# Patient Record
Sex: MALE | Race: WHITE | HISPANIC OR LATINO
[De-identification: names, ages, dates, MRNs, and addresses within clinical notes are randomized per-mention and may not be internally consistent; named-entity substitution may affect disease eponyms.]

---

## 2018-12-06 ENCOUNTER — APPOINTMENT (OUTPATIENT)
Dept: SURGERY | Facility: CLINIC | Age: 58
End: 2018-12-06
Payer: SELF-PAY

## 2018-12-06 ENCOUNTER — APPOINTMENT (OUTPATIENT)
Dept: GASTROENTEROLOGY | Facility: CLINIC | Age: 58
End: 2018-12-06

## 2018-12-06 VITALS
DIASTOLIC BLOOD PRESSURE: 52 MMHG | BODY MASS INDEX: 24.13 KG/M2 | TEMPERATURE: 98 F | OXYGEN SATURATION: 99 % | SYSTOLIC BLOOD PRESSURE: 107 MMHG | HEART RATE: 57 BPM | WEIGHT: 141.31 LBS | HEIGHT: 64 IN

## 2018-12-06 PROCEDURE — 99024 POSTOP FOLLOW-UP VISIT: CPT

## 2023-06-12 ENCOUNTER — INPATIENT (INPATIENT)
Facility: HOSPITAL | Age: 63
LOS: 6 days | Discharge: HOME | DRG: 280 | End: 2023-06-19
Attending: HOSPITALIST | Admitting: THORACIC SURGERY (CARDIOTHORACIC VASCULAR SURGERY)
Payer: MEDICAID

## 2023-06-12 VITALS
SYSTOLIC BLOOD PRESSURE: 172 MMHG | HEART RATE: 76 BPM | TEMPERATURE: 99 F | OXYGEN SATURATION: 97 % | DIASTOLIC BLOOD PRESSURE: 70 MMHG | RESPIRATION RATE: 17 BRPM

## 2023-06-12 DIAGNOSIS — I25.10 ATHEROSCLEROTIC HEART DISEASE OF NATIVE CORONARY ARTERY WITHOUT ANGINA PECTORIS: ICD-10-CM

## 2023-06-12 LAB
A1C WITH ESTIMATED AVERAGE GLUCOSE RESULT: 9.4 % — HIGH (ref 4–5.6)
ALBUMIN SERPL ELPH-MCNC: 3.3 G/DL — SIGNIFICANT CHANGE UP (ref 3.3–5.2)
ALP SERPL-CCNC: 78 U/L — SIGNIFICANT CHANGE UP (ref 40–120)
ALT FLD-CCNC: 19 U/L — SIGNIFICANT CHANGE UP
ANION GAP SERPL CALC-SCNC: 15 MMOL/L — SIGNIFICANT CHANGE UP (ref 5–17)
APTT BLD: 29.7 SEC — SIGNIFICANT CHANGE UP (ref 27.5–35.5)
AST SERPL-CCNC: 13 U/L — SIGNIFICANT CHANGE UP
BASOPHILS # BLD AUTO: 0.03 K/UL — SIGNIFICANT CHANGE UP (ref 0–0.2)
BASOPHILS NFR BLD AUTO: 0.6 % — SIGNIFICANT CHANGE UP (ref 0–2)
BILIRUB SERPL-MCNC: 0.3 MG/DL — LOW (ref 0.4–2)
BLD GP AB SCN SERPL QL: SIGNIFICANT CHANGE UP
BUN SERPL-MCNC: 43.5 MG/DL — HIGH (ref 8–20)
CALCIUM SERPL-MCNC: 7.9 MG/DL — LOW (ref 8.4–10.5)
CHLORIDE SERPL-SCNC: 90 MMOL/L — LOW (ref 96–108)
CO2 SERPL-SCNC: 25 MMOL/L — SIGNIFICANT CHANGE UP (ref 22–29)
CREAT SERPL-MCNC: 7.78 MG/DL — HIGH (ref 0.5–1.3)
EGFR: 7 ML/MIN/1.73M2 — LOW
EOSINOPHIL # BLD AUTO: 0.45 K/UL — SIGNIFICANT CHANGE UP (ref 0–0.5)
EOSINOPHIL NFR BLD AUTO: 9.1 % — HIGH (ref 0–6)
ESTIMATED AVERAGE GLUCOSE: 223 MG/DL — HIGH (ref 68–114)
GLUCOSE BLDC GLUCOMTR-MCNC: 308 MG/DL — HIGH (ref 70–99)
GLUCOSE SERPL-MCNC: 323 MG/DL — HIGH (ref 70–99)
GLUCOSE SERPL-MCNC: 323 MG/DL — HIGH (ref 70–99)
HCG SERPL-ACNC: <4 MIU/ML — HIGH
HCT VFR BLD CALC: 25.6 % — LOW (ref 39–50)
HGB BLD-MCNC: 8.7 G/DL — LOW (ref 13–17)
IMM GRANULOCYTES NFR BLD AUTO: 0.2 % — SIGNIFICANT CHANGE UP (ref 0–0.9)
INR BLD: 0.97 RATIO — SIGNIFICANT CHANGE UP (ref 0.88–1.16)
LYMPHOCYTES # BLD AUTO: 0.73 K/UL — LOW (ref 1–3.3)
LYMPHOCYTES # BLD AUTO: 14.8 % — SIGNIFICANT CHANGE UP (ref 13–44)
MCHC RBC-ENTMCNC: 29.5 PG — SIGNIFICANT CHANGE UP (ref 27–34)
MCHC RBC-ENTMCNC: 34 GM/DL — SIGNIFICANT CHANGE UP (ref 32–36)
MCV RBC AUTO: 86.8 FL — SIGNIFICANT CHANGE UP (ref 80–100)
MONOCYTES # BLD AUTO: 0.5 K/UL — SIGNIFICANT CHANGE UP (ref 0–0.9)
MONOCYTES NFR BLD AUTO: 10.1 % — SIGNIFICANT CHANGE UP (ref 2–14)
NEUTROPHILS # BLD AUTO: 3.22 K/UL — SIGNIFICANT CHANGE UP (ref 1.8–7.4)
NEUTROPHILS NFR BLD AUTO: 65.2 % — SIGNIFICANT CHANGE UP (ref 43–77)
NT-PROBNP SERPL-SCNC: HIGH PG/ML (ref 0–300)
PA ADP PRP-ACNC: 262 PRU — SIGNIFICANT CHANGE UP (ref 180–376)
PLATELET # BLD AUTO: 167 K/UL — SIGNIFICANT CHANGE UP (ref 150–400)
POTASSIUM SERPL-MCNC: 4.4 MMOL/L — SIGNIFICANT CHANGE UP (ref 3.5–5.3)
POTASSIUM SERPL-SCNC: 4.4 MMOL/L — SIGNIFICANT CHANGE UP (ref 3.5–5.3)
PREALB SERPL-MCNC: 15 MG/DL — LOW (ref 18–38)
PROT SERPL-MCNC: 6 G/DL — LOW (ref 6.6–8.7)
PROTHROM AB SERPL-ACNC: 11.2 SEC — SIGNIFICANT CHANGE UP (ref 10.5–13.4)
RBC # BLD: 2.95 M/UL — LOW (ref 4.2–5.8)
RBC # FLD: 14.6 % — HIGH (ref 10.3–14.5)
SODIUM SERPL-SCNC: 130 MMOL/L — LOW (ref 135–145)
TSH SERPL-MCNC: 4.14 UIU/ML — SIGNIFICANT CHANGE UP (ref 0.27–4.2)
WBC # BLD: 4.94 K/UL — SIGNIFICANT CHANGE UP (ref 3.8–10.5)
WBC # FLD AUTO: 4.94 K/UL — SIGNIFICANT CHANGE UP (ref 3.8–10.5)

## 2023-06-12 PROCEDURE — 93010 ELECTROCARDIOGRAM REPORT: CPT

## 2023-06-12 RX ORDER — SODIUM CHLORIDE 9 MG/ML
3 INJECTION INTRAMUSCULAR; INTRAVENOUS; SUBCUTANEOUS EVERY 8 HOURS
Refills: 0 | Status: DISCONTINUED | OUTPATIENT
Start: 2023-06-12 | End: 2023-06-19

## 2023-06-12 RX ORDER — INSULIN GLARGINE 100 [IU]/ML
7 INJECTION, SOLUTION SUBCUTANEOUS AT BEDTIME
Refills: 0 | Status: DISCONTINUED | OUTPATIENT
Start: 2023-06-12 | End: 2023-06-15

## 2023-06-12 RX ORDER — INSULIN LISPRO 100/ML
VIAL (ML) SUBCUTANEOUS
Refills: 0 | Status: DISCONTINUED | OUTPATIENT
Start: 2023-06-12 | End: 2023-06-13

## 2023-06-12 RX ORDER — DEXTROSE 50 % IN WATER 50 %
25 SYRINGE (ML) INTRAVENOUS ONCE
Refills: 0 | Status: DISCONTINUED | OUTPATIENT
Start: 2023-06-12 | End: 2023-06-19

## 2023-06-12 RX ORDER — DEXTROSE 50 % IN WATER 50 %
12.5 SYRINGE (ML) INTRAVENOUS ONCE
Refills: 0 | Status: DISCONTINUED | OUTPATIENT
Start: 2023-06-12 | End: 2023-06-19

## 2023-06-12 RX ORDER — PANTOPRAZOLE SODIUM 20 MG/1
40 TABLET, DELAYED RELEASE ORAL
Refills: 0 | Status: DISCONTINUED | OUTPATIENT
Start: 2023-06-12 | End: 2023-06-15

## 2023-06-12 RX ORDER — AMLODIPINE BESYLATE 2.5 MG/1
10 TABLET ORAL ONCE
Refills: 0 | Status: DISCONTINUED | OUTPATIENT
Start: 2023-06-13 | End: 2023-06-16

## 2023-06-12 RX ORDER — GABAPENTIN 400 MG/1
300 CAPSULE ORAL AT BEDTIME
Refills: 0 | Status: DISCONTINUED | OUTPATIENT
Start: 2023-06-12 | End: 2023-06-14

## 2023-06-12 RX ORDER — HYOSCYAMINE SULFATE 0.13 MG
0.12 TABLET ORAL
Refills: 0 | Status: DISCONTINUED | OUTPATIENT
Start: 2023-06-12 | End: 2023-06-19

## 2023-06-12 RX ORDER — CALCIUM ACETATE 667 MG
667 TABLET ORAL
Refills: 0 | Status: DISCONTINUED | OUTPATIENT
Start: 2023-06-12 | End: 2023-06-19

## 2023-06-12 RX ORDER — ERYTHROPOIETIN 10000 [IU]/ML
10000 INJECTION, SOLUTION INTRAVENOUS; SUBCUTANEOUS
Refills: 0 | Status: DISCONTINUED | OUTPATIENT
Start: 2023-06-14 | End: 2023-06-14

## 2023-06-12 RX ORDER — SODIUM CHLORIDE 9 MG/ML
1000 INJECTION, SOLUTION INTRAVENOUS
Refills: 0 | Status: DISCONTINUED | OUTPATIENT
Start: 2023-06-12 | End: 2023-06-19

## 2023-06-12 RX ORDER — ACETAMINOPHEN 500 MG
650 TABLET ORAL EVERY 8 HOURS
Refills: 0 | Status: DISCONTINUED | OUTPATIENT
Start: 2023-06-12 | End: 2023-06-19

## 2023-06-12 RX ORDER — METHOCARBAMOL 500 MG/1
500 TABLET, FILM COATED ORAL THREE TIMES A DAY
Refills: 0 | Status: DISCONTINUED | OUTPATIENT
Start: 2023-06-12 | End: 2023-06-19

## 2023-06-12 RX ORDER — ALBUTEROL 90 UG/1
2 AEROSOL, METERED ORAL EVERY 6 HOURS
Refills: 0 | Status: DISCONTINUED | OUTPATIENT
Start: 2023-06-12 | End: 2023-06-19

## 2023-06-12 RX ORDER — CARVEDILOL PHOSPHATE 80 MG/1
25 CAPSULE, EXTENDED RELEASE ORAL EVERY 12 HOURS
Refills: 0 | Status: DISCONTINUED | OUTPATIENT
Start: 2023-06-13 | End: 2023-06-13

## 2023-06-12 RX ORDER — LIDOCAINE 4 G/100G
1 CREAM TOPICAL DAILY
Refills: 0 | Status: DISCONTINUED | OUTPATIENT
Start: 2023-06-12 | End: 2023-06-19

## 2023-06-12 RX ORDER — ATORVASTATIN CALCIUM 80 MG/1
40 TABLET, FILM COATED ORAL AT BEDTIME
Refills: 0 | Status: DISCONTINUED | OUTPATIENT
Start: 2023-06-12 | End: 2023-06-19

## 2023-06-12 RX ORDER — DEXTROSE 50 % IN WATER 50 %
15 SYRINGE (ML) INTRAVENOUS ONCE
Refills: 0 | Status: DISCONTINUED | OUTPATIENT
Start: 2023-06-12 | End: 2023-06-19

## 2023-06-12 RX ORDER — NITROGLYCERIN 6.5 MG
0.4 CAPSULE, EXTENDED RELEASE ORAL
Refills: 0 | Status: COMPLETED | OUTPATIENT
Start: 2023-06-12 | End: 2023-06-13

## 2023-06-12 RX ORDER — ASPIRIN/CALCIUM CARB/MAGNESIUM 324 MG
81 TABLET ORAL DAILY
Refills: 0 | Status: DISCONTINUED | OUTPATIENT
Start: 2023-06-13 | End: 2023-06-13

## 2023-06-12 RX ORDER — GLUCAGON INJECTION, SOLUTION 0.5 MG/.1ML
1 INJECTION, SOLUTION SUBCUTANEOUS ONCE
Refills: 0 | Status: DISCONTINUED | OUTPATIENT
Start: 2023-06-12 | End: 2023-06-19

## 2023-06-13 DIAGNOSIS — I50.9 HEART FAILURE, UNSPECIFIED: ICD-10-CM

## 2023-06-13 DIAGNOSIS — N40.0 BENIGN PROSTATIC HYPERPLASIA WITHOUT LOWER URINARY TRACT SYMPTOMS: ICD-10-CM

## 2023-06-13 DIAGNOSIS — J44.9 CHRONIC OBSTRUCTIVE PULMONARY DISEASE, UNSPECIFIED: ICD-10-CM

## 2023-06-13 DIAGNOSIS — I10 ESSENTIAL (PRIMARY) HYPERTENSION: ICD-10-CM

## 2023-06-13 DIAGNOSIS — E11.9 TYPE 2 DIABETES MELLITUS WITHOUT COMPLICATIONS: ICD-10-CM

## 2023-06-13 DIAGNOSIS — E78.5 HYPERLIPIDEMIA, UNSPECIFIED: ICD-10-CM

## 2023-06-13 DIAGNOSIS — I25.10 ATHEROSCLEROTIC HEART DISEASE OF NATIVE CORONARY ARTERY WITHOUT ANGINA PECTORIS: ICD-10-CM

## 2023-06-13 DIAGNOSIS — N18.6 END STAGE RENAL DISEASE: ICD-10-CM

## 2023-06-13 LAB
ABO RH CONFIRMATION: SIGNIFICANT CHANGE UP
ALBUMIN SERPL ELPH-MCNC: 3 G/DL — LOW (ref 3.3–5.2)
ALP SERPL-CCNC: 75 U/L — SIGNIFICANT CHANGE UP (ref 40–120)
ALT FLD-CCNC: 15 U/L — SIGNIFICANT CHANGE UP
ANION GAP SERPL CALC-SCNC: 15 MMOL/L — SIGNIFICANT CHANGE UP (ref 5–17)
AST SERPL-CCNC: 11 U/L — SIGNIFICANT CHANGE UP
BASOPHILS # BLD AUTO: 0.02 K/UL — SIGNIFICANT CHANGE UP (ref 0–0.2)
BASOPHILS NFR BLD AUTO: 0.4 % — SIGNIFICANT CHANGE UP (ref 0–2)
BILIRUB SERPL-MCNC: 0.3 MG/DL — LOW (ref 0.4–2)
BUN SERPL-MCNC: 49.7 MG/DL — HIGH (ref 8–20)
CALCIUM SERPL-MCNC: 7.6 MG/DL — LOW (ref 8.4–10.5)
CHLORIDE SERPL-SCNC: 93 MMOL/L — LOW (ref 96–108)
CO2 SERPL-SCNC: 25 MMOL/L — SIGNIFICANT CHANGE UP (ref 22–29)
CREAT SERPL-MCNC: 8.56 MG/DL — HIGH (ref 0.5–1.3)
EGFR: 6 ML/MIN/1.73M2 — LOW
EOSINOPHIL # BLD AUTO: 0.48 K/UL — SIGNIFICANT CHANGE UP (ref 0–0.5)
EOSINOPHIL NFR BLD AUTO: 10.6 % — HIGH (ref 0–6)
FERRITIN SERPL-MCNC: 1092 NG/ML — HIGH (ref 30–400)
GLUCOSE BLDC GLUCOMTR-MCNC: 126 MG/DL — HIGH (ref 70–99)
GLUCOSE BLDC GLUCOMTR-MCNC: 127 MG/DL — HIGH (ref 70–99)
GLUCOSE BLDC GLUCOMTR-MCNC: 193 MG/DL — HIGH (ref 70–99)
GLUCOSE BLDC GLUCOMTR-MCNC: 226 MG/DL — HIGH (ref 70–99)
GLUCOSE BLDC GLUCOMTR-MCNC: 374 MG/DL — HIGH (ref 70–99)
GLUCOSE SERPL-MCNC: 169 MG/DL — HIGH (ref 70–99)
HCT VFR BLD CALC: 23.7 % — LOW (ref 39–50)
HGB BLD-MCNC: 7.9 G/DL — LOW (ref 13–17)
IMM GRANULOCYTES NFR BLD AUTO: 0.2 % — SIGNIFICANT CHANGE UP (ref 0–0.9)
IRON SATN MFR SERPL: 21 % — SIGNIFICANT CHANGE UP (ref 16–55)
IRON SATN MFR SERPL: 45 UG/DL — LOW (ref 59–158)
LYMPHOCYTES # BLD AUTO: 0.74 K/UL — LOW (ref 1–3.3)
LYMPHOCYTES # BLD AUTO: 16.3 % — SIGNIFICANT CHANGE UP (ref 13–44)
MAGNESIUM SERPL-MCNC: 2.1 MG/DL — SIGNIFICANT CHANGE UP (ref 1.6–2.6)
MCHC RBC-ENTMCNC: 28.9 PG — SIGNIFICANT CHANGE UP (ref 27–34)
MCHC RBC-ENTMCNC: 33.3 GM/DL — SIGNIFICANT CHANGE UP (ref 32–36)
MCV RBC AUTO: 86.8 FL — SIGNIFICANT CHANGE UP (ref 80–100)
MONOCYTES # BLD AUTO: 0.55 K/UL — SIGNIFICANT CHANGE UP (ref 0–0.9)
MONOCYTES NFR BLD AUTO: 12.1 % — SIGNIFICANT CHANGE UP (ref 2–14)
MRSA PCR RESULT.: SIGNIFICANT CHANGE UP
NEUTROPHILS # BLD AUTO: 2.74 K/UL — SIGNIFICANT CHANGE UP (ref 1.8–7.4)
NEUTROPHILS NFR BLD AUTO: 60.4 % — SIGNIFICANT CHANGE UP (ref 43–77)
PHOSPHATE SERPL-MCNC: 5.5 MG/DL — HIGH (ref 2.4–4.7)
PLATELET # BLD AUTO: 159 K/UL — SIGNIFICANT CHANGE UP (ref 150–400)
POTASSIUM SERPL-MCNC: 4.2 MMOL/L — SIGNIFICANT CHANGE UP (ref 3.5–5.3)
POTASSIUM SERPL-SCNC: 4.2 MMOL/L — SIGNIFICANT CHANGE UP (ref 3.5–5.3)
PROT SERPL-MCNC: 5.6 G/DL — LOW (ref 6.6–8.7)
RBC # BLD: 2.73 M/UL — LOW (ref 4.2–5.8)
RBC # FLD: 14.7 % — HIGH (ref 10.3–14.5)
S AUREUS DNA NOSE QL NAA+PROBE: SIGNIFICANT CHANGE UP
SODIUM SERPL-SCNC: 133 MMOL/L — LOW (ref 135–145)
TIBC SERPL-MCNC: 216 UG/DL — LOW (ref 220–430)
TRANSFERRIN SERPL-MCNC: 151 MG/DL — LOW (ref 180–329)
WBC # BLD: 4.54 K/UL — SIGNIFICANT CHANGE UP (ref 3.8–10.5)
WBC # FLD AUTO: 4.54 K/UL — SIGNIFICANT CHANGE UP (ref 3.8–10.5)

## 2023-06-13 PROCEDURE — 99222 1ST HOSP IP/OBS MODERATE 55: CPT

## 2023-06-13 PROCEDURE — 99255 IP/OBS CONSLTJ NEW/EST HI 80: CPT

## 2023-06-13 PROCEDURE — 93880 EXTRACRANIAL BILAT STUDY: CPT | Mod: 26

## 2023-06-13 PROCEDURE — 99222 1ST HOSP IP/OBS MODERATE 55: CPT | Mod: 25

## 2023-06-13 PROCEDURE — 93306 TTE W/DOPPLER COMPLETE: CPT | Mod: 26

## 2023-06-13 PROCEDURE — 99223 1ST HOSP IP/OBS HIGH 75: CPT

## 2023-06-13 PROCEDURE — 71045 X-RAY EXAM CHEST 1 VIEW: CPT | Mod: 26

## 2023-06-13 PROCEDURE — 93010 ELECTROCARDIOGRAM REPORT: CPT

## 2023-06-13 RX ORDER — SODIUM BICARBONATE 1 MEQ/ML
2 SYRINGE (ML) INTRAVENOUS
Refills: 0 | DISCHARGE

## 2023-06-13 RX ORDER — INSULIN LISPRO 100/ML
5 VIAL (ML) SUBCUTANEOUS
Refills: 0 | Status: DISCONTINUED | OUTPATIENT
Start: 2023-06-13 | End: 2023-06-14

## 2023-06-13 RX ORDER — LANOLIN ALCOHOL/MO/W.PET/CERES
5 CREAM (GRAM) TOPICAL AT BEDTIME
Refills: 0 | Status: DISCONTINUED | OUTPATIENT
Start: 2023-06-13 | End: 2023-06-19

## 2023-06-13 RX ORDER — INSULIN LISPRO 100/ML
VIAL (ML) SUBCUTANEOUS
Refills: 0 | Status: DISCONTINUED | OUTPATIENT
Start: 2023-06-13 | End: 2023-06-13

## 2023-06-13 RX ORDER — LORATADINE 10 MG/1
10 TABLET ORAL DAILY
Refills: 0 | Status: DISCONTINUED | OUTPATIENT
Start: 2023-06-13 | End: 2023-06-19

## 2023-06-13 RX ORDER — TAMSULOSIN HYDROCHLORIDE 0.4 MG/1
0.4 CAPSULE ORAL AT BEDTIME
Refills: 0 | Status: DISCONTINUED | OUTPATIENT
Start: 2023-06-13 | End: 2023-06-19

## 2023-06-13 RX ORDER — SODIUM BICARBONATE 1 MEQ/ML
1300 SYRINGE (ML) INTRAVENOUS THREE TIMES A DAY
Refills: 0 | Status: DISCONTINUED | OUTPATIENT
Start: 2023-06-13 | End: 2023-06-14

## 2023-06-13 RX ORDER — LORATADINE 10 MG/1
1 TABLET ORAL
Refills: 0 | DISCHARGE

## 2023-06-13 RX ORDER — HEPARIN SODIUM 5000 [USP'U]/ML
5000 INJECTION INTRAVENOUS; SUBCUTANEOUS EVERY 8 HOURS
Refills: 0 | Status: DISCONTINUED | OUTPATIENT
Start: 2023-06-13 | End: 2023-06-19

## 2023-06-13 RX ORDER — METOPROLOL TARTRATE 50 MG
50 TABLET ORAL
Refills: 0 | Status: DISCONTINUED | OUTPATIENT
Start: 2023-06-13 | End: 2023-06-16

## 2023-06-13 RX ORDER — SACCHAROMYCES BOULARDII 250 MG
250 POWDER IN PACKET (EA) ORAL
Refills: 0 | Status: DISCONTINUED | OUTPATIENT
Start: 2023-06-13 | End: 2023-06-19

## 2023-06-13 RX ORDER — ACETAMINOPHEN 500 MG
2 TABLET ORAL
Refills: 0 | DISCHARGE

## 2023-06-13 RX ORDER — LANOLIN ALCOHOL/MO/W.PET/CERES
1 CREAM (GRAM) TOPICAL
Refills: 0 | DISCHARGE

## 2023-06-13 RX ORDER — INSULIN LISPRO 100/ML
5 VIAL (ML) SUBCUTANEOUS ONCE
Refills: 0 | Status: COMPLETED | OUTPATIENT
Start: 2023-06-13 | End: 2023-06-13

## 2023-06-13 RX ORDER — MONTELUKAST 4 MG/1
10 TABLET, CHEWABLE ORAL AT BEDTIME
Refills: 0 | Status: DISCONTINUED | OUTPATIENT
Start: 2023-06-13 | End: 2023-06-19

## 2023-06-13 RX ORDER — ERYTHROPOIETIN 10000 [IU]/ML
10000 INJECTION, SOLUTION INTRAVENOUS; SUBCUTANEOUS
Refills: 0 | DISCHARGE

## 2023-06-13 RX ORDER — METHOCARBAMOL 500 MG/1
1 TABLET, FILM COATED ORAL
Refills: 0 | DISCHARGE

## 2023-06-13 RX ORDER — OMEPRAZOLE 10 MG/1
1 CAPSULE, DELAYED RELEASE ORAL
Refills: 0 | DISCHARGE

## 2023-06-13 RX ORDER — OXYCODONE HYDROCHLORIDE 5 MG/1
5 TABLET ORAL ONCE
Refills: 0 | Status: DISCONTINUED | OUTPATIENT
Start: 2023-06-13 | End: 2023-06-13

## 2023-06-13 RX ORDER — INSULIN LISPRO 100/ML
VIAL (ML) SUBCUTANEOUS
Refills: 0 | Status: DISCONTINUED | OUTPATIENT
Start: 2023-06-13 | End: 2023-06-14

## 2023-06-13 RX ORDER — BNT162B2 ORIGINAL AND OMICRON BA.4/BA.5 .1125; .1125 MG/2.25ML; MG/2.25ML
0.3 INJECTION, SUSPENSION INTRAMUSCULAR ONCE
Refills: 0 | Status: COMPLETED | OUTPATIENT
Start: 2023-06-13 | End: 2023-06-13

## 2023-06-13 RX ORDER — HYOSCYAMINE SULFATE 0.13 MG
1 TABLET ORAL
Refills: 0 | DISCHARGE

## 2023-06-13 RX ORDER — CHLORHEXIDINE GLUCONATE 213 G/1000ML
1 SOLUTION TOPICAL
Refills: 0 | Status: DISCONTINUED | OUTPATIENT
Start: 2023-06-13 | End: 2023-06-19

## 2023-06-13 RX ORDER — LIDOCAINE 4 G/100G
1 CREAM TOPICAL
Refills: 0 | DISCHARGE

## 2023-06-13 RX ORDER — ASPIRIN/CALCIUM CARB/MAGNESIUM 324 MG
81 TABLET ORAL DAILY
Refills: 0 | Status: DISCONTINUED | OUTPATIENT
Start: 2023-06-13 | End: 2023-06-19

## 2023-06-13 RX ADMIN — CARVEDILOL PHOSPHATE 25 MILLIGRAM(S): 80 CAPSULE, EXTENDED RELEASE ORAL at 05:56

## 2023-06-13 RX ADMIN — Medication 5 UNIT(S): at 01:10

## 2023-06-13 RX ADMIN — Medication 250 MILLIGRAM(S): at 22:02

## 2023-06-13 RX ADMIN — Medication 0.4 MILLIGRAM(S): at 01:10

## 2023-06-13 RX ADMIN — Medication 1: at 08:27

## 2023-06-13 RX ADMIN — SODIUM CHLORIDE 3 MILLILITER(S): 9 INJECTION INTRAMUSCULAR; INTRAVENOUS; SUBCUTANEOUS at 00:23

## 2023-06-13 RX ADMIN — HEPARIN SODIUM 5000 UNIT(S): 5000 INJECTION INTRAVENOUS; SUBCUTANEOUS at 22:03

## 2023-06-13 RX ADMIN — OXYCODONE HYDROCHLORIDE 5 MILLIGRAM(S): 5 TABLET ORAL at 00:00

## 2023-06-13 RX ADMIN — LIDOCAINE 1 PATCH: 4 CREAM TOPICAL at 19:00

## 2023-06-13 RX ADMIN — Medication 50 MILLIGRAM(S): at 19:40

## 2023-06-13 RX ADMIN — SODIUM CHLORIDE 3 MILLILITER(S): 9 INJECTION INTRAMUSCULAR; INTRAVENOUS; SUBCUTANEOUS at 22:53

## 2023-06-13 RX ADMIN — Medication 81 MILLIGRAM(S): at 08:25

## 2023-06-13 RX ADMIN — Medication 250 MILLIGRAM(S): at 05:55

## 2023-06-13 RX ADMIN — LIDOCAINE 1 PATCH: 4 CREAM TOPICAL at 12:25

## 2023-06-13 RX ADMIN — GABAPENTIN 300 MILLIGRAM(S): 400 CAPSULE ORAL at 22:03

## 2023-06-13 RX ADMIN — LORATADINE 10 MILLIGRAM(S): 10 TABLET ORAL at 08:26

## 2023-06-13 RX ADMIN — ATORVASTATIN CALCIUM 40 MILLIGRAM(S): 80 TABLET, FILM COATED ORAL at 22:03

## 2023-06-13 RX ADMIN — MONTELUKAST 10 MILLIGRAM(S): 4 TABLET, CHEWABLE ORAL at 22:03

## 2023-06-13 RX ADMIN — TAMSULOSIN HYDROCHLORIDE 0.4 MILLIGRAM(S): 0.4 CAPSULE ORAL at 22:02

## 2023-06-13 RX ADMIN — SODIUM CHLORIDE 3 MILLILITER(S): 9 INJECTION INTRAMUSCULAR; INTRAVENOUS; SUBCUTANEOUS at 14:59

## 2023-06-13 RX ADMIN — Medication 1300 MILLIGRAM(S): at 22:02

## 2023-06-13 RX ADMIN — Medication 0.4 MILLIGRAM(S): at 01:30

## 2023-06-13 RX ADMIN — Medication 667 MILLIGRAM(S): at 08:26

## 2023-06-13 RX ADMIN — Medication 1300 MILLIGRAM(S): at 05:56

## 2023-06-13 RX ADMIN — INSULIN GLARGINE 7 UNIT(S): 100 INJECTION, SOLUTION SUBCUTANEOUS at 22:03

## 2023-06-13 RX ADMIN — ATORVASTATIN CALCIUM 40 MILLIGRAM(S): 80 TABLET, FILM COATED ORAL at 00:49

## 2023-06-13 RX ADMIN — Medication 667 MILLIGRAM(S): at 12:23

## 2023-06-13 RX ADMIN — INSULIN GLARGINE 7 UNIT(S): 100 INJECTION, SOLUTION SUBCUTANEOUS at 00:50

## 2023-06-13 RX ADMIN — PANTOPRAZOLE SODIUM 40 MILLIGRAM(S): 20 TABLET, DELAYED RELEASE ORAL at 05:56

## 2023-06-13 RX ADMIN — OXYCODONE HYDROCHLORIDE 5 MILLIGRAM(S): 5 TABLET ORAL at 06:25

## 2023-06-13 RX ADMIN — Medication 0.4 MILLIGRAM(S): at 00:49

## 2023-06-13 RX ADMIN — Medication 1300 MILLIGRAM(S): at 12:24

## 2023-06-13 RX ADMIN — SODIUM CHLORIDE 3 MILLILITER(S): 9 INJECTION INTRAMUSCULAR; INTRAVENOUS; SUBCUTANEOUS at 06:47

## 2023-06-13 NOTE — CONSULT NOTE ADULT - SUBJECTIVE AND OBJECTIVE BOX
Patient is a 63y old  Male who presents with a chief complaint of multivessel CAD     HPI:  63M with h/o ESRD on HD (T,Th, S), IDDM, COPD, gastroparesis, peripheral blindness and retinopathy, and CAD, who initially presented to [facility] on 5/31 with n/v/d, missing his HD as a result, and found to be hyperkalemic with acute respiratory failure secondary to fluid overload vs pneumonia and NSTEMI in the context of C. diff colitis (positive on 6/1). Completed 10 day course of oral vancomycin on 6/12 per chart.  Patient became fluid overloaded during hospitalization at [place] requiring HFNC, improved with additional HD.Then  transfered to [facility].  Patient found to have NSTEMI s/p heparin drip 6/10, with cardiac cath 6/12 demonstrating MVD requiring transfer to [facility] for CT Surgery evaluation for possible CABG.    On admission a1c 9.4%, at home on insulin, patient does not know his doses, says he has been diabetic for a few years., Takes 2 to 3 insulin shots at home, wife administers it, he does not know doses, wife not bedside today.    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  S/P amputation  left toes  Stented coronary artery        Social History:  Marital status:   Lives with: wife  ADLs: independent  Assistive Devices: none    Tobacco use: denies  Alcohol use: denies  Illicit drug use: denies    Code status: Full  HCP: TBD (13 Jun 2023 03:52)      FAMILY HISTORY:  No pertinent family history in first degree relatives          Allergies    shellfish (Rash)  No Known Drug Allergies    Intolerances        REVIEW OF SYSTEMS:    CONSTITUTIONAL: No fever, weight loss, or fatigue  EYES: No eye pain, legally blind  ENMT:  No difficulty hearing, tinnitus, vertigo; No sinus or throat pain  NECK: No pain or stiffness  RESPIRATORY: No cough, wheezing, chills or hemoptysis; mild shortness of breath  CARDIOVASCULAR: mild chest pain, palpitations, dizziness, or leg swelling  GASTROINTESTINAL: No abdominal or epigastric pain. No nausea, vomiting, or hematemesis;   No diarrhea or constipation. No melena or hematochezia.  NEUROLOGICAL: No headaches, memory loss, loss of strength, numbness, or tremors  SKIN: No itching, burning, rashes, or lesions   MUSCULOSKELETAL: No joint pain or swelling; No muscle, back, or extremity pain  PSYCHIATRIC: No depression, anxiety, mood swings, or difficulty sleeping        MEDICATIONS  (STANDING):  amLODIPine   Tablet 10 milliGRAM(s) Oral once  aspirin  chewable 81 milliGRAM(s) Oral daily  atorvastatin 40 milliGRAM(s) Oral at bedtime  calcium acetate 667 milliGRAM(s) Oral three times a day with meals  chlorhexidine 2% Cloths 1 Application(s) Topical <User Schedule>  dextrose 5%. 1000 milliLiter(s) (100 mL/Hr) IV Continuous <Continuous>  dextrose 5%. 1000 milliLiter(s) (50 mL/Hr) IV Continuous <Continuous>  dextrose 50% Injectable 12.5 Gram(s) IV Push once  dextrose 50% Injectable 25 Gram(s) IV Push once  dextrose 50% Injectable 25 Gram(s) IV Push once  gabapentin 300 milliGRAM(s) Oral at bedtime  glucagon  Injectable 1 milliGRAM(s) IntraMuscular once  insulin glargine Injectable (LANTUS) 7 Unit(s) SubCutaneous at bedtime  insulin lispro (ADMELOG) corrective regimen sliding scale   SubCutaneous three times a day before meals  lidocaine   4% Patch 1 Patch Transdermal daily  loratadine 10 milliGRAM(s) Oral daily  melatonin 5 milliGRAM(s) Oral at bedtime  metoprolol tartrate 50 milliGRAM(s) Oral two times a day  montelukast 10 milliGRAM(s) Oral at bedtime  pantoprazole    Tablet 40 milliGRAM(s) Oral before breakfast  saccharomyces boulardii 250 milliGRAM(s) Oral two times a day  sodium bicarbonate 1300 milliGRAM(s) Oral three times a day  sodium chloride 0.9% lock flush 3 milliLiter(s) IV Push every 8 hours  tamsulosin 0.4 milliGRAM(s) Oral at bedtime    MEDICATIONS  (PRN):  acetaminophen     Tablet .. 650 milliGRAM(s) Oral every 8 hours PRN Mild Pain (1 - 3)  albuterol    90 MICROgram(s) HFA Inhaler 2 Puff(s) Inhalation every 6 hours PRN Bronchospasm  dextrose Oral Gel 15 Gram(s) Oral once PRN Blood Glucose LESS THAN 70 milliGRAM(s)/deciliter  guaiFENesin Oral Liquid (Sugar-Free) 100 milliGRAM(s) Oral every 8 hours PRN Cough  hyoscyamine SL 0.125 milliGRAM(s) SubLingual four times a day PRN abdominal spasm  methocarbamol 500 milliGRAM(s) Oral three times a day PRN Muscle Spasm      Vital Signs Last 24 Hrs  T(C): 36.7 (13 Jun 2023 07:07), Max: 37.1 (12 Jun 2023 21:47)  T(F): 98 (13 Jun 2023 07:07), Max: 98.8 (12 Jun 2023 21:47)  HR: 67 (13 Jun 2023 07:07) (66 - 76)  BP: 134/56 (13 Jun 2023 07:07) (134/56 - 172/70)  BP(mean): 90 (13 Jun 2023 05:48) (90 - 97)  RR: 20 (13 Jun 2023 07:07) (16 - 20)  SpO2: 98% (13 Jun 2023 07:07) (97% - 100%)    Parameters below as of 13 Jun 2023 05:48  Patient On (Oxygen Delivery Method): nasal cannula  O2 Flow (L/min): 2        PHYSICAL EXAM:    Constitutional: NAD, well-groomed, well-developed  Neck: No LAD,  no thyroid enlargement  Respiratory: CTAB  Cardiovascular: S1 and S2, RRR, no M/G/R  Gastrointestinal: BS+, soft, no organomegaly or mass  Extremities: no peripheral edema, no pedal lesions  Vascular: 2+ peripheral pulses  Neurological: A/O x 3, no focal deficits  Psychiatric: Normal mood, normal affect  Skin: No rashes, no acanthosis        LABS  06-13    133<L>  |  93<L>  |  49.7<H>  ----------------------------<  169<H>  4.2   |  25.0  |  8.56<H>    Ca    7.6<L>      13 Jun 2023 05:32  Mg     2.1     06-13    TPro  5.6<L>  /  Alb  3.0<L>  /  TBili  0.3<L>  /  DBili  x   /  AST  11  /  ALT  15  /  AlkPhos  75  06-13                          7.9    4.54  )-----------( 159      ( 13 Jun 2023 05:32 )             23.7       A1C with Estimated Average Glucose Result: 9.4 % (06-12-23 @ 21:50)          Alkaline Phosphatase, Serum: 75 U/L (06-13-23 @ 05:32)  Albumin, Serum: 3.0 g/dL (06-13-23 @ 05:32)  Aspartate Aminotransferase (AST/SGOT): 11 U/L (06-13-23 @ 05:32)  Alanine Aminotransferase (ALT/SGPT): 15 U/L (06-13-23 @ 05:32)  Alkaline Phosphatase, Serum: 78 U/L (06-12-23 @ 21:50)  Albumin, Serum: 3.3 g/dL (06-12-23 @ 21:50)  Aspartate Aminotransferase (AST/SGOT): 13 U/L (06-12-23 @ 21:50)  Alanine Aminotransferase (ALT/SGPT): 19 U/L (06-12-23 @ 21:50)    Thyroid Stimulating Hormone, Serum: 4.14 uIU/mL (06-12-23 @ 21:50)    CAPILLARY BLOOD GLUCOSE      POCT Blood Glucose.: 127 mg/dL (13 Jun 2023 12:15)  POCT Blood Glucose.: 193 mg/dL (13 Jun 2023 08:10)  POCT Blood Glucose.: 374 mg/dL (13 Jun 2023 00:20)  POCT Blood Glucose.: 308 mg/dL (12 Jun 2023 21:44)

## 2023-06-13 NOTE — CONSULT NOTE ADULT - SUBJECTIVE AND OBJECTIVE BOX
The patient is a 63 year old legally blind male with PMH of DM complicated by neuropathy, HTN, HLD, CAD, PVD, gastroparesis, ESRD on HD TTS and BPH initially presented to [facility] on 5/31 with N/V/D after missing hemodialysis. Admitted for hyperkalemic and acute respiratory failure secondary to fluid overload vs pneumonia. Hospital course notable for NSTEMI with cardiac cath on 06/12 showing multivessel disease. He was transferred to [facility] on 06/13/2023 for CABG evaluation. Nephrology is consulted for resumption of hemodialysis.     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  S/P amputation  left toes  Stented coronary artery    Allergies  shellfish (Rash)  No Known Drug Allergies  Intolerances    Home Medications:  acetaminophen 325 mg oral tablet: 2 tab(s) orally every 8 hours as needed for  mild pain (13 Jun 2023 04:19)  Albuterol (Eqv-ProAir HFA) 90 mcg/inh inhalation aerosol: 2 puff(s) inhaled 4 times a day as needed for  bronchospasm (13 Jun 2023 04:33)  aspirin 81 mg oral tablet: 1 tab(s) orally once a day (13 Jun 2023 04:33)  calcium acetate 667 mg oral tablet: 1 tab(s) orally 3 times a day (13 Jun 2023 04:24)  Coreg 25 mg oral tablet: 1 tab(s) orally 2 times a day (13 Jun 2023 04:33)  epoetin lois 10,000 units/mL injectable solution: 10,000 unit(s) subcutaneous 3 times a week MWF (13 Jun 2023 04:19)  Flomax 0.4 mg oral capsule: 1 cap(s) orally once a day (13 Jun 2023 04:35)  gabapentin 300 mg oral capsule: 1 cap(s) orally once a day (at bedtime) (13 Jun 2023 04:33)  guaiFENesin 100 mg/5 mL oral liquid: 5 milliliter(s) orally 3 times a day as needed for  cough (13 Jun 2023 04:33)  hyoscyamine 0.125 mg oral tablet: 1 tab(s) orally 4 times a day as needed for  abdominal spasm (13 Jun 2023 04:33)  Lantus 100 units/mL subcutaneous solution: 5 unit(s) subcutaneous once a day (at bedtime) (13 Jun 2023 04:33)  lidocaine 4% topical film: Apply topically to affected area once a day (13 Jun 2023 04:19)  loratadine 10 mg oral tablet: 1 tab(s) orally once a day (13 Jun 2023 04:19)  melatonin 5 mg oral tablet: 1 tab(s) orally once a day (at bedtime) (13 Jun 2023 04:19)  methocarbamol 500 mg oral tablet: 1 tab(s) orally 2 times a day (13 Jun 2023 04:33)  nitroglycerin 0.4 mg sublingual tablet: 1 tab(s) sublingually once a day as needed for  chest pain (13 Jun 2023 04:19)  Norvasc 10 mg oral tablet: 1 tab(s) orally once a day (13 Jun 2023 04:35)  omeprazole 20 mg oral delayed release capsule: 1 cap(s) orally once a day (13 Jun 2023 04:33)  Plavix 75 mg oral tablet: 1 tab(s) orally once a day (13 Jun 2023 04:33)  pravastatin 40 mg oral tablet: 1 tab(s) orally once a day (at bedtime) (13 Jun 2023 04:33)  saccharomyces boulardii lyo 250 mg oral capsule: 1 cap(s) orally 2 times a day (13 Jun 2023 04:19)  sodium bicarbonate 650 mg oral tablet: 2 tab(s) orally 3 times a day (13 Jun 2023 04:33)    FAMILY HISTORY:  No pertinent family history in first degree relatives    Social History:  Marital status:   Lives with: wife  ADLs: independent  Assistive Devices: none    Tobacco use: denies  Alcohol use: denies  Illicit drug use: denies    Code status: Full  HCP: TBD (13 Jun 2023 03:52)    ROS: "Pain all over"    Vital Signs Last 24 Hrs  T(C): 36.7 (13 Jun 2023 07:07), Max: 37.1 (12 Jun 2023 21:47)  T(F): 98 (13 Jun 2023 07:07), Max: 98.8 (12 Jun 2023 21:47)  HR: 67 (13 Jun 2023 07:07) (66 - 76)  BP: 134/56 (13 Jun 2023 07:07) (134/56 - 172/70)  BP(mean): 90 (13 Jun 2023 05:48) (90 - 97)  RR: 20 (13 Jun 2023 07:07) (16 - 20)  SpO2: 98% (13 Jun 2023 07:07) (97% - 100%)    Parameters below as of 13 Jun 2023 05:48  Patient On (Oxygen Delivery Method): nasal cannula  O2 Flow (L/min): 2    I&O's Summary    12 Jun 2023 07:01  -  13 Jun 2023 07:00  --------------------------------------------------------  IN: 480 mL / OUT: 0 mL / NET: 480 mL    13 Jun 2023 07:01  -  13 Jun 2023 12:48  --------------------------------------------------------  IN: 360 mL / OUT: 0 mL / NET: 360 mL    Physical Exam  General: WDWN male in NAD  HEENT: Normocephalic  Cardiac: S1S2 RRR  Respiratory: CTAB  Abdomen: Soft, NT  Extremities: No appreciable edema  Neuro: AAOx3  Access: R AV fistula with thrill and bruit     06-13    133<L>  |  93<L>  |  49.7<H>  ----------------------------<  169<H>  4.2   |  25.0  |  8.56<H>    Ca    7.6<L>      13 Jun 2023 05:32  Mg     2.1     06-13    TPro  5.6<L>  /  Alb  3.0<L>  /  TBili  0.3<L>  /  DBili  x   /  AST  11  /  ALT  15  /  AlkPhos  75  06-13                        7.9    4.54  )-----------( 159      ( 13 Jun 2023 05:32 )             23.7     MEDICATIONS  (STANDING):  amLODIPine   Tablet 10 milliGRAM(s) Oral once  aspirin  chewable 81 milliGRAM(s) Oral daily  atorvastatin 40 milliGRAM(s) Oral at bedtime  calcium acetate 667 milliGRAM(s) Oral three times a day with meals  chlorhexidine 2% Cloths 1 Application(s) Topical <User Schedule>  dextrose 5%. 1000 milliLiter(s) (50 mL/Hr) IV Continuous <Continuous>  dextrose 5%. 1000 milliLiter(s) (100 mL/Hr) IV Continuous <Continuous>  dextrose 50% Injectable 12.5 Gram(s) IV Push once  dextrose 50% Injectable 25 Gram(s) IV Push once  dextrose 50% Injectable 25 Gram(s) IV Push once  gabapentin 300 milliGRAM(s) Oral at bedtime  glucagon  Injectable 1 milliGRAM(s) IntraMuscular once  insulin glargine Injectable (LANTUS) 7 Unit(s) SubCutaneous at bedtime  insulin lispro (ADMELOG) corrective regimen sliding scale   SubCutaneous three times a day before meals  lidocaine   4% Patch 1 Patch Transdermal daily  loratadine 10 milliGRAM(s) Oral daily  melatonin 5 milliGRAM(s) Oral at bedtime  metoprolol tartrate 50 milliGRAM(s) Oral two times a day  montelukast 10 milliGRAM(s) Oral at bedtime  pantoprazole    Tablet 40 milliGRAM(s) Oral before breakfast  saccharomyces boulardii 250 milliGRAM(s) Oral two times a day  sodium bicarbonate 1300 milliGRAM(s) Oral three times a day  sodium chloride 0.9% lock flush 3 milliLiter(s) IV Push every 8 hours  tamsulosin 0.4 milliGRAM(s) Oral at bedtime    MEDICATIONS  (PRN):  acetaminophen     Tablet .. 650 milliGRAM(s) Oral every 8 hours PRN Mild Pain (1 - 3)  albuterol    90 MICROgram(s) HFA Inhaler 2 Puff(s) Inhalation every 6 hours PRN Bronchospasm  dextrose Oral Gel 15 Gram(s) Oral once PRN Blood Glucose LESS THAN 70 milliGRAM(s)/deciliter  guaiFENesin Oral Liquid (Sugar-Free) 100 milliGRAM(s) Oral every 8 hours PRN Cough  hyoscyamine SL 0.125 milliGRAM(s) SubLingual four times a day PRN abdominal spasm  methocarbamol 500 milliGRAM(s) Oral three times a day PRN Muscle Spasm

## 2023-06-13 NOTE — PATIENT PROFILE ADULT - FALL HARM RISK - RISK INTERVENTIONS

## 2023-06-13 NOTE — H&P ADULT - NSICDXPASTSURGICALHX_GEN_ALL_CORE_FT
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

## 2023-06-13 NOTE — H&P ADULT - PROBLEM SELECTOR PLAN 1
Preop workup in progress  Continue ASA. plavix held in preop setting  Pt with C. Diff at previous hospital, completed oral vancomycin 6/12.  Will continue to monitor for persistent diarrhea.  Continue to monitor on telemetry,     Plan to be discussed with CT Surgery attendings during AM rounds.

## 2023-06-13 NOTE — H&P ADULT - ASSESSMENT
63M with h/o ESRD on HD (T,Th, S), IDDM, COPD, gastroparesis, peripheral blindness and retinopathy, and CAD, who initially presented to [facility] on 5/31 with n/v/d, missing his HD as a result, and found to be hyperkalemic with acute respiratory failure secondary to fluid overload vs pneumonia and NSTEMI in the context of C. diff colitis (positive on 6/1). Completed 10 day course of oral vancomycin on 6/12 per chart.  Patient became fluid overloaded during hospitalization at [place] requiring HFNC, improved with additional HD. Blood culture negative to date, patient received 1 dose of IV cefepime. Patient tolerating 2L NC prior to transfer to [facility].  Patient found to have NSTEMI s/p heparin drip 6/10, with cardiac cath 6/12 demonstrating MVD requiring transfer to [facility] for CT Surgery evaluation for possible CABG.

## 2023-06-13 NOTE — H&P ADULT - NSICDXPASTMEDICALHX_GEN_ALL_CORE_FT
PAST MEDICAL HISTORY:  BPH (Benign Prostatic Hypertrophy) (ICD9 600.00)     CAD (coronary artery disease)     CKD (chronic kidney disease)     Diabetes Mellitus with Neuropathy (ICD9 250.60) x 8 yrs without nephropathy or retinopathy    Gastroparesis     Glaucoma     HTN (Hypertension) (ICD9 401.9)     Hypercholesterolemia (ICD9 272.0)     Legally blind     Osteomyelitis     PVD (peripheral vascular disease)

## 2023-06-13 NOTE — PHYSICAL THERAPY INITIAL EVALUATION ADULT - ADDITIONAL COMMENTS
Pt lives with spouse in an apartment. No stairs. Independent at baseline with RW, short distances. Visual impairment

## 2023-06-13 NOTE — PATIENT PROFILE ADULT - FUNCTIONAL ASSESSMENT - BASIC MOBILITY 6.
3-calculated by average/Not able to assess (calculate score using [facility] averaging method)

## 2023-06-13 NOTE — H&P ADULT - HISTORY OF PRESENT ILLNESS
63M with h/o ESRD on HD (T,Th, S), IDDM, COPD, gastroparesis, peripheral blindness and retinopathy, and CAD, who initially presented to [facility] on 5/31 with n/v/d, missing his HD as a result, and found to be hyperkalemic with acute respiratory failure secondary to fluid overload vs pneumonia and NSTEMI in the context of C. diff colitis (positive on 6/1). Completed 10 day course of oral vancomycin on 6/12 per chart.  Patient became fluid overloaded during hospitalization at [place] requiring HFNC, improved with additional HD. Blood culture negative to date, patient received 1 dose of IV cefepime. Patient tolerating 2L NC prior to transfer to [facility].  Patient found to have NSTEMI s/p heparin drip 6/10, with cardiac cath 6/12 demonstrating MVD requiring transfer to [facility] for CT Surgery evaluation for possible CABG.  Pt complaining of chest pain at time of evaluation.  States chest pain similar to pain that he has been having recently.  Denies f/c, n/v, sob, abdominal pain, d/c, urinary symptoms.

## 2023-06-13 NOTE — CONSULT NOTE ADULT - SUBJECTIVE AND OBJECTIVE BOX
[facility]                                              CARDIOLOGY AT [address], [place] [address]                                             Telephone: [phone]. Fax:[phone]                                                       CARDIOLOGY CONSULTATION NOTE                                                                                             History obtained by: Patient and medical record  Community Cardiologist: Denies current Cardiologist but has seen  in the past   obtained: Yes [  ] No [  ]  Reason for Consultation: MVD, CHF  Available out pt records reviewed: Yes [x  ] No [  ]    HPI:  Patient is a 63y old  Male ESRD on HD (T,Th, S), IDDM, COPD, gastroparesis, peripheral blindness and retinopathy, and CAD (most recent DEISY 2011 dLAD). Presented to [facility] for chest discomfort, respiratory distress, C.Diff, NSTEMI. S/p [facility] with severe MVD. Endorses consistent CP for 1 month now. Cardiology consulted for medical optimization of GDMT prior to CABG. Denies palpitations, fevers, syncope, orthopnea       CARDIAC TESTING   ECHO:  EF 60% on formerly [facility]  STRESS:    CATH:   LAD 70%, distal DEISY ISR 70%  LCX 80% focal, 70% mid, 80% distal  OM1 90%  RCA 80% prox, 70% mid  RPDA 90%    ELECTROPHYSIOLOGY:     PAST MEDICAL HISTORY  HTN (Hypertension) (ICD9 401.9)    Hypercholesterolemia (ICD9 272.0)    Diabetes Mellitus (ICD9 250.00)    Diabetes Mellitus with Neuropathy (ICD9 250.60)    BPH (Benign Prostatic Hypertrophy) (ICD9 600.00)    Glaucoma    CAD (coronary artery disease)    CKD (chronic kidney disease)    Osteomyelitis    Gastroparesis    PVD (peripheral vascular disease)    Legally blind        PAST SURGICAL HISTORY  Diabetes Mellitus with Neuropathy (ICD9 250.60)    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)    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)    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)    S/P amputation    Stented coronary artery        SOCIAL HISTORY:  Denies smoking/alcohol/drugs  CIGARETTES:     ALCOHOL:  DRUGS:    FAMILY HISTORY:  No pertinent family history in first degree relatives      Family History of Cardiovascular Disease:  Yes [ x ] No [  ]  Coronary Artery Disease in first degree relative: Yes [  ] No [ x ]  Sudden Cardiac Death in First degree relative: Yes [  ] No [x  ]    HOME MEDICATIONS:  acetaminophen 325 mg oral tablet: 2 tab(s) orally every 8 hours as needed for  mild pain (13 Jun 2023 04:19)  Albuterol (Eqv-ProAir HFA) 90 mcg/inh inhalation aerosol: 2 puff(s) inhaled 4 times a day as needed for  bronchospasm (13 Jun 2023 04:33)  aspirin 81 mg oral tablet: 1 tab(s) orally once a day (13 Jun 2023 04:33)  calcium acetate 667 mg oral tablet: 1 tab(s) orally 3 times a day (13 Jun 2023 04:24)  Coreg 25 mg oral tablet: 1 tab(s) orally 2 times a day (13 Jun 2023 04:33)  epoetin lois 10,000 units/mL injectable solution: 10,000 unit(s) subcutaneous 3 times a week MWF (13 Jun 2023 04:19)  Flomax 0.4 mg oral capsule: 1 cap(s) orally once a day (13 Jun 2023 04:35)  gabapentin 300 mg oral capsule: 1 cap(s) orally once a day (at bedtime) (13 Jun 2023 04:33)  guaiFENesin 100 mg/5 mL oral liquid: 5 milliliter(s) orally 3 times a day as needed for  cough (13 Jun 2023 04:33)  hyoscyamine 0.125 mg oral tablet: 1 tab(s) orally 4 times a day as needed for  abdominal spasm (13 Jun 2023 04:33)  Lantus 100 units/mL subcutaneous solution: 5 unit(s) subcutaneous once a day (at bedtime) (13 Jun 2023 04:33)  lidocaine 4% topical film: Apply topically to affected area once a day (13 Jun 2023 04:19)  loratadine 10 mg oral tablet: 1 tab(s) orally once a day (13 Jun 2023 04:19)  melatonin 5 mg oral tablet: 1 tab(s) orally once a day (at bedtime) (13 Jun 2023 04:19)  methocarbamol 500 mg oral tablet: 1 tab(s) orally 2 times a day (13 Jun 2023 04:33)  nitroglycerin 0.4 mg sublingual tablet: 1 tab(s) sublingually once a day as needed for  chest pain (13 Jun 2023 04:19)  Norvasc 10 mg oral tablet: 1 tab(s) orally once a day (13 Jun 2023 04:35)  omeprazole 20 mg oral delayed release capsule: 1 cap(s) orally once a day (13 Jun 2023 04:33)  Plavix 75 mg oral tablet: 1 tab(s) orally once a day (13 Jun 2023 04:33)  pravastatin 40 mg oral tablet: 1 tab(s) orally once a day (at bedtime) (13 Jun 2023 04:33)  saccharomyces boulardii lyo 250 mg oral capsule: 1 cap(s) orally 2 times a day (13 Jun 2023 04:19)  sodium bicarbonate 650 mg oral tablet: 2 tab(s) orally 3 times a day (13 Jun 2023 04:33)      CURRENT CARDIAC MEDICATIONS:  amLODIPine   Tablet 10 milliGRAM(s) Oral once, Stop order after: 1 Doses  metoprolol tartrate 50 milliGRAM(s) Oral two times a day      CURRENT OTHER MEDICATIONS:  albuterol    90 MICROgram(s) HFA Inhaler 2 Puff(s) Inhalation every 6 hours PRN Bronchospasm  guaiFENesin Oral Liquid (Sugar-Free) 100 milliGRAM(s) Oral every 8 hours PRN Cough  loratadine 10 milliGRAM(s) Oral daily  montelukast 10 milliGRAM(s) Oral at bedtime  acetaminophen     Tablet .. 650 milliGRAM(s) Oral every 8 hours PRN Mild Pain (1 - 3)  gabapentin 300 milliGRAM(s) Oral at bedtime  melatonin 5 milliGRAM(s) Oral at bedtime  methocarbamol 500 milliGRAM(s) Oral three times a day PRN Muscle Spasm  hyoscyamine SL 0.125 milliGRAM(s) SubLingual four times a day PRN abdominal spasm  pantoprazole    Tablet 40 milliGRAM(s) Oral before breakfast  aspirin  chewable 81 milliGRAM(s) Oral daily  atorvastatin 40 milliGRAM(s) Oral at bedtime  calcium acetate 667 milliGRAM(s) Oral three times a day with meals  chlorhexidine 2% Cloths 1 Application(s) Topical <User Schedule>  dextrose 5%. 1000 milliLiter(s) (100 mL/Hr) IV Continuous <Continuous>  dextrose 5%. 1000 milliLiter(s) (50 mL/Hr) IV Continuous <Continuous>  dextrose 50% Injectable 12.5 Gram(s) IV Push once, Stop order after: 1 Doses  dextrose 50% Injectable 25 Gram(s) IV Push once, Stop order after: 1 Doses  dextrose 50% Injectable 25 Gram(s) IV Push once, Stop order after: 1 Doses  dextrose Oral Gel 15 Gram(s) Oral once, Stop order after: 1 Doses PRN Blood Glucose LESS THAN 70 milliGRAM(s)/deciliter  glucagon  Injectable 1 milliGRAM(s) IntraMuscular once, Stop order after: 1 Doses  insulin glargine Injectable (LANTUS) 7 Unit(s) SubCutaneous at bedtime  insulin lispro (ADMELOG) corrective regimen sliding scale   SubCutaneous three times a day before meals  lidocaine   4% Patch 1 Patch Transdermal daily  saccharomyces boulardii 250 milliGRAM(s) Oral two times a day  sodium bicarbonate 1300 milliGRAM(s) Oral three times a day  sodium chloride 0.9% lock flush 3 milliLiter(s) IV Push every 8 hours  tamsulosin 0.4 milliGRAM(s) Oral at bedtime      ALLERGIES:   shellfish (Rash)  No Known Drug Allergies      REVIEW OF SYMPTOMS:   CONSTITUTIONAL: No fever, no chills, no weight loss, no weight gain, no fatigue   ENMT:  No vertigo; No sinus or throat pain  NECK: No pain or stiffness  CARDIOVASCULAR: +chest pain, no dyspnea, no syncope/presyncope, no palpitations, no dizziness, no Orthopnea, no Paroxsymal nocturnal dyspnea  RESPIRATORY: no Shortness of breath, no cough, no wheezing  : No dysuria, no hematuria   GI: No dark color stool, no nausea, no diarrhea, no constipation, no abdominal pain   NEURO: No headache, no slurred speech   MUSCULOSKELETAL: No joint pain or swelling; No muscle, back, or extremity pain  PSYCH: No agitation, no anxiety.    ALL OTHER REVIEW OF SYSTEMS ARE NEGATIVE.    VITAL SIGNS:  T(C): 36.7 (06-13-23 @ 07:07), Max: 37.1 (06-12-23 @ 21:47)  T(F): 98 (06-13-23 @ 07:07), Max: 98.8 (06-12-23 @ 21:47)  HR: 67 (06-13-23 @ 07:07) (66 - 76)  BP: 134/56 (06-13-23 @ 07:07) (134/56 - 172/70)  RR: 20 (06-13-23 @ 07:07) (16 - 20)  SpO2: 98% (06-13-23 @ 07:07) (97% - 100%)    INTAKE AND OUTPUT:     06-12 @ 07:01  -  06-13 @ 07:00  --------------------------------------------------------  IN: 480 mL / OUT: 0 mL / NET: 480 mL    06-13 @ 07:01  -  06-13 @ 12:58  --------------------------------------------------------  IN: 360 mL / OUT: 0 mL / NET: 360 mL        PHYSICAL EXAM:  Constitutional: Comfortable . No acute distress.   HEENT: Atraumatic and normocephalic , neck is supple . no JVD. No carotid bruit.  CNS: A&Ox3. No focal deficits.   Respiratory: CTAB, unlabored   Cardiovascular: RRR normal s1 s2. No murmur. No rubs or gallop.  Gastrointestinal: Soft, non-tender. +Bowel sounds.   Extremities: 2+ Peripheral Pulses, No clubbing, cyanosis, or edema  Psychiatric: Calm . no agitation.   Skin: Warm and dry, no ulcers on extremities     LABS:                            7.9    4.54  )-----------( 159      ( 13 Jun 2023 05:32 )             23.7     06-13    133<L>  |  93<L>  |  49.7<H>  ----------------------------<  169<H>  4.2   |  25.0  |  8.56<H>    Ca    7.6<L>      13 Jun 2023 05:32  Mg     2.1     06-13    TPro  5.6<L>  /  Alb  3.0<L>  /  TBili  0.3<L>  /  DBili  x   /  AST  11  /  ALT  15  /  AlkPhos  75  06-13    PT/INR - ( 12 Jun 2023 21:50 )   PT: 11.2 sec;   INR: 0.97 ratio         PTT - ( 12 Jun 2023 21:50 )  PTT:29.7 sec        Thyroid Stimulating Hormone, Serum: 4.14 uIU/mL (06-12-23 @ 21:50)      INTERPRETATION OF TELEMETRY: SR    ECG: SR no ischemia  Prior ECG: Yes [  x] No [  ]

## 2023-06-13 NOTE — H&P ADULT - NSHPSOCIALHISTORY_GEN_ALL_CORE
Marital status:   Lives with: wife  ADLs: independent  Assistive Devices: none    Tobacco use: denies  Alcohol use: denies  Illicit drug use: denies    Code status: Full  HCP: TBD

## 2023-06-13 NOTE — PATIENT PROFILE ADULT - FUNCTIONAL ASSESSMENT - DAILY ACTIVITY SECTION LABEL
Chart reviewed and case discussed with treatment team. Staff report    Chart reviewed and case discussed with treatment team. Staff report patient has been guarded, keeping to herself. Patient continues to be paranoid about her landlord. She is unwilling to discuss prior psychiatric treatment and states "there's nothing wrong with me." Denies hallucinations.    .

## 2023-06-13 NOTE — H&P ADULT - NSHPPHYSICALEXAM_GEN_ALL_CORE
Neuro: A+O x 3, non-focal, speech clear and intact  HEENT: PERRL, EOMI, oral mucosa pink and moist  Neck: supple, no JVD  CV: regular rate, regular rhythm, +S1S2, no murmurs or rub  Pulm/chest: lung sounds CTA and equal bilaterally, no accessory muscle use noted  Abd: soft, NT, ND, +BS  Ext: BARRETO x 4, no C/C/E  Skin: warm, well perfused, no rashes

## 2023-06-13 NOTE — CONSULT NOTE ADULT - PROBLEM SELECTOR RECOMMENDATION 2
-HFpEF  -Euvolemic at this time  -BNP 41K  -ESRD for fluid removal  -EF preserved on LHC at [place], was 60%  -TTE here obtained, f/u -New HFrEF, ICM  -Euvolemic at this time  -BNP 41K  -ESRD for fluid removal  -EF preserved on LHC at [place], was 60%  -TTE here obtained, -> 35-40%, DDII

## 2023-06-14 DIAGNOSIS — D63.8 ANEMIA IN OTHER CHRONIC DISEASES CLASSIFIED ELSEWHERE: ICD-10-CM

## 2023-06-14 LAB
ANION GAP SERPL CALC-SCNC: 12 MMOL/L — SIGNIFICANT CHANGE UP (ref 5–17)
BUN SERPL-MCNC: 27.2 MG/DL — HIGH (ref 8–20)
CALCIUM SERPL-MCNC: 7.8 MG/DL — LOW (ref 8.4–10.5)
CALCIUM SERPL-MCNC: 7.9 MG/DL — LOW (ref 8.4–10.5)
CHLORIDE SERPL-SCNC: 93 MMOL/L — LOW (ref 96–108)
CO2 SERPL-SCNC: 27 MMOL/L — SIGNIFICANT CHANGE UP (ref 22–29)
CREAT SERPL-MCNC: 6.02 MG/DL — HIGH (ref 0.5–1.3)
EGFR: 10 ML/MIN/1.73M2 — LOW
GLUCOSE BLDC GLUCOMTR-MCNC: 126 MG/DL — HIGH (ref 70–99)
GLUCOSE BLDC GLUCOMTR-MCNC: 142 MG/DL — HIGH (ref 70–99)
GLUCOSE BLDC GLUCOMTR-MCNC: 155 MG/DL — HIGH (ref 70–99)
GLUCOSE BLDC GLUCOMTR-MCNC: 155 MG/DL — HIGH (ref 70–99)
GLUCOSE BLDC GLUCOMTR-MCNC: 246 MG/DL — HIGH (ref 70–99)
GLUCOSE BLDC GLUCOMTR-MCNC: 65 MG/DL — LOW (ref 70–99)
GLUCOSE BLDC GLUCOMTR-MCNC: 67 MG/DL — LOW (ref 70–99)
GLUCOSE BLDC GLUCOMTR-MCNC: 68 MG/DL — LOW (ref 70–99)
GLUCOSE SERPL-MCNC: 147 MG/DL — HIGH (ref 70–99)
HBV CORE AB SER-ACNC: SIGNIFICANT CHANGE UP
HBV SURFACE AB SER-ACNC: 12.7 MIU/ML — SIGNIFICANT CHANGE UP
HBV SURFACE AB SER-ACNC: REACTIVE
HBV SURFACE AG SER-ACNC: SIGNIFICANT CHANGE UP
HCT VFR BLD CALC: 28.7 % — LOW (ref 39–50)
HCV AB S/CO SERPL IA: 0.09 S/CO — SIGNIFICANT CHANGE UP (ref 0–0.99)
HCV AB SERPL-IMP: SIGNIFICANT CHANGE UP
HGB BLD-MCNC: 9.7 G/DL — LOW (ref 13–17)
MAGNESIUM SERPL-MCNC: 1.9 MG/DL — SIGNIFICANT CHANGE UP (ref 1.6–2.6)
MCHC RBC-ENTMCNC: 29.1 PG — SIGNIFICANT CHANGE UP (ref 27–34)
MCHC RBC-ENTMCNC: 33.8 GM/DL — SIGNIFICANT CHANGE UP (ref 32–36)
MCV RBC AUTO: 86.2 FL — SIGNIFICANT CHANGE UP (ref 80–100)
PLATELET # BLD AUTO: 175 K/UL — SIGNIFICANT CHANGE UP (ref 150–400)
POTASSIUM SERPL-MCNC: 4.4 MMOL/L — SIGNIFICANT CHANGE UP (ref 3.5–5.3)
POTASSIUM SERPL-SCNC: 4.4 MMOL/L — SIGNIFICANT CHANGE UP (ref 3.5–5.3)
PTH-INTACT FLD-MCNC: 175 PG/ML — HIGH (ref 15–65)
RBC # BLD: 3.33 M/UL — LOW (ref 4.2–5.8)
RBC # FLD: 15.2 % — HIGH (ref 10.3–14.5)
SODIUM SERPL-SCNC: 132 MMOL/L — LOW (ref 135–145)
WBC # BLD: 4.83 K/UL — SIGNIFICANT CHANGE UP (ref 3.8–10.5)
WBC # FLD AUTO: 4.83 K/UL — SIGNIFICANT CHANGE UP (ref 3.8–10.5)

## 2023-06-14 PROCEDURE — 99232 SBSQ HOSP IP/OBS MODERATE 35: CPT

## 2023-06-14 PROCEDURE — 90937 HEMODIALYSIS REPEATED EVAL: CPT

## 2023-06-14 PROCEDURE — 71250 CT THORAX DX C-: CPT | Mod: 26

## 2023-06-14 PROCEDURE — 99233 SBSQ HOSP IP/OBS HIGH 50: CPT | Mod: 25

## 2023-06-14 PROCEDURE — 99231 SBSQ HOSP IP/OBS SF/LOW 25: CPT

## 2023-06-14 RX ORDER — GABAPENTIN 400 MG/1
300 CAPSULE ORAL
Refills: 0 | Status: DISCONTINUED | OUTPATIENT
Start: 2023-06-14 | End: 2023-06-19

## 2023-06-14 RX ORDER — DEXTROSE 50 % IN WATER 50 %
15 SYRINGE (ML) INTRAVENOUS ONCE
Refills: 0 | Status: COMPLETED | OUTPATIENT
Start: 2023-06-14 | End: 2023-06-14

## 2023-06-14 RX ORDER — OXYCODONE HYDROCHLORIDE 5 MG/1
2.5 TABLET ORAL ONCE
Refills: 0 | Status: DISCONTINUED | OUTPATIENT
Start: 2023-06-14 | End: 2023-06-14

## 2023-06-14 RX ORDER — MAGNESIUM SULFATE 500 MG/ML
2 VIAL (ML) INJECTION ONCE
Refills: 0 | Status: COMPLETED | OUTPATIENT
Start: 2023-06-14 | End: 2023-06-14

## 2023-06-14 RX ORDER — ERYTHROPOIETIN 10000 [IU]/ML
10000 INJECTION, SOLUTION INTRAVENOUS; SUBCUTANEOUS
Refills: 0 | Status: DISCONTINUED | OUTPATIENT
Start: 2023-06-14 | End: 2023-06-17

## 2023-06-14 RX ORDER — INSULIN LISPRO 100/ML
4 VIAL (ML) SUBCUTANEOUS ONCE
Refills: 0 | Status: COMPLETED | OUTPATIENT
Start: 2023-06-14 | End: 2023-06-14

## 2023-06-14 RX ORDER — DEXTROSE 50 % IN WATER 50 %
12.5 SYRINGE (ML) INTRAVENOUS ONCE
Refills: 0 | Status: COMPLETED | OUTPATIENT
Start: 2023-06-14 | End: 2023-06-14

## 2023-06-14 RX ORDER — INSULIN LISPRO 100/ML
VIAL (ML) SUBCUTANEOUS
Refills: 0 | Status: DISCONTINUED | OUTPATIENT
Start: 2023-06-14 | End: 2023-06-19

## 2023-06-14 RX ORDER — INSULIN LISPRO 100/ML
3 VIAL (ML) SUBCUTANEOUS
Refills: 0 | Status: DISCONTINUED | OUTPATIENT
Start: 2023-06-14 | End: 2023-06-15

## 2023-06-14 RX ADMIN — ATORVASTATIN CALCIUM 40 MILLIGRAM(S): 80 TABLET, FILM COATED ORAL at 22:15

## 2023-06-14 RX ADMIN — Medication 5 UNIT(S): at 08:35

## 2023-06-14 RX ADMIN — Medication 15 GRAM(S): at 12:06

## 2023-06-14 RX ADMIN — Medication 81 MILLIGRAM(S): at 11:54

## 2023-06-14 RX ADMIN — LIDOCAINE 1 PATCH: 4 CREAM TOPICAL at 19:00

## 2023-06-14 RX ADMIN — LIDOCAINE 1 PATCH: 4 CREAM TOPICAL at 11:54

## 2023-06-14 RX ADMIN — Medication 1300 MILLIGRAM(S): at 05:01

## 2023-06-14 RX ADMIN — Medication 5 MILLIGRAM(S): at 22:16

## 2023-06-14 RX ADMIN — SODIUM CHLORIDE 3 MILLILITER(S): 9 INJECTION INTRAMUSCULAR; INTRAVENOUS; SUBCUTANEOUS at 14:00

## 2023-06-14 RX ADMIN — Medication 4 UNIT(S): at 00:46

## 2023-06-14 RX ADMIN — INSULIN GLARGINE 7 UNIT(S): 100 INJECTION, SOLUTION SUBCUTANEOUS at 22:03

## 2023-06-14 RX ADMIN — Medication 250 MILLIGRAM(S): at 05:01

## 2023-06-14 RX ADMIN — CHLORHEXIDINE GLUCONATE 1 APPLICATION(S): 213 SOLUTION TOPICAL at 05:01

## 2023-06-14 RX ADMIN — Medication 1: at 22:03

## 2023-06-14 RX ADMIN — SODIUM CHLORIDE 3 MILLILITER(S): 9 INJECTION INTRAMUSCULAR; INTRAVENOUS; SUBCUTANEOUS at 07:48

## 2023-06-14 RX ADMIN — LIDOCAINE 1 PATCH: 4 CREAM TOPICAL at 22:18

## 2023-06-14 RX ADMIN — OXYCODONE HYDROCHLORIDE 2.5 MILLIGRAM(S): 5 TABLET ORAL at 10:49

## 2023-06-14 RX ADMIN — Medication 25 GRAM(S): at 10:49

## 2023-06-14 RX ADMIN — TAMSULOSIN HYDROCHLORIDE 0.4 MILLIGRAM(S): 0.4 CAPSULE ORAL at 22:15

## 2023-06-14 RX ADMIN — Medication 12.5 GRAM(S): at 12:32

## 2023-06-14 RX ADMIN — HEPARIN SODIUM 5000 UNIT(S): 5000 INJECTION INTRAVENOUS; SUBCUTANEOUS at 05:00

## 2023-06-14 RX ADMIN — SODIUM CHLORIDE 3 MILLILITER(S): 9 INJECTION INTRAMUSCULAR; INTRAVENOUS; SUBCUTANEOUS at 22:16

## 2023-06-14 RX ADMIN — OXYCODONE HYDROCHLORIDE 2.5 MILLIGRAM(S): 5 TABLET ORAL at 12:56

## 2023-06-14 RX ADMIN — LORATADINE 10 MILLIGRAM(S): 10 TABLET ORAL at 11:54

## 2023-06-14 RX ADMIN — PANTOPRAZOLE SODIUM 40 MILLIGRAM(S): 20 TABLET, DELAYED RELEASE ORAL at 05:01

## 2023-06-14 RX ADMIN — Medication 3 UNIT(S): at 18:46

## 2023-06-14 RX ADMIN — Medication 50 MILLIGRAM(S): at 18:43

## 2023-06-14 RX ADMIN — LIDOCAINE 1 PATCH: 4 CREAM TOPICAL at 00:41

## 2023-06-14 RX ADMIN — Medication 667 MILLIGRAM(S): at 07:57

## 2023-06-14 RX ADMIN — MONTELUKAST 10 MILLIGRAM(S): 4 TABLET, CHEWABLE ORAL at 22:15

## 2023-06-14 RX ADMIN — ERYTHROPOIETIN 10000 UNIT(S): 10000 INJECTION, SOLUTION INTRAVENOUS; SUBCUTANEOUS at 17:18

## 2023-06-14 RX ADMIN — Medication 50 MILLIGRAM(S): at 05:01

## 2023-06-14 RX ADMIN — HEPARIN SODIUM 5000 UNIT(S): 5000 INJECTION INTRAVENOUS; SUBCUTANEOUS at 14:01

## 2023-06-14 RX ADMIN — Medication 667 MILLIGRAM(S): at 18:49

## 2023-06-14 RX ADMIN — Medication 667 MILLIGRAM(S): at 11:54

## 2023-06-14 RX ADMIN — HEPARIN SODIUM 5000 UNIT(S): 5000 INJECTION INTRAVENOUS; SUBCUTANEOUS at 22:16

## 2023-06-14 NOTE — PROVIDER CONTACT NOTE (HYPOGLYCEMIA EVENT) - NS PROVIDER CONTACT RECOMMEND-HYPO
Pt with FS <70. Glucose gel given with apple juice with no effect. 12.5g dextrose 50% IVP given with rise in blood glucose. Standing premeal of insulin decreased.

## 2023-06-14 NOTE — PROGRESS NOTE ADULT - ASSESSMENT
63M with h/o ESRD on HD (T,Th,S), IDDM, COPD, gastroparesis, peripheral blindness and retinopathy, and CAD, who initially presented to [facility] on 5/31 with n/v/d, missing his HD as a result, and found to be hyperkalemic with acute respiratory failure secondary to fluid overload vs pneumonia and NSTEMI in the context of C. diff colitis (positive on 6/1). Completed 10 day course of oral vancomycin on 6/12 per chart.  Patient became fluid overloaded during hospitalization at [place] requiring HFNC, improved with additional HD. Blood culture negative to date, patient received 1 dose of IV cefepime. Patient found to have NSTEMI s/p heparin drip 6/10, with cardiac cath 6/12 demonstrating MVD requiring transfer to [facility] for CT Surgery evaluation for possible CABG.    Problem/Plan - 1:  ·  Problem: CAD (coronary artery disease).   ·  Plan: CABG evaluation ongoing   On  ASA and statin  On  coreg as tolerated by HR/BP  Pt with C. Diff at previous hospital, completed oral vancomycin 6/12.      Problem/Plan - 2:  ·  Problem: Anemia of chronic disease.   ·  Plan: s/p PRBC 6/13 for H/H 7.9/23 %    Problem/Plan - 3:  ·  Problem: HTN (hypertension).   ·  Plan: On  norvasc, carvedilol as tolerated by BP and HR.    Problem/Plan - 4:  ·  Problem: HLD (hyperlipidemia).   ·  Plan: On  lipitor.    Problem/Plan - 5:  ·  Problem: Diabetes mellitus.   ·  Plan: On  Lantus, ISS, NCC Diet,     Problem/Plan - 6:  ·  Problem: BPH (benign prostatic hyperplasia).   ·  Plan: On  flomax. UO ?    Problem/Plan - 7:  ·  Problem: CKD (chronic kidney disease) requiring chronic dialysis.     D,C  NaHCO3-  Will try to optimize fluid removal in pre operative setting.    Problem/Plan - 8:  ·  Problem: COPD without exacerbation.   ·  Plan: On  albuterol and singulair.    HD Today & In AM ,     D/W CT-NP

## 2023-06-14 NOTE — PROGRESS NOTE ADULT - SUBJECTIVE AND OBJECTIVE BOX
Subjective: Patient seen and assessed for CABG evaluation.  Patient states "It hurts when you press everywhere."  At time of exam, patient reports constant generalized pain. Otherwise, .d    Pertinent events of the past 24 hours: s/p PRBC for likely anemia of chonic condition secondary to CKD    VITAL SIGNS  Vital Signs Last 24 Hrs  T(C): 36.9 (23 @ 21:50), Max: 37.1 (23 @ 18:40)  T(F): 98.4 (23 @ 21:50), Max: 98.7 (23 @ 18:40)  HR: 83 (23 @ 21:50) (58 - 83)  BP: 165/71 (23 @ 21:50) (134/56 - 170/71)  RR: 16 (23 @ 21:50) (16 - 20)  SpO2: 97% (23 @ 21:50) (94% - 100%)  on (O2)              MEDICATIONS  acetaminophen     Tablet .. 650 milliGRAM(s) Oral every 8 hours PRN  albuterol    90 MICROgram(s) HFA Inhaler 2 Puff(s) Inhalation every 6 hours PRN  amLODIPine   Tablet 10 milliGRAM(s) Oral once  aspirin  chewable 81 milliGRAM(s) Oral daily  atorvastatin 40 milliGRAM(s) Oral at bedtime  calcium acetate 667 milliGRAM(s) Oral three times a day with meals  chlorhexidine 2% Cloths 1 Application(s) Topical <User Schedule>  epoetin lois-epbx (RETACRIT) Injectable 93242 Unit(s) SubCutaneous <User Schedule>  gabapentin 300 milliGRAM(s) Oral at bedtime  guaiFENesin Oral Liquid (Sugar-Free) 100 milliGRAM(s) Oral every 8 hours PRN  heparin   Injectable 5000 Unit(s) SubCutaneous every 8 hours  hyoscyamine SL 0.125 milliGRAM(s) SubLingual four times a day PRN  insulin glargine Injectable (LANTUS) 7 Unit(s) SubCutaneous at bedtime  insulin lispro (ADMELOG) corrective regimen sliding scale   SubCutaneous three times a day before meals  insulin lispro Injectable (ADMELOG) 5 Unit(s) SubCutaneous three times a day before meals  lidocaine   4% Patch 1 Patch Transdermal daily  loratadine 10 milliGRAM(s) Oral daily  melatonin 5 milliGRAM(s) Oral at bedtime  methocarbamol 500 milliGRAM(s) Oral three times a day PRN  metoprolol tartrate 50 milliGRAM(s) Oral two times a day  montelukast 10 milliGRAM(s) Oral at bedtime  pantoprazole    Tablet 40 milliGRAM(s) Oral before breakfast  saccharomyces boulardii 250 milliGRAM(s) Oral two times a day  sodium bicarbonate 1300 milliGRAM(s) Oral three times a day  sodium chloride 0.9% lock flush 3 milliLiter(s) IV Push every 8 hours  tamsulosin 0.4 milliGRAM(s) Oral at bedtime    PHYSICAL EXAM  Constitutional: NAD  Neuro: A+O x 3- poor historian, non-focal, speech clear and intact  CV: regular rate, regular rhythm, +S1S2, no murmurs or rub  Pulm/chest: breath sounds with bibasilar crackles, otherwise clear to ascultation, no accessory muscle use noted  Abd: +BS, soft, NT, ND  Ext: BARRETO x 4, no C/C/E  Skin: warm, well perfused  Psych: calm, appropriate affect  Lines: Right upper arm AVF +bruit/+thrill    Intake and Output   @ 07:  -   @ 07:00  --------------------------------------------------------  IN: 480 mL / OUT: 0 mL / NET: 480 mL     @ 07:  -  14 @ 02:18  --------------------------------------------------------  IN: 720 mL / OUT: 1500 mL / NET: -780 mL    Weights:  Daily     Daily Weight in k.4 (2023 18:40)  Admit Wt: Drug Dosing Weight  Height (cm): 162.5 (2023 22:28)  Weight (kg): 67.2 (2023 22:28)  BMI (kg/m2): 25.4 (2023 22:28)  BSA (m2): 1.72 (2023 22:28)    All laboratory results, radiology and medications reviewed.    LABS      133<L>  |  93<L>  |  49.7<H>  ----------------------------<  169<H>  4.2   |  25.0  |  8.56<H>    Ca    7.6<L>      2023 05:32  Phos  5.5       Mg     2.1         TPro  5.6<L>  /  Alb  3.0<L>  /  TBili  0.3<L>  /  DBili  x   /  AST  11  /  ALT  15  /  AlkPhos  75                                   7.9    4.54  )-----------( 159      ( 2023 05:32 )             23.7          PT/INR - ( 2023 21:50 )   PT: 11.2 sec;   INR: 0.97 ratio         PTT - ( 2023 21:50 )  PTT:29.7 sec  Bilirubin Total, Serum: 0.3 mg/dL ( @ 05:32)    CAPILLARY BLOOD GLUCOSE  POCT Blood Glucose.: 246 mg/dL (2023 00:20)  POCT Blood Glucose.: 226 mg/dL (2023 21:13)  POCT Blood Glucose.: 126 mg/dL (2023 17:59)  POCT Blood Glucose.: 127 mg/dL (2023 12:15)  POCT Blood Glucose.: 193 mg/dL (2023 08:10)         < from: Xray Chest 1 View- PORTABLE-Routine (Xray Chest 1 View- PORTABLE-Routine in AM.) (23 @ 05:00) >    FINDINGS:    Single frontal view of the chest demonstrates moderate CHF and moderate   bilateral pleural effusions. The cardiomediastinal silhouette is   enlarged. No acute osseous abnormalities. Overlying EKG leads and wires   are noted    IMPRESSION: No interval change.    < end of copied text >      < from: US Duplex Carotid Arteries Complete, Bilateral (23 @ 12:31) >    FINDINGS:    No elevated velocities or abnormal waveforms are encountered. Bilateral   calcified plaque noted.    Peak systolic velocities are as follows:    RIGHT:  PROX CCA = 64 cm/s  DIST CCA = 71 cm/s  PROX ICA = 77 cm/s  DIST ICA = 67 cm/s  ECA = 74 cm/s    LEFT:  PROX CCA = 83 cm/s  DIST CCA = 97 cm/s  PROX ICA = 89 cm/s  DIST ICA = 75 cm/s  ECA = 126 cm/s    Antegrade flow is noted within both vertebral arteries.    IMPRESSION: No significant hemodynamic stenosis of either carotid artery.    < end of copied text >      < from: 12 Lead ECG (23 @ 13:00) >    Ventricular Rate 66 BPM    Atrial Rate 66 BPM    P-R Interval 204 ms    QRS Duration 88 ms    Q-T Interval 438 ms    QTC Calculation(Bazett) 459 ms    P Axis 57 degrees    R Axis 19 degrees    T Axis 18 degrees    Diagnosis Line Normal sinus rhythm  Normal ECG    < end of copied text >      < from: TTE Echo Complete w/ Contrast w/ Doppler (23 @ 08:36) >    PHYSICIAN INTERPRETATION:  Left Ventricle: Endocardial visualization was enhanced with intravenous   echo contrast. The left ventricular internal cavity size is normal. Left   ventricular wall thickness is normal.  Global LV systolic function was moderately to severely decreased. Left   ventricular ejection fraction, by visual estimation, is 35 to 40%.   Spectral Doppler shows pseudonormal pattern of left ventricular   myocardial filling (Grade II diastolic dysfunction).  Right Ventricle: Normal right ventricular size and function.  Left Atrium: Moderate to severe left atrial enlargement.  Right Atrium: The right atrium is normal in size.  Pericardium: There is no evidence of pericardial effusion. There is a   small pleural effusion in both left and right lateral regions.  Mitral Valve: Thickening of the anterior and posterior mitral valve   leaflets. There is mild mitral annular calcification. No evidence of   mitral stenosis. Mild mitral valve regurgitation is seen.  Tricuspid Valve: Structurally normal tricuspid valve, with normal leaflet  excursion. Trivial tricuspid regurgitation is visualized.  Aortic Valve: The aortic valve is trileaflet. Sclerotic aortic valve with   normal opening. No evidence of aortic valve regurgitation is seen.  Pulmonic Valve: Structurally normal pulmonic valve, with normal leaflet   excursion. Trace pulmonic valve regurgitation.  Aorta: The aortic root is normal in size and structure.  Pulmonary Artery: The main pulmonary artery is normal in size.  Venous: A normal flow pattern is recorded from the right lower pulmonary   vein. The inferior vena cava is normal. The inferior vena cava was normal   sized, with respiratory size variation greater than 50%. The inferior   vena cava and the hepatic vein show a normal flow pattern.  In comparison to the previous echocardiogram(s): There are no prior   studies on this patient for comparison purposes.      Summary:   1. Endocardial visualization was enhanced with intravenous echo contrast.   2. Left ventricular ejection fraction, by visual estimation, is 35 to   40%.   3. Moderately to severely decreased global left ventricular systolic   function.   4. Spectral Doppler shows pseudonormal pattern of left ventricular   myocardial filling (Grade II diastolic dysfunction).   5. Normal right ventricular size and function.   6. Moderate to severe left atrial enlargement.   7. Mild mitral valve regurgitation.   8. Sclerotic aortic valve with normal opening.    < end of copied text >

## 2023-06-14 NOTE — PROVIDER CONTACT NOTE (HYPOGLYCEMIA EVENT) - NS PROVIDER CONTACT BACKGROUND-HYPO
Age: 63y    Gender: Male    POCT Blood Glucose:  126 mg/dL (06-14-23 @ 12:48)  67 mg/dL (06-14-23 @ 12:23)  65 mg/dL (06-14-23 @ 12:00)  68 mg/dL (06-14-23 @ 11:55)  142 mg/dL (06-14-23 @ 07:55)  246 mg/dL (06-14-23 @ 00:20)  226 mg/dL (06-13-23 @ 21:13)  126 mg/dL (06-13-23 @ 17:59)      eMAR:atorvastatin   40 milliGRAM(s) Oral (06-13-23 @ 22:03)    dextrose 50% Injectable   12.5 Gram(s) IV Push (06-14-23 @ 12:32)    dextrose Oral Gel   15 Gram(s) Oral (06-14-23 @ 12:06)    insulin glargine Injectable (LANTUS)   7 Unit(s) SubCutaneous (06-13-23 @ 22:03)    insulin lispro Injectable (ADMELOG)   5 Unit(s) SubCutaneous (06-14-23 @ 08:35)    insulin lispro Injectable (ADMELOG).   4 Unit(s) SubCutaneous (06-14-23 @ 00:46)

## 2023-06-14 NOTE — PROGRESS NOTE ADULT - PROBLEM SELECTOR PLAN 1
CABG evaluation ongoing   Continue ASA and statin  Continue coreg as tolerated by HR/BP  Plavix held in preop setting  Pt with C. Diff at previous hospital, completed oral vancomycin 6/12.  Will continue to monitor for persistent diarrhea.  Plan to be discussed with CT Surgery attendings during AM rounds.

## 2023-06-14 NOTE — PROGRESS NOTE ADULT - PROBLEM SELECTOR PLAN 2
controlled on norvasc 10mg qd & metoprolol 50mg bid  Low na diet    Cardiac meds  amLODIPine   Tablet 10 milliGRAM(s) Oral once  aspirin  chewable 81 milliGRAM(s) Oral daily  atorvastatin 40 milliGRAM(s) Oral at bedtime  magnesium sulfate  IVPB 2 Gram(s) IV Intermittent once  metoprolol tartrate 50 milliGRAM(s) Oral two times a day

## 2023-06-14 NOTE — PROVIDER CONTACT NOTE (HYPOGLYCEMIA EVENT) - NS PROVIDER CONTACT NOTE-TREATMENT-HYPO
CTS DOMINGUEZ Dale made aware of hypoglycemic episode and actions taken./Glucose gel 1 tube/Dextrose 50% 12.5 Grams IV Push

## 2023-06-14 NOTE — PROGRESS NOTE ADULT - SUBJECTIVE AND OBJECTIVE BOX
Subjective: Patient seen and assessed for CABG evaluation.  Patient states "It hurts when you press everywhere."  At time of exam, patient reports constant generalized pain. Otherwise, .d    Pertinent events of the past 24 hours: s/p PRBC for likely anemia of chonic condition secondary to CKD    VITAL SIGNS    T(C): 36.9 (23 @ 21:50), Max: 37.1 (23 @ 18:40)  T(F): 98.4 (23 @ 21:50), Max: 98.7 (23 @ 18:40)  HR: 83 (23 @ 21:50) (58 - 83)  BP: 165/71 (23 @ 21:50) (134/56 - 170/71)  RR: 16 (23 @ 21:50) (16 - 20)  SpO2: 97% (23 @ 21:50) (94% - 100%)  on (O2)              MEDICATIONS    acetaminophen     Tablet .. 650 milliGRAM(s) Oral every 8 hours PRN  albuterol    90 MICROgram(s) HFA Inhaler 2 Puff(s) Inhalation every 6 hours PRN  amLODIPine   Tablet 10 milliGRAM(s) Oral once  aspirin  chewable 81 milliGRAM(s) Oral daily  atorvastatin 40 milliGRAM(s) Oral at bedtime  calcium acetate 667 milliGRAM(s) Oral three times a day with meals  chlorhexidine 2% Cloths 1 Application(s) Topical <User Schedule>  epoetin lois-epbx (RETACRIT) Injectable 56014 Unit(s) SubCutaneous <User Schedule>  gabapentin 300 milliGRAM(s) Oral at bedtime  guaiFENesin Oral Liquid (Sugar-Free) 100 milliGRAM(s) Oral every 8 hours PRN  heparin   Injectable 5000 Unit(s) SubCutaneous every 8 hours  hyoscyamine SL 0.125 milliGRAM(s) SubLingual four times a day PRN  insulin glargine Injectable (LANTUS) 7 Unit(s) SubCutaneous at bedtime  insulin lispro (ADMELOG) corrective regimen sliding scale   SubCutaneous three times a day before meals  insulin lispro Injectable (ADMELOG) 5 Unit(s) SubCutaneous three times a day before meals  lidocaine   4% Patch 1 Patch Transdermal daily  loratadine 10 milliGRAM(s) Oral daily  melatonin 5 milliGRAM(s) Oral at bedtime  methocarbamol 500 milliGRAM(s) Oral three times a day PRN  metoprolol tartrate 50 milliGRAM(s) Oral two times a day  montelukast 10 milliGRAM(s) Oral at bedtime  pantoprazole    Tablet 40 milliGRAM(s) Oral before breakfast  saccharomyces boulardii 250 milliGRAM(s) Oral two times a day  sodium bicarbonate 1300 milliGRAM(s) Oral three times a day  sodium chloride 0.9% lock flush 3 milliLiter(s) IV Push every 8 hours  tamsulosin 0.4 milliGRAM(s) Oral at bedtime    PHYSICAL EXAM  Constitutional: NAD  Neuro: A+O x 3- poor historian, non-focal, speech clear and intact  CV: regular rate, regular rhythm, +S1S2, no murmurs or rub  Pulm/chest: breath sounds with bibasilar crackles, otherwise clear to ascultation, no accessory muscle use noted  Abd: +BS, soft, NT, ND  Ext: BARRETO x 4, no C/C/E  Skin: warm, well perfused  Psych: calm, appropriate affect  Lines: Right upper arm AVF +bruit/+thrill    Intake and Output   @ :  -   @ 07:00  --------------------------------------------------------  IN: 480 mL / OUT: 0 mL / NET: 480 mL     @ 07:  -  14 @ 02:18  --------------------------------------------------------  IN: 720 mL / OUT: 1500 mL / NET: -780 mL    Daily Weight in k.4 (2023 18:40)  Admit Wt: Drug Dosing Weight  Height (cm): 162.5 (2023 22:28)  Weight (kg): 67.2 (2023 22:28)  BMI (kg/m2): 25.4 (2023 22:28)  BSA (m2): 1.72 (2023 22:28)    All laboratory results, radiology and medications reviewed.    LABS      133<L>  |  93<L>  |  49.7<H>  ----------------------------<  169<H>  4.2   |  25.0  |  8.56<H>    Ca    7.6<L>      2023 05:32  Phos  5.5       Mg     2.1         TPro  5.6<L>  /  Alb  3.0<L>  /  TBili  0.3<L>  /  DBili  x   /  AST  11  /  ALT  15  /  AlkPhos  75                                7.9    4.54  )-----------( 159      ( 2023 05:32 )             23.7          PT/INR - ( 2023 21:50 )   PT: 11.2 sec;   INR: 0.97 ratio         PTT - ( 2023 21:50 )  PTT:29.7 sec  Bilirubin Total, Serum: 0.3 mg/dL ( @ 05:32)    CAPILLARY BLOOD GLUCOSE  POCT Blood Glucose.: 246 mg/dL (2023 00:20)  POCT Blood Glucose.: 226 mg/dL (2023 21:13)  POCT Blood Glucose.: 126 mg/dL (2023 17:59)  POCT Blood Glucose.: 127 mg/dL (2023 12:15)  POCT Blood Glucose.: 193 mg/dL (2023 08:10)         Xray Chest 1 View- PORTABLE-Routine (Xray Chest 1 View- PORTABLE-Routine in AM.) (23 @ 05:00)    FINDINGS:    Single frontal view of the chest demonstrates moderate CHF and moderate   bilateral pleural effusions. The cardiomediastinal silhouette is   enlarged. No acute osseous abnormalities. Overlying EKG leads and wires   are noted    IMPRESSION: No interval change.    US Duplex Carotid Arteries Complete, Bilateral (23 @ 12:31)    FINDINGS:    No elevated velocities or abnormal waveforms are encountered. Bilateral   calcified plaque noted.    Peak systolic velocities are as follows:    RIGHT:  PROX CCA = 64 cm/s  DIST CCA = 71 cm/s  PROX ICA = 77 cm/s  DIST ICA = 67 cm/s  ECA = 74 cm/s    LEFT:  PROX CCA = 83 cm/s  DIST CCA = 97 cm/s  PROX ICA = 89 cm/s  DIST ICA = 75 cm/s  ECA = 126 cm/s    Antegrade flow is noted within both vertebral arteries.    IMPRESSION: No significant hemodynamic stenosis of either carotid artery.    12 Lead ECG (23 @ 13:00)     Ventricular Rate 66 BPM    Atrial Rate 66 BPM    P-R Interval 204 ms    QRS Duration 88 ms    Q-T Interval 438 ms    QTC Calculation(Bazett) 459 ms    P Axis 57 degrees    R Axis 19 degrees    T Axis 18 degrees    Diagnosis Line Normal sinus rhythm  Normal ECG    TTE Echo Complete w/ Contrast w/ Doppler (23 @ 08:36)     PHYSICIAN INTERPRETATION:  Left Ventricle: Endocardial visualization was enhanced with intravenous   echo contrast. The left ventricular internal cavity size is normal. Left   ventricular wall thickness is normal.  Global LV systolic function was moderately to severely decreased. Left   ventricular ejection fraction, by visual estimation, is 35 to 40%.   Spectral Doppler shows pseudonormal pattern of left ventricular   myocardial filling (Grade II diastolic dysfunction).  Right Ventricle: Normal right ventricular size and function.  Left Atrium: Moderate to severe left atrial enlargement.  Right Atrium: The right atrium is normal in size.  Pericardium: There is no evidence of pericardial effusion. There is a   small pleural effusion in both left and right lateral regions.  Mitral Valve: Thickening of the anterior and posterior mitral valve   leaflets. There is mild mitral annular calcification. No evidence of   mitral stenosis. Mild mitral valve regurgitation is seen.  Tricuspid Valve: Structurally normal tricuspid valve, with normal leaflet  excursion. Trivial tricuspid regurgitation is visualized.  Aortic Valve: The aortic valve is trileaflet. Sclerotic aortic valve with   normal opening. No evidence of aortic valve regurgitation is seen.  Pulmonic Valve: Structurally normal pulmonic valve, with normal leaflet   excursion. Trace pulmonic valve regurgitation.  Aorta: The aortic root is normal in size and structure.  Pulmonary Artery: The main pulmonary artery is normal in size.  Venous: A normal flow pattern is recorded from the right lower pulmonary   vein. The inferior vena cava is normal. The inferior vena cava was normal   sized, with respiratory size variation greater than 50%. The inferior   vena cava and the hepatic vein show a normal flow pattern.  In comparison to the previous echocardiogram(s): There are no prior   studies on this patient for comparison purposes.      Summary:   1. Endocardial visualization was enhanced with intravenous echo contrast.   2. Left ventricular ejection fraction, by visual estimation, is 35 to   40%.   3. Moderately to severely decreased global left ventricular systolic   function.   4. Spectral Doppler shows pseudonormal pattern of left ventricular   myocardial filling (Grade II diastolic dysfunction).   5. Normal right ventricular size and function.   6. Moderate to severe left atrial enlargement.   7. Mild mitral valve regurgitation.   8. Sclerotic aortic valve with normal opening.

## 2023-06-14 NOTE — PROGRESS NOTE ADULT - SUBJECTIVE AND OBJECTIVE BOX
INTERVAL EVENTS:  Follow up diabetes management    ROS: Complains of generalized pain    MEDICATIONS  (STANDING):  amLODIPine   Tablet 10 milliGRAM(s) Oral once  aspirin  chewable 81 milliGRAM(s) Oral daily  atorvastatin 40 milliGRAM(s) Oral at bedtime  calcium acetate 667 milliGRAM(s) Oral three times a day with meals  chlorhexidine 2% Cloths 1 Application(s) Topical <User Schedule>  dextrose 5%. 1000 milliLiter(s) (50 mL/Hr) IV Continuous <Continuous>  dextrose 5%. 1000 milliLiter(s) (100 mL/Hr) IV Continuous <Continuous>  dextrose 50% Injectable 12.5 Gram(s) IV Push once  dextrose 50% Injectable 25 Gram(s) IV Push once  dextrose 50% Injectable 25 Gram(s) IV Push once  epoetin lois-epbx (RETACRIT) Injectable 42946 Unit(s) SubCutaneous <User Schedule>  gabapentin 300 milliGRAM(s) Oral at bedtime  glucagon  Injectable 1 milliGRAM(s) IntraMuscular once  heparin   Injectable 5000 Unit(s) SubCutaneous every 8 hours  insulin glargine Injectable (LANTUS) 7 Unit(s) SubCutaneous at bedtime  insulin lispro (ADMELOG) corrective regimen sliding scale   SubCutaneous Before meals and at bedtime  insulin lispro Injectable (ADMELOG) 5 Unit(s) SubCutaneous three times a day before meals  lidocaine   4% Patch 1 Patch Transdermal daily  loratadine 10 milliGRAM(s) Oral daily  melatonin 5 milliGRAM(s) Oral at bedtime  metoprolol tartrate 50 milliGRAM(s) Oral two times a day  montelukast 10 milliGRAM(s) Oral at bedtime  pantoprazole    Tablet 40 milliGRAM(s) Oral before breakfast  saccharomyces boulardii 250 milliGRAM(s) Oral two times a day  sodium bicarbonate 1300 milliGRAM(s) Oral three times a day  sodium chloride 0.9% lock flush 3 milliLiter(s) IV Push every 8 hours  tamsulosin 0.4 milliGRAM(s) Oral at bedtime    MEDICATIONS  (PRN):  acetaminophen     Tablet .. 650 milliGRAM(s) Oral every 8 hours PRN Mild Pain (1 - 3)  albuterol    90 MICROgram(s) HFA Inhaler 2 Puff(s) Inhalation every 6 hours PRN Bronchospasm  dextrose Oral Gel 15 Gram(s) Oral once PRN Blood Glucose LESS THAN 70 milliGRAM(s)/deciliter  guaiFENesin Oral Liquid (Sugar-Free) 100 milliGRAM(s) Oral every 8 hours PRN Cough  hyoscyamine SL 0.125 milliGRAM(s) SubLingual four times a day PRN abdominal spasm  methocarbamol 500 milliGRAM(s) Oral three times a day PRN Muscle Spasm    Allergies  shellfish (Rash)  No Known Drug Allergies    Vital Signs Last 24 Hrs  T(C): 36.6 (14 Jun 2023 08:08), Max: 37.1 (13 Jun 2023 18:40)  T(F): 97.9 (14 Jun 2023 08:08), Max: 98.7 (13 Jun 2023 18:40)  HR: 66 (14 Jun 2023 08:08) (58 - 83)  BP: 155/72 (14 Jun 2023 08:08) (139/94 - 170/71)  BP(mean): --  RR: 18 (14 Jun 2023 08:08) (16 - 18)  SpO2: 98% (14 Jun 2023 08:08) (93% - 98%)    Parameters below as of 14 Jun 2023 08:08  Patient On (Oxygen Delivery Method): nasal cannula  O2 Flow (L/min): 3      PHYSICAL EXAM:  General: No apparent distress  Neck: Supple, trachea midline, no thyromegaly  Respiratory: Lungs clear bilaterally, normal rate, effort  Cardiac: +S1, S2, no m/r/g  GI: +BS, soft, non tender, non distended  Extremities: No peripheral edema, no pedal lesions  Neuro: A+O X3, no tremor      LABS:                        9.7    4.83  )-----------( 175      ( 14 Jun 2023 05:17 )             28.7     06-14    132<L>  |  93<L>  |  27.2<H>  ----------------------------<  147<H>  4.4   |  27.0  |  6.02<H>    Ca    7.9<L>      14 Jun 2023 05:17  Phos  5.5     06-13  Mg     1.9     06-14    TPro  5.6<L>  /  Alb  3.0<L>  /  TBili  0.3<L>  /  DBili  x   /  AST  11  /  ALT  15  /  AlkPhos  75  06-13      POCT Blood Glucose.: 142 mg/dL (06-14-23 @ 07:55)  POCT Blood Glucose.: 246 mg/dL (06-14-23 @ 00:20)  POCT Blood Glucose.: 226 mg/dL (06-13-23 @ 21:13)  POCT Blood Glucose.: 126 mg/dL (06-13-23 @ 17:59)  POCT Blood Glucose.: 127 mg/dL (06-13-23 @ 12:15)    Thyroid Stimulating Hormone, Serum: 4.14 uIU/mL (06-12-23 @ 21:50)

## 2023-06-14 NOTE — PROGRESS NOTE ADULT - NS ATTEND AMEND GEN_ALL_CORE FT
Patient seen and examined by me.    T(C): 36.6 (06-14-23 @ 08:08), Max: 37.1 (06-13-23 @ 18:40)  HR: 66 (06-14-23 @ 08:08) (58 - 83)  BP: 155/72 (06-14-23 @ 08:08) (139/94 - 170/71)  RR: 18 (06-14-23 @ 08:08) (16 - 18)  SpO2: 98% (06-14-23 @ 08:08) (93% - 98%)  Patient alert and awake.  Chest- Bilateral Clear BS  Cardiac- S1 and S2  Abdomen- Soft    Assessment:  1. CAD  2. Chest pain-?angina or musculoskeletal pain    Recommendations:  Continue current medical therapy  Awaiting CABG    acetaminophen     Tablet .. 650 milliGRAM(s) Oral every 8 hours PRN  albuterol    90 MICROgram(s) HFA Inhaler 2 Puff(s) Inhalation every 6 hours PRN  amLODIPine   Tablet 10 milliGRAM(s) Oral once  aspirin  chewable 81 milliGRAM(s) Oral daily  atorvastatin 40 milliGRAM(s) Oral at bedtime  calcium acetate 667 milliGRAM(s) Oral three times a day with meals  chlorhexidine 2% Cloths 1 Application(s) Topical <User Schedule>  dextrose 5%. 1000 milliLiter(s) IV Continuous <Continuous>  dextrose 5%. 1000 milliLiter(s) IV Continuous <Continuous>  dextrose 50% Injectable 25 Gram(s) IV Push once  dextrose 50% Injectable 25 Gram(s) IV Push once  dextrose 50% Injectable 12.5 Gram(s) IV Push once  dextrose Oral Gel 15 Gram(s) Oral once PRN  epoetin lois-epbx (RETACRIT) Injectable 13550 Unit(s) SubCutaneous <User Schedule>  gabapentin 300 milliGRAM(s) Oral at bedtime  glucagon  Injectable 1 milliGRAM(s) IntraMuscular once  guaiFENesin Oral Liquid (Sugar-Free) 100 milliGRAM(s) Oral every 8 hours PRN  heparin   Injectable 5000 Unit(s) SubCutaneous every 8 hours  hyoscyamine SL 0.125 milliGRAM(s) SubLingual four times a day PRN  insulin glargine Injectable (LANTUS) 7 Unit(s) SubCutaneous at bedtime  insulin lispro (ADMELOG) corrective regimen sliding scale   SubCutaneous Before meals and at bedtime  insulin lispro Injectable (ADMELOG) 5 Unit(s) SubCutaneous three times a day before meals  lidocaine   4% Patch 1 Patch Transdermal daily  loratadine 10 milliGRAM(s) Oral daily  melatonin 5 milliGRAM(s) Oral at bedtime  methocarbamol 500 milliGRAM(s) Oral three times a day PRN  metoprolol tartrate 50 milliGRAM(s) Oral two times a day  montelukast 10 milliGRAM(s) Oral at bedtime  pantoprazole    Tablet 40 milliGRAM(s) Oral before breakfast  saccharomyces boulardii 250 milliGRAM(s) Oral two times a day  sodium bicarbonate 1300 milliGRAM(s) Oral three times a day  sodium chloride 0.9% lock flush 3 milliLiter(s) IV Push every 8 hours  tamsulosin 0.4 milliGRAM(s) Oral at bedtime      I have discussed my recommendation with the PA which are outlined above.  Will follow.

## 2023-06-14 NOTE — PROGRESS NOTE ADULT - PROBLEM SELECTOR PLAN 2
s/p PRBC 6/13 for H/H 7.9/23  Per Dr. Newsome, patient to received a unit of PRBC with each HD session prior to surger

## 2023-06-14 NOTE — PROGRESS NOTE ADULT - SUBJECTIVE AND OBJECTIVE BOX
[facility]                                                         CARDIOLOGY AT [facility]                                                                  [address] [facility] [place] [place] [address]                                                         Telephone: [phone]. Fax:[phone]                                                                             PROGRESS NOTE    Reason for follow up: CHF and multivessel cad  Update: Has reproducible pain across chest  still with some chest discomfort but no sob or diaphoresis    Review of symptoms:   Respiratory: no cough. No dyspnea  Gastrointestinal: No diarrhea. No abdominal pain. No bleeding.   Neuro: No focal neuro complaints.      Vitals:  T(C): 36.6 (06-14-23 @ 08:08), Max: 37.1 (06-13-23 @ 18:40)  HR: 66 (06-14-23 @ 08:08) (58 - 83)  BP: 155/72 (06-14-23 @ 08:08) (139/94 - 170/71)  RR: 18 (06-14-23 @ 08:08) (16 - 18)  SpO2: 98% (06-14-23 @ 08:08) (93% - 98%)  Wt(kg): --  I&O's Summary    13 Jun 2023 07:01  -  14 Jun 2023 07:00  --------------------------------------------------------  IN: 720 mL / OUT: 1500 mL / NET: -780 mL      Weight (kg): 67.2 (06-12 @ 22:28)      PHYSICAL EXAM:  Appearance: Comfortable. No acute distress  HEENT:  Atraumatic. Normocephalic.  Normal oral mucosa  Neurologic: A & O x 3, no gross focal deficits.  Cardiovascular: RRR S1 S2, No murmur, no rubs/gallops. No JVD  Respiratory: Lungs clear to auscultation, unlabored   Gastrointestinal:  Soft, Non-tender, + BS  Lower Extremities: No edema  Psychiatry: Patient is calm. No agitation.   Skin: warm and dry.      CURRENT MEDICATIONS:  MEDICATIONS  (STANDING):  amLODIPine   Tablet 10 milliGRAM(s) Oral once  aspirin  chewable 81 milliGRAM(s) Oral daily  atorvastatin 40 milliGRAM(s) Oral at bedtime  calcium acetate 667 milliGRAM(s) Oral three times a day with meals  epoetin lois-epbx (RETACRIT) Injectable 92233 Unit(s) SubCutaneous <User Schedule>  gabapentin 300 milliGRAM(s) Oral at bedtime  glucagon  Injectable 1 milliGRAM(s) IntraMuscular once  heparin   Injectable 5000 Unit(s) SubCutaneous every 8 hours  insulin glargine Injectable (LANTUS) 7 Unit(s) SubCutaneous at bedtime  insulin lispro (ADMELOG) corrective regimen sliding scale   SubCutaneous Before meals and at bedtime  insulin lispro Injectable (ADMELOG) 5 Unit(s) SubCutaneous three times a day before meals  lidocaine   4% Patch 1 Patch Transdermal daily  loratadine 10 milliGRAM(s) Oral daily  magnesium sulfate  IVPB 2 Gram(s) IV Intermittent once  melatonin 5 milliGRAM(s) Oral at bedtime  metoprolol tartrate 50 milliGRAM(s) Oral two times a day  montelukast 10 milliGRAM(s) Oral at bedtime  oxyCODONE    IR 2.5 milliGRAM(s) Oral once  pantoprazole    Tablet 40 milliGRAM(s) Oral before breakfast  saccharomyces boulardii 250 milliGRAM(s) Oral two times a day  sodium bicarbonate 1300 milliGRAM(s) Oral three times a day  sodium chloride 0.9% lock flush 3 milliLiter(s) IV Push every 8 hours  tamsulosin 0.4 milliGRAM(s) Oral at bedtime          LABS:	 	                            9.7    4.83  )-----------( 175      ( 14 Jun 2023 05:17 )             28.7     06-14    132<L>  |  93<L>  |  27.2<H>  ----------------------------<  147<H>  4.4   |  27.0  |  6.02<H>    Ca    7.9<L>      14 Jun 2023 05:17  Phos  5.5     06-13  Mg     1.9     06-14    TPro  5.6<L>  /  Alb  3.0<L>  /  TBili  0.3<L>  /  DBili  x   /  AST  11  /  ALT  15  /  AlkPhos  75  06-13    proBNP:   Lipid Profile:   HgA1c:   TSH: Thyroid Stimulating Hormone, Serum: 4.14 uIU/mL        TELEMETRY:   ECG:  	      DIAGNOSTIC TESTING:  [ ] Echocardiogram:   [ ]  Catheterization:  [ ] Stress Test:    OTHER:

## 2023-06-14 NOTE — PROGRESS NOTE ADULT - ASSESSMENT
64 y/o M with PMHx ESRD (T/TH/S), IDDM, COPD, gastroparesis, peripheral blindness, retinopathy, CAD, initially presented to [facility] with nausea, vomiting and diarrhea and missed HD, found to be hyperkalemic with acute respiratory failure secondary to fluid overload, C. diff colitis. Then found to have NSTEMI, s/p cath with MVD. Transferred for CABG evaluation. Endocrine consulted for DM management, a1c 9.4%.    1. Uncontrolled DM, a1c 9.4% - glucoses improving  - Continue lantus 7 units daily  - Continue premeal admelog 5 units tid  - Sliding scale    2. CAD/MVD  - CABG evaluation as per CT surgery    3. ESRD  - HD as per nephrology    4. HLD  - Continue atorvastatin       62 y/o M with PMHx ESRD (T/TH/S), IDDM, COPD, gastroparesis, peripheral blindness, retinopathy, CAD, initially presented to [facility] with nausea, vomiting and diarrhea and missed HD, found to be hyperkalemic with acute respiratory failure secondary to fluid overload, C. diff colitis. Then found to have NSTEMI, s/p cath with MVD. Transferred for CABG evaluation. Endocrine consulted for DM management, a1c 9.4%.    1. Uncontrolled DM, a1c 9.4%  - FS 68 at lunch  - Decrease premeal admelog to 3 units tid  - Continue lantus 7 units daily  - Sliding scale    2. CAD/MVD  - CABG evaluation as per CT surgery    3. ESRD  - HD as per nephrology    4. HLD  - Continue atorvastatin

## 2023-06-15 DIAGNOSIS — R19.7 DIARRHEA, UNSPECIFIED: ICD-10-CM

## 2023-06-15 DIAGNOSIS — I25.10 ATHEROSCLEROTIC HEART DISEASE OF NATIVE CORONARY ARTERY WITHOUT ANGINA PECTORIS: ICD-10-CM

## 2023-06-15 DIAGNOSIS — D64.9 ANEMIA, UNSPECIFIED: ICD-10-CM

## 2023-06-15 DIAGNOSIS — N18.6 END STAGE RENAL DISEASE: ICD-10-CM

## 2023-06-15 DIAGNOSIS — I10 ESSENTIAL (PRIMARY) HYPERTENSION: ICD-10-CM

## 2023-06-15 DIAGNOSIS — E11.9 TYPE 2 DIABETES MELLITUS WITHOUT COMPLICATIONS: ICD-10-CM

## 2023-06-15 DIAGNOSIS — I50.21 ACUTE SYSTOLIC (CONGESTIVE) HEART FAILURE: ICD-10-CM

## 2023-06-15 LAB
ANION GAP SERPL CALC-SCNC: 13 MMOL/L — SIGNIFICANT CHANGE UP (ref 5–17)
BLD GP AB SCN SERPL QL: SIGNIFICANT CHANGE UP
BUN SERPL-MCNC: 29.7 MG/DL — HIGH (ref 8–20)
CALCIUM SERPL-MCNC: 8 MG/DL — LOW (ref 8.4–10.5)
CHLORIDE SERPL-SCNC: 92 MMOL/L — LOW (ref 96–108)
CO2 SERPL-SCNC: 27 MMOL/L — SIGNIFICANT CHANGE UP (ref 22–29)
CREAT SERPL-MCNC: 5.46 MG/DL — HIGH (ref 0.5–1.3)
EGFR: 11 ML/MIN/1.73M2 — LOW
GLUCOSE BLDC GLUCOMTR-MCNC: 189 MG/DL — HIGH (ref 70–99)
GLUCOSE BLDC GLUCOMTR-MCNC: 204 MG/DL — HIGH (ref 70–99)
GLUCOSE BLDC GLUCOMTR-MCNC: 234 MG/DL — HIGH (ref 70–99)
GLUCOSE BLDC GLUCOMTR-MCNC: 71 MG/DL — SIGNIFICANT CHANGE UP (ref 70–99)
GLUCOSE SERPL-MCNC: 223 MG/DL — HIGH (ref 70–99)
HCT VFR BLD CALC: 30.2 % — LOW (ref 39–50)
HGB BLD-MCNC: 10 G/DL — LOW (ref 13–17)
MAGNESIUM SERPL-MCNC: 2.2 MG/DL — SIGNIFICANT CHANGE UP (ref 1.6–2.6)
MCHC RBC-ENTMCNC: 29.4 PG — SIGNIFICANT CHANGE UP (ref 27–34)
MCHC RBC-ENTMCNC: 33.1 GM/DL — SIGNIFICANT CHANGE UP (ref 32–36)
MCV RBC AUTO: 88.8 FL — SIGNIFICANT CHANGE UP (ref 80–100)
PLATELET # BLD AUTO: 197 K/UL — SIGNIFICANT CHANGE UP (ref 150–400)
POTASSIUM SERPL-MCNC: 4.7 MMOL/L — SIGNIFICANT CHANGE UP (ref 3.5–5.3)
POTASSIUM SERPL-SCNC: 4.7 MMOL/L — SIGNIFICANT CHANGE UP (ref 3.5–5.3)
RBC # BLD: 3.4 M/UL — LOW (ref 4.2–5.8)
RBC # FLD: 15.3 % — HIGH (ref 10.3–14.5)
SODIUM SERPL-SCNC: 132 MMOL/L — LOW (ref 135–145)
WBC # BLD: 4.55 K/UL — SIGNIFICANT CHANGE UP (ref 3.8–10.5)
WBC # FLD AUTO: 4.55 K/UL — SIGNIFICANT CHANGE UP (ref 3.8–10.5)

## 2023-06-15 PROCEDURE — 99231 SBSQ HOSP IP/OBS SF/LOW 25: CPT

## 2023-06-15 PROCEDURE — 99232 SBSQ HOSP IP/OBS MODERATE 35: CPT

## 2023-06-15 PROCEDURE — 74176 CT ABD & PELVIS W/O CONTRAST: CPT | Mod: 26

## 2023-06-15 PROCEDURE — 90937 HEMODIALYSIS REPEATED EVAL: CPT

## 2023-06-15 PROCEDURE — 71045 X-RAY EXAM CHEST 1 VIEW: CPT | Mod: 26

## 2023-06-15 RX ORDER — INSULIN LISPRO 100/ML
2 VIAL (ML) SUBCUTANEOUS
Refills: 0 | Status: DISCONTINUED | OUTPATIENT
Start: 2023-06-15 | End: 2023-06-17

## 2023-06-15 RX ORDER — INSULIN GLARGINE 100 [IU]/ML
6 INJECTION, SOLUTION SUBCUTANEOUS AT BEDTIME
Refills: 0 | Status: DISCONTINUED | OUTPATIENT
Start: 2023-06-15 | End: 2023-06-19

## 2023-06-15 RX ORDER — VANCOMYCIN HCL 1 G
125 VIAL (EA) INTRAVENOUS EVERY 6 HOURS
Refills: 0 | Status: DISCONTINUED | OUTPATIENT
Start: 2023-06-15 | End: 2023-06-16

## 2023-06-15 RX ADMIN — Medication 1: at 17:32

## 2023-06-15 RX ADMIN — INSULIN GLARGINE 6 UNIT(S): 100 INJECTION, SOLUTION SUBCUTANEOUS at 21:51

## 2023-06-15 RX ADMIN — LIDOCAINE 1 PATCH: 4 CREAM TOPICAL at 18:21

## 2023-06-15 RX ADMIN — LORATADINE 10 MILLIGRAM(S): 10 TABLET ORAL at 09:12

## 2023-06-15 RX ADMIN — MONTELUKAST 10 MILLIGRAM(S): 4 TABLET, CHEWABLE ORAL at 21:52

## 2023-06-15 RX ADMIN — Medication 2: at 09:02

## 2023-06-15 RX ADMIN — CHLORHEXIDINE GLUCONATE 1 APPLICATION(S): 213 SOLUTION TOPICAL at 05:25

## 2023-06-15 RX ADMIN — Medication 50 MILLIGRAM(S): at 05:21

## 2023-06-15 RX ADMIN — Medication 5 MILLIGRAM(S): at 21:52

## 2023-06-15 RX ADMIN — Medication 2: at 21:51

## 2023-06-15 RX ADMIN — Medication 2 UNIT(S): at 17:33

## 2023-06-15 RX ADMIN — LIDOCAINE 1 PATCH: 4 CREAM TOPICAL at 20:41

## 2023-06-15 RX ADMIN — GABAPENTIN 300 MILLIGRAM(S): 400 CAPSULE ORAL at 20:33

## 2023-06-15 RX ADMIN — Medication 667 MILLIGRAM(S): at 12:15

## 2023-06-15 RX ADMIN — TAMSULOSIN HYDROCHLORIDE 0.4 MILLIGRAM(S): 0.4 CAPSULE ORAL at 21:52

## 2023-06-15 RX ADMIN — SODIUM CHLORIDE 3 MILLILITER(S): 9 INJECTION INTRAMUSCULAR; INTRAVENOUS; SUBCUTANEOUS at 21:53

## 2023-06-15 RX ADMIN — PANTOPRAZOLE SODIUM 40 MILLIGRAM(S): 20 TABLET, DELAYED RELEASE ORAL at 05:21

## 2023-06-15 RX ADMIN — ATORVASTATIN CALCIUM 40 MILLIGRAM(S): 80 TABLET, FILM COATED ORAL at 21:52

## 2023-06-15 RX ADMIN — Medication 667 MILLIGRAM(S): at 09:10

## 2023-06-15 RX ADMIN — Medication 250 MILLIGRAM(S): at 05:21

## 2023-06-15 RX ADMIN — SODIUM CHLORIDE 3 MILLILITER(S): 9 INJECTION INTRAMUSCULAR; INTRAVENOUS; SUBCUTANEOUS at 05:26

## 2023-06-15 RX ADMIN — Medication 250 MILLIGRAM(S): at 17:32

## 2023-06-15 RX ADMIN — Medication 50 MILLIGRAM(S): at 17:32

## 2023-06-15 RX ADMIN — Medication 667 MILLIGRAM(S): at 17:32

## 2023-06-15 RX ADMIN — Medication 81 MILLIGRAM(S): at 09:10

## 2023-06-15 RX ADMIN — METHOCARBAMOL 500 MILLIGRAM(S): 500 TABLET, FILM COATED ORAL at 05:37

## 2023-06-15 RX ADMIN — SODIUM CHLORIDE 3 MILLILITER(S): 9 INJECTION INTRAMUSCULAR; INTRAVENOUS; SUBCUTANEOUS at 13:43

## 2023-06-15 RX ADMIN — LIDOCAINE 1 PATCH: 4 CREAM TOPICAL at 09:10

## 2023-06-15 RX ADMIN — HEPARIN SODIUM 5000 UNIT(S): 5000 INJECTION INTRAVENOUS; SUBCUTANEOUS at 14:28

## 2023-06-15 RX ADMIN — HEPARIN SODIUM 5000 UNIT(S): 5000 INJECTION INTRAVENOUS; SUBCUTANEOUS at 05:21

## 2023-06-15 RX ADMIN — HEPARIN SODIUM 5000 UNIT(S): 5000 INJECTION INTRAVENOUS; SUBCUTANEOUS at 21:53

## 2023-06-15 RX ADMIN — Medication 3 UNIT(S): at 09:02

## 2023-06-15 NOTE — PROGRESS NOTE ADULT - ASSESSMENT
63y old  Male ESRD on HD (T,Th, S), IDDM, COPD, gastroparesis, peripheral blindness and retinopathy, and CAD (most recent DEISY 2011 dLAD). Presented to [facility] for chest discomfort, respiratory distress, C.Diff, NSTEMI. S/p C with severe MVD. Endorses consistent CP for 1 month now.

## 2023-06-15 NOTE — PROGRESS NOTE ADULT - PROBLEM SELECTOR PROBLEM 7
CKD (chronic kidney disease) requiring chronic dialysis
CKD (chronic kidney disease) requiring chronic dialysis

## 2023-06-15 NOTE — PROGRESS NOTE ADULT - SUBJECTIVE AND OBJECTIVE BOX
INTERVAL EVENTS:  Follow up diabetes management    ROS: Complains of generalized chest discomfort (unchanged since admission), no sob/abd pain    MEDICATIONS  (STANDING):  amLODIPine   Tablet 10 milliGRAM(s) Oral once  aspirin  chewable 81 milliGRAM(s) Oral daily  atorvastatin 40 milliGRAM(s) Oral at bedtime  calcium acetate 667 milliGRAM(s) Oral three times a day with meals  chlorhexidine 2% Cloths 1 Application(s) Topical <User Schedule>  dextrose 5%. 1000 milliLiter(s) (50 mL/Hr) IV Continuous <Continuous>  dextrose 5%. 1000 milliLiter(s) (100 mL/Hr) IV Continuous <Continuous>  dextrose 50% Injectable 12.5 Gram(s) IV Push once  dextrose 50% Injectable 25 Gram(s) IV Push once  dextrose 50% Injectable 25 Gram(s) IV Push once  epoetin lois-epbx (RETACRIT) Injectable 86668 Unit(s) IV Push <User Schedule>  gabapentin 300 milliGRAM(s) Oral <User Schedule>  glucagon  Injectable 1 milliGRAM(s) IntraMuscular once  heparin   Injectable 5000 Unit(s) SubCutaneous every 8 hours  insulin glargine Injectable (LANTUS) 6 Unit(s) SubCutaneous at bedtime  insulin lispro (ADMELOG) corrective regimen sliding scale   SubCutaneous Before meals and at bedtime  insulin lispro Injectable (ADMELOG) 2 Unit(s) SubCutaneous three times a day with meals  lidocaine   4% Patch 1 Patch Transdermal daily  loratadine 10 milliGRAM(s) Oral daily  melatonin 5 milliGRAM(s) Oral at bedtime  metoprolol tartrate 50 milliGRAM(s) Oral two times a day  montelukast 10 milliGRAM(s) Oral at bedtime  pantoprazole    Tablet 40 milliGRAM(s) Oral before breakfast  saccharomyces boulardii 250 milliGRAM(s) Oral two times a day  sodium chloride 0.9% lock flush 3 milliLiter(s) IV Push every 8 hours  tamsulosin 0.4 milliGRAM(s) Oral at bedtime    MEDICATIONS  (PRN):  acetaminophen     Tablet .. 650 milliGRAM(s) Oral every 8 hours PRN Mild Pain (1 - 3)  albuterol    90 MICROgram(s) HFA Inhaler 2 Puff(s) Inhalation every 6 hours PRN Bronchospasm  dextrose Oral Gel 15 Gram(s) Oral once PRN Blood Glucose LESS THAN 70 milliGRAM(s)/deciliter  guaiFENesin Oral Liquid (Sugar-Free) 100 milliGRAM(s) Oral every 8 hours PRN Cough  hyoscyamine SL 0.125 milliGRAM(s) SubLingual four times a day PRN abdominal spasm  methocarbamol 500 milliGRAM(s) Oral three times a day PRN Muscle Spasm    Allergies  shellfish (Rash)  No Known Drug Allergies    Vital Signs Last 24 Hrs  T(C): 36.7 (15 Poncho 2023 10:30), Max: 37.2 (15 Poncho 2023 05:15)  T(F): 98.1 (15 Poncho 2023 10:30), Max: 99 (15 Poncho 2023 05:15)  HR: 60 (15 Poncho 2023 10:30) (60 - 81)  BP: 135/60 (15 Poncho 2023 10:30) (135/60 - 178/80)  BP(mean): 101 (14 Jun 2023 18:33) (101 - 101)  RR: 18 (15 Poncho 2023 10:30) (17 - 18)  SpO2: 100% (15 Poncho 2023 08:24) (93% - 100%)    Parameters below as of 15 Poncho 2023 10:30  Patient On (Oxygen Delivery Method): nasal cannula  O2 Flow (L/min): 2      PHYSICAL EXAM:  General: No apparent distress  Neck: Supple, trachea midline, no thyromegaly  Respiratory: Lungs clear bilaterally, normal rate, effort  Cardiac: +S1, S2, no m/r/g  GI: +BS, soft, non tender, non distended  Extremities: No peripheral edema, no pedal lesions  Neuro: A+O X3, no tremor    LABS:                        10.0   4.55  )-----------( 197      ( 15 Poncho 2023 05:07 )             30.2     06-15    132<L>  |  92<L>  |  29.7<H>  ----------------------------<  223<H>  4.7   |  27.0  |  5.46<H>    Ca    8.0<L>      15 Poncho 2023 05:07  Phos  5.5     06-13  Mg     2.2     06-15    POCT Blood Glucose.: 71 mg/dL (06-15-23 @ 12:06)  POCT Blood Glucose.: 234 mg/dL (06-15-23 @ 07:58)  POCT Blood Glucose.: 155 mg/dL (06-14-23 @ 21:10)  POCT Blood Glucose.: 155 mg/dL (06-14-23 @ 18:26)  POCT Blood Glucose.: 126 mg/dL (06-14-23 @ 12:48)    Thyroid Stimulating Hormone, Serum: 4.14 uIU/mL (06-12-23 @ 21:50)

## 2023-06-15 NOTE — PROGRESS NOTE ADULT - PROBLEM SELECTOR PLAN 5
Continue fingersticks AC/HS.   Continue Lantus, premeal insulin, and sliding scale insulin for BG coverage.   Endocrine following.  Consistent carb diet.

## 2023-06-15 NOTE — PROGRESS NOTE ADULT - ASSESSMENT
3Hrs HD with 1.5 liters fluid loss.1unit PRBC transfused.rt avf hemostasis achived.report given primary RN.    Pt is seen and examined on dialysis. No symptoms. Hemodynamics stable. Tolerating dialysis and ultrafiltration.  Pre Laboratory values personally reviewed by me.  Dialysis adjusted appropriately based on current values.  Will continue the current medical management.  Next hemodialysis as scheduled.  Discussed with nursing, primary care team.     Nephrologially stable for the Planned surgery,

## 2023-06-15 NOTE — PROGRESS NOTE ADULT - PROBLEM SELECTOR PLAN 1
Patient with consistent chest pain since admission  today pain in controlled  CTS following  Consider trialing Imdur (Ranexa contraindicated for eGFR < 30)  Keep HGB > 8.  As discussed Dr. Becerra is to review the films for possible high risk pci today   c/w asa, atorvastatin, metoprolol. Patient with consistent chest pain since admission  today pain in controlled  CTS following  Consider trialing Imdur (Ranexa contraindicated for eGFR < 30)  Keep HGB > 8.  Possible CABG 6/16/2023 CTS on the CASE.     c/w asa, atorvastatin, metoprolol. CATH:   LAD 70%, distal DEISY ISR 70%  LCX 80% focal, 70% mid, 80% distal  OM1 90%  RCA 80% prox, 70% mid  RPDA 90%  today pain is controlled  Possible CABG 6/16/2023 CTS on the case     c/w asa, atorvastatin, metoprolol.

## 2023-06-15 NOTE — PROGRESS NOTE ADULT - ASSESSMENT
64 y/o M with PMHx ESRD (T/TH/S), IDDM, COPD, gastroparesis, peripheral blindness, retinopathy, CAD, initially presented to [facility] with nausea, vomiting and diarrhea and missed HD, found to be hyperkalemic with acute respiratory failure secondary to fluid overload, C. diff colitis. Then found to have NSTEMI, s/p cath with MVD. Transferred for CABG evaluation. Endocrine consulted for DM management, a1c 9.4%.    1. Uncontrolled DM, a1c 9.4%  - Post prandial hypoglycemia yesterday, premeal admelog was reduced  - FS 71 today at lunch time  - Decrease premeal admelog to 2 units tid  - Decrease lantus to 6 units qhs  - Sliding scale    2. CAD/MVD  - CABG evaluation as per CT surgery, possibly 6/16    3. ESRD  - HD as per nephrology    4. HLD  - Continue atorvastatin

## 2023-06-15 NOTE — PROGRESS NOTE ADULT - PROBLEM SELECTOR PLAN 5
Continue fingersticks AC/HS.   Continue Lantus, premeal insulin, and sliding scale insulin for BG coverage.

## 2023-06-15 NOTE — PROGRESS NOTE ADULT - PROBLEM SELECTOR PLAN 5
EF 35-40%, Grade II diastolic dysfunction on echo noted.  patient appearing euvolemic today  Hemofiltration as tolerated by HD  IN: 315 mL / OUT: 2100 mL / NET: -1785 mL  GDMT: no diuresis as hemofiltration is with dialysis  Ct bb Echo 6/13/2023 EF 35-40%, Grade II diastolic dysfunction on echo noted.  patient appearing euvolemic today  Hemofiltration as tolerated by HD  IN: 315 mL / OUT: 2100 mL / NET: -1785 mL  GDMT: no diuresis as hemofiltration is with dialysis  Ct bb

## 2023-06-15 NOTE — PROGRESS NOTE ADULT - PROBLEM SELECTOR PLAN 3
EG 35-40%, Grade II diastolic dysfunction on echo noted.  patient appearing euvolemic today  Hemofiltration as tolerated by HD  IN: 315 mL / OUT: 2100 mL / NET: -1785 mL  GDMT: no diuresis as hemofiltration is with dialysis  Ct bb Patient transferred 1 unit of 6/13/2023  Last hemoglobin 10

## 2023-06-15 NOTE — PROGRESS NOTE ADULT - PROBLEM SELECTOR PLAN 6
anemia of chronic disease in the setting of ESRD  S/p 1 unit PRBC 6/13 for H/H 7.9/23.   Hb 10 today.
Continue Flomax.
Continue flomax.

## 2023-06-15 NOTE — PROGRESS NOTE ADULT - PROBLEM SELECTOR PLAN 2
164/70  amLODIPine   Tablet 10 milliGRAM(s) Oral once  metoprolol tartrate 50 milliGRAM(s) Oral two times a day - titrate as tolerated. 164/70  amlodipine   Tablet 10 mg GRAM(s) Oral once  metoprolol tartrate 50 mg Oral two times a day - titrate as tolerated.

## 2023-06-15 NOTE — PROGRESS NOTE ADULT - SUBJECTIVE AND OBJECTIVE BOX
pt. seen and examined , has been under CT surgery service for evaluation of possible CABG for multi vessel disease found on cardiac cath on 6/12/23  at [facility] later transferred to [facility]. ct surgery team not planning to do CABG at this point, medical optimization and then reassess as an out-pt. As per ct surgery PA pt. is now having loose BM and reported abd. pain, recently pt. completed his course ( 6/12 /23 ) of po vanco for c diff. I was told that ct surgery team consulted GI and pt. will be seen in am. pt's ct abd. pelvis and c-diff has been ordered by ct surgery team ,pending. pt. is transferred to medical team now. no fever. no n/v. no cp, no sob.   ROS : negative except for the ones mentioned above  Brief hospital course :   63 year old male with a medical history of ESRD on HD (T,Th,S), type 2 DM (Ha1c 9.4 on insulin), COPD, gastroparesis, peripheral blindness and retinopathy, and CAD, who initially presented to [facility] on 5/31/23 with n/v/d, missing his HD as a result, found to be hyperkalemic with acute respiratory failure secondary to fluid overload vs pneumonia, and C. diff colitis (positive on 6/1). Completed 10 day course of oral vancomycin on 6/12 per chart.  Patient became fluid overloaded during hospitalization at [place] requiring HFNC, improved with additional HD. Patient found to have NSTEMI s/p heparin drip 6/10, with cardiac cath 6/12 demonstrating Multivessel CAD, requiring transfer to [facility] for CT Surgery evaluation for possible CABG.  pt. seen and examined , has been under CT surgery service for evaluation of possible CABG for multi vessel disease found on cardiac cath on 6/12/23  at [facility] later transferred to [facility]. ct surgery team not planning to do CABG at this point, medical optimization and then reassess as an out-pt. As per ct surgery PA pt. is now having loose watery BM since this morning , multiple episodes and reported abd. pain, pt. recently completed his course ( 6/12 /23 ) of po vanco for c diff. I was told that ct surgery team consulted GI and pt. will be seen in am. pt's ct abd. pelvis and c-diff has been ordered by ct surgery team ,pending. pt. is transferred to medical team now. no fever. no n/v. mid abdomen mild to moderate pain reported. no cp, no sob.   ROS : negative except for the ones mentioned above  Brief hospital course :   63 year old male with a medical history of ESRD on HD (T,Th,S), type 2 DM (Ha1c 9.4 on insulin), COPD, gastroparesis, peripheral blindness and retinopathy, and CAD, who initially presented to [facility] on 5/31/23 with n/v/d, missing his HD as a result, found to be hyperkalemic with acute respiratory failure secondary to fluid overload vs pneumonia, and C. diff colitis (positive on 6/1). Completed 10 day course of oral vancomycin on 6/12 per chart.  Patient became fluid overloaded during hospitalization at [place] requiring HFNC, improved with additional HD. Patient found to have NSTEMI s/p heparin drip 6/10, with cardiac cath 6/12 demonstrating Multivessel CAD, requiring transfer to [facility] for CT Surgery evaluation for possible CABG.     P/E  Vital Signs Last 24 Hrs  T(C): 37.3 (15 Poncho 2023 16:21), Max: 37.3 (15 Poncho 2023 16:21)  T(F): 99.2 (15 Poncho 2023 16:21), Max: 99.2 (15 Poncho 2023 16:21)  HR: 66 (15 Poncho 2023 16:21) (60 - 68)  BP: 165/73 (15 Poncho 2023 16:21) (135/60 - 166/74)  BP(mean): 103 (15 Poncho 2023 16:21) (103 - 103)  RR: 18 (15 Poncho 2023 16:21) (17 - 18)  SpO2: 99% (15 Poncho 2023 16:21) (98% - 100%)    Parameters below as of 15 Poncho 2023 16:21  Patient On (Oxygen Delivery Method): nasal cannula  O2 Flow (L/min): 2    General: Pt. in bed not in distress  HEENT: AT, NC. PERRL. intact EOM. no throat erythema or exudate.   Neck: supple. no JVD.   Chest: CTA bilaterally  Heart: S1,S2. RRR. no heart murmur.  Abdomen: soft. mild pain to palpation mid abd. area, no RT, no gaurding, non-distended. + BS.  Ext: no calf tenderness. RUE av graft with thrill.   Neuro: AAO x3. no focal weakness. no speech disorder.  Skin: warm and dry  psych : mood ok, no si/hi    MEDICATIONS  (STANDING):  amLODIPine   Tablet 10 milliGRAM(s) Oral once  aspirin  chewable 81 milliGRAM(s) Oral daily  atorvastatin 40 milliGRAM(s) Oral at bedtime  calcium acetate 667 milliGRAM(s) Oral three times a day with meals  chlorhexidine 2% Cloths 1 Application(s) Topical <User Schedule>  dextrose 5%. 1000 milliLiter(s) (50 mL/Hr) IV Continuous <Continuous>  dextrose 5%. 1000 milliLiter(s) (100 mL/Hr) IV Continuous <Continuous>  dextrose 50% Injectable 12.5 Gram(s) IV Push once  dextrose 50% Injectable 25 Gram(s) IV Push once  dextrose 50% Injectable 25 Gram(s) IV Push once  epoetin lois-epbx (RETACRIT) Injectable 95056 Unit(s) IV Push <User Schedule>  gabapentin 300 milliGRAM(s) Oral <User Schedule>  glucagon  Injectable 1 milliGRAM(s) IntraMuscular once  heparin   Injectable 5000 Unit(s) SubCutaneous every 8 hours  insulin glargine Injectable (LANTUS) 6 Unit(s) SubCutaneous at bedtime  insulin lispro (ADMELOG) corrective regimen sliding scale   SubCutaneous Before meals and at bedtime  insulin lispro Injectable (ADMELOG) 2 Unit(s) SubCutaneous three times a day with meals  lidocaine   4% Patch 1 Patch Transdermal daily  loratadine 10 milliGRAM(s) Oral daily  melatonin 5 milliGRAM(s) Oral at bedtime  metoprolol tartrate 50 milliGRAM(s) Oral two times a day  montelukast 10 milliGRAM(s) Oral at bedtime  saccharomyces boulardii 250 milliGRAM(s) Oral two times a day  sodium chloride 0.9% lock flush 3 milliLiter(s) IV Push every 8 hours  tamsulosin 0.4 milliGRAM(s) Oral at bedtime  vancomycin    Solution 125 milliGRAM(s) Oral every 6 hours    MEDICATIONS  (PRN):  acetaminophen     Tablet .. 650 milliGRAM(s) Oral every 8 hours PRN Mild Pain (1 - 3)  albuterol    90 MICROgram(s) HFA Inhaler 2 Puff(s) Inhalation every 6 hours PRN Bronchospasm  dextrose Oral Gel 15 Gram(s) Oral once PRN Blood Glucose LESS THAN 70 milliGRAM(s)/deciliter  guaiFENesin Oral Liquid (Sugar-Free) 100 milliGRAM(s) Oral every 8 hours PRN Cough  hyoscyamine SL 0.125 milliGRAM(s) SubLingual four times a day PRN abdominal spasm  methocarbamol 500 milliGRAM(s) Oral three times a day PRN Muscle Spasm      LABS                           10.0   4.55  )-----------( 197      ( 15 Poncho 2023 05:07 )             30.2     06-15    132<L>  |  92<L>  |  29.7<H>  ----------------------------<  223<H>  4.7   |  27.0  |  5.46<H>    Ca    8.0<L>      15 Poncho 2023 05:07  Mg     2.2     06-15

## 2023-06-15 NOTE — PROGRESS NOTE ADULT - PROBLEM SELECTOR PLAN 7
Nephrology following   Last HD 6/13  Continue bicarb, calcium acetate, epoetin.  Will try to optimize fluid removal in pre operative setting
Nephrology following.  Last HD 6/14 with -2L removed.   Continue calcium acetate, epoetin.  Will try to optimize fluid removal in pre operative setting.  Plan for HD again today per [facility].

## 2023-06-15 NOTE — PROGRESS NOTE ADULT - PROBLEM SELECTOR PLAN 1
multi vessel disease   continue aspirin, b.blk, statin   as per ct surgery medical management at this point, pt. not going for CABG at this point.   cardiology team following.

## 2023-06-15 NOTE — PROGRESS NOTE ADULT - NSPROGADDITIONALINFOA_GEN_ALL_CORE
CAD s/p LHC 70%, distal DEISY ISR 70%, LCX 80% focal, 70% mid, 80% distal, OM1 90%, RCA 80% prox, 70% mid, RPDA 90%  Acute Systolic Heart Failure: LVEF 35-40.  Possible CABG 6/16/2023 CTS on the case .  c/w asa, atorvastatin, metoprolol.  s/p LHC showed severe mvd  CTS on the case  Possible CABG 6/16

## 2023-06-15 NOTE — PROGRESS NOTE ADULT - PROBLEM SELECTOR PLAN 2
S/p 1 unit PRBC 6/13 for H/H 7.9/23.  Per Dr. Newsome, patient to received a unit of PRBC with each HD session prior to surgery.

## 2023-06-15 NOTE — PROGRESS NOTE ADULT - ASSESSMENT
63 year old male with a medical history of ESRD on HD (T,Th,S), type 2 DM (Ha1c 9.4 on insulin), COPD, gastroparesis, peripheral blindness and retinopathy, and CAD, who initially presented to [facility] on 5/31/23 with n/v/d, missing his HD as a result, found to be hyperkalemic with acute respiratory failure secondary to fluid overload vs pneumonia, and C. diff colitis (positive on 6/1). Completed 10 day course of oral vancomycin on 6/12 per chart.  Patient became fluid overloaded during hospitalization at [place] requiring HFNC, improved with additional HD. Patient found to have NSTEMI s/p heparin drip 6/10, with cardiac cath 6/12 demonstrating Multivessel CAD, requiring transfer to [facility] for CT Surgery evaluation for possible CABG.

## 2023-06-15 NOTE — PROGRESS NOTE ADULT - SUBJECTIVE AND OBJECTIVE BOX
Preoperative Evaluation for CABG given Multivessel CAD    PAST MEDICAL & SURGICAL HISTORY:  HTN (Hypertension)  Hypercholesterolemia   Diabetes Mellitus with Neuropathy (x 8 yrs without nephropathy or retinopathy  BPH (Benign Prostatic Hypertrophy)   Glaucoma  CAD (coronary artery disease)  CKD (chronic kidney disease)  Osteomyelitis  Gastroparesis  PVD (peripheral vascular disease)  Legally blind  Congenital Anomaly of Foot, surgically corrected as infant  S/P amputation left toes  Stented coronary artery    FAMILY HISTORY:  No pertinent family history in first degree relatives    Brief Hospital Course: 63 year old male with a medical history of ESRD on HD (T,Th,S), type 2 DM (Ha1c 9.4 on insulin), COPD, gastroparesis, peripheral blindness and retinopathy, and CAD, who initially presented to [facility] on 5/31/23 with n/v/d, missing his HD as a result, found to be hyperkalemic with acute respiratory failure secondary to fluid overload vs pneumonia, and C. diff colitis (positive on 6/1). Completed 10 day course of oral vancomycin on 6/12 per chart.  Patient became fluid overloaded during hospitalization at [facility] requiring HFNC, improved with additional HD. Patient found to have NSTEMI s/p heparin drip 6/10, with cardiac cath 6/12 demonstrating Multivessel CAD, requiring transfer to [facility] for CT Surgery evaluation for possible CABG.     Significant recent/past 24 hr events: No overnight events reported.    Subjective: Patient lying in bed in NAD. +Tolerating diet. +Passing gas. +Pain currently controlled. Denies fevers, chills, lightheadedness, dizziness, HA, CP, palpitations, SOB, cough, abdominal pain, N/V, diarrhea, numbness/tingling in extremities, or any other acute complaints. ROS negative x 10 systems except as noted above.    MEDICATIONS  (STANDING):  amLODIPine   Tablet 10 milliGRAM(s) Oral once  aspirin  chewable 81 milliGRAM(s) Oral daily  atorvastatin 40 milliGRAM(s) Oral at bedtime  calcium acetate 667 milliGRAM(s) Oral three times a day with meals  chlorhexidine 2% Cloths 1 Application(s) Topical <User Schedule>  epoetin lois-epbx (RETACRIT) Injectable 82850 Unit(s) IV Push <User Schedule>  gabapentin 300 milliGRAM(s) Oral <User Schedule>  heparin   Injectable 5000 Unit(s) SubCutaneous every 8 hours  insulin glargine Injectable (LANTUS) 7 Unit(s) SubCutaneous at bedtime  insulin lispro (ADMELOG) corrective regimen sliding scale   SubCutaneous Before meals and at bedtime  insulin lispro Injectable (ADMELOG) 3 Unit(s) SubCutaneous three times a day before meals  lidocaine   4% Patch 1 Patch Transdermal daily  loratadine 10 milliGRAM(s) Oral daily  melatonin 5 milliGRAM(s) Oral at bedtime  metoprolol tartrate 50 milliGRAM(s) Oral two times a day  montelukast 10 milliGRAM(s) Oral at bedtime  pantoprazole    Tablet 40 milliGRAM(s) Oral before breakfast  saccharomyces boulardii 250 milliGRAM(s) Oral two times a day  sodium chloride 0.9% lock flush 3 milliLiter(s) IV Push every 8 hours  tamsulosin 0.4 milliGRAM(s) Oral at bedtime    MEDICATIONS  (PRN):  acetaminophen     Tablet .. 650 milliGRAM(s) Oral every 8 hours PRN Mild Pain (1 - 3)  albuterol    90 MICROgram(s) HFA Inhaler 2 Puff(s) Inhalation every 6 hours PRN Bronchospasm  dextrose Oral Gel 15 Gram(s) Oral once PRN Blood Glucose LESS THAN 70 milliGRAM(s)/deciliter  guaiFENesin Oral Liquid (Sugar-Free) 100 milliGRAM(s) Oral every 8 hours PRN Cough  hyoscyamine SL 0.125 milliGRAM(s) SubLingual four times a day PRN abdominal spasm  methocarbamol 500 milliGRAM(s) Oral three times a day PRN Muscle Spasm    Allergies:  shellfish (Rash)  No Known Drug Allergies    Vitals   T(C): 36.8 (15 Poncho 2023 00:28), Max: 36.9 (14 Jun 2023 04:55)  T(F): 98.2 (15 Poncho 2023 00:28), Max: 98.5 (14 Jun 2023 04:55)  HR: 63 (15 Poncho 2023 00:28) (63 - 81)  BP: 162/71 (15 Poncho 2023 00:28) (147/74 - 178/80)  BP(mean): 101 (14 Jun 2023 18:33) (101 - 101)  RR: 18 (15 Poncho 2023 00:28) (16 - 18)  SpO2: 98% (15 Poncho 2023 00:28) (93% - 100%)  O2 Parameters below as of 15 Poncho 2023 00:28  Patient On (Oxygen Delivery Method): nasal cannula  O2 Flow (L/min): 3    I&O's Detail    13 Jun 2023 07:01  -  14 Jun 2023 07:00  --------------------------------------------------------  IN:    Oral Fluid: 720 mL  Total IN: 720 mL    OUT:    Other (mL): 1500 mL  Total OUT: 1500 mL    Total NET: -780 mL      14 Jun 2023 07:01  -  15 Poncho 2023 02:25  --------------------------------------------------------  IN:  Total IN: 0 mL    OUT:    Other (mL): 2000 mL  Total OUT: 2000 mL    Total NET: -2000 mL    PHYSICAL EXAM  Constitutional: NAD  Neuro: A+O x 3, poor historian, non-focal, speech clear and intact  HEENT:  NCAT, No conjuctival edema or icterus, no thrush.   Neck:  Supple, trachea midline  Pulm: breath sounds with bibasilar crackles, no accessory muscle use noted  CV: regular rate, regular rhythm, +S1S2, no murmur or rub noted  Abd: soft, NT, ND, + BS  Ext: BARRETO x 4, no LE edema, no cyanosis, distal motor/neuro/circ intact  Skin: warm, dry, perfused  Psych: calm, appropriate affect  Lines: Right upper arm AVF +bruit/+thrill    LABS                        9.7    4.83  )-----------( 175      ( 14 Jun 2023 05:17 )             28.7     06-14    132<L>  |  93<L>  |  27.2<H>  ----------------------------<  147<H>  4.4   |  27.0  |  6.02<H>    Ca    7.9<L>      14 Jun 2023 05:17  Phos  5.5     06-13  Mg     1.9     06-14    TPro  5.6<L>  /  Alb  3.0<L>  /  TBili  0.3<L>  /  DBili  x   /  AST  11  /  ALT  15  /  AlkPhos  75  06-13    Prothrombin Time and INR, Plasma (06.12.23 @ 21:50)    Prothrombin Time, Plasma: 11.2 sec   INR: 0.97    P2Y12 Plt Response Test (06.12.23 @ 21:50)    P2Y12 Plt Reactivity: 262    POCT Blood Glucose.: 155 mg/dL (06-14-23 @ 21:10)  POCT Blood Glucose.: 155 mg/dL (06-14-23 @ 18:26)  POCT Blood Glucose.: 126 mg/dL (06-14-23 @ 12:48)  POCT Blood Glucose.: 67 mg/dL (06-14-23 @ 12:23)  POCT Blood Glucose.: 65 mg/dL (06-14-23 @ 12:00)  POCT Blood Glucose.: 68 mg/dL (06-14-23 @ 11:55)  POCT Blood Glucose.: 142 mg/dL (06-14-23 @ 07:55)    A1C with Estimated Average Glucose (06.12.23 @ 21:50)    A1C with Estimated Average Glucose Result: 9.4 %   Estimated Average Glucose: 223 mg/dL    Thyroid Stimulating Hormone, Serum (06.12.23 @ 21:50)    Thyroid Stimulating Hormone, Serum: 4.14 uIU/mL    Pro-Brain Natriuretic Peptide (06.12.23 @ 21:50)    Pro-Brain Natriuretic Peptide: 38922    Prealbumin, Serum (06.12.23 @ 21:50)    Prealbumin, Serum: 15 mg/dL    Last CXR:  < from: Xray Chest 1 View- PORTABLE-Routine (Xray Chest 1 View- PORTABLE-Routine in AM.) (06.13.23 @ 05:00) >  FINDINGS:  Single frontal view of the chest demonstrates moderate CHF and moderate bilateral pleural effusions. The cardiomediastinal silhouette is enlarged. No acute osseous abnormalities. Overlying EKG leads and wires are noted  IMPRESSION: No interval change.  < end of copied text >    CT Chest:  < from: CT Chest No Cont (06.14.23 @ 11:31) >  FINDINGS:  AIRWAYS, LUNGS, PLEURA: Central airways clear. Interlobular septal thickening. Small/moderate bilateral pleural effusions and associated right basilar atelectasis, new.  MEDIASTINUM: Cardiomegaly. Coronary atherosclerosis. Normal caliber thoracic aorta. No large mediastinal nodes.  IMAGED ABDOMEN: Unremarkable.  SOFT TISSUES: Unremarkable.  BONES: Unremarkable.  IMPRESSION:.  Pulmonary edema with small/moderate bilateral pleural effusions.  < end of copied text >    TTE:  < from: TTE Echo Complete w/ Contrast w/ Doppler (06.13.23 @ 08:36) >  Summary:   1. Endocardial visualization was enhanced with intravenous echo contrast.   2. Left ventricular ejection fraction, by visual estimation, is 35 to 40%.   3. Moderately to severely decreased global left ventricular systolic function.   4. Spectral Doppler shows pseudonormal pattern of left ventricular myocardial filling (Grade II diastolic dysfunction).   5. Normal right ventricular size and function.   6. Moderate to severe left atrial enlargement.   7. Mild mitral valve regurgitation.   8. Sclerotic aortic valve with normal opening.  MD Jami Electronically signed on 6/13/2023 at 1:16:37 PM  < end of copied text >    Carotid US:  < from: US Duplex Carotid Arteries Complete, Bilateral (06.13.23 @ 12:31) >  Antegrade flow is noted within both vertebral arteries.  IMPRESSION: No significant hemodynamic stenosis of either carotid artery.  < end of copied text >

## 2023-06-15 NOTE — PROGRESS NOTE ADULT - SUBJECTIVE AND OBJECTIVE BOX
[facility] DIVISION OF KIDNEY DISEASES AND HYPERTENSION -- HEMODIALYSIS NOTE  --------------------------------------------------------------------------------  Chief Complaint: ESRD/Ongoing hemodialysis requirement    24 hour events/subjective: On HD, KAI the Family,     Cat, MARINA ,     PAST HISTORY  --------------------------------------------------------------------------------  No significant changes to PMH, PSH, FHx, SHx, unless otherwise noted    ALLERGIES & MEDICATIONS  --------------------------------------------------------------------------------  Allergies    shellfish (Rash)  No Known Drug Allergies    Intolerances    Standing Inpatient Medications  amLODIPine   Tablet 10 milliGRAM(s) Oral daily  aspirin  chewable 81 milliGRAM(s) Oral daily  atorvastatin 40 milliGRAM(s) Oral at bedtime  calcium acetate 667 milliGRAM(s) Oral three times a day with meals  chlorhexidine 2% Cloths 1 Application(s) Topical <User Schedule>  dextrose 5%. 1000 milliLiter(s) IV Continuous <Continuous>  dextrose 5%. 1000 milliLiter(s) IV Continuous <Continuous>  dextrose 50% Injectable 25 Gram(s) IV Push once  dextrose 50% Injectable 12.5 Gram(s) IV Push once  dextrose 50% Injectable 25 Gram(s) IV Push once  epoetin lois-epbx (RETACRIT) Injectable 26262 Unit(s) IV Push <User Schedule>  gabapentin 300 milliGRAM(s) Oral <User Schedule>  glucagon  Injectable 1 milliGRAM(s) IntraMuscular once  heparin   Injectable 5000 Unit(s) SubCutaneous every 8 hours  insulin glargine Injectable (LANTUS) 6 Unit(s) SubCutaneous at bedtime  insulin lispro (ADMELOG) corrective regimen sliding scale   SubCutaneous Before meals and at bedtime  insulin lispro Injectable (ADMELOG) 2 Unit(s) SubCutaneous three times a day with meals  lidocaine   4% Patch 1 Patch Transdermal daily  loratadine 10 milliGRAM(s) Oral daily  melatonin 5 milliGRAM(s) Oral at bedtime  metoprolol tartrate 50 milliGRAM(s) Oral two times a day  montelukast 10 milliGRAM(s) Oral at bedtime  saccharomyces boulardii 250 milliGRAM(s) Oral two times a day  sodium chloride 0.9% lock flush 3 milliLiter(s) IV Push every 8 hours  tamsulosin 0.4 milliGRAM(s) Oral at bedtime  vancomycin    Solution 125 milliGRAM(s) Oral every 6 hours    PRN Inpatient Medications  acetaminophen     Tablet .. 650 milliGRAM(s) Oral every 8 hours PRN  albuterol    90 MICROgram(s) HFA Inhaler 2 Puff(s) Inhalation every 6 hours PRN  dextrose Oral Gel 15 Gram(s) Oral once PRN  guaiFENesin Oral Liquid (Sugar-Free) 100 milliGRAM(s) Oral every 8 hours PRN  hyoscyamine SL 0.125 milliGRAM(s) SubLingual four times a day PRN  methocarbamol 500 milliGRAM(s) Oral three times a day PRN      REVIEW OF SYSTEMS  --------------------------------------------------------------------------------  Gen: No weight changes, fatigue, fevers/chills, weakness  Skin: No rashes  Head/Eyes/Ears/Mouth: No headache; Normal hearing; Normal vision w/o blurriness; No sinus pain/discomfort, sore throat  Respiratory: No dyspnea, cough, wheezing, hemoptysis  CV: No chest pain, PND, orthopnea  GI: No abdominal pain, diarrhea, constipation, nausea, vomiting, melena, hematochezia  : No increased frequency, dysuria, hematuria, nocturia  MSK: No joint pain/swelling; no back pain; no edema  Neuro: No dizziness/lightheadedness, weakness, seizures, numbness, tingling  Heme: No easy bruising or bleeding  Endo: No heat/cold intolerance  Psych: No significant nervousness, anxiety, stress, depression    All other systems were reviewed and are negative, except as noted.    VITALS/PHYSICAL EXAM  --------------------------------------------------------------------------------  T(C): 36.7 (06-16-23 @ 08:02), Max: 37.3 (06-15-23 @ 16:21)  HR: 66 (06-16-23 @ 08:02) (61 - 68)  BP: 136/69 (06-16-23 @ 08:02) (136/69 - 171/73)  RR: 18 (06-16-23 @ 05:20) (18 - 18)  SpO2: 99% (06-16-23 @ 05:20) (98% - 99%)  Wt(kg): --        06-15-23 @ 07:01  -  06-16-23 @ 07:00  --------------------------------------------------------  IN: 780 mL / OUT: 1600 mL / NET: -820 mL      Physical Exam:  	Gen: NAD, well-appearing  	HEENT: PERRL, supple neck, clear oropharynx  	Pulm: CTA B/L  	CV: RRR, S1S2; no rub  	Back: No spinal or CVA tenderness; no sacral edema  	Abd: +BS, soft, nontender/nondistended  	: No suprapubic tenderness  	UE: Warm, FROM, no clubbing, intact strength; no edema; no asterixis  	LE: Warm, FROM, no clubbing, intact strength; no edema  	Neuro: No focal deficits, intact gait  	Psych: Normal affect and mood  	Skin: Warm, without rashes  	Vascular access:    LABS/STUDIES  --------------------------------------------------------------------------------              11.0   5.39  >-----------<  217      [06-16-23 @ 05:05]              33.3     131  |  94  |  34.3  ----------------------------<  178      [06-16-23 @ 05:05]  4.8   |  22.0  |  5.17        Ca     8.0     [06-16-23 @ 05:05]      Mg     2.4     [06-16-23 @ 05:05]    Iron 45, TIBC 216, %sat 21      [06-13-23 @ 15:10]  Ferritin 1092      [06-13-23 @ 15:10]  PTH -- (Ca 7.8)      [06-13-23 @ 15:10]   175  TSH 4.14      [06-12-23 @ 21:50]    HBsAb 12.7      [06-13-23 @ 15:10]  HBsAb Reactive      [06-13-23 @ 15:10]  HBsAg Nonreact      [06-13-23 @ 15:10]  HBcAb Nonreact      [06-13-23 @ 15:10]  HCV 0.09, Nonreact      [06-13-23 @ 15:10]    Patient was seen and evaluated on dialysis.   Patient is tolerating the procedure well.     Continue dialysis: TIW  Dialyzer: Revaclear 300         QB: 400 ml.,        QD: 500ml.,  Goal UF _1.5 L _ over _3_ Hours

## 2023-06-15 NOTE — PROGRESS NOTE ADULT - ASSESSMENT
63 year old male with a medical history of ESRD on HD (T,Th,S), type 2 DM (Ha1c 9.4 on insulin), COPD, gastroparesis, peripheral blindness and retinopathy, CAD with multi vessel disease who was under ct surgery for possible CABG eval but now ct surgery not proceeding with CABG . pt. recently positive for c -diff completed po vancomycin now reporteing that diarrhea is back again.  63 year old male with a medical history of ESRD on HD (T,Th,S), type 2 DM (Ha1c 9.4 on insulin), COPD, gastroparesis, peripheral blindness and retinopathy, CAD with multi vessel disease who was under ct surgery for possible CABG eval but now ct surgery not proceeding with CABG . pt. recently positive for c -diff completed po vancomycin now reporting that diarrhea is back again. pt. now to medical service.

## 2023-06-15 NOTE — PROGRESS NOTE ADULT - PROBLEM SELECTOR PLAN 1
Cardiac cath 6/12/23 demonstrating Multivessel CAD.   Preoperative evaluation for CABG ongoing.   Continue ASA and Statin.  Continue Lopressor as tolerated by HR and BP.   Plavix held in preop setting.  Pt with C. Diff at previous hospital, completed oral vancomycin 6/12.  Will continue to monitor for persistent diarrhea.  Optimizing fluid status prior to OR.   Last HD 6/14 with -2L removed. Plan for HD again today per Renal.   Possible CABG 6/16 pending medical optimization.   Plan to be discussed with CT Surgery attendings during AM rounds.

## 2023-06-15 NOTE — PROGRESS NOTE ADULT - SUBJECTIVE AND OBJECTIVE BOX
[facility] DIVISION OF KIDNEY DISEASES AND HYPERTENSION -- HEMODIALYSIS NOTE  --------------------------------------------------------------------------------  Chief Complaint: ESRD/Ongoing hemodialysis requirement    24 hour events/subjective: HD Yesterday 3.5 Hours,     PAST HISTORY  --------------------------------------------------------------------------------  No significant changes to PMH, PSH, FHx, SHx, unless otherwise noted    ALLERGIES & MEDICATIONS  --------------------------------------------------------------------------------  Allergies    shellfish (Rash)  No Known Drug Allergies    Intolerances    Standing Inpatient Medications  amLODIPine   Tablet 10 milliGRAM(s) Oral once  aspirin  chewable 81 milliGRAM(s) Oral daily  atorvastatin 40 milliGRAM(s) Oral at bedtime  calcium acetate 667 milliGRAM(s) Oral three times a day with meals  chlorhexidine 2% Cloths 1 Application(s) Topical <User Schedule>  dextrose 5%. 1000 milliLiter(s) IV Continuous <Continuous>  dextrose 5%. 1000 milliLiter(s) IV Continuous <Continuous>  dextrose 50% Injectable 12.5 Gram(s) IV Push once  dextrose 50% Injectable 25 Gram(s) IV Push once  dextrose 50% Injectable 25 Gram(s) IV Push once  epoetin lois-epbx (RETACRIT) Injectable 00870 Unit(s) IV Push <User Schedule>  gabapentin 300 milliGRAM(s) Oral <User Schedule>  glucagon  Injectable 1 milliGRAM(s) IntraMuscular once  heparin   Injectable 5000 Unit(s) SubCutaneous every 8 hours  insulin glargine Injectable (LANTUS) 6 Unit(s) SubCutaneous at bedtime  insulin lispro (ADMELOG) corrective regimen sliding scale   SubCutaneous Before meals and at bedtime  insulin lispro Injectable (ADMELOG) 2 Unit(s) SubCutaneous three times a day with meals  lidocaine   4% Patch 1 Patch Transdermal daily  loratadine 10 milliGRAM(s) Oral daily  melatonin 5 milliGRAM(s) Oral at bedtime  metoprolol tartrate 50 milliGRAM(s) Oral two times a day  montelukast 10 milliGRAM(s) Oral at bedtime  pantoprazole    Tablet 40 milliGRAM(s) Oral before breakfast  saccharomyces boulardii 250 milliGRAM(s) Oral two times a day  sodium chloride 0.9% lock flush 3 milliLiter(s) IV Push every 8 hours  tamsulosin 0.4 milliGRAM(s) Oral at bedtime    PRN Inpatient Medications  acetaminophen     Tablet .. 650 milliGRAM(s) Oral every 8 hours PRN  albuterol    90 MICROgram(s) HFA Inhaler 2 Puff(s) Inhalation every 6 hours PRN  dextrose Oral Gel 15 Gram(s) Oral once PRN  guaiFENesin Oral Liquid (Sugar-Free) 100 milliGRAM(s) Oral every 8 hours PRN  hyoscyamine SL 0.125 milliGRAM(s) SubLingual four times a day PRN  methocarbamol 500 milliGRAM(s) Oral three times a day PRN      REVIEW OF SYSTEMS  --------------------------------------------------------------------------------  Gen: No weight changes, fatigue, fevers/chills, weakness  Skin: No rashes  Head/Eyes/Ears/Mouth: No headache; Normal hearing; Normal vision w/o blurriness; No sinus pain/discomfort, sore throat  Respiratory: No dyspnea, cough, wheezing, hemoptysis  CV: No chest pain, PND, orthopnea  GI: No abdominal pain, diarrhea, constipation, nausea, vomiting, melena, hematochezia  : No increased frequency, dysuria, hematuria, nocturia  MSK: No joint pain/swelling; no back pain; no edema  Neuro: No dizziness/lightheadedness, weakness, seizures, numbness, tingling  Heme: No easy bruising or bleeding  Endo: No heat/cold intolerance  Psych: No significant nervousness, anxiety, stress, depression    All other systems were reviewed and are negative, except as noted.    VITALS/PHYSICAL EXAM  --------------------------------------------------------------------------------  T(C): 37.3 (06-15-23 @ 16:21), Max: 37.3 (06-15-23 @ 16:21)  HR: 66 (06-15-23 @ 16:21) (60 - 78)  BP: 165/73 (06-15-23 @ 16:21) (135/60 - 168/68)  RR: 18 (06-15-23 @ 16:21) (17 - 18)  SpO2: 99% (06-15-23 @ 16:21) (98% - 100%)  Wt(kg): --NA    06-14-23 @ 07:01  -  06-15-23 @ 07:00  --------------------------------------------------------  IN: 315 mL / OUT: 2100 mL / NET: -1785 mL    06-15-23 @ 07:01  -  06-15-23 @ 18:23  --------------------------------------------------------  IN: 660 mL / OUT: 1500 mL / NET: -840 mL    Physical Exam:  	Gen: NAD, well-appearing  	HEENT: PERRL, supple neck, clear oropharynx  	Pulm: CTA B/L  	CV: RRR, S1S2; no rub  	Back: No spinal or CVA tenderness; no sacral edema  	Abd: +BS, soft, nontender/nondistended  	: No suprapubic tenderness  	UE: Warm, FROM, no clubbing, intact strength; no edema; no asterixis  	LE: Warm, FROM, no clubbing, intact strength; no edema  	Neuro: No focal deficits, intact gait  	Psych: Normal affect and mood  	Skin: Warm, without rashes  	Vascular access: AVF    LABS/STUDIES  --------------------------------------------------------------------------------              10.0   4.55  >-----------<  197      [06-15-23 @ 05:07]              30.2     132  |  92  |  29.7  ----------------------------<  223      [06-15-23 @ 05:07]  4.7   |  27.0  |  5.46        Ca     8.0     [06-15-23 @ 05:07]      Mg     2.2     [06-15-23 @ 05:07]    Iron 45, TIBC 216, %sat 21      [06-13-23 @ 15:10]  Ferritin 1092      [06-13-23 @ 15:10]  PTH -- (Ca 7.8)      [06-13-23 @ 15:10]   175  TSH 4.14      [06-12-23 @ 21:50]    HBsAb 12.7      [06-13-23 @ 15:10]  HBsAb Reactive      [06-13-23 @ 15:10]  HBsAg Nonreact      [06-13-23 @ 15:10]  HBcAb Nonreact      [06-13-23 @ 15:10]  HCV 0.09, Nonreact      [06-13-23 @ 15:10]    OP HD ( [facility] [place] )

## 2023-06-15 NOTE — PROGRESS NOTE ADULT - SUBJECTIVE AND OBJECTIVE BOX
[facility] PHYSICIAN [facility]NERS                                                         CARDIOLOGY AT [facility]                                                                  [address] [address]                                                         Telephone: [phone]. Fax:[phone]                                                                             PROGRESS NOTE    Reason for follow up:   CHF and multivessel cad    Update:   As discussed with CTS PA - Dr. Becerra to review the films for possible high risk PCI.    Review of symptoms:   Cardiac:  No chest pain. No dyspnea. No palpitations.  Respiratory: no cough. No dyspnea  Gastrointestinal: No diarrhea. No abdominal pain. No bleeding.   Neuro: No focal neuro complaints.    Vitals:  T(C): 36.8 (06-15-23 @ 08:24), Max: 37.2 (06-15-23 @ 05:15)  HR: 66 (06-15-23 @ 08:24) (63 - 81)  BP: 164/70 (06-15-23 @ 08:24) (162/71 - 178/80)  RR: 18 (06-15-23 @ 08:24) (17 - 18)  SpO2: 100% (06-15-23 @ 08:24) (93% - 100%)    I&O's Summary  14 Jun 2023 07:01  -  15 Poncho 2023 07:00  --------------------------------------------------------  IN: 315 mL / OUT: 2100 mL / NET: -1785 mL    Weight (kg): 67.2 (06-12 @ 22:28)    PHYSICAL EXAM:  Appearance: Comfortable.  thin frail  HEENT:  Atraumatic. Normocephalic.  Normal oral mucosa  Neurologic: A & O x 3, no gross focal deficits.  Cardiovascular: RRR S1 S2, No murmur, no rubs/gallops. No JVD  Respiratory:   Diminished bilaterally bs - chest pain noted on palpitations  Gastrointestinal:  Soft, Non-tender, + BS  Lower Extremities: + Peripheral Pulses, No clubbing, cyanosis, or edema  Psychiatry: Patient is calm. No agitation.   Skin: warm and dry.    CURRENT CARDIAC MEDICATIONS:  amLODIPine   Tablet 10 milliGRAM(s) Oral once  metoprolol tartrate 50 milliGRAM(s) Oral two times a day    CURRENT OTHER MEDICATIONS:  albuterol    90 MICROgram(s) HFA Inhaler 2 Puff(s) Inhalation every 6 hours PRN Bronchospasm  guaiFENesin Oral Liquid (Sugar-Free) 100 milliGRAM(s) Oral every 8 hours PRN Cough  loratadine 10 milliGRAM(s) Oral daily  montelukast 10 milliGRAM(s) Oral at bedtime  acetaminophen     Tablet .. 650 milliGRAM(s) Oral every 8 hours PRN Mild Pain (1 - 3)  gabapentin 300 milliGRAM(s) Oral <User Schedule>  melatonin 5 milliGRAM(s) Oral at bedtime  methocarbamol 500 milliGRAM(s) Oral three times a day PRN Muscle Spasm  hyoscyamine SL 0.125 milliGRAM(s) SubLingual four times a day PRN abdominal spasm  pantoprazole    Tablet 40 milliGRAM(s) Oral before breakfast  atorvastatin 40 milliGRAM(s) Oral at bedtime  insulin glargine Injectable (LANTUS) 7 Unit(s) SubCutaneous at bedtime  insulin lispro (ADMELOG) corrective regimen sliding scale   SubCutaneous Before meals and at bedtime  insulin lispro Injectable (ADMELOG) 3 Unit(s) SubCutaneous three times a day before meals  aspirin  chewable 81 milliGRAM(s) Oral daily  calcium acetate 667 milliGRAM(s) Oral three times a day with meals  chlorhexidine 2% Cloths 1 Application(s) Topical <User Schedule>  epoetin lois-epbx (RETACRIT) Injectable 49781 Unit(s) IV Push <User Schedule>  heparin   Injectable 5000 Unit(s) SubCutaneous every 8 hours  lidocaine   4% Patch 1 Patch Transdermal daily  sodium chloride 0.9% lock flush 3 milliLiter(s) IV Push every 8 hours  tamsulosin 0.4 milliGRAM(s) Oral at bedtime    LABS:	 	                      10.0   4.55  )-----------( 197      ( 15 Poncho 2023 05:07 )             30.2     06-15    132<L>  |  92<L>  |  29.7<H>  ----------------------------<  223<H>  4.7   |  27.0  |  5.46<H>    Ca    8.0<L>      15 Pocnho 2023 05:07  Phos  5.5     06-13  Mg     2.2     06-15      PT/INR/PTT ( 12 Jun 2023 21:50 )                       :                       :      11.2         :       29.7                  .        .                   .              .           .       0.97        .                                         TSH: Thyroid Stimulating Hormone, Serum: 4.14 uIU/mL    TELEMETRY:   Tele sr 62, no acute alarms overnigh                                                                [facility]YS[facility]                                                         CARDIOLOGY AT [facility]                                                                  [address] [address]                                                         Telephone: [phone]. Fax:[phone]                                                                             PROGRESS NOTE    Reason for follow up:   CHF and multivessel cad    Review of symptoms:   Cardiac:  No chest pain. No dyspnea. No palpitations.  Respiratory: no cough. No dyspnea  Gastrointestinal: No diarrhea. No abdominal pain. No bleeding.   Neuro: No focal neuro complaints.    Vitals:  T(C): 36.8 (06-15-23 @ 08:24), Max: 37.2 (06-15-23 @ 05:15)  HR: 66 (06-15-23 @ 08:24) (63 - 81)  BP: 164/70 (06-15-23 @ 08:24) (162/71 - 178/80)  RR: 18 (06-15-23 @ 08:24) (17 - 18)  SpO2: 100% (06-15-23 @ 08:24) (93% - 100%)    I&O's Summary  14 Jun 2023 07:01  -  15 Poncho 2023 07:00  --------------------------------------------------------  IN: 315 mL / OUT: 2100 mL / NET: -1785 mL    Weight (kg): 67.2 (06-12 @ 22:28)    PHYSICAL EXAM:  Appearance: Comfortable.  thin frail  HEENT:  Atraumatic. Normocephalic.  Normal oral mucosa  Neurologic: A & O x 3, no gross focal deficits.  Cardiovascular: RRR S1 S2, No murmur, no rubs/gallops. No JVD  Respiratory:   Diminished bilaterally bs - chest pain noted on palpitations  Gastrointestinal:  Soft, Non-tender, + BS  Lower Extremities: + Peripheral Pulses, No clubbing, cyanosis, or edema  Psychiatry: Patient is calm. No agitation.   Skin: warm and dry.    CURRENT CARDIAC MEDICATIONS:  amLODIPine   Tablet 10 milliGRAM(s) Oral once  metoprolol tartrate 50 milliGRAM(s) Oral two times a day    CURRENT OTHER MEDICATIONS:  albuterol    90 MICROgram(s) HFA Inhaler 2 Puff(s) Inhalation every 6 hours PRN Bronchospasm  guaiFENesin Oral Liquid (Sugar-Free) 100 milliGRAM(s) Oral every 8 hours PRN Cough  loratadine 10 milliGRAM(s) Oral daily  montelukast 10 milliGRAM(s) Oral at bedtime  acetaminophen     Tablet .. 650 milliGRAM(s) Oral every 8 hours PRN Mild Pain (1 - 3)  gabapentin 300 milliGRAM(s) Oral <User Schedule>  melatonin 5 milliGRAM(s) Oral at bedtime  methocarbamol 500 milliGRAM(s) Oral three times a day PRN Muscle Spasm  hyoscyamine SL 0.125 milliGRAM(s) SubLingual four times a day PRN abdominal spasm  pantoprazole    Tablet 40 milliGRAM(s) Oral before breakfast  atorvastatin 40 milliGRAM(s) Oral at bedtime  insulin glargine Injectable (LANTUS) 7 Unit(s) SubCutaneous at bedtime  insulin lispro (ADMELOG) corrective regimen sliding scale   SubCutaneous Before meals and at bedtime  insulin lispro Injectable (ADMELOG) 3 Unit(s) SubCutaneous three times a day before meals  aspirin  chewable 81 milliGRAM(s) Oral daily  calcium acetate 667 milliGRAM(s) Oral three times a day with meals  chlorhexidine 2% Cloths 1 Application(s) Topical <User Schedule>  epoetin lois-epbx (RETACRIT) Injectable 71076 Unit(s) IV Push <User Schedule>  heparin   Injectable 5000 Unit(s) SubCutaneous every 8 hours  lidocaine   4% Patch 1 Patch Transdermal daily  sodium chloride 0.9% lock flush 3 milliLiter(s) IV Push every 8 hours  tamsulosin 0.4 milliGRAM(s) Oral at bedtime    LABS:	 	                      10.0   4.55  )-----------( 197      ( 15 Poncho 2023 05:07 )             30.2     06-15    132<L>  |  92<L>  |  29.7<H>  ----------------------------<  223<H>  4.7   |  27.0  |  5.46<H>    Ca    8.0<L>      15 Poncho 2023 05:07  Phos  5.5     06-13  Mg     2.2     06-15      PT/INR/PTT ( 12 Jun 2023 21:50 )                       :                       :      11.2         :       29.7                  .        .                   .              .           .       0.97        .                                         TSH: Thyroid Stimulating Hormone, Serum: 4.14 uIU/mL    TELEMETRY:   Tele sr 62, no acute alarms overnigh

## 2023-06-16 ENCOUNTER — APPOINTMENT (OUTPATIENT)
Dept: CARDIOTHORACIC SURGERY | Facility: HOSPITAL | Age: 63
End: 2023-06-16

## 2023-06-16 DIAGNOSIS — K31.84 GASTROPARESIS: ICD-10-CM

## 2023-06-16 DIAGNOSIS — R19.7 DIARRHEA, UNSPECIFIED: ICD-10-CM

## 2023-06-16 DIAGNOSIS — I50.20 UNSPECIFIED SYSTOLIC (CONGESTIVE) HEART FAILURE: ICD-10-CM

## 2023-06-16 LAB
ADV 40+41 DNA STL QL NAA+NON-PROBE: DETECTED
ANION GAP SERPL CALC-SCNC: 15 MMOL/L — SIGNIFICANT CHANGE UP (ref 5–17)
BUN SERPL-MCNC: 34.3 MG/DL — HIGH (ref 8–20)
CALCIUM SERPL-MCNC: 8 MG/DL — LOW (ref 8.4–10.5)
CHLORIDE SERPL-SCNC: 94 MMOL/L — LOW (ref 96–108)
CO2 SERPL-SCNC: 22 MMOL/L — SIGNIFICANT CHANGE UP (ref 22–29)
CREAT SERPL-MCNC: 5.17 MG/DL — HIGH (ref 0.5–1.3)
EGFR: 12 ML/MIN/1.73M2 — LOW
GI PCR PANEL: DETECTED
GLUCOSE BLDC GLUCOMTR-MCNC: 131 MG/DL — HIGH (ref 70–99)
GLUCOSE BLDC GLUCOMTR-MCNC: 141 MG/DL — HIGH (ref 70–99)
GLUCOSE BLDC GLUCOMTR-MCNC: 181 MG/DL — HIGH (ref 70–99)
GLUCOSE BLDC GLUCOMTR-MCNC: 227 MG/DL — HIGH (ref 70–99)
GLUCOSE BLDC GLUCOMTR-MCNC: 268 MG/DL — HIGH (ref 70–99)
GLUCOSE SERPL-MCNC: 178 MG/DL — HIGH (ref 70–99)
HCT VFR BLD CALC: 33.3 % — LOW (ref 39–50)
HGB BLD-MCNC: 11 G/DL — LOW (ref 13–17)
MAGNESIUM SERPL-MCNC: 2.4 MG/DL — SIGNIFICANT CHANGE UP (ref 1.6–2.6)
MCHC RBC-ENTMCNC: 30 PG — SIGNIFICANT CHANGE UP (ref 27–34)
MCHC RBC-ENTMCNC: 33 GM/DL — SIGNIFICANT CHANGE UP (ref 32–36)
MCV RBC AUTO: 90.7 FL — SIGNIFICANT CHANGE UP (ref 80–100)
PLATELET # BLD AUTO: 217 K/UL — SIGNIFICANT CHANGE UP (ref 150–400)
POTASSIUM SERPL-MCNC: 4.8 MMOL/L — SIGNIFICANT CHANGE UP (ref 3.5–5.3)
POTASSIUM SERPL-SCNC: 4.8 MMOL/L — SIGNIFICANT CHANGE UP (ref 3.5–5.3)
RBC # BLD: 3.67 M/UL — LOW (ref 4.2–5.8)
RBC # FLD: 15.9 % — HIGH (ref 10.3–14.5)
SARS-COV-2 RNA SPEC QL NAA+PROBE: SIGNIFICANT CHANGE UP
SODIUM SERPL-SCNC: 131 MMOL/L — LOW (ref 135–145)
WBC # BLD: 5.39 K/UL — SIGNIFICANT CHANGE UP (ref 3.8–10.5)
WBC # FLD AUTO: 5.39 K/UL — SIGNIFICANT CHANGE UP (ref 3.8–10.5)

## 2023-06-16 PROCEDURE — 71045 X-RAY EXAM CHEST 1 VIEW: CPT | Mod: 26

## 2023-06-16 PROCEDURE — 99232 SBSQ HOSP IP/OBS MODERATE 35: CPT

## 2023-06-16 PROCEDURE — 99233 SBSQ HOSP IP/OBS HIGH 50: CPT

## 2023-06-16 PROCEDURE — 99223 1ST HOSP IP/OBS HIGH 75: CPT

## 2023-06-16 RX ORDER — LOSARTAN POTASSIUM 100 MG/1
25 TABLET, FILM COATED ORAL DAILY
Refills: 0 | Status: DISCONTINUED | OUTPATIENT
Start: 2023-06-16 | End: 2023-06-19

## 2023-06-16 RX ORDER — VANCOMYCIN HCL 1 G
500 VIAL (EA) INTRAVENOUS EVERY 6 HOURS
Refills: 0 | Status: DISCONTINUED | OUTPATIENT
Start: 2023-06-16 | End: 2023-06-17

## 2023-06-16 RX ORDER — METOPROLOL TARTRATE 50 MG
100 TABLET ORAL DAILY
Refills: 0 | Status: DISCONTINUED | OUTPATIENT
Start: 2023-06-17 | End: 2023-06-19

## 2023-06-16 RX ORDER — METOPROLOL TARTRATE 50 MG
50 TABLET ORAL ONCE
Refills: 0 | Status: COMPLETED | OUTPATIENT
Start: 2023-06-16 | End: 2023-06-16

## 2023-06-16 RX ORDER — CLOPIDOGREL BISULFATE 75 MG/1
75 TABLET, FILM COATED ORAL DAILY
Refills: 0 | Status: DISCONTINUED | OUTPATIENT
Start: 2023-06-16 | End: 2023-06-19

## 2023-06-16 RX ORDER — AMLODIPINE BESYLATE 2.5 MG/1
10 TABLET ORAL DAILY
Refills: 0 | Status: DISCONTINUED | OUTPATIENT
Start: 2023-06-16 | End: 2023-06-16

## 2023-06-16 RX ADMIN — Medication 500 MILLIGRAM(S): at 17:48

## 2023-06-16 RX ADMIN — SODIUM CHLORIDE 3 MILLILITER(S): 9 INJECTION INTRAMUSCULAR; INTRAVENOUS; SUBCUTANEOUS at 13:01

## 2023-06-16 RX ADMIN — Medication 1: at 17:49

## 2023-06-16 RX ADMIN — SODIUM CHLORIDE 3 MILLILITER(S): 9 INJECTION INTRAMUSCULAR; INTRAVENOUS; SUBCUTANEOUS at 22:55

## 2023-06-16 RX ADMIN — Medication 250 MILLIGRAM(S): at 17:47

## 2023-06-16 RX ADMIN — SODIUM CHLORIDE 3 MILLILITER(S): 9 INJECTION INTRAMUSCULAR; INTRAVENOUS; SUBCUTANEOUS at 06:14

## 2023-06-16 RX ADMIN — Medication 2 UNIT(S): at 12:04

## 2023-06-16 RX ADMIN — LIDOCAINE 1 PATCH: 4 CREAM TOPICAL at 23:02

## 2023-06-16 RX ADMIN — LIDOCAINE 1 PATCH: 4 CREAM TOPICAL at 08:23

## 2023-06-16 RX ADMIN — Medication 667 MILLIGRAM(S): at 12:03

## 2023-06-16 RX ADMIN — HEPARIN SODIUM 5000 UNIT(S): 5000 INJECTION INTRAVENOUS; SUBCUTANEOUS at 06:13

## 2023-06-16 RX ADMIN — LIDOCAINE 1 PATCH: 4 CREAM TOPICAL at 19:00

## 2023-06-16 RX ADMIN — Medication 2 UNIT(S): at 17:50

## 2023-06-16 RX ADMIN — TAMSULOSIN HYDROCHLORIDE 0.4 MILLIGRAM(S): 0.4 CAPSULE ORAL at 22:54

## 2023-06-16 RX ADMIN — MONTELUKAST 10 MILLIGRAM(S): 4 TABLET, CHEWABLE ORAL at 22:53

## 2023-06-16 RX ADMIN — LORATADINE 10 MILLIGRAM(S): 10 TABLET ORAL at 08:21

## 2023-06-16 RX ADMIN — Medication 3: at 23:02

## 2023-06-16 RX ADMIN — Medication 125 MILLIGRAM(S): at 12:03

## 2023-06-16 RX ADMIN — Medication 81 MILLIGRAM(S): at 08:21

## 2023-06-16 RX ADMIN — HEPARIN SODIUM 5000 UNIT(S): 5000 INJECTION INTRAVENOUS; SUBCUTANEOUS at 22:52

## 2023-06-16 RX ADMIN — HEPARIN SODIUM 5000 UNIT(S): 5000 INJECTION INTRAVENOUS; SUBCUTANEOUS at 13:00

## 2023-06-16 RX ADMIN — Medication 667 MILLIGRAM(S): at 08:21

## 2023-06-16 RX ADMIN — Medication 125 MILLIGRAM(S): at 00:20

## 2023-06-16 RX ADMIN — Medication 50 MILLIGRAM(S): at 17:47

## 2023-06-16 RX ADMIN — INSULIN GLARGINE 6 UNIT(S): 100 INJECTION, SOLUTION SUBCUTANEOUS at 23:01

## 2023-06-16 RX ADMIN — CHLORHEXIDINE GLUCONATE 1 APPLICATION(S): 213 SOLUTION TOPICAL at 06:13

## 2023-06-16 RX ADMIN — ATORVASTATIN CALCIUM 40 MILLIGRAM(S): 80 TABLET, FILM COATED ORAL at 22:54

## 2023-06-16 RX ADMIN — Medication 250 MILLIGRAM(S): at 06:12

## 2023-06-16 RX ADMIN — Medication 50 MILLIGRAM(S): at 06:12

## 2023-06-16 RX ADMIN — Medication 5 MILLIGRAM(S): at 22:54

## 2023-06-16 RX ADMIN — Medication 667 MILLIGRAM(S): at 17:47

## 2023-06-16 RX ADMIN — Medication 2 UNIT(S): at 08:22

## 2023-06-16 RX ADMIN — Medication 125 MILLIGRAM(S): at 06:13

## 2023-06-16 NOTE — DIETITIAN INITIAL EVALUATION ADULT - PERTINENT MEDS FT
MEDICATIONS  (STANDING):  amLODIPine   Tablet 10 milliGRAM(s) Oral daily  aspirin  chewable 81 milliGRAM(s) Oral daily  atorvastatin 40 milliGRAM(s) Oral at bedtime  calcium acetate 667 milliGRAM(s) Oral three times a day with meals  chlorhexidine 2% Cloths 1 Application(s) Topical <User Schedule>  dextrose 5%. 1000 milliLiter(s) (100 mL/Hr) IV Continuous <Continuous>  dextrose 5%. 1000 milliLiter(s) (50 mL/Hr) IV Continuous <Continuous>  dextrose 50% Injectable 25 Gram(s) IV Push once  dextrose 50% Injectable 12.5 Gram(s) IV Push once  dextrose 50% Injectable 25 Gram(s) IV Push once  epoetin lois-epbx (RETACRIT) Injectable 77489 Unit(s) IV Push <User Schedule>  gabapentin 300 milliGRAM(s) Oral <User Schedule>  glucagon  Injectable 1 milliGRAM(s) IntraMuscular once  heparin   Injectable 5000 Unit(s) SubCutaneous every 8 hours  insulin glargine Injectable (LANTUS) 6 Unit(s) SubCutaneous at bedtime  insulin lispro (ADMELOG) corrective regimen sliding scale   SubCutaneous Before meals and at bedtime  insulin lispro Injectable (ADMELOG) 2 Unit(s) SubCutaneous three times a day with meals  lidocaine   4% Patch 1 Patch Transdermal daily  loratadine 10 milliGRAM(s) Oral daily  melatonin 5 milliGRAM(s) Oral at bedtime  metoprolol tartrate 50 milliGRAM(s) Oral two times a day  montelukast 10 milliGRAM(s) Oral at bedtime  saccharomyces boulardii 250 milliGRAM(s) Oral two times a day  sodium chloride 0.9% lock flush 3 milliLiter(s) IV Push every 8 hours  tamsulosin 0.4 milliGRAM(s) Oral at bedtime  vancomycin    Solution 250 milliGRAM(s) Oral every 6 hours    MEDICATIONS  (PRN):  acetaminophen     Tablet .. 650 milliGRAM(s) Oral every 8 hours PRN Mild Pain (1 - 3)  albuterol    90 MICROgram(s) HFA Inhaler 2 Puff(s) Inhalation every 6 hours PRN Bronchospasm  dextrose Oral Gel 15 Gram(s) Oral once PRN Blood Glucose LESS THAN 70 milliGRAM(s)/deciliter  guaiFENesin Oral Liquid (Sugar-Free) 100 milliGRAM(s) Oral every 8 hours PRN Cough  hyoscyamine SL 0.125 milliGRAM(s) SubLingual four times a day PRN abdominal spasm  methocarbamol 500 milliGRAM(s) Oral three times a day PRN Muscle Spasm

## 2023-06-16 NOTE — DIETITIAN NUTRITION RISK NOTIFICATION - TREATMENT: THE FOLLOWING DIET HAS BEEN RECOMMENDED
Diet, DASH/TLC:   Sodium & Cholesterol Restricted  Consistent Carbohydrate {No Snacks} (CSTCHO)  For patients receiving Renal Replacement - No Protein Restr, No Conc K, No Conc Phos, Low  Sodium (RENAL) (06-13-23 @ 20:06) [Active]

## 2023-06-16 NOTE — PROGRESS NOTE ADULT - SUBJECTIVE AND OBJECTIVE BOX
INTERVAL EVENTS:  Follow up diabetes management    ROS: Denies chest pain, sob, abd pain.    MEDICATIONS  (STANDING):  amLODIPine   Tablet 10 milliGRAM(s) Oral daily  aspirin  chewable 81 milliGRAM(s) Oral daily  atorvastatin 40 milliGRAM(s) Oral at bedtime  calcium acetate 667 milliGRAM(s) Oral three times a day with meals  chlorhexidine 2% Cloths 1 Application(s) Topical <User Schedule>  dextrose 5%. 1000 milliLiter(s) (100 mL/Hr) IV Continuous <Continuous>  dextrose 5%. 1000 milliLiter(s) (50 mL/Hr) IV Continuous <Continuous>  dextrose 50% Injectable 25 Gram(s) IV Push once  dextrose 50% Injectable 12.5 Gram(s) IV Push once  dextrose 50% Injectable 25 Gram(s) IV Push once  epoetin lois-epbx (RETACRIT) Injectable 55048 Unit(s) IV Push <User Schedule>  gabapentin 300 milliGRAM(s) Oral <User Schedule>  glucagon  Injectable 1 milliGRAM(s) IntraMuscular once  heparin   Injectable 5000 Unit(s) SubCutaneous every 8 hours  insulin glargine Injectable (LANTUS) 6 Unit(s) SubCutaneous at bedtime  insulin lispro (ADMELOG) corrective regimen sliding scale   SubCutaneous Before meals and at bedtime  insulin lispro Injectable (ADMELOG) 2 Unit(s) SubCutaneous three times a day with meals  lidocaine   4% Patch 1 Patch Transdermal daily  loratadine 10 milliGRAM(s) Oral daily  melatonin 5 milliGRAM(s) Oral at bedtime  metoprolol tartrate 50 milliGRAM(s) Oral two times a day  montelukast 10 milliGRAM(s) Oral at bedtime  saccharomyces boulardii 250 milliGRAM(s) Oral two times a day  sodium chloride 0.9% lock flush 3 milliLiter(s) IV Push every 8 hours  tamsulosin 0.4 milliGRAM(s) Oral at bedtime  vancomycin    Solution 125 milliGRAM(s) Oral every 6 hours    MEDICATIONS  (PRN):  acetaminophen     Tablet .. 650 milliGRAM(s) Oral every 8 hours PRN Mild Pain (1 - 3)  albuterol    90 MICROgram(s) HFA Inhaler 2 Puff(s) Inhalation every 6 hours PRN Bronchospasm  dextrose Oral Gel 15 Gram(s) Oral once PRN Blood Glucose LESS THAN 70 milliGRAM(s)/deciliter  guaiFENesin Oral Liquid (Sugar-Free) 100 milliGRAM(s) Oral every 8 hours PRN Cough  hyoscyamine SL 0.125 milliGRAM(s) SubLingual four times a day PRN abdominal spasm  methocarbamol 500 milliGRAM(s) Oral three times a day PRN Muscle Spasm    Allergies  shellfish (Rash)  No Known Drug Allergies    Vital Signs Last 24 Hrs  T(C): 36.7 (16 Jun 2023 08:02), Max: 37.3 (15 Poncho 2023 16:21)  T(F): 98 (16 Jun 2023 08:02), Max: 99.2 (15 Poncho 2023 16:21)  HR: 66 (16 Jun 2023 08:02) (60 - 68)  BP: 136/69 (16 Jun 2023 08:02) (135/60 - 171/73)  BP(mean): 103 (15 Poncho 2023 16:21) (103 - 103)  RR: 18 (16 Jun 2023 05:20) (18 - 18)  SpO2: 99% (16 Jun 2023 05:20) (98% - 99%)    Parameters below as of 16 Jun 2023 05:20  Patient On (Oxygen Delivery Method): nasal cannula  O2 Flow (L/min): 2      PHYSICAL EXAM:  General: No apparent distress  Neck: Supple, trachea midline, no thyromegaly  Respiratory: Lungs clear bilaterally, normal rate, effort  Cardiac: +S1, S2, no m/r/g  GI: +BS, soft, non tender, non distended  Extremities: No peripheral edema, no pedal lesions  Neuro: A+O X3, no tremor      LABS:                        11.0   5.39  )-----------( 217      ( 16 Jun 2023 05:05 )             33.3     06-16    131<L>  |  94<L>  |  34.3<H>  ----------------------------<  178<H>  4.8   |  22.0  |  5.17<H>    Ca    8.0<L>      16 Jun 2023 05:05  Mg     2.4     06-16      POCT Blood Glucose.: 141 mg/dL (06-16-23 @ 08:15)  POCT Blood Glucose.: 204 mg/dL (06-15-23 @ 21:19)  POCT Blood Glucose.: 189 mg/dL (06-15-23 @ 17:13)  POCT Blood Glucose.: 71 mg/dL (06-15-23 @ 12:06)    Thyroid Stimulating Hormone, Serum: 4.14 uIU/mL (06-12-23 @ 21:50)

## 2023-06-16 NOTE — PROGRESS NOTE ADULT - PROBLEM SELECTOR PROBLEM 4
HLD (hyperlipidemia)
HLD (hyperlipidemia)
HTN (hypertension)
CKD (chronic kidney disease) requiring chronic dialysis
Heart failure with reduced ejection fraction

## 2023-06-16 NOTE — PROGRESS NOTE ADULT - SUBJECTIVE AND OBJECTIVE BOX
HEALTH ISSUES - PROBLEM Dx:  63M with PMHx ESRD on HD (T,Th, S), IDDM, COPD, gastroparesis, peripheral blindness and retinopathy, and CAD, who initially presented to [facility] on 5/31 with n/v/d, missing his HD as a result, and found to be hyperkalemic with acute respiratory failure secondary to fluid overload vs pneumonia and NSTEMI in the context of C. diff colitis (positive on 6/1). Completed 10 day course of oral vancomycin on 6/12 per chart.  Patient became fluid overloaded during hospitalization at [place] requiring HFNC, improved with additional HD. Blood culture negative to date, patient received 1 dose of IV cefepime. Patient tolerating 2L NC prior to transfer to [facility].  Patient found to have NSTEMI s/p heparin drip 6/10, with cardiac cath 6/12 demonstrating MVD requiring transfer to [facility] for CT Surgery evaluation for possible CABG.     Diarrhea  CHF    INTERVAL HPI/ OVERNIGHT EVENTS:    pt sitting up in chair  during my conversation used the bathroom 4 times for diarrhea  denies nausea or abd pain now  family at bedside  all questions answered     REVIEW OF SYSTEMS:    As above    Vital Signs Last 24 Hrs  T(C): 36.7 (16 Jun 2023 08:02), Max: 37.3 (15 Poncho 2023 16:21)  T(F): 98 (16 Jun 2023 08:02), Max: 99.2 (15 Poncho 2023 16:21)  HR: 66 (16 Jun 2023 08:02) (61 - 66)  BP: 136/69 (16 Jun 2023 08:02) (136/69 - 171/73)  BP(mean): 103 (15 Poncho 2023 16:21) (103 - 103)  RR: 18 (16 Jun 2023 05:20) (18 - 18)  SpO2: 99% (16 Jun 2023 05:20) (99% - 99%)    Parameters below as of 16 Jun 2023 05:20  Patient On (Oxygen Delivery Method): nasal cannula  O2 Flow (L/min): 2    PHYSICAL EXAM-  General: Pt. sitting in chair byt he bathroom, suing bathroom frequently  HEENT: AT, NC. PERRL. intact EOM. no throat erythema or exudate.   Neck: supple. no JVD.   Chest: CTA bilaterally  Heart: S1,S2. RRR. no heart murmur.  Abdomen: soft. mild pain to palpation mid abd. area, no RT, no gaurding, non-distended. + BS.  Ext: no calf tenderness. RUE av graft with thrill.   Neuro: AAO x3. no focal weakness. no speech disorder.  Skin: warm and dry  psych : mood ok, no si/hi    MEDICATIONS  (STANDING):  aspirin  chewable 81 milliGRAM(s) Oral daily  atorvastatin 40 milliGRAM(s) Oral at bedtime  calcium acetate 667 milliGRAM(s) Oral three times a day with meals  chlorhexidine 2% Cloths 1 Application(s) Topical <User Schedule>  dextrose 5%. 1000 milliLiter(s) (100 mL/Hr) IV Continuous <Continuous>  dextrose 5%. 1000 milliLiter(s) (50 mL/Hr) IV Continuous <Continuous>  dextrose 50% Injectable 25 Gram(s) IV Push once  dextrose 50% Injectable 12.5 Gram(s) IV Push once  dextrose 50% Injectable 25 Gram(s) IV Push once  epoetin lois-epbx (RETACRIT) Injectable 46141 Unit(s) IV Push <User Schedule>  gabapentin 300 milliGRAM(s) Oral <User Schedule>  glucagon  Injectable 1 milliGRAM(s) IntraMuscular once  heparin   Injectable 5000 Unit(s) SubCutaneous every 8 hours  insulin glargine Injectable (LANTUS) 6 Unit(s) SubCutaneous at bedtime  insulin lispro (ADMELOG) corrective regimen sliding scale   SubCutaneous Before meals and at bedtime  insulin lispro Injectable (ADMELOG) 2 Unit(s) SubCutaneous three times a day with meals  lidocaine   4% Patch 1 Patch Transdermal daily  loratadine 10 milliGRAM(s) Oral daily  losartan 25 milliGRAM(s) Oral daily  melatonin 5 milliGRAM(s) Oral at bedtime  metoprolol tartrate 50 milliGRAM(s) Oral once  montelukast 10 milliGRAM(s) Oral at bedtime  saccharomyces boulardii 250 milliGRAM(s) Oral two times a day  sodium chloride 0.9% lock flush 3 milliLiter(s) IV Push every 8 hours  tamsulosin 0.4 milliGRAM(s) Oral at bedtime  vancomycin    Solution 500 milliGRAM(s) Oral every 6 hours    MEDICATIONS  (PRN):  acetaminophen     Tablet .. 650 milliGRAM(s) Oral every 8 hours PRN Mild Pain (1 - 3)  albuterol    90 MICROgram(s) HFA Inhaler 2 Puff(s) Inhalation every 6 hours PRN Bronchospasm  dextrose Oral Gel 15 Gram(s) Oral once PRN Blood Glucose LESS THAN 70 milliGRAM(s)/deciliter  guaiFENesin Oral Liquid (Sugar-Free) 100 milliGRAM(s) Oral every 8 hours PRN Cough  hyoscyamine SL 0.125 milliGRAM(s) SubLingual four times a day PRN abdominal spasm  methocarbamol 500 milliGRAM(s) Oral three times a day PRN Muscle Spasm      LABS:                        11.0   5.39  )-----------( 217      ( 16 Jun 2023 05:05 )             33.3     06-16    131<L>  |  94<L>  |  34.3<H>  ----------------------------<  178<H>  4.8   |  22.0  |  5.17<H>    Ca    8.0<L>      16 Jun 2023 05:05  Mg     2.4     06-16

## 2023-06-16 NOTE — PROGRESS NOTE ADULT - ASSESSMENT
63y old  Male ESRD on HD (T,Th, S), IDDM, COPD, gastroparesis, peripheral blindness and retinopathy, and CAD (most recent DEISY 2011 dLAD). Presented to [facility] for chest discomfort, respiratory distress, C.Diff, NSTEMI. S/p C with severe MVD. Endorses consistent CP for 1 month now.

## 2023-06-16 NOTE — PROGRESS NOTE ADULT - PROBLEM SELECTOR PLAN 1
CATH:   LAD 70%, distal DEISY ISR 70%  LCX 80% focal, 70% mid, 80% distal  OM1 90%  RCA 80% prox, 70% mid  RPDA 90%  today pain is controlled  Case discussed with DOMINGUEZ Pond.  As per Dejah case was reviewed between Dr. Newsome and Dr. Becerra and is planned for outpt f/u with Dr. Becerra for stenting.   c/w asa, atorvastatin, metoprolol.

## 2023-06-16 NOTE — DIETITIAN INITIAL EVALUATION ADULT - PERTINENT LABORATORY DATA
06-16    131<L>  |  94<L>  |  34.3<H>  ----------------------------<  178<H>  4.8   |  22.0  |  5.17<H>    Ca    8.0<L>      16 Jun 2023 05:05  Mg     2.4     06-16    POCT Blood Glucose.: 131 mg/dL (06-16-23 @ 11:53)  A1C with Estimated Average Glucose Result: 9.4 % (06-12-23 @ 21:50)

## 2023-06-16 NOTE — PROGRESS NOTE ADULT - PROBLEM SELECTOR PLAN 2
136/64  amlodipine Tablet 10 mg GRAM(s) Oral once  metoprolol tartrate 50 mg Oral two times a day - titrate as tolerated.

## 2023-06-16 NOTE — DIETITIAN INITIAL EVALUATION ADULT - OTHER INFO
63 year old male with a medical history of ESRD on HD (T,Th,S), type 2 DM (Ha1c 9.4 on insulin), COPD, gastroparesis, peripheral blindness and retinopathy, and CAD, who initially presented to [facility] on 5/31/23 with n/v/d, missing his HD as a result, found to be hyperkalemic with acute respiratory failure secondary to fluid overload vs pneumonia, and C. diff colitis (positive on 6/1). Completed 10 day course of oral vancomycin on 6/12 per chart.  Patient became fluid overloaded during hospitalization at [place] requiring HFNC, improved with additional HD. Patient found to have NSTEMI s/p heparin drip 6/10, with cardiac cath 6/12 demonstrating Multivessel CAD, requiring transfer to [facility] for CT Surgery evaluation for possible CABG.

## 2023-06-16 NOTE — PROGRESS NOTE ADULT - ASSESSMENT
63M with PMHx ESRD on HD (T,Th, S), IDDM, COPD, gastroparesis, peripheral blindness and retinopathy, and CAD, who initially presented to [facility] on 5/31 with n/v/d, missing his HD as a result, and found to be hyperkalemic with acute respiratory failure secondary to fluid overload vs pneumonia and NSTEMI in the context of C. diff colitis (positive on 6/1). Completed 10 day course of oral vancomycin on 6/12 per chart.  Patient became fluid overloaded during hospitalization at [place] requiring HFNC, improved with additional HD. Blood culture negative to date, patient received 1 dose of IV cefepime. Patient tolerating 2L NC prior to transfer to [facility].  Patient found to have NSTEMI s/p heparin drip 6/10, with cardiac cath 6/12 demonstrating MVD requiring transfer to [facility] for CT Surgery evaluation for possible CABG. CTS evaluated and recommend he f/u o/p with cardiology for PCI.    # Diarrhea- Enterocolitis  known C diff colitis from [place] (POA)  Completed 10 day course of oral vancomycin on 6/12 per chart.   GI PCR + for Adenovirus.  C diff testing  c/s testing  Cont Vanco 500mg Q 6 hrs, until resolved  Gi appreciated     # Gastroparesis  has Gastric Pacer  CT findings noted  GI following    # NSTEMI S/p C with severe MVD  CTS evaluated and recommend he f/u o/p with cardiology for PCI.  Plavix was put on hold prior to sending him here  Resume now  Cont ASA, BB, statins, ARB    # Chr CHFrEF and grd 2 DD  Toprol and Losartan started today  compensated  Cards following    # ESRD on HD TTS  Cont same here    # Dm2  Insulin in house

## 2023-06-16 NOTE — PROGRESS NOTE ADULT - PROBLEM SELECTOR PLAN 3
Patient transferred 1 unit of 6/13/2023  Todays hemoglobin 11.0 Patient transferred 1 unit of 6/13/2023  Today's hemoglobin 11.0

## 2023-06-16 NOTE — CONSULT NOTE ADULT - ASSESSMENT
63 year old male with a medical history of chronic diarrhea, ESRD on HD (T,Th,S), type 2 DM (Ha1c 9.4 on insulin), COPD, gastroparesis, peripheral blindness and retinopathy, CAD with multi vessel disease who was under ct surgery for possible CABG eval but now ct surgery not proceeding with CABG  Patient was recently positive for C. diff colitis (positive on 6/1). Completed 10 day course of oral vancomycin on 6/12 per chart, now reporting that diarrhea is back again.
The patient is a 63 year old legally blind male with PMH of DM complicated by neuropathy, HTN, HLD, CAD, PVD, gastroparesis, ESRD on HD TTS and BPH initially presented to [facility] on 5/31 with N/V/D after missing hemodialysis. Admitted for hyperkalemic and acute respiratory failure secondary to fluid overload vs pneumonia. Hospital course notable for NSTEMI with cardiac cath on 06/12 showing multivessel disease. He was transferred to [facility] on 06/13/2023 for CABG evaluation. Nephrology is consulted for resumption of hemodialysis.     -Access: R AV fistula   -Outpatient dialysis days are Tuesdays Thursdays Saturdays  -Outpatient dialysis unit is unclear - patient and wife are both poor historians and unable to provide details - reportedly somewhere around the [facility]   -Next hemodialysis will be today as per his usual outpatient dialysis schedule   -BP robust  -Metabolic acidosis in setting of CKD - continue sodium bicarb tabs as it is a home medication  -Anemia - to be transfused pRBC with HD today; will also add KIMBERLY with HD   -Mineral Bone Disease in setting of CKD - continue oral phos binder  -Hyponatremia - will improve with hemodialysis     Kamryn Li DO  Nephrology  [facility] 
63M with h/o ESRD on HD (T,Th, S), IDDM, COPD, gastroparesis, peripheral blindness and retinopathy, and CAD, who was transfered to [facility] for CT Surgery evaluation for possible CABG.    On admission a1c 9.4%, at home on insulin, patient does not know his doses, says he has been diabetic for a few years., Takes 2 to 3 insulin shots at home, wife administers it, he does not know doses, wife not bedside today.    DM type 2, a1c 9.4%:  sugars were in 300s, now improving.  -Started on lantus 7 units daily and admelog 5 units tid with meals and ssi., agree to continue same doses as sugars better today.  -fingerstick ac tid hs.  -diabetic diet.    CAD   -Preop workup in progress  -Follow CT recs  -Plan to be discussed with CT Surgery attendings during AM rounds.     HTN  - Continue norvasc, carvedilol      HLD    - Continue lipitor.    CKD  requiring chronic dialysis.   follow nephrology recs    
 63y old  Male ESRD on HD (T,Th, S), IDDM, COPD, gastroparesis, peripheral blindness and retinopathy, and CAD (most recent DEISY 2011 dLAD). Presented to [facility] for chest discomfort, respiratory distress, C.Diff, NSTEMI. S/p LHC with severe MVD. Endorses consistent CP for 1 month now. Cardiology consulted for medical optimization of GDMT prior to CABG.

## 2023-06-16 NOTE — CONSULT NOTE ADULT - PROBLEM SELECTOR RECOMMENDATION 9
Acute on chronic diarrhea but recently positive for C. diff colitis (positive on 6/1). Completed 10 day course of oral vancomycin on 6/12 per chart, now reporting that diarrhea is back again.   - Continue Vancomycin solution every 6 hours   - GI PCR, ova and parasites and Stool cultures collected    - Monitor for stool count    - Consider ID consultation Acute on chronic diarrhea (progressively worsening for the last 2 years) but recently positive for C. diff colitis (positive on 6/1). Completed 10 day course of oral vancomycin on 6/12 per chart, now reporting that diarrhea is back again.    - Continue Vancomycin solution every 6 hours   - GI PCR, ova and parasites and Stool cultures collected    - Monitor for stool count    - Consider ID consultation

## 2023-06-16 NOTE — DIETITIAN NUTRITION RISK NOTIFICATION - ADDITIONAL COMMENTS/DIETITIAN RECOMMENDATIONS
Encourage compliance to diet.  Provided meal planning with plate model and dialysis diet education.

## 2023-06-16 NOTE — DIETITIAN INITIAL EVALUATION ADULT - PROBLEM SELECTOR PLAN 6
Go ahead and take care of it as per request.    
PLEASE ADVISE      
Pt.  was last seen in 11/2019 and was having seizures.He had an MRI done and stated he has some blockage in his sinus cavities. Elsa told him when he was ready she would put in a referral for and ENT. He is ready for the referral to be placed.   
Referral placed for approval     
Will need nephrology consult for HD  Continue bicarb, calcium acetate, epoetin

## 2023-06-16 NOTE — PROGRESS NOTE ADULT - PROBLEM SELECTOR PLAN 4
HFrEF and ICM  Patient appearing euvolemic   TTE  6/13 LVEF  35-40. DD 2. Mild MR  If pt will not have surgery then will need to start GDMT for HF prior to dischargge HFrEF and ICM  Patient appearing euvolemic   TTE  6/13 LVEF  35-40. DD 2. Mild MR  If pt will not have surgery then will need to start GDMT for HF prior to discharge  GDMT  Duresis as per HD and nephrologists  Ct with BB  Weight daily  DASH diet HFrEF and ICM  Patient appearing euvolemic   TTE  6/13 LVEF  35-40. DD 2. Mild MR  If pt will not have surgery then will need to start GDMT for HF prior to discharge  GDMT  Diuresis as per HD and nephrologists   Ct with BB  Discussed ACE/ARB with Nephrology Dr. Lacy and approved for ACE/ARB and MRA usage.    Weight daily  DASH diet HFrEF and ICM  Patient appearing euvolemic   TTE  6/13 LVEF  35-40. DD 2. Mild MR  If pt will not have surgery then will need to start GDMT for HF prior to discharge  GDMT  Diuresis as per HD and nephrologists   Ct with bb, but change   Dlaverne Vallec.   Discussed ACE/ARB with Nephrology Dr. Lacy and approved for ACE/ARB and MRA usage.    Weight daily  DASH diet HFrEF and ICM  Patient appearing euvolemic   TTE  6/13 LVEF  35-40. DD 2. Mild MR  If pt will not have surgery then will need to start GDMT for HF prior to discharge  GDMT  Diuresis as per HD and nephrologists   Ct with bb, but change Metroprolol tartrate 50mg po bid to Metroprolol XL 100mg po daily  Discussed ACE/ARB with Nephrology Dr. Lacy and approved for ACE/ARB and MRA usage.    Start patient on Losartan 25mg po daily  Can consider adding MRA outpatient.    D.c Norvasc.   Weight daily  DASH diet

## 2023-06-16 NOTE — CHART NOTE - NSCHARTNOTEFT_GEN_A_CORE
case reviewed between Dr. Newsome and Dr. Becerra, plan for outpt f/u with Dr. Becerra for stenting.  d/w Dr. Newsome

## 2023-06-16 NOTE — DIETITIAN INITIAL EVALUATION ADULT - ADD RECOMMEND
Nepro BID to optimize po intake and provide an additional 425 kcal, 19.1g protein per serving   Nephrovite daily  Encouraged diet compliance.

## 2023-06-16 NOTE — DIETITIAN INITIAL EVALUATION ADULT - ORAL INTAKE PTA/DIET HISTORY
As per pt, Po intake is improving. 10# weight loss noted. A1C 9.4 noted. Provided meal planning and dialysis diet in english as requested.

## 2023-06-16 NOTE — PROGRESS NOTE ADULT - SUBJECTIVE AND OBJECTIVE BOX
[facility]                                                         CARDIOLOGY AT [facility]                                                                  [address], [place] [address]                                                         Telephone: [phone]. Fax:[phone]                                                                             PROGRESS NOTE    Reason for follow up: CHF and multivessel cad  Update:   Case discussed with DOMINGUEZ Pond.  As per Dejah case was reviewed between Dr. Newsome and Dr. Becerra and is planned for outpt f/u with Dr. Becerra for stenting.  Review of symptoms:   Cardiac:  No chest pain. No dyspnea. No palpitations.  Respiratory: no cough. No dyspnea  Gastrointestinal: No diarrhea. No abdominal pain. No bleeding.   Neuro: No focal neuro complaints.    Vitals:  T(C): 36.7 (06-16-23 @ 08:02), Max: 37.3 (06-15-23 @ 16:21)  HR: 66 (06-16-23 @ 08:02) (61 - 68)  BP: 136/69 (06-16-23 @ 08:02) (136/69 - 171/73)  RR: 18 (06-16-23 @ 05:20) (18 - 18)  SpO2: 99% (06-16-23 @ 05:20) (98% - 99%  I&O's Summary    15 Poncho 2023 07:01  -  16 Jun 2023 07:00  --------------------------------------------------------  IN: 780 mL / OUT: 1600 mL / NET: -820 mL    Weight (kg): 67.2 (06-12 @ 22:28)    PHYSICAL EXAM:  Appearance: Comfortable. No acute distress  HEENT:  Atraumatic. Normocephalic.  Normal oral mucosa  Neurologic: A & O x 3, no gross focal deficits.  Cardiovascular: RRR S1 S2, No murmur, no rubs/gallops. No JVD  Respiratory:   Diminished lungs sounds.    Gastrointestinal:  Soft, Non-tender, + BS  Lower Extremities: + Peripheral Pulses, No clubbing, cyanosis, or edema  Psychiatry: Patient is calm. No agitation.   Skin: warm and dry.    CURRENT CARDIAC MEDICATIONS:  amLODIPine   Tablet 10 milliGRAM(s) Oral daily  metoprolol tartrate 50 milliGRAM(s) Oral two times a day      CURRENT OTHER MEDICATIONS:  albuterol    90 MICROgram(s) HFA Inhaler 2 Puff(s) Inhalation every 6 hours PRN Bronchospasm  guaiFENesin Oral Liquid (Sugar-Free) 100 milliGRAM(s) Oral every 8 hours PRN Cough  loratadine 10 milliGRAM(s) Oral daily  montelukast 10 milliGRAM(s) Oral at bedtime  vancomycin    Solution 125 milliGRAM(s) Oral every 6 hours  acetaminophen     Tablet .. 650 milliGRAM(s) Oral every 8 hours PRN Mild Pain (1 - 3)  gabapentin 300 milliGRAM(s) Oral <User Schedule>  melatonin 5 milliGRAM(s) Oral at bedtime  methocarbamol 500 milliGRAM(s) Oral three times a day PRN Muscle Spasm  hyoscyamine SL 0.125 milliGRAM(s) SubLingual four times a day PRN abdominal spasm  atorvastatin 40 milliGRAM(s) Oral at bedtime  glucagon  Injectable 1 milliGRAM(s) IntraMuscular once, Stop order after: 1 Doses  insulin glargine Injectable (LANTUS) 6 Unit(s) SubCutaneous at bedtime  insulin lispro (ADMELOG) corrective regimen sliding scale   SubCutaneous Before meals and at bedtime  insulin lispro Injectable (ADMELOG) 2 Unit(s) SubCutaneous three times a day with meals  aspirin  chewable 81 milliGRAM(s) Oral daily  calcium acetate 667 milliGRAM(s) Oral three times a day with meals  chlorhexidine 2% Cloths 1 Application(s) Topical <User Schedule>  dextrose 5%. 1000 milliLiter(s) (100 mL/Hr) IV Continuous <Continuous>  dextrose 5%. 1000 milliLiter(s) (50 mL/Hr) IV Continuous <Continuous>  epoetin lois-epbx (RETACRIT) Injectable 84483 Unit(s) IV Push <User Schedule>  heparin   Injectable 5000 Unit(s) SubCutaneous every 8 hours  lidocaine   4% Patch 1 Patch Transdermal daily  sodium chloride 0.9% lock flush 3 milliLiter(s) IV Push every 8 hours  tamsulosin 0.4 milliGRAM(s) Oral at bedtime    LABS:	 	                          11.0   5.39  )-----------( 217      ( 16 Jun 2023 05:05 )             33.3     06-16    131<L>  |  94<L>  |  34.3<H>  ----------------------------<  178<H>  4.8   |  22.0  |  5.17<H>    Ca    8.0<L>      16 Jun 2023 05:05  Mg     2.4     06-16      PT/INR/PTT ( 12 Jun 2023 21:50 )                       :                       :      11.2         :       29.7                  .        .                   .              .           .       0.97        .                                       TSH: Thyroid Stimulating Hormone, Serum: 4.14 uIU/mL      TELEMETRY:    Sr 60's, no acute alarms noted.    	                                                                [facility]                                                         CARDIOLOGY AT [facility]                                                                  [address], [place] [address]                                                         Telephone: [phone]. Fax:[phone]                                                                             PROGRESS NOTE    Reason for follow up: CHF and multivessel cad  Update:   Case discussed with DOMINGUEZ Pond.  As per Dejah case was reviewed between Dr. Newsome and Dr. Becerra and is planned for outpt f/u with Dr. Becerra for stenting.  Review of symptoms:   Cardiac:  No chest pain. No dyspnea. No palpitations.  Respiratory: no cough. No dyspnea  Gastrointestinal: No diarrhea. No abdominal pain. No bleeding.   Neuro: No focal neuro complaints.    Vitals:  T(C): 36.7 (06-16-23 @ 08:02), Max: 37.3 (06-15-23 @ 16:21)  HR: 66 (06-16-23 @ 08:02) (61 - 68)  BP: 136/69 (06-16-23 @ 08:02) (136/69 - 171/73)  RR: 18 (06-16-23 @ 05:20) (18 - 18)  SpO2: 99% (06-16-23 @ 05:20) (98% - 99%  I&O's Summary    15 Poncho 2023 07:01  -  16 Jun 2023 07:00  --------------------------------------------------------  IN: 780 mL / OUT: 1600 mL / NET: -820 mL    Weight (kg): 67.2 (06-12 @ 22:28)    PHYSICAL EXAM:  Appearance: Comfortable. No acute distress  HEENT:  Atraumatic. Normocephalic.  Normal oral mucosa  Neurologic: A & O x 3, no gross focal deficits.  Cardiovascular: RRR S1 S2, No murmur, no rubs/gallops. No JVD  Respiratory:   Diminished lungs sounds.    Gastrointestinal:  Soft, Non-tender, + BS  Lower Extremities: + Peripheral Pulses, No clubbing, cyanosis, or edema  Psychiatry: Patient is calm. No agitation.   Skin: warm and dry.    CURRENT CARDIAC MEDICATIONS:  amLODIPine   Tablet 10 milliGRAM(s) Oral daily  metoprolol tartrate 50 milliGRAM(s) Oral two times a day    CURRENT OTHER MEDICATIONS:  albuterol    90 MICROgram(s) HFA Inhaler 2 Puff(s) Inhalation every 6 hours PRN Bronchospasm  guaiFENesin Oral Liquid (Sugar-Free) 100 milliGRAM(s) Oral every 8 hours PRN Cough  loratadine 10 milliGRAM(s) Oral daily  montelukast 10 milliGRAM(s) Oral at bedtime  vancomycin    Solution 125 milliGRAM(s) Oral every 6 hours  acetaminophen     Tablet .. 650 milliGRAM(s) Oral every 8 hours PRN Mild Pain (1 - 3)  gabapentin 300 milliGRAM(s) Oral <User Schedule>  melatonin 5 milliGRAM(s) Oral at bedtime  methocarbamol 500 milliGRAM(s) Oral three times a day PRN Muscle Spasm  hyoscyamine SL 0.125 milliGRAM(s) SubLingual four times a day PRN abdominal spasm  atorvastatin 40 milliGRAM(s) Oral at bedtime  glucagon  Injectable 1 milliGRAM(s) IntraMuscular once, Stop order after: 1 Doses  insulin glargine Injectable (LANTUS) 6 Unit(s) SubCutaneous at bedtime  insulin lispro (ADMELOG) corrective regimen sliding scale   SubCutaneous Before meals and at bedtime  insulin lispro Injectable (ADMELOG) 2 Unit(s) SubCutaneous three times a day with meals  aspirin  chewable 81 milliGRAM(s) Oral daily  calcium acetate 667 milliGRAM(s) Oral three times a day with meals  chlorhexidine 2% Cloths 1 Application(s) Topical <User Schedule>  dextrose 5%. 1000 milliLiter(s) (100 mL/Hr) IV Continuous <Continuous>  dextrose 5%. 1000 milliLiter(s) (50 mL/Hr) IV Continuous <Continuous>  epoetin lois-epbx (RETACRIT) Injectable 61319 Unit(s) IV Push <User Schedule>  heparin   Injectable 5000 Unit(s) SubCutaneous every 8 hours  lidocaine   4% Patch 1 Patch Transdermal daily  sodium chloride 0.9% lock flush 3 milliLiter(s) IV Push every 8 hours  tamsulosin 0.4 milliGRAM(s) Oral at bedtime    LABS:	 	                          11.0   5.39  )-----------( 217      ( 16 Jun 2023 05:05 )             33.3     06-16    131<L>  |  94<L>  |  34.3<H>  ----------------------------<  178<H>  4.8   |  22.0  |  5.17<H>    Ca    8.0<L>      16 Jun 2023 05:05  Mg     2.4     06-16      PT/INR/PTT ( 12 Jun 2023 21:50 )                       :                       :      11.2         :       29.7                  .        .                   .              .           .       0.97        .                                       TSH: Thyroid Stimulating Hormone, Serum: 4.14 uIU/mL      TELEMETRY:    Sr 60's, no acute alarms noted.

## 2023-06-16 NOTE — CONSULT NOTE ADULT - NS ATTEND AMEND GEN_ALL_CORE FT
I saw and examined the patient with NP  Medical hx as above   NP was the  along with wife at bedside   Patient with intermittent diarrhea since starting dialysis  2 years ago. 1-2 loose BM few days a week. Then had a toe amputation in 12/22. After that point diarrhea became progressively worse. Eventually having 7 loose BM qd with urgency and incontinence. NO abdominal pain . NO fevers.   Patient diagnosed with C diff 6/1 and treated until 6/12. States diarrhea intially better., Was off antibiotics for one day before diarrhea resumed. Noted to have NSTEMI. Cath showed occlusive disease and transferred from [place] to [facility] for evaluation for CABG.      [facility]. Last seen 3-4 weeks ago. Pt states he had  colonoscopy "not that long ago ", but does not know when or where. May not have been [facility].      Patient with hx of gastroparesis- Had gastric PPM done 2013. NO nausea and vomiting now. States food " moves slowly sometimes "    H- RRR    A/P  hx of C diff- if diarrhea has resumed, would give Vanco 125 mg qid , may require  6 week tappering dose   please call ID  low fiber, lactose free , renal diet   [place] Will F/U with [facility]
Patient seen and examined by me.    T(C): 36.8 (06-13-23 @ 16:35), Max: 37.1 (06-12-23 @ 21:47)  HR: 73 (06-13-23 @ 16:35) (58 - 76)  BP: 157/81 (06-13-23 @ 16:35) (134/56 - 172/70)  RR: 18 (06-13-23 @ 16:35) (16 - 20)  SpO2: 98% (06-13-23 @ 16:35) (94% - 100%)  Patient alert and awake.  Chest- Bilateral Clear BS  Cardiac- S1 and S2  Abdomen- Soft    Assessment:  1. CAD      Recommendations:  Discussed with CT Surgery PA    I have discussed my recommendation with the PA which are outlined above.  Will follow.

## 2023-06-16 NOTE — PROGRESS NOTE ADULT - NS ATTEND AMEND GEN_ALL_CORE FT
NSTEMI, CAD : s/p LHC which showed mvd. Case discussed with DOMINGUEZ Pond.  As per Dejah case was reviewed between Dr. Newsome and Dr. Becerra and is planned for outpt f/u with Dr. Becerra for stenting.  c/w asa, atorvastatin, metoprolol.  HFrEF, ICM: TTE LVEF 6/13  35-40. DD 2. Mild MR  ESRD on HD (T,Th,S)    Current Cardiac Meds  Amlodipine   10 mg daily  Metoprolol tartrate 50 mg twice daily  Atorvastatin 40 mg daily  Aspirin  81 mg daily  Tamsulosin 0.4 mg daily -    1) NSTEMI, CAD : s/p LHC which showed mvd. Case discussed with DOMINGUEZ Pond.  As per Dejah case was reviewed between Dr. Newsome and Dr. Becerra and is planned for outpt f/u with Dr. Becerra for stenting.  c/w asa, atorvastatin, metoprolol.  2) ESRD on HD (T,Th,S)  3) HFrEF, ICM: TTE LVEF 6/13  35-40. DD 2. Mild MR: If pt will not have surgery then will need to start GDMT for HF prior to discharge,        Current Cardiac Meds  Amlodipine   10 mg daily  Metoprolol tartrate 50 mg twice daily  Atorvastatin 40 mg daily  Aspirin  81 mg daily  Tamsulosin 0.4 mg daily -    1) NSTEMI, CAD : s/p LHC which showed mvd. Case discussed with DOMINGUEZ Pond.  As per Dejah case was reviewed between Dr. Newsome and Dr. Becerra and is planned for outpt f/u with Dr. Becerra for stenting.  c/w asa, atorvastatin, metoprolol.  2) ESRD on HD (T,Th,S)  3) HFrEF, ICM: TTE  6/13 LVEF  35-40. DD 2. Mild MR: If pt will not have surgery then will need to start GDMT for HF prior to discharge,        Current Cardiac Meds  Amlodipine   10 mg daily  Metoprolol tartrate 50 mg twice daily  Atorvastatin 40 mg daily  Aspirin  81 mg daily  Tamsulosin 0.4 mg daily

## 2023-06-16 NOTE — CONSULT NOTE ADULT - SUBJECTIVE AND OBJECTIVE BOX
Patient is a 63y old  Male who presents with a chief complaint of multivessel CAD (16 Jun 2023 10:23)      NOTE INCOMPLETE       HPI:  63M with PMHx ESRD on HD (T,Th, S), IDDM, COPD, gastroparesis, peripheral blindness and retinopathy, and CAD, who initially presented to [facility] on 5/31 with n/v/d, missing his HD as a result, and found to be hyperkalemic with acute respiratory failure secondary to fluid overload vs pneumonia and NSTEMI in the context of C. diff colitis (positive on 6/1). Completed 10 day course of oral vancomycin on 6/12 per chart.  Patient became fluid overloaded during hospitalization at [facility] requiring HFNC, improved with additional HD. Blood culture negative to date, patient received 1 dose of IV cefepime. Patient tolerating 2L NC prior to transfer to [facility].  Patient found to have NSTEMI s/p heparin drip 6/10, with cardiac cath 6/12 demonstrating MVD requiring transfer to [facility] for CT Surgery evaluation for possible CABG. GI consulted as pt now reporting that diarrhea is back again. CT of abdomen and pelvis revealed   Off note, Patient had placement of Gastric pacemaker on 2/14/2013 by Dr. Gold at [facility], no further follow up notes, therefore unsure if Gastric PM remains in place.  According to [employer] KUMAR pt evaluated by GI at [facility] and reports having GI evaluation by a gastroenterologist at [facility] ? in 2021 to include a colonoscopy which the patient is unsure of the results.       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 1000 milliLiter(s) (100 mL/Hr) IV Continuous <Continuous>  dextrose 5%. 1000 milliLiter(s) (50 mL/Hr) IV Continuous <Continuous>  dextrose 50% Injectable 25 Gram(s) IV Push once  dextrose 50% Injectable 12.5 Gram(s) IV Push once  dextrose 50% Injectable 25 Gram(s) IV Push once  epoetin lois-epbx (RETACRIT) Injectable 31625 Unit(s) IV Push <User Schedule>  gabapentin 300 milliGRAM(s) Oral <User Schedule>  glucagon  Injectable 1 milliGRAM(s) IntraMuscular once  heparin   Injectable 5000 Unit(s) SubCutaneous every 8 hours  insulin glargine Injectable (LANTUS) 6 Unit(s) SubCutaneous at bedtime  insulin lispro (ADMELOG) corrective regimen sliding scale   SubCutaneous Before meals and at bedtime  insulin lispro Injectable (ADMELOG) 2 Unit(s) SubCutaneous three times a day with meals  lidocaine   4% Patch 1 Patch Transdermal daily  loratadine 10 milliGRAM(s) Oral daily  melatonin 5 milliGRAM(s) Oral at bedtime  metoprolol tartrate 50 milliGRAM(s) Oral two times a day  montelukast 10 milliGRAM(s) Oral at bedtime  saccharomyces boulardii 250 milliGRAM(s) Oral two times a day  sodium chloride 0.9% lock flush 3 milliLiter(s) IV Push every 8 hours  tamsulosin 0.4 milliGRAM(s) Oral at bedtime  vancomycin    Solution 125 milliGRAM(s) Oral every 6 hours    MEDICATIONS  (PRN):  acetaminophen     Tablet .. 650 milliGRAM(s) Oral every 8 hours PRN Mild Pain (1 - 3)  albuterol    90 MICROgram(s) HFA Inhaler 2 Puff(s) Inhalation every 6 hours PRN Bronchospasm  dextrose Oral Gel 15 Gram(s) Oral once PRN Blood Glucose LESS THAN 70 milliGRAM(s)/deciliter  guaiFENesin Oral Liquid (Sugar-Free) 100 milliGRAM(s) Oral every 8 hours PRN Cough  hyoscyamine SL 0.125 milliGRAM(s) SubLingual four times a day PRN abdominal spasm  methocarbamol 500 milliGRAM(s) Oral three times a day PRN Muscle Spasm      Social History:    Marital Status:  (   )    (   ) Single    (   )    (  )   Occupation:   Lives with: (  ) alone  (  ) children   (  ) spouse   (  ) parents  (  ) other    Substance Use (street drugs): (  ) never used  (  ) other:  Tobacco Usage:  (   ) never smoked   (   ) former smoker   (   ) current smoker  (     ) pack year  (        ) last cigarette date  Alcohol Usage:  Sexual History:     Family History   IBD (  ) Yes   (  ) No  GI Malignancy (  )  Yes    (  ) No    Health Management     Last Colonoscopy -      (     ) Advanced Directives: (     ) None    (      ) DNR    (     ) DNI    (     ) Health Care Proxy:       REVIEW OF SYSTEMS:   General: Negative  HEENT: Negative  CV: Negative  Respiratory: Negative  GI: See HPI  : Negative  MSK: Negative  Hematologic: Negative  Skin: Negative      Vital Signs Last 24 Hrs  T(C): 36.7 (16 Jun 2023 08:02), Max: 37.3 (15 Poncho 2023 16:21)  T(F): 98 (16 Jun 2023 08:02), Max: 99.2 (15 Poncho 2023 16:21)  HR: 66 (16 Jun 2023 08:02) (61 - 68)  BP: 136/69 (16 Jun 2023 08:02) (136/69 - 171/73)  BP(mean): 103 (15 Poncho 2023 16:21) (103 - 103)  RR: 18 (16 Jun 2023 05:20) (18 - 18)  SpO2: 99% (16 Jun 2023 05:20) (98% - 99%)    Parameters below as of 16 Jun 2023 05:20  Patient On (Oxygen Delivery Method): nasal cannula  O2 Flow (L/min): 2      PHYSICAL EXAM:   GENERAL:  No acute distress  HEENT:  NC/AT, conjunctiva clear, sclera anicteric  CHEST:  No increased effort  HEART:  Regular rhythm  ABDOMEN:  Soft, non-tender, non-distended, normoactive bowel sounds  EXTREMITIES: No edema  SKIN:  Warm, dry  NEURO:  Calm, cooperative        LABS:                        11.0   5.39  )-----------( 217      ( 16 Jun 2023 05:05 )             33.3     06-16    131<L>  |  94<L>  |  34.3<H>  ----------------------------<  178<H>  4.8   |  22.0  |  5.17<H>    Ca    8.0<L>      16 Jun 2023 05:05  Mg     2.4     06-16          LIVER FUNCTIONS - ( 13 Jun 2023 05:32 )  Alb: 3.0 g/dL / Pro: 5.6 g/dL / ALK PHOS: 75 U/L / ALT: 15 U/L / AST: 11 U/L / GGT: x                   RADIOLOGY & ADDITIONAL TESTS:      < from: CT Abdomen and Pelvis No Cont (06.15.23 @ 21:12) >    ACC: [id number] EXAM:  CT ABDOMEN AND PELVIS   ORDERED BY: GWEN ARRINGTON     PROCEDURE DATE:  06/15/2023      FINDINGS:  LOWER CHEST: Developing bilateral pleural effusions with underlying   passive atelectasis. Mild cardiomegaly.    LIVER: Contracted.  BILE DUCTS: Normal caliber.  GALLBLADDER: Within normal limits.  SPLEEN: Within normal limits.  PANCREAS: Within normal limits.  ADRENALS: Within normal limits.  KIDNEYS/URETERS: Within normal limits.    BLADDER: Completely decompressed.  REPRODUCTIVE ORGANS: Mildly enlarged prostate.    BOWEL: No bowel obstruction. Appendix . Fluid-filled nondilated mildly   thickened small bowel loops in the pelvis suggestive of enteritis. Liquid   stool in the colon from cecum to splenic flexure. Debris-filled stomach   with questionable wall thickening.  PERITONEUM: No ascites.  VESSELS: Atherosclerotic changes.  RETROPERITONEUM/LYMPH NODES: No lymphadenopathy.  ABDOMINAL WALL: Gastric pacemaker in the right abdominal wall.  BONES: Degenerative changes. L5-S1 disc bulging    IMPRESSION:  New bilateral pleural effusions with underlying passive atelectasis.    Probable enterocolitis as above.    Debris-filled stomach likely related to gastroparesis. Questionable wall   thickening. Consider endoscopy.        --- End of Report ---              < end of copied text >       Patient is a 63y old  Male who presents with a chief complaint of multivessel CAD (16 Jun 2023 10:23)      HPI:  63M with PMHx ESRD on HD (T,Th, S), IDDM, COPD, gastroparesis, peripheral blindness and retinopathy, and CAD, who initially presented to [facility] on 5/31 with n/v/d, missing his HD as a result, and found to be hyperkalemic with acute respiratory failure secondary to fluid overload vs pneumonia and NSTEMI in the context of C. diff colitis (positive on 6/1). Completed 10 day course of oral vancomycin on 6/12 per chart.  Patient became fluid overloaded during hospitalization at [facility] requiring HFNC, improved with additional HD. Blood culture negative to date, patient received 1 dose of IV cefepime. Patient tolerating 2L NC prior to transfer to [facility].  Patient found to have NSTEMI s/p heparin drip 6/10, with cardiac cath 6/12 demonstrating MVD requiring transfer to [facility] for CT Surgery evaluation for possible CABG. GI consulted as pt now reporting that diarrhea is back again. Patient seen and evaluated at bedside, wife also present, reports progressively worsening diarrhea since starting dialysis 2 years ago. Reports 1-2 episodes daily which worsened jan/2023 and has been increasing in episodes. CT of abdomen and pelvis revealed Probable enterocolitis. Debris-filled stomach likely related to gastroparesis. Questionable wall thickening. Today, patient reports 4 episodes thus far, reports 9 episodes yesterday. At this time, Denies nausea, vomiting, abdominal pain, fever, chills, chest pain, shortness of breath.   Off note, Patient had placement of Gastric pacemaker on 2/14/2013 by Dr. Gold at [facility], no further follow up notes. As per patient Gastric PM remains in place. Patient reports having GI evaluation by a gastroenterologist at [facility] clinic? recently to include a colonoscopy which the patient is unsure of the results.           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 1000 milliLiter(s) (100 mL/Hr) IV Continuous <Continuous>  dextrose 5%. 1000 milliLiter(s) (50 mL/Hr) IV Continuous <Continuous>  dextrose 50% Injectable 25 Gram(s) IV Push once  dextrose 50% Injectable 12.5 Gram(s) IV Push once  dextrose 50% Injectable 25 Gram(s) IV Push once  epoetin lois-epbx (RETACRIT) Injectable 36162 Unit(s) IV Push <User Schedule>  gabapentin 300 milliGRAM(s) Oral <User Schedule>  glucagon  Injectable 1 milliGRAM(s) IntraMuscular once  heparin   Injectable 5000 Unit(s) SubCutaneous every 8 hours  insulin glargine Injectable (LANTUS) 6 Unit(s) SubCutaneous at bedtime  insulin lispro (ADMELOG) corrective regimen sliding scale   SubCutaneous Before meals and at bedtime  insulin lispro Injectable (ADMELOG) 2 Unit(s) SubCutaneous three times a day with meals  lidocaine   4% Patch 1 Patch Transdermal daily  loratadine 10 milliGRAM(s) Oral daily  melatonin 5 milliGRAM(s) Oral at bedtime  metoprolol tartrate 50 milliGRAM(s) Oral two times a day  montelukast 10 milliGRAM(s) Oral at bedtime  saccharomyces boulardii 250 milliGRAM(s) Oral two times a day  sodium chloride 0.9% lock flush 3 milliLiter(s) IV Push every 8 hours  tamsulosin 0.4 milliGRAM(s) Oral at bedtime  vancomycin    Solution 125 milliGRAM(s) Oral every 6 hours    MEDICATIONS  (PRN):  acetaminophen     Tablet .. 650 milliGRAM(s) Oral every 8 hours PRN Mild Pain (1 - 3)  albuterol    90 MICROgram(s) HFA Inhaler 2 Puff(s) Inhalation every 6 hours PRN Bronchospasm  dextrose Oral Gel 15 Gram(s) Oral once PRN Blood Glucose LESS THAN 70 milliGRAM(s)/deciliter  guaiFENesin Oral Liquid (Sugar-Free) 100 milliGRAM(s) Oral every 8 hours PRN Cough  hyoscyamine SL 0.125 milliGRAM(s) SubLingual four times a day PRN abdominal spasm  methocarbamol 500 milliGRAM(s) Oral three times a day PRN Muscle Spasm      Social History:    Marital Status:  (   )    (   ) Single    (   )    (  )   Occupation:   Lives with: (  ) alone  (  ) children   (  ) spouse   (  ) parents  (  ) other    Substance Use (street drugs): (  ) never used  (  ) other:  Tobacco Usage:  (   ) never smoked   (   ) former smoker   (   ) current smoker  (     ) pack year  (        ) last cigarette date  Alcohol Usage:  Sexual History:     Family History   IBD (  ) Yes   (  ) No  GI Malignancy (  )  Yes    (  ) No    Health Management     Last Colonoscopy - Recently at [facility]. Unsure of results       (     ) Advanced Directives: (     ) None    (      ) DNR    (     ) DNI    (     ) Health Care Proxy:       REVIEW OF SYSTEMS:   General: Negative  HEENT: Negative  CV: Negative  Respiratory: Negative  GI: See HPI  : Negative  MSK: Negative  Hematologic: Negative  Skin: Negative      Vital Signs Last 24 Hrs  T(C): 36.7 (16 Jun 2023 08:02), Max: 37.3 (15 Poncho 2023 16:21)  T(F): 98 (16 Jun 2023 08:02), Max: 99.2 (15 Poncho 2023 16:21)  HR: 66 (16 Jun 2023 08:02) (61 - 68)  BP: 136/69 (16 Jun 2023 08:02) (136/69 - 171/73)  BP(mean): 103 (15 Poncho 2023 16:21) (103 - 103)  RR: 18 (16 Jun 2023 05:20) (18 - 18)  SpO2: 99% (16 Jun 2023 05:20) (98% - 99%)    Parameters below as of 16 Jun 2023 05:20  Patient On (Oxygen Delivery Method): nasal cannula  O2 Flow (L/min): 2      PHYSICAL EXAM:   GENERAL:  No acute distress  HEENT:  NC/AT, conjunctiva clear, sclera anicteric  CHEST:  No increased effort  HEART:  Regular rhythm  ABDOMEN:  Soft, non-tender, non-distended, normoactive bowel sounds  EXTREMITIES: No edema  SKIN:  Warm, dry  NEURO:  Calm, cooperative        LABS:                        11.0   5.39  )-----------( 217      ( 16 Jun 2023 05:05 )             33.3     06-16    131<L>  |  94<L>  |  34.3<H>  ----------------------------<  178<H>  4.8   |  22.0  |  5.17<H>    Ca    8.0<L>      16 Jun 2023 05:05  Mg     2.4     06-16          LIVER FUNCTIONS - ( 13 Jun 2023 05:32 )  Alb: 3.0 g/dL / Pro: 5.6 g/dL / ALK PHOS: 75 U/L / ALT: 15 U/L / AST: 11 U/L / GGT: x                   RADIOLOGY & ADDITIONAL TESTS:      < from: CT Abdomen and Pelvis No Cont (06.15.23 @ 21:12) >    ACC: [id number] EXAM:  CT ABDOMEN AND PELVIS   ORDERED BY: GWEN ARRINGTON     PROCEDURE DATE:  06/15/2023      FINDINGS:  LOWER CHEST: Developing bilateral pleural effusions with underlying   passive atelectasis. Mild cardiomegaly.    LIVER: Contracted.  BILE DUCTS: Normal caliber.  GALLBLADDER: Within normal limits.  SPLEEN: Within normal limits.  PANCREAS: Within normal limits.  ADRENALS: Within normal limits.  KIDNEYS/URETERS: Within normal limits.    BLADDER: Completely decompressed.  REPRODUCTIVE ORGANS: Mildly enlarged prostate.    BOWEL: No bowel obstruction. Appendix . Fluid-filled nondilated mildly   thickened small bowel loops in the pelvis suggestive of enteritis. Liquid   stool in the colon from cecum to splenic flexure. Debris-filled stomach   with questionable wall thickening.  PERITONEUM: No ascites.  VESSELS: Atherosclerotic changes.  RETROPERITONEUM/LYMPH NODES: No lymphadenopathy.  ABDOMINAL WALL: Gastric pacemaker in the right abdominal wall.  BONES: Degenerative changes. L5-S1 disc bulging    IMPRESSION:  New bilateral pleural effusions with underlying passive atelectasis.    Probable enterocolitis as above.    Debris-filled stomach likely related to gastroparesis. Questionable wall   thickening. Consider endoscopy.        --- End of Report ---              < end of copied text >

## 2023-06-16 NOTE — CONSULT NOTE ADULT - PROBLEM SELECTOR RECOMMENDATION 2
CT of abdomen and pelvis revealed Debris-filled stomach likely related to gastroparesis. Questionable wall   thickening. History of gastroparesis. As per allscript chart, pt underwent Gastric pacemaker placement on 2/14/2013 by Dr. Gold at [facility], no further follow up notes, therefore unsure if Gastric PM remains in place.     _________________________________________________________________  Assessment and recommendations are final when note is signed by the attending physician. CT of abdomen and pelvis revealed Debris-filled stomach likely related to gastroparesis. Questionable wall   thickening. History of gastroparesis. As per allscript chart, pt underwent Gastric pacemaker placement on 2/14/2013 by Dr. Gold at [facility], no further follow up notes,  As per patient Gastric PM remains in place.    - OPT F/U with [facility] for management of gastroparesis and Gastric pacemaker     _________________________________________________________________  Assessment and recommendations are final when note is signed by the attending physician.

## 2023-06-16 NOTE — PROGRESS NOTE ADULT - ASSESSMENT
62 y/o M with PMHx ESRD (T/TH/S), IDDM, COPD, gastroparesis, peripheral blindness, retinopathy, CAD, initially presented to [facility] with nausea, vomiting and diarrhea and missed HD, found to be hyperkalemic with acute respiratory failure secondary to fluid overload, C. diff colitis. Then found to have NSTEMI, s/p cath with MVD. Transferred for CABG evaluation. Endocrine consulted for DM management, a1c 9.4%.    1. Uncontrolled DM, a1c 9.4%  - Post prandial hypoglycemia yesterday, premeal admelog was reduced  - Premeal admelog 2 units tid  - Lantus 6 units qhs  - Sliding scale    2. CAD/MVD  - Medical management, plan to follow up outpatient    3. ESRD  - HD as per nephrology    4. HLD  - Continue atorvastatin

## 2023-06-17 DIAGNOSIS — I50.23 ACUTE ON CHRONIC SYSTOLIC (CONGESTIVE) HEART FAILURE: ICD-10-CM

## 2023-06-17 LAB
ANION GAP SERPL CALC-SCNC: 18 MMOL/L — HIGH (ref 5–17)
BUN SERPL-MCNC: 51.9 MG/DL — HIGH (ref 8–20)
CALCIUM SERPL-MCNC: 8.2 MG/DL — LOW (ref 8.4–10.5)
CHLORIDE SERPL-SCNC: 91 MMOL/L — LOW (ref 96–108)
CO2 SERPL-SCNC: 22 MMOL/L — SIGNIFICANT CHANGE UP (ref 22–29)
CREAT SERPL-MCNC: 7.77 MG/DL — HIGH (ref 0.5–1.3)
EGFR: 7 ML/MIN/1.73M2 — LOW
GLUCOSE BLDC GLUCOMTR-MCNC: 130 MG/DL — HIGH (ref 70–99)
GLUCOSE BLDC GLUCOMTR-MCNC: 160 MG/DL — HIGH (ref 70–99)
GLUCOSE BLDC GLUCOMTR-MCNC: 173 MG/DL — HIGH (ref 70–99)
GLUCOSE BLDC GLUCOMTR-MCNC: 88 MG/DL — SIGNIFICANT CHANGE UP (ref 70–99)
GLUCOSE SERPL-MCNC: 155 MG/DL — HIGH (ref 70–99)
HCT VFR BLD CALC: 31.8 % — LOW (ref 39–50)
HGB BLD-MCNC: 10.7 G/DL — LOW (ref 13–17)
MAGNESIUM SERPL-MCNC: 2.2 MG/DL — SIGNIFICANT CHANGE UP (ref 1.6–2.6)
MCHC RBC-ENTMCNC: 29.8 PG — SIGNIFICANT CHANGE UP (ref 27–34)
MCHC RBC-ENTMCNC: 33.6 GM/DL — SIGNIFICANT CHANGE UP (ref 32–36)
MCV RBC AUTO: 88.6 FL — SIGNIFICANT CHANGE UP (ref 80–100)
PLATELET # BLD AUTO: 229 K/UL — SIGNIFICANT CHANGE UP (ref 150–400)
POTASSIUM SERPL-MCNC: 5.3 MMOL/L — SIGNIFICANT CHANGE UP (ref 3.5–5.3)
POTASSIUM SERPL-SCNC: 5.3 MMOL/L — SIGNIFICANT CHANGE UP (ref 3.5–5.3)
RBC # BLD: 3.59 M/UL — LOW (ref 4.2–5.8)
RBC # FLD: 15.3 % — HIGH (ref 10.3–14.5)
SODIUM SERPL-SCNC: 131 MMOL/L — LOW (ref 135–145)
WBC # BLD: 5.87 K/UL — SIGNIFICANT CHANGE UP (ref 3.8–10.5)
WBC # FLD AUTO: 5.87 K/UL — SIGNIFICANT CHANGE UP (ref 3.8–10.5)

## 2023-06-17 PROCEDURE — 99232 SBSQ HOSP IP/OBS MODERATE 35: CPT

## 2023-06-17 PROCEDURE — 99223 1ST HOSP IP/OBS HIGH 75: CPT

## 2023-06-17 PROCEDURE — 99233 SBSQ HOSP IP/OBS HIGH 50: CPT

## 2023-06-17 PROCEDURE — 90935 HEMODIALYSIS ONE EVALUATION: CPT

## 2023-06-17 RX ORDER — VANCOMYCIN HCL 1 G
125 VIAL (EA) INTRAVENOUS EVERY 6 HOURS
Refills: 0 | Status: DISCONTINUED | OUTPATIENT
Start: 2023-06-17 | End: 2023-06-19

## 2023-06-17 RX ORDER — HYDRALAZINE HCL 50 MG
50 TABLET ORAL EVERY 12 HOURS
Refills: 0 | Status: DISCONTINUED | OUTPATIENT
Start: 2023-06-17 | End: 2023-06-19

## 2023-06-17 RX ORDER — INSULIN LISPRO 100/ML
2 VIAL (ML) SUBCUTANEOUS
Refills: 0 | Status: DISCONTINUED | OUTPATIENT
Start: 2023-06-17 | End: 2023-06-19

## 2023-06-17 RX ORDER — HYDRALAZINE HCL 50 MG
10 TABLET ORAL ONCE
Refills: 0 | Status: COMPLETED | OUTPATIENT
Start: 2023-06-17 | End: 2023-06-17

## 2023-06-17 RX ORDER — HYDRALAZINE HCL 50 MG
25 TABLET ORAL EVERY 12 HOURS
Refills: 0 | Status: DISCONTINUED | OUTPATIENT
Start: 2023-06-17 | End: 2023-06-17

## 2023-06-17 RX ORDER — ERYTHROPOIETIN 10000 [IU]/ML
4000 INJECTION, SOLUTION INTRAVENOUS; SUBCUTANEOUS
Refills: 0 | Status: DISCONTINUED | OUTPATIENT
Start: 2023-06-17 | End: 2023-06-19

## 2023-06-17 RX ADMIN — HEPARIN SODIUM 5000 UNIT(S): 5000 INJECTION INTRAVENOUS; SUBCUTANEOUS at 04:46

## 2023-06-17 RX ADMIN — Medication 81 MILLIGRAM(S): at 12:17

## 2023-06-17 RX ADMIN — CHLORHEXIDINE GLUCONATE 1 APPLICATION(S): 213 SOLUTION TOPICAL at 04:48

## 2023-06-17 RX ADMIN — Medication 10 MILLIGRAM(S): at 19:22

## 2023-06-17 RX ADMIN — SODIUM CHLORIDE 3 MILLILITER(S): 9 INJECTION INTRAMUSCULAR; INTRAVENOUS; SUBCUTANEOUS at 13:25

## 2023-06-17 RX ADMIN — HEPARIN SODIUM 5000 UNIT(S): 5000 INJECTION INTRAVENOUS; SUBCUTANEOUS at 13:16

## 2023-06-17 RX ADMIN — Medication 2 UNIT(S): at 22:29

## 2023-06-17 RX ADMIN — Medication 1: at 22:29

## 2023-06-17 RX ADMIN — Medication 500 MILLIGRAM(S): at 12:48

## 2023-06-17 RX ADMIN — Medication 1: at 08:06

## 2023-06-17 RX ADMIN — Medication 0.1 MILLIGRAM(S): at 18:46

## 2023-06-17 RX ADMIN — Medication 50 MILLIGRAM(S): at 17:24

## 2023-06-17 RX ADMIN — Medication 100 MILLIGRAM(S): at 04:47

## 2023-06-17 RX ADMIN — Medication 250 MILLIGRAM(S): at 04:46

## 2023-06-17 RX ADMIN — CLOPIDOGREL BISULFATE 75 MILLIGRAM(S): 75 TABLET, FILM COATED ORAL at 12:17

## 2023-06-17 RX ADMIN — INSULIN GLARGINE 6 UNIT(S): 100 INJECTION, SOLUTION SUBCUTANEOUS at 22:22

## 2023-06-17 RX ADMIN — LOSARTAN POTASSIUM 25 MILLIGRAM(S): 100 TABLET, FILM COATED ORAL at 04:47

## 2023-06-17 RX ADMIN — TAMSULOSIN HYDROCHLORIDE 0.4 MILLIGRAM(S): 0.4 CAPSULE ORAL at 22:24

## 2023-06-17 RX ADMIN — SODIUM CHLORIDE 3 MILLILITER(S): 9 INJECTION INTRAMUSCULAR; INTRAVENOUS; SUBCUTANEOUS at 22:27

## 2023-06-17 RX ADMIN — HEPARIN SODIUM 5000 UNIT(S): 5000 INJECTION INTRAVENOUS; SUBCUTANEOUS at 22:24

## 2023-06-17 RX ADMIN — Medication 5 MILLIGRAM(S): at 22:22

## 2023-06-17 RX ADMIN — GABAPENTIN 300 MILLIGRAM(S): 400 CAPSULE ORAL at 22:23

## 2023-06-17 RX ADMIN — Medication 125 MILLIGRAM(S): at 22:26

## 2023-06-17 RX ADMIN — Medication 667 MILLIGRAM(S): at 08:06

## 2023-06-17 RX ADMIN — LORATADINE 10 MILLIGRAM(S): 10 TABLET ORAL at 12:19

## 2023-06-17 RX ADMIN — MONTELUKAST 10 MILLIGRAM(S): 4 TABLET, CHEWABLE ORAL at 22:22

## 2023-06-17 RX ADMIN — ATORVASTATIN CALCIUM 40 MILLIGRAM(S): 80 TABLET, FILM COATED ORAL at 22:24

## 2023-06-17 RX ADMIN — LIDOCAINE 1 PATCH: 4 CREAM TOPICAL at 12:17

## 2023-06-17 RX ADMIN — Medication 2 UNIT(S): at 08:06

## 2023-06-17 RX ADMIN — Medication 500 MILLIGRAM(S): at 06:30

## 2023-06-17 RX ADMIN — Medication 667 MILLIGRAM(S): at 12:17

## 2023-06-17 RX ADMIN — METHOCARBAMOL 500 MILLIGRAM(S): 500 TABLET, FILM COATED ORAL at 04:51

## 2023-06-17 RX ADMIN — Medication 500 MILLIGRAM(S): at 03:23

## 2023-06-17 RX ADMIN — SODIUM CHLORIDE 3 MILLILITER(S): 9 INJECTION INTRAMUSCULAR; INTRAVENOUS; SUBCUTANEOUS at 04:53

## 2023-06-17 RX ADMIN — Medication 2 UNIT(S): at 12:20

## 2023-06-17 NOTE — PROGRESS NOTE ADULT - NS ATTEND AMEND GEN_ALL_CORE FT
Patient was seen and examined with nurse practitioner  Patient states his diarrhea is getting better.  Diarrhea is less in frequency and amount.  No fever  Patient is now positive for adenovirus.  Was recently treated with vancomycin for C. difficile.    Abdominal exam–positive bowel sounds soft nontender    Assessment and plan  Patient with diarrhea.  Recently treated for C. difficile.  Was initially better with vancomycin and then  had recurrent diarrhea when treatment was stopped.  Now getting better on vancomycin.  Stool now showing adenovirus    ID follow-up  Patient likely benefit from tapering vancomycin.  Patient is to follow-up with his [place] gastroenterologist upon discharge  Patient will likely be ready for discharge tomorrow  Low fiber diet lactose-free Patient was seen and examined with nurse practitioner  Patient states his diarrhea is getting better.  Diarrhea is less in frequency and amount.  No fever  Patient is now positive for adenovirus.  Was recently treated with vancomycin for C. difficile.    Abdominal exam–positive bowel sounds soft nontender    Assessment and plan  Patient with diarrhea.  Recently treated for C. difficile.  Was initially better with vancomycin and then  had recurrent diarrhea when treatment was stopped.  Now getting better on vancomycin.  Stool now showing adenovirus    ID follow-up  Patient likely benefit from tapering vancomycin.  Patient is to follow-up with his [place] gastroenterologist upon discharge  Patient will likely be ready for discharge tomorrow  Low fiber diet lactose-free   I spoke to hospitalist regarding above plan. She will call ID

## 2023-06-17 NOTE — PROGRESS NOTE ADULT - ASSESSMENT
63  y old  Male ESRD on HD (T,Th, S), IDDM, COPD, gastroparesis, peripheral blindness and retinopathy, and CAD (most recent DEISY 2011 dLAD). Presented to [facility] for chest discomfort, respiratory distress, C.Diff, NSTEMI. S/p C with severe MVD. Endorses consistent CP for 1 month now.Echo EF 40%   Chest pain has resolved plan for out patient stent.

## 2023-06-17 NOTE — PROGRESS NOTE ADULT - PROBLEM SELECTOR PLAN 1
Acute on chronic diarrhea (progressively worsening for the last 2 years) but recently positive for C. diff colitis (positive on 6/1). Completed 10 day course of oral vancomycin on 6/12, now with recurrent diarrhea.   + GI PCR, Adenovirus. Diarrhea now improved, reports 2 small volume diarrhea overnight and 1 this morning.   Stool for ova and parasites and Stool cultures report pending   Continue PO Vancomycin, may require  6 week tapering dose   low fiber, lactose free , renal diet    Please consult ID   Trend electrolytes, renal function, Avoid dehydration, replete electrolytes as needed per primary team/ nephrology  Patient follows with [facility] for chronic diarrhea

## 2023-06-17 NOTE — PROGRESS NOTE ADULT - PROBLEM SELECTOR PLAN 1
LAD 70%, distal DEISY ISR 70%,LCX 80% focal, 70% mid, 80% distal,OM1 90%,RCA 80% prox, 70% mid  RPDA 90%  today pain is controlled  Plan for out patient stent  c/w asa, atorvastatin, metoprolol.  b/p elevated  Metoprolol xl 100mg qd  hr 60  add hydralazine for b/p control

## 2023-06-17 NOTE — PROGRESS NOTE ADULT - ASSESSMENT
63M with PMHx ESRD on HD (T,Th, S), IDDM, COPD, gastroparesis, peripheral blindness and retinopathy, and CAD, who initially presented to [facility] on 5/31 with n/v/d, missing his HD as a result, and found to be hyperkalemic with acute respiratory failure secondary to fluid overload vs pneumonia and NSTEMI in the context of C. diff colitis (positive on 6/1). Completed 10 day course of oral vancomycin on 6/12 per chart.  Patient became fluid overloaded during hospitalization at [place] requiring HFNC, improved with additional HD. Blood culture negative to date, patient received 1 dose of IV cefepime. Patient tolerating 2L NC prior to transfer to [facility].  Patient found to have NSTEMI s/p heparin drip 6/10, with cardiac cath 6/12 demonstrating MVD requiring transfer to [facility] for CT Surgery evaluation for possible CABG. CTS evaluated and recommend he f/u o/p with cardiology for PCI.    # Enterocolitis- Adenovirus  known C diff colitis from [place] (POA)  Completed 10 day course of oral vancomycin on 6/12 per chart.   GI PCR + for Adenovirus.  C diff - to be tested if watery diarrhea noted  c/s testing  Cont Vanco 500mg Q 6 hrs x 10 day course (second course)  Gi appreciated     # Gastroparesis  has Gastric Pacer  CT findings noted  GI following    # NSTEMI S/p [facility] with severe MVD  CTS evaluated and recommend he f/u o/p with cardiology for PCI.  Plavix was put on hold prior to sending him here  Resume now  Cont ASA, BB, statins, ARB    # Chr CHFrEF and grd 2 DD  Toprol and Losartan started today  compensated  Cards following    # ESRD on HD TTS  Cont same here    # DM2 uncontrolled  Insulin in house  uptitrate today   63M with PMHx ESRD on HD (T,Th, S), IDDM, COPD, gastroparesis, peripheral blindness and retinopathy, and CAD, who initially presented to [facility] on 5/31 with n/v/d, missing his HD as a result, and found to be hyperkalemic with acute respiratory failure secondary to fluid overload vs pneumonia and NSTEMI in the context of C. diff colitis (positive on 6/1). Completed 10 day course of oral vancomycin on 6/12 per chart.  Patient became fluid overloaded during hospitalization at [place] requiring HFNC, improved with additional HD. Blood culture negative to date, patient received 1 dose of IV cefepime. Patient tolerating 2L NC prior to transfer to [facility].  Patient found to have NSTEMI s/p heparin drip 6/10, with cardiac cath 6/12 demonstrating MVD requiring transfer to [facility] for CT Surgery evaluation for possible CABG. CTS evaluated and recommend he f/u o/p with cardiology for PCI.    # Enterocolitis- Adenovirus and known C diff colitis from [place] (POA)  Completed 10 day course of oral vancomycin on 6/12 per chart.   GI PCR + for Adenovirus.  C diff - to be tested if watery diarrhea noted  c/s testing  Cont Vanco 500mg Q 6 hrs x 10 day course (second course) then taper as 500 bid x 7 days--> 500 daily x 7 days --> 500 Q 2-3 days for next 2 weeks.  Gi appreciated   follows at [facility] for Chr diarrhea.     # Gastroparesis  has Gastric Pacer  CT findings noted  GI following    # NSTEMI S/p [facility] with severe MVD  CTS evaluated and recommend he f/u o/p with cardiology for PCI.  Plavix was put on hold prior to sending him here  Resume now  Cont ASA, BB, statins, ARB    # Chr CHFrEF and grd 2 DD, HTN  Toprol and Losartan started today  compensated  Cards following  Hydralazine added today for HTN  if required can add Imdur     # ESRD on HD TTS  Cont same here    # DM2 uncontrolled  Insulin in house  uptitrate today    Heparin

## 2023-06-17 NOTE — PROGRESS NOTE ADULT - SUBJECTIVE AND OBJECTIVE BOX
[facility]                                                         CARDIOLOGY AT [facility]                                                                  [address], [place] [address]                                                         Telephone: [phone]. Fax:[phone]                                                                             PROGRESS NOTE    Reason for follow up: cad  Update: PCI  to be preformed out patient    Review of symptoms:   Cardiac:  No chest pain. No dyspnea. No palpitations.  Respiratory: no cough. No dyspnea  Gastrointestinal: No diarrhea. No abdominal pain. No bleeding.   Neuro: No focal neuro complaints.      Vitals:  T(C): 36.8 (06-17-23 @ 08:05), Max: 36.8 (06-17-23 @ 08:05)  HR: 61 (06-17-23 @ 08:05) (61 - 61)  BP: 175/71 (06-17-23 @ 08:05) (127/87 - 175/71)  RR: 18 (06-17-23 @ 08:05) (18 - 18)  SpO2: 96% (06-17-23 @ 08:05) (95% - 98%)  Wt(kg): --  I&O's Summary    Weight (kg): 67.2 (06-12 @ 22:28)      PHYSICAL EXAM:  Appearance: Comfortable. No acute distress  HEENT:  Atraumatic. Normocephalic.  Normal oral mucosa  Neurologic: A & O x 3, no gross focal deficits.  Cardiovascular: RRR S1 S2, Regular  Respiratory: Lungs clear to auscultation, unlabored   Gastrointestinal:  Soft, Non-tender, + BS  Lower Extremities: No edema  AV fistular right arm + thrill + bruitt  Psychiatry: Patient is calm. No agitation.   Skin: warm and dry.      CURRENT MEDICATIONS:  MEDICATIONS  (STANDING):  aspirin  chewable 81 milliGRAM(s) Oral daily  atorvastatin 40 milliGRAM(s) Oral at bedtime  calcium acetate 667 milliGRAM(s) Oral three times a day with meals  chlorhexidine 2% Cloths 1 Application(s) Topical <User Schedule>  clopidogrel Tablet 75 milliGRAM(s) Oral daily  epoetin lois-epbx (RETACRIT) Injectable 84674 Unit(s) IV Push <User Schedule>  gabapentin 300 milliGRAM(s) Oral <User Schedule>  glucagon  Injectable 1 milliGRAM(s) IntraMuscular once  heparin   Injectable 5000 Unit(s) SubCutaneous every 8 hours  insulin glargine Injectable (LANTUS) 6 Unit(s) SubCutaneous at bedtime  insulin lispro (ADMELOG) corrective regimen sliding scale   SubCutaneous Before meals and at bedtime  insulin lispro Injectable (ADMELOG) 2 Unit(s) SubCutaneous three times a day with meals  lidocaine   4% Patch 1 Patch Transdermal daily  loratadine 10 milliGRAM(s) Oral daily  losartan 25 milliGRAM(s) Oral daily  melatonin 5 milliGRAM(s) Oral at bedtime  metoprolol succinate  milliGRAM(s) Oral daily  montelukast 10 milliGRAM(s) Oral at bedtime  saccharomyces boulardii 250 milliGRAM(s) Oral two times a day  sodium chloride 0.9% lock flush 3 milliLiter(s) IV Push every 8 hours  tamsulosin 0.4 milliGRAM(s) Oral at bedtime  vancomycin    Solution 500 milliGRAM(s) Oral every 6 hours          LABS:	 	                            10.7   5.87  )-----------( 229      ( 17 Jun 2023 06:13 )             31.8     06-17    131<L>  |  91<L>  |  51.9<H>  ----------------------------<  155<H>  5.3   |  22.0  |  7.77<H>    Ca    8.2<L>      17 Jun 2023 06:13  Mg     2.2     06-17      proBNP:   Lipid Profile:   HgA1c:   TSH: Thyroid Stimulating Hormone, Serum: 4.14 uIU/mL    TELEMETRY: NSR at 61  DIAGNOSTIC TESTING:  [ ] Echocardiogram: < from: TTE Echo Complete w/ Contrast w/ Doppler (06.13.23 @ 08:36) >  Summary:   1. Endocardial visualization was enhanced with intravenous echo contrast.   2. Left ventricular ejection fraction, by visual estimation, is 35 to   40%.   3. Moderately to severely decreased global left ventricular systolic   function.   4. Spectral Doppler shows pseudonormal pattern of left ventricular   myocardial filling (Grade II diastolic dysfunction).   5. Normal right ventricular size and function.   6. Moderate to severe left atrial enlargement.      [ ]  Catheterization:  < from: Cardiac Cath Lab (10.23.09 @ 18:44) >  Coronary vessels: The coronary circulation is right dominant.  LM:      LM: This vessel was not injected.  LAD:      LAD: This vessel was not injected.  CX:      Circumflex: This vessel was not injected.  RCA:RCA: The vessel was medium sized.  Proximal RCA: Angiography showed mild atherosclerosis with no flow limiting  lesions.  Distal RCA: Angiography showed mild atherosclerosis with no flow limiting  lesions.  RPDA: There was a discrete 80 % stenosis. Distal vessel angiography showed  a small vessel and minor luminal irregularities.  Complications: There were no complications.    < end of copied text >    [ ] Stress Test:    OTHER:

## 2023-06-17 NOTE — PROGRESS NOTE ADULT - PROBLEM SELECTOR PLAN 3
Low na diet  Discussed checking labels on food  Weigh your self daily  report >2 pound gain in 2 days    GDMT:  Diuretic: dialysis  Beta blocker:  metoprolol xl 100mg qd  ACE/ARB./ARNI  losartan 25mg qd  MRA  out patient  Start farxiga as out patient after stent  Start hydralazine for better b.p control Low na diet  Discussed checking labels on food  Weigh your self daily  report >2 pound gain in 2 days    GDMT:  Diuretic: dialysis  Beta blocker:  metoprolol xl 100mg qd  ACE/ARB./ARNI  losartan 25mg qd  MRA  out patient  Start farxiga as out patient after stent  Start hydralazine for better b.p control    Will sign off please call for questions

## 2023-06-17 NOTE — CONSULT NOTE ADULT - SUBJECTIVE AND OBJECTIVE BOX
INFECTIOUS DISEASES AND INTERNAL MEDICINE at [place]  =======================================================  Burke Ch MD  Diplomates American Board of Internal Medicine and Infectious Diseases  Telephone [phone]  Fax            [phone]  =======================================================    BENJAMIN [id number]      HPI:  63M with h/o ESRD on HD (T,Th, S), IDDM, COPD, gastroparesis, peripheral blindness and retinopathy, and CAD, who initially presented to [facility] on 5/31 with n/v/d, missing his HD as a result, and found to be hyperkalemic with acute respiratory failure secondary to fluid overload vs pneumonia and NSTEMI in the context of C. diff colitis (positive on 6/1). Completed 10 day course of oral vancomycin on 6/12 per chart.  Patient became fluid overloaded during hospitalization at [place] requiring HFNC, improved with additional HD. Blood culture negative to date, patient received 1 dose of IV cefepime. Patient tolerating 2L NC prior to transfer to [facility].  Patient found to have NSTEMI s/p heparin drip 6/10, with cardiac cath 6/12 demonstrating MVD requiring transfer to [facility] for CT Surgery evaluation for possible CABG.     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      S/P amputation  left toes      Stented coronary artery          ANTIBIOTICS  vancomycin    Solution 500 milliGRAM(s) Oral every 6 hours      Allergies    shellfish (Rash)  No Known Drug Allergies    Intolerances        SOCIAL HISTORY:     FAMILY HX   FAMILY HISTORY:  No pertinent family history in first degree relatives        Vital Signs Last 24 Hrs  T(C): 36.8 (17 Jun 2023 08:05), Max: 36.8 (17 Jun 2023 08:05)  T(F): 98.2 (17 Jun 2023 08:05), Max: 98.2 (17 Jun 2023 08:05)  HR: 62 (17 Jun 2023 14:00) (61 - 62)  BP: 154/68 (17 Jun 2023 14:00) (127/87 - 175/71)  BP(mean): 102 (16 Jun 2023 16:37) (102 - 102)  RR: 17 (17 Jun 2023 14:00) (17 - 18)  SpO2: 99% (17 Jun 2023 14:00) (95% - 99%)    Parameters below as of 17 Jun 2023 14:00  Patient On (Oxygen Delivery Method): room air      Drug Dosing Weight  Height (cm): 162.5 (12 Jun 2023 22:28)  Weight (kg): 67.2 (12 Jun 2023 22:28)  BMI (kg/m2): 25.4 (12 Jun 2023 22:28)  BSA (m2): 1.72 (12 Jun 2023 22:28)      REVIEW OF SYSTEMS:    CONSTITUTIONAL:  As per HPI.    HEENT:  Eyes:  No diplopia or blurred vision. ENT:  No earache, sore throat or runny nose.    CARDIOVASCULAR:  No pressure, squeezing, strangling, tightness, heaviness or aching about the chest, neck, axilla or epigastrium.    RESPIRATORY:  No cough, shortness of breath, PND or orthopnea.    GASTROINTESTINAL:   r diarrhea.    GENITOURINARY:  No dysuria, frequency or urgency.    MUSCULOSKELETAL:  As per HPI.    SKIN:  No change in skin, hair or nails.    NEUROLOGIC:  No paresthesias, fasciculations, seizures or weakness.                  PHYSICAL EXAMINATION:    GENERAL: The patient is a _____in no apparent distress. ___     VITAL SIGNS: T(C): 36.8 (06-17-23 @ 08:05), Max: 36.8 (06-17-23 @ 08:05)  HR: 62 (06-17-23 @ 14:00) (61 - 62)  BP: 154/68 (06-17-23 @ 14:00) (127/87 - 175/71)  RR: 17 (06-17-23 @ 14:00) (17 - 18)  SpO2: 99% (06-17-23 @ 14:00) (95% - 99%)  Wt(kg): --    HEENT: Head is normocephalic and atraumatic.  ANICTERIC  NECK: Supple. No carotid bruits.  No lymphadenopathy or thyromegaly.    LUNGS:COARSE BREATH SOUNDS    HEART: Regular rate and rhythm without murmur.    ABDOMEN: Soft, nontender, and nondistended.  Positive bowel sounds.  No hepatosplenomegaly was noted. NO REBOUND NO GUARDING    EXTREMITIES: NO EDEMA NO ERYTHEMA    NEUROLOGIC: NON FOCAL      SKIN: No ulceration or induration present. NO RASH        BLOOD CULTURES  Culture Results:   No enteric pathogens to date: Final culture pending (06-16 @ 00:25)  Culture Results:   Testing in progress (06-16 @ 00:25)       URINE CX          LABS:                        10.7   5.87  )-----------( 229      ( 17 Jun 2023 06:13 )             31.8     06-17    131<L>  |  91<L>  |  51.9<H>  ----------------------------<  155<H>  5.3   |  22.0  |  7.77<H>    Ca    8.2<L>      17 Jun 2023 06:13  Mg     2.2     06-17            RADIOLOGY & ADDITIONAL STUDIES:      ASSESSMENT/PLAN    63M with h/o ESRD on HD (T,Th, S), IDDM, COPD, gastroparesis, peripheral blindness and retinopathy, and CAD, who initially presented to [facility] on 5/31 with n/v/d, missing his HD as a result, and found to be hyperkalemic with acute respiratory failure secondary to fluid overload vs pneumonia and NSTEMI in the context of C. diff colitis (positive on 6/1). Completed 10 day course of oral vancomycin on 6/12 per chart.  Patient became fluid overloaded during hospitalization at [place] requiring HFNC, improved with additional HD. Blood culture negative to date, patient received 1 dose of IV cefepime. Patient tolerating 2L NC prior to transfer to [facility].  Patient found to have NSTEMI s/p heparin drip 6/10, with cardiac cath 6/12 demonstrating MVD requiring transfer to [facility] for CT Surgery evaluation for possible CABG.     AS ABOVE WAS TREATED FOR C DIFF AT [facility] NOW WITH SOME DIARRHEA   GI PCR WITH  ADENOVIRUS 40/41  OVERALL FEELS BETTER  ICT SCAN NO COLITIS UNCLEAR IF RECURRENT C DIFF  VS [id number]  WOULD RECOMMEND TREATE WITH VOANCO 125 QID TO COMPLETE 2 WEEKS  WITH OUTPT FOLLOW UP IF RECURS AGAIN WILL NEED EITHER TAPERING SCHEDULE OR  DIFFICID RO OTHER STRATEGY  D/W HOSPITALIST  'WILL FOLLOW UP AS  NEEDED PLEASE CALL IF QUESTIONS               DELVIN KIM MD

## 2023-06-17 NOTE — PROGRESS NOTE ADULT - SUBJECTIVE AND OBJECTIVE BOX
[facility] DIVISION OF KIDNEY DISEASES AND HYPERTENSION -- HEMODIALYSIS NOTE  --------------------------------------------------------------------------------  Chief Complaint: ESRD/Ongoing hemodialysis requirement    24 hour events/subjective:  no acute event noted  pt seen and examined; feels well        PAST HISTORY  --------------------------------------------------------------------------------  No significant changes to PMH, PSH, FHx, SHx, unless otherwise noted    ALLERGIES & MEDICATIONS  --------------------------------------------------------------------------------  Allergies    shellfish (Rash)  No Known Drug Allergies    Intolerances      Standing Inpatient Medications  aspirin  chewable 81 milliGRAM(s) Oral daily  atorvastatin 40 milliGRAM(s) Oral at bedtime  calcium acetate 667 milliGRAM(s) Oral three times a day with meals  chlorhexidine 2% Cloths 1 Application(s) Topical <User Schedule>  clopidogrel Tablet 75 milliGRAM(s) Oral daily  dextrose 5%. 1000 milliLiter(s) IV Continuous <Continuous>  dextrose 5%. 1000 milliLiter(s) IV Continuous <Continuous>  dextrose 50% Injectable 25 Gram(s) IV Push once  dextrose 50% Injectable 12.5 Gram(s) IV Push once  dextrose 50% Injectable 25 Gram(s) IV Push once  epoetin lois-epbx (RETACRIT) Injectable 61338 Unit(s) IV Push <User Schedule>  gabapentin 300 milliGRAM(s) Oral <User Schedule>  glucagon  Injectable 1 milliGRAM(s) IntraMuscular once  heparin   Injectable 5000 Unit(s) SubCutaneous every 8 hours  hydrALAZINE 50 milliGRAM(s) Oral every 12 hours  insulin glargine Injectable (LANTUS) 6 Unit(s) SubCutaneous at bedtime  insulin lispro (ADMELOG) corrective regimen sliding scale   SubCutaneous Before meals and at bedtime  insulin lispro Injectable (ADMELOG) 2 Unit(s) SubCutaneous three times a day with meals  lidocaine   4% Patch 1 Patch Transdermal daily  loratadine 10 milliGRAM(s) Oral daily  losartan 25 milliGRAM(s) Oral daily  melatonin 5 milliGRAM(s) Oral at bedtime  metoprolol succinate  milliGRAM(s) Oral daily  montelukast 10 milliGRAM(s) Oral at bedtime  saccharomyces boulardii 250 milliGRAM(s) Oral two times a day  sodium chloride 0.9% lock flush 3 milliLiter(s) IV Push every 8 hours  tamsulosin 0.4 milliGRAM(s) Oral at bedtime  vancomycin    Solution 500 milliGRAM(s) Oral every 6 hours    PRN Inpatient Medications  acetaminophen     Tablet .. 650 milliGRAM(s) Oral every 8 hours PRN  albuterol    90 MICROgram(s) HFA Inhaler 2 Puff(s) Inhalation every 6 hours PRN  dextrose Oral Gel 15 Gram(s) Oral once PRN  guaiFENesin Oral Liquid (Sugar-Free) 100 milliGRAM(s) Oral every 8 hours PRN  hyoscyamine SL 0.125 milliGRAM(s) SubLingual four times a day PRN  methocarbamol 500 milliGRAM(s) Oral three times a day PRN      REVIEW OF SYSTEMS  --------------------------------------------------------------------------------  Gen: No weight changes, fatigue, fevers/chills, weakness  Skin: No rashes  Head/Eyes/Ears/Mouth: No headache  Respiratory: No dyspnea, cough,  CV: No chest pain, orthopnea  GI: No abdominal pain, diarrhea, constipation, nausea, vomiting,  MSK: No joint pain  Neuro: No dizziness/lightheadedness, weakness  Heme: No bleeding  Psych: No significant nervousness, anxiety, stress, depression    All other systems were reviewed and are negative, except as noted.    VITALS/PHYSICAL EXAM  --------------------------------------------------------------------------------  T(C): 36.8 (06-17-23 @ 08:05), Max: 36.8 (06-17-23 @ 08:05)  HR: 61 (06-17-23 @ 08:05) (61 - 61)  BP: 175/71 (06-17-23 @ 08:05) (127/87 - 175/71)  RR: 18 (06-17-23 @ 08:05) (18 - 18)  SpO2: 96% (06-17-23 @ 08:05) (95% - 98%)  Wt(kg): --        Physical Exam:  	Gen: NAD  	HEENT: PERRL, supple neck,  	Pulm: CTA B/L  	CV: RRR, S1S2; no rub  	Abd: +BS, soft, nontender  	UE: Warm  	LE: Warm, edema  	Neuro: No focal deficits  	Psych: Normal affect and mood  	Skin: Warm  	Vascular access: HUAN MONTEJO    LABS/STUDIES  --------------------------------------------------------------------------------              10.7   5.87  >-----------<  229      [06-17-23 @ 06:13]              31.8     131  |  91  |  51.9  ----------------------------<  155      [06-17-23 @ 06:13]  5.3   |  22.0  |  7.77        Ca     8.2     [06-17-23 @ 06:13]      Mg     2.2     [06-17-23 @ 06:13]            Iron 45, TIBC 216, %sat 21      [06-13-23 @ 15:10]  Ferritin 1092      [06-13-23 @ 15:10]  PTH -- (Ca 7.8)      [06-13-23 @ 15:10]   175  TSH 4.14      [06-12-23 @ 21:50]    HBsAb 12.7      [06-13-23 @ 15:10]  HBsAb Reactive      [06-13-23 @ 15:10]  HBsAg Nonreact      [06-13-23 @ 15:10]  HBcAb Nonreact      [06-13-23 @ 15:10]  HCV 0.09, Nonreact      [06-13-23 @ 15:10]

## 2023-06-17 NOTE — PROGRESS NOTE ADULT - ASSESSMENT
The patient is a 63 year old legally blind male with PMH of DM complicated by neuropathy, HTN, HLD, CAD, PVD, gastroparesis, ESRD on HD TTS and BPH initially presented to [facility] on 5/31 with N/V/D after missing hemodialysis. Admitted for hyperkalemic and acute respiratory failure secondary to fluid overload vs pneumonia. Hospital course notable for NSTEMI with cardiac cath on 06/12 showing multivessel disease. He was transferred to [facility] on 06/13/2023 for CABG evaluation. Nephrology is consulted for resumption of hemodialysis.     ESRD on HD  TTS schedule - ? unknown outpt unit  Access: R AV fistula   HD today  Pt seen and re-evaluated on HD  tolerating well; no issues with access  goal UF 2. L/ 3 hours    HTN  BP high  UF as above  increase Losartan if BP remains high      Anemia   Hb at goal  EAS with HD; will decrease dose to 4000 IU tiw     Mineral Bone Disease in setting of CKD   - continue oral phos binders with meals   Monitor Ca++ and phos

## 2023-06-17 NOTE — PROGRESS NOTE ADULT - NS ATTEND AMEND GEN_ALL_CORE FT
1) CAD:   Plan: LAD 70%, distal DEISY ISR 70%,LCX 80% focal, 70% mid, 80% distal,OM1 90%,RCA 80% prox, 70% mid RPDA 90%  today pain is controlled. Plan for out patient stent. c/w asa, atorvastatin, metoprolol. b/p elevated  Metoprolol xl 100mg qd  hr 60. add hydralazine for b/p control.  2) ESRD on dialysis: cw hemodialysis.  3) Acute on chronic systolic congestive heart failure:   GDMT:  Diuretic: dialysis  Beta blocker:  metoprolol xl 100mg qd  ACE/ARB./ARNI  losartan 25mg qd  MRA  out patient  Start hydralazine for better BP control  Will sign off please call for questions.

## 2023-06-17 NOTE — PROGRESS NOTE ADULT - SUBJECTIVE AND OBJECTIVE BOX
Chief Complaint: This is a 63y old man patient being seen in follow-up consultation for chronic diarrhea, recent C diff infection.     Interval HPI / 24H events:  Patient is seen and examined at  the bedside, reports "abdominal pain better now" . Reports diffuse abdominal tenderness which s "better than before". Reports 1 small volume loose BM last night and 1 this morning. Tolerating diet, waiting for breakfast this morning. Remain afebrile, no leucocytosis. +GI PCR, Adenovirus.    Review of Systems:  . Constitutional: No fever, chills  . HEENT: Negative  · Respiratory and Thorax: No shortness of breath, no cough  · Cardiovascular: No chest pain, palpitation, no dizziness  · Gastrointestinal: see above  · Genitourinary: No hematuria  · Musculoskeletal: Negative  · Neurological: negative  · Psychiatric: no agitation, no anxiety      PAST MEDICAL/SURGICAL HISTORY:  HTN (Hypertension) (ICD9 401.9)    Hypercholesterolemia (ICD9 272.0)    Diabetes Mellitus with Neuropathy (ICD9 250.60)  x 8 yrs without nephropathy or retinopathy    BPH (Benign Prostatic Hypertrophy) (ICD9 600.00)    Glaucoma    CAD (coronary artery disease)    CKD (chronic kidney disease)    Osteomyelitis    Gastroparesis    PVD (peripheral vascular disease)    Legally blind    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 1000 milliLiter(s) (100 mL/Hr) IV Continuous <Continuous>  dextrose 5%. 1000 milliLiter(s) (50 mL/Hr) IV Continuous <Continuous>  dextrose 50% Injectable 25 Gram(s) IV Push once  dextrose 50% Injectable 12.5 Gram(s) IV Push once  dextrose 50% Injectable 25 Gram(s) IV Push once  epoetin lois-epbx (RETACRIT) Injectable 81863 Unit(s) IV Push <User Schedule>  gabapentin 300 milliGRAM(s) Oral <User Schedule>  glucagon  Injectable 1 milliGRAM(s) IntraMuscular once  heparin   Injectable 5000 Unit(s) SubCutaneous every 8 hours  hydrALAZINE 25 milliGRAM(s) Oral every 12 hours  insulin glargine Injectable (LANTUS) 6 Unit(s) SubCutaneous at bedtime  insulin lispro (ADMELOG) corrective regimen sliding scale   SubCutaneous Before meals and at bedtime  insulin lispro Injectable (ADMELOG) 2 Unit(s) SubCutaneous three times a day with meals  lidocaine   4% Patch 1 Patch Transdermal daily  loratadine 10 milliGRAM(s) Oral daily  losartan 25 milliGRAM(s) Oral daily  melatonin 5 milliGRAM(s) Oral at bedtime  metoprolol succinate  milliGRAM(s) Oral daily  montelukast 10 milliGRAM(s) Oral at bedtime  saccharomyces boulardii 250 milliGRAM(s) Oral two times a day  sodium chloride 0.9% lock flush 3 milliLiter(s) IV Push every 8 hours  tamsulosin 0.4 milliGRAM(s) Oral at bedtime  vancomycin    Solution 500 milliGRAM(s) Oral every 6 hours    MEDICATIONS  (PRN):  acetaminophen     Tablet .. 650 milliGRAM(s) Oral every 8 hours PRN Mild Pain (1 - 3)  albuterol    90 MICROgram(s) HFA Inhaler 2 Puff(s) Inhalation every 6 hours PRN Bronchospasm  dextrose Oral Gel 15 Gram(s) Oral once PRN Blood Glucose LESS THAN 70 milliGRAM(s)/deciliter  guaiFENesin Oral Liquid (Sugar-Free) 100 milliGRAM(s) Oral every 8 hours PRN Cough  hyoscyamine SL 0.125 milliGRAM(s) SubLingual four times a day PRN abdominal spasm  methocarbamol 500 milliGRAM(s) Oral three times a day PRN Muscle Spasm    shellfish (Rash)  No Known Drug Allergies    T(C): 36.8 (06-17-23 @ 08:05), Max: 36.8 (06-17-23 @ 08:05)  HR: 61 (06-17-23 @ 08:05) (61 - 61)  BP: 175/71 (06-17-23 @ 08:05) (127/87 - 175/71)  RR: 18 (06-17-23 @ 08:05) (18 - 18)  SpO2: 96% (06-17-23 @ 08:05) (95% - 98%)      PHYSICAL EXAM:    Constitutional: No acute distress  Neuro: Awake alert, oriented  HEENT: anicteric sclerae  Neck: supple, no JVD  CV: regular rate, regular rhythm  Pulm/chest: lung sounds clear bilaterally, no accessory muscle use noted  Abd: soft, mild diffuse tenderness, nondistended +bowel sounds. No rigidity, rebound tenderness, or guarding  Skin: warm, no jaundice   Psych: calm, cooperative      LABS:               10.7   5.87  )-----------( 229      ( 06-17 @ 06:13 )             31.8                11.0   5.39  )-----------( 217      ( 06-16 @ 05:05 )             33.3                10.0   4.55  )-----------( 197      ( 06-15 @ 05:07 )             30.2       06-17    131<L>  |  91<L>  |  51.9<H>  ----------------------------<  155<H>  5.3   |  22.0  |  7.77<H>    Ca    8.2<L>      17 Jun 2023 06:13  Mg     2.2     06-17      Iron Total, Serum: 45 ug/dL *L* (06-13-23 @ 15:10)  Iron - Total Binding Capacity.: 216 ug/dL *L* (06-13-23 @ 15:10)  % Saturation, Iron: 21 % (06-13-23 @ 15:10)  Ferritin, Serum: 1092 ng/mL *H* (06-13-23 @ 15:10)    < from: CT Abdomen and Pelvis No Cont (06.15.23 @ 21:12) >    FINDINGS:  LOWER CHEST: Developing bilateral pleural effusions with underlying   passive atelectasis. Mild cardiomegaly.    LIVER: Contracted.  BILE DUCTS: Normal caliber.  GALLBLADDER: Within normal limits.  SPLEEN: Within normal limits.  PANCREAS: Within normal limits.  ADRENALS: Within normal limits.  KIDNEYS/URETERS: Within normal limits.    BLADDER: Completely decompressed.  REPRODUCTIVE ORGANS: Mildly enlarged prostate.    BOWEL: No bowel obstruction. Appendix . Fluid-filled nondilated mildly   thickened small bowel loops in the pelvis suggestive of enteritis. Liquid   stool in the colon from cecum to splenic flexure. Debris-filled stomach   with questionable wall thickening.  PERITONEUM: No ascites.  VESSELS: Atherosclerotic changes.  RETROPERITONEUM/LYMPH NODES: No lymphadenopathy.  ABDOMINAL WALL: Gastric pacemaker in the right abdominal wall.  BONES: Degenerative changes. L5-S1 disc bulging    IMPRESSION:  New bilateral pleural effusions with underlying passive atelectasis.    Probable enterocolitis as above.    Debris-filled stomach likely related to gastroparesis. Questionable wall   thickening. Consider endoscopy.    < end of copied text >

## 2023-06-17 NOTE — PROGRESS NOTE ADULT - SUBJECTIVE AND OBJECTIVE BOX
HEALTH ISSUES - PROBLEM Dx:    frequent soft stools  CHF    INTERVAL HPI/ OVERNIGHT EVENTS:    pt sitting up in chair  still with freq BMs- not watery diarrhea,   denies nausea or abd pain now    REVIEW OF SYSTEMS:    As above    Vital Signs Last 24 Hrs  T(C): 36.7 (17 Jun 2023 04:26), Max: 36.7 (16 Jun 2023 08:02)  T(F): 98.1 (17 Jun 2023 04:26), Max: 98.1 (16 Jun 2023 16:37)  HR: 61 (17 Jun 2023 04:26) (61 - 66)  BP: 127/87 (17 Jun 2023 04:26) (127/87 - 173/66)  BP(mean): 102 (16 Jun 2023 16:37) (102 - 102)  RR: 18 (17 Jun 2023 04:26) (18 - 18)  SpO2: 95% (17 Jun 2023 04:26) (95% - 98%)    Parameters below as of 17 Jun 2023 04:26  Patient On (Oxygen Delivery Method): nasal cannula  O2 Flow (L/min): 1      PHYSICAL EXAM-  General: Pt. sitting in chair byt he bathroom, suing bathroom frequently  HEENT: AT, NC. PERRL. intact EOM. no throat erythema or exudate.   Neck: supple. no JVD.   Chest: CTA bilaterally  Heart: S1,S2. RRR. no heart murmur.  Abdomen: soft. mild pain to palpation mid abd. area, no RT, no gaurding, non-distended. + BS.  Ext: no calf tenderness. RUE av graft with thrill.   Neuro: AAO x3. no focal weakness. no speech disorder.  Skin: warm and dry  psych : mood ok, no si/hi      MEDICATIONS  (STANDING):  aspirin  chewable 81 milliGRAM(s) Oral daily  atorvastatin 40 milliGRAM(s) Oral at bedtime  calcium acetate 667 milliGRAM(s) Oral three times a day with meals  chlorhexidine 2% Cloths 1 Application(s) Topical <User Schedule>  clopidogrel Tablet 75 milliGRAM(s) Oral daily  dextrose 5%. 1000 milliLiter(s) (100 mL/Hr) IV Continuous <Continuous>  dextrose 5%. 1000 milliLiter(s) (50 mL/Hr) IV Continuous <Continuous>  dextrose 50% Injectable 25 Gram(s) IV Push once  dextrose 50% Injectable 12.5 Gram(s) IV Push once  dextrose 50% Injectable 25 Gram(s) IV Push once  epoetin lois-epbx (RETACRIT) Injectable 79290 Unit(s) IV Push <User Schedule>  gabapentin 300 milliGRAM(s) Oral <User Schedule>  glucagon  Injectable 1 milliGRAM(s) IntraMuscular once  heparin   Injectable 5000 Unit(s) SubCutaneous every 8 hours  insulin glargine Injectable (LANTUS) 6 Unit(s) SubCutaneous at bedtime  insulin lispro (ADMELOG) corrective regimen sliding scale   SubCutaneous Before meals and at bedtime  insulin lispro Injectable (ADMELOG) 2 Unit(s) SubCutaneous three times a day with meals  lidocaine   4% Patch 1 Patch Transdermal daily  loratadine 10 milliGRAM(s) Oral daily  losartan 25 milliGRAM(s) Oral daily  melatonin 5 milliGRAM(s) Oral at bedtime  metoprolol succinate  milliGRAM(s) Oral daily  montelukast 10 milliGRAM(s) Oral at bedtime  saccharomyces boulardii 250 milliGRAM(s) Oral two times a day  sodium chloride 0.9% lock flush 3 milliLiter(s) IV Push every 8 hours  tamsulosin 0.4 milliGRAM(s) Oral at bedtime  vancomycin    Solution 500 milliGRAM(s) Oral every 6 hours    MEDICATIONS  (PRN):  acetaminophen     Tablet .. 650 milliGRAM(s) Oral every 8 hours PRN Mild Pain (1 - 3)  albuterol    90 MICROgram(s) HFA Inhaler 2 Puff(s) Inhalation every 6 hours PRN Bronchospasm  dextrose Oral Gel 15 Gram(s) Oral once PRN Blood Glucose LESS THAN 70 milliGRAM(s)/deciliter  guaiFENesin Oral Liquid (Sugar-Free) 100 milliGRAM(s) Oral every 8 hours PRN Cough  hyoscyamine SL 0.125 milliGRAM(s) SubLingual four times a day PRN abdominal spasm  methocarbamol 500 milliGRAM(s) Oral three times a day PRN Muscle Spasm      LABS:                        11.0   5.39  )-----------( 217      ( 16 Jun 2023 05:05 )             33.3     06-16    131<L>  |  94<L>  |  34.3<H>  ----------------------------<  178<H>  4.8   |  22.0  |  5.17<H>    Ca    8.0<L>      16 Jun 2023 05:05  Mg     2.4     06-16       HEALTH ISSUES - PROBLEM Dx:    frequent soft stools  CHF    INTERVAL HPI/ OVERNIGHT EVENTS:    pt sitting up in chair  still with freq BMs- not watery diarrhea,   twice overnight  denies nausea or abd pain now    REVIEW OF SYSTEMS:    As above    Vital Signs Last 24 Hrs  T(C): 36.7 (17 Jun 2023 04:26), Max: 36.7 (16 Jun 2023 08:02)  T(F): 98.1 (17 Jun 2023 04:26), Max: 98.1 (16 Jun 2023 16:37)  HR: 61 (17 Jun 2023 04:26) (61 - 66)  BP: 127/87 (17 Jun 2023 04:26) (127/87 - 173/66)  BP(mean): 102 (16 Jun 2023 16:37) (102 - 102)  RR: 18 (17 Jun 2023 04:26) (18 - 18)  SpO2: 95% (17 Jun 2023 04:26) (95% - 98%)    Parameters below as of 17 Jun 2023 04:26  Patient On (Oxygen Delivery Method): nasal cannula  O2 Flow (L/min): 1      PHYSICAL EXAM-  General: Pt. sitting in chair byt he bathroom, suing bathroom frequently  HEENT: AT, NC. PERRL. intact EOM. no throat erythema or exudate.   Neck: supple. no JVD.   Chest: CTA bilaterally  Heart: S1,S2. RRR. no heart murmur.  Abdomen: soft. mild pain to palpation mid abd. area, no RT, no gaurding, non-distended. + BS.  Ext: no calf tenderness. RUE av graft with thrill.   Neuro: AAO x3. no focal weakness. no speech disorder.  Skin: warm and dry  psych : mood ok, no si/hi      MEDICATIONS  (STANDING):  aspirin  chewable 81 milliGRAM(s) Oral daily  atorvastatin 40 milliGRAM(s) Oral at bedtime  calcium acetate 667 milliGRAM(s) Oral three times a day with meals  chlorhexidine 2% Cloths 1 Application(s) Topical <User Schedule>  clopidogrel Tablet 75 milliGRAM(s) Oral daily  dextrose 5%. 1000 milliLiter(s) (100 mL/Hr) IV Continuous <Continuous>  dextrose 5%. 1000 milliLiter(s) (50 mL/Hr) IV Continuous <Continuous>  dextrose 50% Injectable 25 Gram(s) IV Push once  dextrose 50% Injectable 12.5 Gram(s) IV Push once  dextrose 50% Injectable 25 Gram(s) IV Push once  epoetin lois-epbx (RETACRIT) Injectable 09891 Unit(s) IV Push <User Schedule>  gabapentin 300 milliGRAM(s) Oral <User Schedule>  glucagon  Injectable 1 milliGRAM(s) IntraMuscular once  heparin   Injectable 5000 Unit(s) SubCutaneous every 8 hours  insulin glargine Injectable (LANTUS) 6 Unit(s) SubCutaneous at bedtime  insulin lispro (ADMELOG) corrective regimen sliding scale   SubCutaneous Before meals and at bedtime  insulin lispro Injectable (ADMELOG) 2 Unit(s) SubCutaneous three times a day with meals  lidocaine   4% Patch 1 Patch Transdermal daily  loratadine 10 milliGRAM(s) Oral daily  losartan 25 milliGRAM(s) Oral daily  melatonin 5 milliGRAM(s) Oral at bedtime  metoprolol succinate  milliGRAM(s) Oral daily  montelukast 10 milliGRAM(s) Oral at bedtime  saccharomyces boulardii 250 milliGRAM(s) Oral two times a day  sodium chloride 0.9% lock flush 3 milliLiter(s) IV Push every 8 hours  tamsulosin 0.4 milliGRAM(s) Oral at bedtime  vancomycin    Solution 500 milliGRAM(s) Oral every 6 hours    MEDICATIONS  (PRN):  acetaminophen     Tablet .. 650 milliGRAM(s) Oral every 8 hours PRN Mild Pain (1 - 3)  albuterol    90 MICROgram(s) HFA Inhaler 2 Puff(s) Inhalation every 6 hours PRN Bronchospasm  dextrose Oral Gel 15 Gram(s) Oral once PRN Blood Glucose LESS THAN 70 milliGRAM(s)/deciliter  guaiFENesin Oral Liquid (Sugar-Free) 100 milliGRAM(s) Oral every 8 hours PRN Cough  hyoscyamine SL 0.125 milliGRAM(s) SubLingual four times a day PRN abdominal spasm  methocarbamol 500 milliGRAM(s) Oral three times a day PRN Muscle Spasm      LABS:                        11.0   5.39  )-----------( 217      ( 16 Jun 2023 05:05 )             33.3     06-16    131<L>  |  94<L>  |  34.3<H>  ----------------------------<  178<H>  4.8   |  22.0  |  5.17<H>    Ca    8.0<L>      16 Jun 2023 05:05  Mg     2.4     06-16

## 2023-06-17 NOTE — PROGRESS NOTE ADULT - ASSESSMENT
63 year old male with a medical history of chronic diarrhea, ESRD on HD (T,Th,S), type 2 DM (Ha1c 9.4 on insulin), COPD, gastroparesis, peripheral blindness and retinopathy, CAD with multi vessel disease who originally admitted to [facility] with fluid overload after missing HD. Patient was transferred to [facility] for evaluation of possible CABG eval. He was diagnosed with C diff coliits at [facility] (positive on 6/1) and was completed 10 day course of oral vancomycin on 6/12 per chart, now reporting that diarrhea is back again.

## 2023-06-18 ENCOUNTER — TRANSCRIPTION ENCOUNTER (OUTPATIENT)
Age: 63
End: 2023-06-18

## 2023-06-18 LAB
ANION GAP SERPL CALC-SCNC: 15 MMOL/L — SIGNIFICANT CHANGE UP (ref 5–17)
BUN SERPL-MCNC: 42.6 MG/DL — HIGH (ref 8–20)
CALCIUM SERPL-MCNC: 8.2 MG/DL — LOW (ref 8.4–10.5)
CHLORIDE SERPL-SCNC: 95 MMOL/L — LOW (ref 96–108)
CO2 SERPL-SCNC: 24 MMOL/L — SIGNIFICANT CHANGE UP (ref 22–29)
CREAT SERPL-MCNC: 6.28 MG/DL — HIGH (ref 0.5–1.3)
CULTURE RESULTS: SIGNIFICANT CHANGE UP
EGFR: 9 ML/MIN/1.73M2 — LOW
GLUCOSE BLDC GLUCOMTR-MCNC: 117 MG/DL — HIGH (ref 70–99)
GLUCOSE BLDC GLUCOMTR-MCNC: 145 MG/DL — HIGH (ref 70–99)
GLUCOSE BLDC GLUCOMTR-MCNC: 176 MG/DL — HIGH (ref 70–99)
GLUCOSE BLDC GLUCOMTR-MCNC: 204 MG/DL — HIGH (ref 70–99)
GLUCOSE BLDC GLUCOMTR-MCNC: 79 MG/DL — SIGNIFICANT CHANGE UP (ref 70–99)
GLUCOSE SERPL-MCNC: 164 MG/DL — HIGH (ref 70–99)
POTASSIUM SERPL-MCNC: 4.7 MMOL/L — SIGNIFICANT CHANGE UP (ref 3.5–5.3)
POTASSIUM SERPL-SCNC: 4.7 MMOL/L — SIGNIFICANT CHANGE UP (ref 3.5–5.3)
SODIUM SERPL-SCNC: 133 MMOL/L — LOW (ref 135–145)
SPECIMEN SOURCE: SIGNIFICANT CHANGE UP

## 2023-06-18 PROCEDURE — 99232 SBSQ HOSP IP/OBS MODERATE 35: CPT

## 2023-06-18 RX ORDER — VANCOMYCIN HCL 1 G
5 VIAL (EA) INTRAVENOUS
Qty: 0 | Refills: 0 | DISCHARGE
Start: 2023-06-18

## 2023-06-18 RX ORDER — HYDRALAZINE HCL 50 MG
1 TABLET ORAL
Qty: 0 | Refills: 0 | DISCHARGE
Start: 2023-06-18

## 2023-06-18 RX ORDER — HYDRALAZINE HCL 50 MG
10 TABLET ORAL ONCE
Refills: 0 | Status: COMPLETED | OUTPATIENT
Start: 2023-06-18 | End: 2023-06-18

## 2023-06-18 RX ORDER — LOSARTAN POTASSIUM 100 MG/1
1 TABLET, FILM COATED ORAL
Qty: 0 | Refills: 0 | DISCHARGE
Start: 2023-06-18

## 2023-06-18 RX ORDER — SACCHAROMYCES BOULARDII 250 MG
1 POWDER IN PACKET (EA) ORAL
Refills: 0 | DISCHARGE

## 2023-06-18 RX ORDER — METOPROLOL TARTRATE 50 MG
1 TABLET ORAL
Qty: 0 | Refills: 0 | DISCHARGE
Start: 2023-06-18

## 2023-06-18 RX ORDER — AMLODIPINE BESYLATE 2.5 MG/1
1 TABLET ORAL
Refills: 0 | DISCHARGE

## 2023-06-18 RX ORDER — OXYCODONE HYDROCHLORIDE 5 MG/1
5 TABLET ORAL ONCE
Refills: 0 | Status: DISCONTINUED | OUTPATIENT
Start: 2023-06-18 | End: 2023-06-18

## 2023-06-18 RX ORDER — CARVEDILOL PHOSPHATE 80 MG/1
1 CAPSULE, EXTENDED RELEASE ORAL
Refills: 0 | DISCHARGE

## 2023-06-18 RX ADMIN — LORATADINE 10 MILLIGRAM(S): 10 TABLET ORAL at 12:35

## 2023-06-18 RX ADMIN — Medication 2 UNIT(S): at 08:43

## 2023-06-18 RX ADMIN — Medication 667 MILLIGRAM(S): at 08:43

## 2023-06-18 RX ADMIN — MONTELUKAST 10 MILLIGRAM(S): 4 TABLET, CHEWABLE ORAL at 22:28

## 2023-06-18 RX ADMIN — HEPARIN SODIUM 5000 UNIT(S): 5000 INJECTION INTRAVENOUS; SUBCUTANEOUS at 12:35

## 2023-06-18 RX ADMIN — SODIUM CHLORIDE 3 MILLILITER(S): 9 INJECTION INTRAMUSCULAR; INTRAVENOUS; SUBCUTANEOUS at 14:03

## 2023-06-18 RX ADMIN — Medication 125 MILLIGRAM(S): at 12:37

## 2023-06-18 RX ADMIN — Medication 10 MILLIGRAM(S): at 22:37

## 2023-06-18 RX ADMIN — LOSARTAN POTASSIUM 25 MILLIGRAM(S): 100 TABLET, FILM COATED ORAL at 05:04

## 2023-06-18 RX ADMIN — Medication 125 MILLIGRAM(S): at 05:07

## 2023-06-18 RX ADMIN — Medication 50 MILLIGRAM(S): at 16:22

## 2023-06-18 RX ADMIN — Medication 81 MILLIGRAM(S): at 12:35

## 2023-06-18 RX ADMIN — HEPARIN SODIUM 5000 UNIT(S): 5000 INJECTION INTRAVENOUS; SUBCUTANEOUS at 05:05

## 2023-06-18 RX ADMIN — Medication 100 MILLIGRAM(S): at 05:04

## 2023-06-18 RX ADMIN — LIDOCAINE 1 PATCH: 4 CREAM TOPICAL at 19:00

## 2023-06-18 RX ADMIN — Medication 667 MILLIGRAM(S): at 16:25

## 2023-06-18 RX ADMIN — ATORVASTATIN CALCIUM 40 MILLIGRAM(S): 80 TABLET, FILM COATED ORAL at 22:28

## 2023-06-18 RX ADMIN — Medication 250 MILLIGRAM(S): at 16:22

## 2023-06-18 RX ADMIN — OXYCODONE HYDROCHLORIDE 5 MILLIGRAM(S): 5 TABLET ORAL at 06:03

## 2023-06-18 RX ADMIN — CHLORHEXIDINE GLUCONATE 1 APPLICATION(S): 213 SOLUTION TOPICAL at 05:05

## 2023-06-18 RX ADMIN — Medication 2 UNIT(S): at 12:36

## 2023-06-18 RX ADMIN — Medication 125 MILLIGRAM(S): at 00:53

## 2023-06-18 RX ADMIN — Medication 250 MILLIGRAM(S): at 05:03

## 2023-06-18 RX ADMIN — Medication 125 MILLIGRAM(S): at 16:23

## 2023-06-18 RX ADMIN — SODIUM CHLORIDE 3 MILLILITER(S): 9 INJECTION INTRAMUSCULAR; INTRAVENOUS; SUBCUTANEOUS at 22:35

## 2023-06-18 RX ADMIN — Medication 5 MILLIGRAM(S): at 22:37

## 2023-06-18 RX ADMIN — HEPARIN SODIUM 5000 UNIT(S): 5000 INJECTION INTRAVENOUS; SUBCUTANEOUS at 22:30

## 2023-06-18 RX ADMIN — OXYCODONE HYDROCHLORIDE 5 MILLIGRAM(S): 5 TABLET ORAL at 05:03

## 2023-06-18 RX ADMIN — SODIUM CHLORIDE 3 MILLILITER(S): 9 INJECTION INTRAMUSCULAR; INTRAVENOUS; SUBCUTANEOUS at 05:05

## 2023-06-18 RX ADMIN — Medication 667 MILLIGRAM(S): at 12:35

## 2023-06-18 RX ADMIN — CLOPIDOGREL BISULFATE 75 MILLIGRAM(S): 75 TABLET, FILM COATED ORAL at 12:35

## 2023-06-18 RX ADMIN — LIDOCAINE 1 PATCH: 4 CREAM TOPICAL at 12:43

## 2023-06-18 RX ADMIN — GABAPENTIN 300 MILLIGRAM(S): 400 CAPSULE ORAL at 20:23

## 2023-06-18 RX ADMIN — Medication 50 MILLIGRAM(S): at 05:05

## 2023-06-18 RX ADMIN — Medication 1: at 22:31

## 2023-06-18 RX ADMIN — TAMSULOSIN HYDROCHLORIDE 0.4 MILLIGRAM(S): 0.4 CAPSULE ORAL at 22:28

## 2023-06-18 RX ADMIN — INSULIN GLARGINE 6 UNIT(S): 100 INJECTION, SOLUTION SUBCUTANEOUS at 22:30

## 2023-06-18 NOTE — DISCHARGE NOTE PROVIDER - PROVIDER TOKENS
PROVIDER:[TOKEN:[[id number]]],PROVIDER:[TOKEN:[[id number]]],PROVIDER:[TOKEN:[[id number]]]

## 2023-06-18 NOTE — DISCHARGE NOTE NURSING/CASE MANAGEMENT/SOCIAL WORK - PATIENT PORTAL LINK FT
You can access the FollowMyHealth Patient Portal offered by [facility] by registering at the following website: [email or web address]. By joining [facility] FollowMyHealth portal, you will also be able to view your health information using other applications (apps) compatible with our system.

## 2023-06-18 NOTE — DISCHARGE NOTE PROVIDER - NSDCMRMEDTOKEN_GEN_ALL_CORE_FT
acetaminophen 325 mg oral tablet: 2 tab(s) orally every 8 hours as needed for  mild pain  Albuterol (Eqv-ProAir HFA) 90 mcg/inh inhalation aerosol: 2 puff(s) inhaled 4 times a day as needed for  bronchospasm  aspirin 81 mg oral tablet: 1 tab(s) orally once a day  calcium acetate 667 mg oral tablet: 1 tab(s) orally 3 times a day  epoetin lois 10,000 units/mL injectable solution: 10,000 unit(s) subcutaneous 3 times a week MWF  Flomax 0.4 mg oral capsule: 1 cap(s) orally once a day  gabapentin 300 mg oral capsule: 1 cap(s) orally 2 times a day  hydrALAZINE 50 mg oral tablet: 1 tab(s) orally every 12 hours  hyoscyamine 0.125 mg oral tablet: 1 tab(s) orally 4 times a day as needed for  abdominal spasm  Lantus 100 units/mL subcutaneous solution: 6 unit(s) subcutaneous once a day (at bedtime)  lidocaine 4% topical film: Apply topically to affected area once a day  loratadine 10 mg oral tablet: 1 tab(s) orally once a day  losartan 25 mg oral tablet: 1 tab(s) orally once a day  melatonin 5 mg oral tablet: 1 tab(s) orally once a day (at bedtime)  methocarbamol 500 mg oral tablet: 1 tab(s) orally 2 times a day  metoprolol succinate 100 mg oral tablet, extended release: 1 tab(s) orally once a day  nitroglycerin 0.4 mg sublingual tablet: 1 tab(s) sublingually once a day as needed for  chest pain  omeprazole 20 mg oral delayed release capsule: 1 cap(s) orally once a day  Plavix 75 mg oral tablet: 1 tab(s) orally once a day  pravastatin 40 mg oral tablet: 1 tab(s) orally once a day (at bedtime)  sodium bicarbonate 650 mg oral tablet: 2 tab(s) orally 3 times a day  vancomycin 25 mg/mL oral liquid: 5 milliliter(s) orally every 6 hours

## 2023-06-18 NOTE — DISCHARGE NOTE NURSING/CASE MANAGEMENT/SOCIAL WORK - NSDCFUADDAPPT_GEN_ALL_CORE_FT
f/u Dr Becerra in 1 week after completing Vancomycin Rx. f/u Dr Becerra in 1 week after completing Vancomycin Rx.    Your dialysis has been resumed at [facility], [facility] on Tuesday June 20, 2023 at 5AM for your usual Tues/Thurs/Sat schedule. Your medicaid taxi services with [employer] have been resumed for tomorrow Tuesday June 20th at 4AM.    [facility] [place] [id number] [phone]

## 2023-06-18 NOTE — PROGRESS NOTE ADULT - SUBJECTIVE AND OBJECTIVE BOX
HEALTH ISSUES - PROBLEM Dx:    CAD    INTERVAL HPI/ OVERNIGHT EVENTS:    pt sitting up in chair  freq BMs resolved  denies nausea or abd pain now    REVIEW OF SYSTEMS:    As above    Vital Signs Last 24 Hrs  T(C): 37.2 (18 Jun 2023 08:38), Max: 37.2 (18 Jun 2023 08:38)  T(F): 98.9 (18 Jun 2023 08:38), Max: 98.9 (18 Jun 2023 08:38)  HR: 88 (18 Jun 2023 08:38) (62 - 88)  BP: 184/84 (18 Jun 2023 08:38) (126/57 - 185/80)  BP(mean): --  RR: 15 (18 Jun 2023 08:38) (15 - 19)  SpO2: 99% (18 Jun 2023 08:38) (96% - 100%)    Parameters below as of 18 Jun 2023 08:38  Patient On (Oxygen Delivery Method): room air        PHYSICAL EXAM-  General: Pt. sitting in chair byt he bathroom, suing bathroom frequently  HEENT: AT, NC. PERRL. intact EOM. no throat erythema or exudate.   Neck: supple. no JVD.   Chest: CTA bilaterally  Heart: S1,S2. RRR. no heart murmur.  Abdomen: soft. mild pain to palpation mid abd. area, no RT, no gaurding, non-distended. + BS.  Ext: no calf tenderness. RUE av graft with thrill.   Neuro: AAO x3. no focal weakness. no speech disorder.  Skin: warm and dry  psych : mood ok, no si/hi    MEDICATIONS  (STANDING):  aspirin  chewable 81 milliGRAM(s) Oral daily  atorvastatin 40 milliGRAM(s) Oral at bedtime  calcium acetate 667 milliGRAM(s) Oral three times a day with meals  chlorhexidine 2% Cloths 1 Application(s) Topical <User Schedule>  clopidogrel Tablet 75 milliGRAM(s) Oral daily  dextrose 5%. 1000 milliLiter(s) (100 mL/Hr) IV Continuous <Continuous>  dextrose 5%. 1000 milliLiter(s) (50 mL/Hr) IV Continuous <Continuous>  dextrose 50% Injectable 25 Gram(s) IV Push once  dextrose 50% Injectable 12.5 Gram(s) IV Push once  dextrose 50% Injectable 25 Gram(s) IV Push once  epoetin lois-epbx (RETACRIT) Injectable 4000 Unit(s) IV Push <User Schedule>  gabapentin 300 milliGRAM(s) Oral <User Schedule>  glucagon  Injectable 1 milliGRAM(s) IntraMuscular once  heparin   Injectable 5000 Unit(s) SubCutaneous every 8 hours  hydrALAZINE 50 milliGRAM(s) Oral every 12 hours  insulin glargine Injectable (LANTUS) 6 Unit(s) SubCutaneous at bedtime  insulin lispro (ADMELOG) corrective regimen sliding scale   SubCutaneous Before meals and at bedtime  insulin lispro Injectable (ADMELOG) 2 Unit(s) SubCutaneous three times a day with meals  lidocaine   4% Patch 1 Patch Transdermal daily  loratadine 10 milliGRAM(s) Oral daily  losartan 25 milliGRAM(s) Oral daily  melatonin 5 milliGRAM(s) Oral at bedtime  metoprolol succinate  milliGRAM(s) Oral daily  montelukast 10 milliGRAM(s) Oral at bedtime  saccharomyces boulardii 250 milliGRAM(s) Oral two times a day  sodium chloride 0.9% lock flush 3 milliLiter(s) IV Push every 8 hours  tamsulosin 0.4 milliGRAM(s) Oral at bedtime  vancomycin    Solution 125 milliGRAM(s) Oral every 6 hours    MEDICATIONS  (PRN):  acetaminophen     Tablet .. 650 milliGRAM(s) Oral every 8 hours PRN Mild Pain (1 - 3)  albuterol    90 MICROgram(s) HFA Inhaler 2 Puff(s) Inhalation every 6 hours PRN Bronchospasm  dextrose Oral Gel 15 Gram(s) Oral once PRN Blood Glucose LESS THAN 70 milliGRAM(s)/deciliter  guaiFENesin Oral Liquid (Sugar-Free) 100 milliGRAM(s) Oral every 8 hours PRN Cough  hyoscyamine SL 0.125 milliGRAM(s) SubLingual four times a day PRN abdominal spasm  methocarbamol 500 milliGRAM(s) Oral three times a day PRN Muscle Spasm      LABS:                        10.7   5.87  )-----------( 229      ( 17 Jun 2023 06:13 )             31.8     06-18    133<L>  |  95<L>  |  42.6<H>  ----------------------------<  164<H>  4.7   |  24.0  |  6.28<H>    Ca    8.2<L>      18 Jun 2023 05:34  Mg     2.2     06-17

## 2023-06-18 NOTE — DISCHARGE NOTE NURSING/CASE MANAGEMENT/SOCIAL WORK - NSDCPEFALRISK_GEN_ALL_CORE
For information on Fall & Injury Prevention, visit: [email or web address] OR  [email or web address] OR  https://www.cdc.gov/steadi/patient.html

## 2023-06-18 NOTE — DISCHARGE NOTE NURSING/CASE MANAGEMENT/SOCIAL WORK - NSSCTYPOFSERV_GEN_ALL_CORE
VISITING NURSE AND HOME PHYSICAL THERAPY (RESUMPTION OF CARE) VISITING NURSE AND HOME PHYSICAL THERAPY

## 2023-06-18 NOTE — DISCHARGE NOTE PROVIDER - CARE PROVIDER_API CALL
Jaleel Becerra  Interventional Cardiology  [address], [address]  [place] [address]  Phone: [phone]  Fax: [phone]  Follow Up Time:     Norman Núñez  Infectious Disease  [address], [address] [place] [address]  Phone: [phone]  Fax: [phone]  Follow Up Time:     Robbi Chaudhry  Cardiovascular Disease  [address], [address] [place] [address]  Phone: [phone]  Fax: [phone]  Follow Up Time:

## 2023-06-18 NOTE — DISCHARGE NOTE PROVIDER - HOSPITAL COURSE
63M with PMHx ESRD on HD (T,Th, S), IDDM, COPD, gastroparesis, peripheral blindness and retinopathy, and CAD, who initially presented to [facility] on 5/31 with n/v/d, missing his HD as a result, and found to be hyperkalemic with acute respiratory failure secondary to fluid overload vs pneumonia and NSTEMI in the context of C. diff colitis (positive on 6/1). Completed 10 day course of oral vancomycin on 6/12 per chart.  Patient became fluid overloaded during hospitalization at [place] requiring HFNC, improved with additional HD. Blood culture negative to date, patient received 1 dose of IV cefepime. Patient tolerating 2L NC prior to transfer to [facility].  Patient found to have NSTEMI s/p heparin drip 6/10, with cardiac cath 6/12 demonstrating MVD requiring transfer to [facility] for CT Surgery evaluation for possible CABG. CTS evaluated and recommend he f/u o/p with cardiology for PCI.    # Enterocolitis- Adenovirus and known C diff colitis from [place] (POA)  Completed 10 day course of oral vancomycin on 6/12 per chart.   GI PCR + for Adenovirus.  rpt Rx for C diff initiated again.   ID input- to give 1215 mg x 10 days total  if not resolved, then upon f/u can decide a long taper regimen  # ESRD on HD TTS  # Chr CHFrEF and grd 2 DD, HTN  # NSTEMI S/p LHC with severe MVD  # Gastroapresis- has gastric pacer    to f/u Dr Becerra for high risk PCI    Medically stable and agreeable with discharge and follow up plan. Patient was advised to return to ED if any symptoms occur or worsen.  time 45 mins   63M with PMHx ESRD on HD (T,Th, S), IDDM, COPD, gastroparesis, peripheral blindness and retinopathy, and CAD, who initially presented to [facility] on 5/31 with n/v/d, missing his HD as a result, and found to be hyperkalemic with acute respiratory failure secondary to fluid overload vs pneumonia and NSTEMI in the context of C. diff colitis (positive on 6/1). Completed 10 day course of oral vancomycin on 6/12 per chart.  Patient became fluid overloaded during hospitalization at [place] requiring HFNC, improved with additional HD. Blood culture negative to date, patient received 1 dose of IV cefepime. Patient tolerating 2L NC prior to transfer to [facility].  Patient found to have NSTEMI s/p heparin drip 6/10, with cardiac cath 6/12 demonstrating MVD requiring transfer to [facility] for CT Surgery evaluation for possible CABG. CTS evaluated and recommend he f/u o/p with cardiology for PCI.    # Enterocolitis- Adenovirus and known C diff colitis from [place] (POA)  Completed 10 day course of oral vancomycin on 6/12 per chart.   GI PCR + for Adenovirus.  rpt Rx for C diff initiated again.   ID input- to give 1215 mg x 10 days total  if not resolved, then upon f/u can decide a long taper regimen  # ESRD on HD TTS  # Chr CHFrEF and grd 2 DD, HTN  # NSTEMI S/p LHC with severe MVD  # Gastroparesis:  has gastric pacer    to f/u Dr Becerra for high risk PCI    Medically stable and agreeable with discharge and follow up plan. Patient was advised to return to ED if any symptoms occur or worsen.  time 45 mins   63M with PMHx ESRD on HD (T,Th, S), IDDM, COPD, gastroparesis, peripheral blindness and retinopathy, and CAD, who initially presented to [facility] on 5/31 with n/v/d, missing his HD as a result, and found to be hyperkalemic with acute respiratory failure secondary to fluid overload vs pneumonia and NSTEMI in the context of C. diff colitis (positive on 6/1). Completed 10 day course of oral vancomycin on 6/12 per chart.  Patient became fluid overloaded during hospitalization at [place] requiring HFNC, improved with additional HD. Blood culture negative to date, patient received 1 dose of IV cefepime. Patient tolerating 2L NC prior to transfer to [facility].  Patient found to have NSTEMI s/p heparin drip 6/10, with cardiac cath 6/12 demonstrating MVD requiring transfer to [facility] for CT Surgery evaluation for possible CABG. CTS evaluated and recommend he f/u o/p with cardiology for PCI.    to f/u Dr Becerra for high risk PCI    Medically stable and agreeable with discharge and follow up plan. Patient was advised to return to ED if any symptoms occur or worsen.  time 45 mins

## 2023-06-18 NOTE — PROGRESS NOTE ADULT - SUBJECTIVE AND OBJECTIVE BOX
Patient is a 63y old  Male who presents with a chief complaint of multivessel CAD (18 Jun 2023 10:40)      HPI:  63M with h/o ESRD on HD (T,Th, S), IDDM, COPD, gastroparesis, peripheral blindness and retinopathy, and CAD, who initially presented to [facility] on 5/31 with n/v/d, missing his HD as a result, and found to be hyperkalemic with acute respiratory failure secondary to fluid overload vs pneumonia and NSTEMI in the context of C. diff colitis (positive on 6/1).       Cardiothorasic work up will be as outpatient. Patient states diarrhea improved., 2 loose BM yesterday, 2 overnight and one this am. NO pain . no fevers . Tolerating diet     REVIEW OF SYSTEMS:  Constitutional: No fever, weight loss or fatigue  ENMT:  No difficulty hearing, tinnitus, vertigo; No sinus or throat pain  Respiratory: No cough, wheezing, chills or hemoptysis  Cardiovascular: No chest pain, palpitations, dizziness or leg swelling  Gastrointestinal: No abdominal or epigastric pain. No nausea, vomiting or hematemesis; + diarrhea . No melena or hematochezia.  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 1000 milliLiter(s) (100 mL/Hr) IV Continuous <Continuous>  dextrose 5%. 1000 milliLiter(s) (50 mL/Hr) IV Continuous <Continuous>  dextrose 50% Injectable 25 Gram(s) IV Push once  dextrose 50% Injectable 12.5 Gram(s) IV Push once  dextrose 50% Injectable 25 Gram(s) IV Push once  epoetin lois-epbx (RETACRIT) Injectable 4000 Unit(s) IV Push <User Schedule>  gabapentin 300 milliGRAM(s) Oral <User Schedule>  glucagon  Injectable 1 milliGRAM(s) IntraMuscular once  heparin   Injectable 5000 Unit(s) SubCutaneous every 8 hours  hydrALAZINE 50 milliGRAM(s) Oral every 12 hours  insulin glargine Injectable (LANTUS) 6 Unit(s) SubCutaneous at bedtime  insulin lispro (ADMELOG) corrective regimen sliding scale   SubCutaneous Before meals and at bedtime  insulin lispro Injectable (ADMELOG) 2 Unit(s) SubCutaneous three times a day with meals  lidocaine   4% Patch 1 Patch Transdermal daily  loratadine 10 milliGRAM(s) Oral daily  losartan 25 milliGRAM(s) Oral daily  melatonin 5 milliGRAM(s) Oral at bedtime  metoprolol succinate  milliGRAM(s) Oral daily  montelukast 10 milliGRAM(s) Oral at bedtime  saccharomyces boulardii 250 milliGRAM(s) Oral two times a day  sodium chloride 0.9% lock flush 3 milliLiter(s) IV Push every 8 hours  tamsulosin 0.4 milliGRAM(s) Oral at bedtime  vancomycin    Solution 125 milliGRAM(s) Oral every 6 hours    MEDICATIONS  (PRN):  acetaminophen     Tablet .. 650 milliGRAM(s) Oral every 8 hours PRN Mild Pain (1 - 3)  albuterol    90 MICROgram(s) HFA Inhaler 2 Puff(s) Inhalation every 6 hours PRN Bronchospasm  dextrose Oral Gel 15 Gram(s) Oral once PRN Blood Glucose LESS THAN 70 milliGRAM(s)/deciliter  guaiFENesin Oral Liquid (Sugar-Free) 100 milliGRAM(s) Oral every 8 hours PRN Cough  hyoscyamine SL 0.125 milliGRAM(s) SubLingual four times a day PRN abdominal spasm  methocarbamol 500 milliGRAM(s) Oral three times a day PRN Muscle Spasm      Allergies    shellfish (Rash)  No Known Drug Allergies    Intolerances        Vital Signs Last 24 Hrs  T(C): 37.2 (18 Jun 2023 08:38), Max: 37.2 (18 Jun 2023 08:38)  T(F): 98.9 (18 Jun 2023 08:38), Max: 98.9 (18 Jun 2023 08:38)  HR: 88 (18 Jun 2023 08:38) (62 - 88)  BP: 184/84 (18 Jun 2023 08:38) (126/57 - 185/80)  BP(mean): --  RR: 15 (18 Jun 2023 08:38) (15 - 19)  SpO2: 99% (18 Jun 2023 08:38) (96% - 100%)    Parameters below as of 18 Jun 2023 08:38  Patient On (Oxygen Delivery Method): room air        06-17 @ 07:01  -  06-18 @ 07:00  --------------------------------------------------------  IN: 360 mL / OUT: 2000 mL / NET: -1640 mL          PHYSICAL EXAM:    General: ; in no acute distress  HEENT: MMM, conjunctiva and sclera clear  H-RRR  L-CTA  Gastrointestinal: Soft, non-tender non-distended; Normal bowel sounds; No rebound or guarding  Extremities: Normal range of motion, No clubbing, cyanosis or edema  Neurological: Alert and oriented x3  Skin: Warm and dry. No obvious rash      LABS:                        10.7   5.87  )-----------( 229      ( 17 Jun 2023 06:13 )             31.8     18 Jun 2023 05:34    133    |  95     |  42.6   ----------------------------<  164    4.7     |  24.0   |  6.28     Ca    8.2        18 Jun 2023 05:34  Mg     2.2       17 Jun 2023 06:13                RADIOLOGY & ADDITIONAL STUDIES:     < from: CT Abdomen and Pelvis No Cont (06.15.23 @ 21:12) >  MPRESSION:  New bilateral pleural effusions with underlying passive atelectasis.    Probable enterocolitis as above.    Debris-filled stomach likely related to gastroparesis. Questionable wall   thickening. Consider endoscopy.      < end of copied text >

## 2023-06-18 NOTE — DISCHARGE NOTE PROVIDER - DETAILS OF MALNUTRITION DIAGNOSIS/DIAGNOSES
This patient has been assessed with a concern for Malnutrition and was treated during this hospitalization for the following Nutrition diagnosis/diagnoses:     -  06/16/2023: Moderate protein-calorie malnutrition

## 2023-06-18 NOTE — PROGRESS NOTE ADULT - PROBLEM SELECTOR PROBLEM 1
CAD (coronary artery disease)
Diarrhea
Diarrhea
CAD (coronary artery disease)

## 2023-06-18 NOTE — PROGRESS NOTE ADULT - PROBLEM SELECTOR PLAN 1
as per ID, pt to get  2 more weeks of vanco. IF diarrhea reoccurs, then  tappering vanco or change to  DIfficid.   low fiber diet  Adenovirus- supportive care  [place] Want to F/U with [facility]  or can see [facility] as well as per ID, pt to get  2 more weeks of vanco. IF diarrhea reoccurs, then  tappering vanco or change to  DIfficid.   low fiber diet  Adenovirus- supportive care  [place] Want to F/U with [facility]  or can see [facility] as well   GI will sign off

## 2023-06-18 NOTE — PROGRESS NOTE ADULT - ASSESSMENT
63M with PMHx ESRD on HD (T,Th, S), IDDM, COPD, gastroparesis, peripheral blindness and retinopathy, and CAD, who initially presented to [facility] on 5/31 with n/v/d, missing his HD as a result, and found to be hyperkalemic with acute respiratory failure secondary to fluid overload vs pneumonia and NSTEMI in the context of C. diff colitis (positive on 6/1). Completed 10 day course of oral vancomycin on 6/12 per chart.  Patient became fluid overloaded during hospitalization at [place] requiring HFNC, improved with additional HD. Blood culture negative to date, patient received 1 dose of IV cefepime. Patient tolerating 2L NC prior to transfer to [facility].  Patient found to have NSTEMI s/p heparin drip 6/10, with cardiac cath 6/12 demonstrating MVD requiring transfer to [facility] for CT Surgery evaluation for possible CABG. CTS evaluated and recommend he f/u o/p with cardiology for PCI.    # Enterocolitis- Adenovirus and known C diff colitis from [place] (POA)  Clinically resolved  Completed 10 day course of oral vancomycin on 6/12 per chart.   GI PCR + for Adenovirus.  C diff - to be tested if watery diarrhea noted  c/s testing NGTD  Cont Vanco PO  Gi appreciated   follows at [facility] for Chr diarrhea.   D/W ID- recs appreciated no need for Vanco long regimen. To complete RPt Vanco total 10 day course. started on 15th.     # Gastroparesis  has Gastric Pacer  CT findings noted  GI following    # NSTEMI S/p [facility] with severe MVD  CTS evaluated and recommend he f/u o/p with cardiology for PCI.  Plavix was put on hold prior to sending him here  Resume now  Cont ASA, BB, statins, ARB  o/p f/u with Dr Becerra for high risk PCI    # Chr CHFrEF and grd 2 DD, HTN  Toprol and Losartan started today  compensated  Cards following  Hydralazine added today for HTN  if required can add Imdur     # ESRD on HD TTS  Cont same here    # DM2 uncontrolled  Insulin in house  uptitrate as needed    Heparin  On saturday- CM notified of D?C plans for sunday  on sunday, I am told that unabel to contact his o/p HD center.  without confirmation, he cannot leave. so d/c on hold today.

## 2023-06-18 NOTE — DISCHARGE NOTE PROVIDER - NSDCCPCAREPLAN_GEN_ALL_CORE_FT
PRINCIPAL DISCHARGE DIAGNOSIS  Diagnosis: CAD (coronary artery disease)  Assessment and Plan of Treatment:       SECONDARY DISCHARGE DIAGNOSES  Diagnosis: ESRD on dialysis  Assessment and Plan of Treatment:     Diagnosis: Enterocolitis  Assessment and Plan of Treatment:      PRINCIPAL DISCHARGE DIAGNOSIS  Diagnosis: CAD (coronary artery disease)  Assessment and Plan of Treatment: CTS evaluated and recommend you follow up  with cardiology for PCI.  Cont ASA, BB, Plavix,  statins, ARB  Follow up  with Dr Becerra for high risk PCI        SECONDARY DISCHARGE DIAGNOSES  Diagnosis: ESRD on dialysis  Assessment and Plan of Treatment: Maintain your HD schedule as prior to admission    Diagnosis: Gastroparesis  Assessment and Plan of Treatment: has Gastric Pacer    Diagnosis: Enterocolitis  Assessment and Plan of Treatment: Adenovirus and known C diff colitis from [facility] records   Completed 10 day course of oral vancomycin on 6/12 per chart  GI PCR + for Adenovirus..   To complete RPt Vanco total 10 day course star date 6/15th.

## 2023-06-18 NOTE — PROGRESS NOTE ADULT - PROBLEM SELECTOR PROBLEM 2
HTN (hypertension)
HTN (hypertension)
Gastroparesis
Gastroparesis
HTN (hypertension)
Acute diarrhea
ESRD on dialysis
Anemia of chronic disease
Anemia of chronic disease

## 2023-06-18 NOTE — DISCHARGE NOTE PROVIDER - ATTENDING DISCHARGE PHYSICAL EXAMINATION:
Vital Signs Last 24 Hrs  T(C): 37.1 (06-18-23 @ 04:39), Max: 37.1 (06-18-23 @ 04:39)  T(F): 98.7 (06-18-23 @ 04:39), Max: 98.7 (06-18-23 @ 04:39)  HR: 71 (06-18-23 @ 04:39) (61 - 74)  BP: 126/57 (06-18-23 @ 04:39) (126/57 - 185/80)  BP(mean): --  RR: 18 (06-18-23 @ 04:39) (17 - 19)  SpO2: 100% (06-18-23 @ 04:39) (96% - 100%)  General: Pt. sitting in chair byt he bathroom, suing bathroom frequently  HEENT: AT, NC. PERRL. intact EOM. no throat erythema or exudate.   Neck: supple. no JVD.   Chest: CTA bilaterally  Heart: S1,S2. RRR. no heart murmur.  Abdomen: soft. mild pain to palpation mid abd. area, no RT, no gaurding, non-distended. + BS.  Ext: no calf tenderness. RUE av graft with thrill.   Neuro: AAO x3. no focal weakness. no speech disorder.  Skin: warm and dry  psych : mood ok, no si/hi

## 2023-06-19 VITALS
DIASTOLIC BLOOD PRESSURE: 63 MMHG | HEART RATE: 62 BPM | OXYGEN SATURATION: 95 % | SYSTOLIC BLOOD PRESSURE: 143 MMHG | RESPIRATION RATE: 17 BRPM | TEMPERATURE: 98 F

## 2023-06-19 LAB
CULTURE RESULTS: SIGNIFICANT CHANGE UP
GLUCOSE BLDC GLUCOMTR-MCNC: 99 MG/DL — SIGNIFICANT CHANGE UP (ref 70–99)
GLUCOSE BLDC GLUCOMTR-MCNC: 99 MG/DL — SIGNIFICANT CHANGE UP (ref 70–99)
SPECIMEN SOURCE: SIGNIFICANT CHANGE UP

## 2023-06-19 PROCEDURE — 99233 SBSQ HOSP IP/OBS HIGH 50: CPT

## 2023-06-19 PROCEDURE — 99239 HOSP IP/OBS DSCHRG MGMT >30: CPT

## 2023-06-19 PROCEDURE — 99232 SBSQ HOSP IP/OBS MODERATE 35: CPT

## 2023-06-19 RX ORDER — METOPROLOL TARTRATE 50 MG
1 TABLET ORAL
Qty: 30 | Refills: 0
Start: 2023-06-19 | End: 2023-07-18

## 2023-06-19 RX ORDER — LOSARTAN POTASSIUM 100 MG/1
1 TABLET, FILM COATED ORAL
Qty: 30 | Refills: 0
Start: 2023-06-19 | End: 2023-07-18

## 2023-06-19 RX ORDER — ASPIRIN/CALCIUM CARB/MAGNESIUM 324 MG
1 TABLET ORAL
Qty: 30 | Refills: 0
Start: 2023-06-19 | End: 2023-07-18

## 2023-06-19 RX ORDER — VANCOMYCIN HCL 1 G
5 VIAL (EA) INTRAVENOUS
Qty: 1 | Refills: 0
Start: 2023-06-19 | End: 2023-06-25

## 2023-06-19 RX ORDER — CLOPIDOGREL BISULFATE 75 MG/1
1 TABLET, FILM COATED ORAL
Qty: 30 | Refills: 0
Start: 2023-06-19 | End: 2023-07-18

## 2023-06-19 RX ORDER — HYDRALAZINE HCL 50 MG
1 TABLET ORAL
Qty: 60 | Refills: 0
Start: 2023-06-19 | End: 2023-07-18

## 2023-06-19 RX ORDER — INSULIN GLARGINE 100 [IU]/ML
5 INJECTION, SOLUTION SUBCUTANEOUS AT BEDTIME
Refills: 0 | Status: DISCONTINUED | OUTPATIENT
Start: 2023-06-19 | End: 2023-06-19

## 2023-06-19 RX ORDER — CLOPIDOGREL BISULFATE 75 MG/1
1 TABLET, FILM COATED ORAL
Refills: 0 | DISCHARGE

## 2023-06-19 RX ORDER — ASPIRIN/CALCIUM CARB/MAGNESIUM 324 MG
1 TABLET ORAL
Refills: 0 | DISCHARGE

## 2023-06-19 RX ORDER — VANCOMYCIN HCL 1 G
1 VIAL (EA) INTRAVENOUS
Qty: 24 | Refills: 0
Start: 2023-06-19 | End: 2023-06-24

## 2023-06-19 RX ORDER — ALBUTEROL 90 UG/1
2 AEROSOL, METERED ORAL
Refills: 0 | DISCHARGE

## 2023-06-19 RX ORDER — CALCIUM ACETATE 667 MG
1 TABLET ORAL
Refills: 0 | DISCHARGE

## 2023-06-19 RX ORDER — INSULIN LISPRO 100/ML
VIAL (ML) SUBCUTANEOUS
Refills: 0 | Status: DISCONTINUED | OUTPATIENT
Start: 2023-06-19 | End: 2023-06-19

## 2023-06-19 RX ADMIN — CHLORHEXIDINE GLUCONATE 1 APPLICATION(S): 213 SOLUTION TOPICAL at 05:27

## 2023-06-19 RX ADMIN — Medication 125 MILLIGRAM(S): at 12:18

## 2023-06-19 RX ADMIN — Medication 667 MILLIGRAM(S): at 12:18

## 2023-06-19 RX ADMIN — Medication 2 UNIT(S): at 12:22

## 2023-06-19 RX ADMIN — LORATADINE 10 MILLIGRAM(S): 10 TABLET ORAL at 12:18

## 2023-06-19 RX ADMIN — SODIUM CHLORIDE 3 MILLILITER(S): 9 INJECTION INTRAMUSCULAR; INTRAVENOUS; SUBCUTANEOUS at 13:00

## 2023-06-19 RX ADMIN — Medication 2 UNIT(S): at 08:09

## 2023-06-19 RX ADMIN — Medication 250 MILLIGRAM(S): at 05:29

## 2023-06-19 RX ADMIN — LOSARTAN POTASSIUM 25 MILLIGRAM(S): 100 TABLET, FILM COATED ORAL at 05:29

## 2023-06-19 RX ADMIN — Medication 50 MILLIGRAM(S): at 05:29

## 2023-06-19 RX ADMIN — CLOPIDOGREL BISULFATE 75 MILLIGRAM(S): 75 TABLET, FILM COATED ORAL at 12:18

## 2023-06-19 RX ADMIN — Medication 125 MILLIGRAM(S): at 00:06

## 2023-06-19 RX ADMIN — Medication 667 MILLIGRAM(S): at 08:08

## 2023-06-19 RX ADMIN — SODIUM CHLORIDE 3 MILLILITER(S): 9 INJECTION INTRAMUSCULAR; INTRAVENOUS; SUBCUTANEOUS at 05:31

## 2023-06-19 RX ADMIN — Medication 81 MILLIGRAM(S): at 12:18

## 2023-06-19 RX ADMIN — Medication 100 MILLIGRAM(S): at 05:30

## 2023-06-19 RX ADMIN — Medication 125 MILLIGRAM(S): at 05:30

## 2023-06-19 RX ADMIN — LIDOCAINE 1 PATCH: 4 CREAM TOPICAL at 00:00

## 2023-06-19 RX ADMIN — HEPARIN SODIUM 5000 UNIT(S): 5000 INJECTION INTRAVENOUS; SUBCUTANEOUS at 05:28

## 2023-06-19 NOTE — PROGRESS NOTE ADULT - REASON FOR ADMISSION
multivessel CAD

## 2023-06-19 NOTE — PROGRESS NOTE ADULT - PROVIDER SPECIALTY LIST ADULT
Cardiology
Endocrinology
Gastroenterology
Hospitalist
Internal Medicine
Internal Medicine
Nephrology
Nephrology
Endocrinology
Endocrinology
Hospitalist
Internal Medicine
Nephrology
Cardiology
Endocrinology
Cardiology
CT Surgery
Gastroenterology
Cardiology
CT Surgery

## 2023-06-19 NOTE — PROGRESS NOTE ADULT - SUBJECTIVE AND OBJECTIVE BOX
· Reason for Admission	multivessel CAD    · Subjective and Objective:   Follow up for C diff , ESRd on HD     pt is feeling well, + BM formed stool     d/w Sw and  pending outpatient HD seat coordination prior Dc     Vital Signs Last 24 Hrs  T(C): 36.5 (19 Jun 2023 07:44), Max: 36.8 (18 Jun 2023 16:15)  T(F): 97.7 (19 Jun 2023 07:44), Max: 98.2 (18 Jun 2023 16:15)  HR: 62 (19 Jun 2023 07:44) (61 - 74)  BP: 143/63 (19 Jun 2023 07:44) (132/59 - 178/62)  BP(mean): --  RR: 17 (19 Jun 2023 07:44) (16 - 18)  SpO2: 95% (19 Jun 2023 07:44) (95% - 100%)    Parameters below as of 19 Jun 2023 07:44  Patient On (Oxygen Delivery Method): room air    General: Pt. sitting in chair byt he bathroom, suing bathroom frequently  HEENT: AT, NC. PERRL. intact EOM. no throat erythema or exudate.   Neck: supple. no JVD.   Chest: CTA bilaterally  Heart: S1,S2. RRR. no heart murmur.  Abdomen: soft. mild pain to palpation mid abd. area, no RT, no gaurding, non-distended. + BS.  Ext: no calf tenderness. RUE av graft with thrill.   Neuro: AAO x3. no focal weakness. no speech disorder  Skin: warm and dry    06-18    133<L>  |  95<L>  |  42.6<H>  ----------------------------<  164<H>  4.7   |  24.0  |  6.28<H>    Ca    8.2<L>      18 Jun 2023 05:34  	  63M with PMHx ESRD on HD (T,Th, S), IDDM, COPD, gastroparesis, peripheral blindness and retinopathy, and CAD, who initially presented to [facility] on 5/31 with n/v/d, missing his HD as a result, and found to be hyperkalemic with acute respiratory failure secondary to fluid overload vs pneumonia and NSTEMI in the context of C. diff colitis (positive on 6/1). Completed 10 day course of oral vancomycin on 6/12 per chart.  Patient became fluid overloaded during hospitalization at [place] requiring HFNC, improved with additional HD. Blood culture negative to date, patient received 1 dose of IV cefepime. Patient tolerating 2L NC prior to transfer to [facility].  Patient found to have NSTEMI s/p heparin drip 6/10, with cardiac cath 6/12 demonstrating MVD requiring transfer to [facility] for CT Surgery evaluation for possible CABG. CTS evaluated and recommend he f/u o/p with cardiology for PCI.    1- Enterocolitis- Adenovirus and known C diff colitis from [facility] records   , present on admission   improving diarrhea     Completed 10 day course of oral vancomycin on 6/12 per chart  GI PCR + for Adenovirus.  Cont Vanco PO  Gi rec appreciated   follows at [facility] for Chronic  diarrhea.   D/W ID- recs appreciated no need for Vanco long regimen.   To complete RPt Vanco total 10 day course star date 6/15th.     2-Gastroparesis  has Gastric Pacer  CT findings noted  GI follow up noted     3- NSTEMI S/p [facility] with severe MVD  CTS evaluated and recommend he f/u o/p with cardiology for PCI.  Plavix was put on hold prior to sending him here  cont now   Cont ASA, BB, statins, ARB  o/p f/u with Dr Becerra for high risk PCI    4- Cronic  CHFrEF and grade 2 Diastolic HF   HTN  cont Toprol and Losartan   Cards follow up   added hydralazine for better BP control on 6/18   will cont to monitor     5-ESRD on HD TTS  cont HD per schedule     6- DM2 uncontrolled  cont lantus and ISS   uptitrate as needed

## 2023-06-19 NOTE — PROGRESS NOTE ADULT - NS ATTEND AMEND GEN_ALL_CORE FT
Pt seen at bedside with wife and RN  pt yair ready for DC now!   Pt and wife state he has had some low BS at [facility] requiring hospitalization   [facility]    [facility]     unabel to afford CGM   insurance wont cover  and Good RX still too expensive     Pt DC instrcutions reviewd/modified on the paper      Lantus should be 5 units ( per wife he takes that at home)   SSI ADmelog/Novolog onlytid ac wihtmeals with carbs-- can take right after eating if not sure going to eat a lot    -300= 1 unit   301-400 = 2 unit   above 400call MD  gavemy card as well     insulin action will be altered and rp;prolonged in ESRD     Pt has  appt with his outp ENDo in a few weeks

## 2023-06-19 NOTE — PROGRESS NOTE ADULT - ASSESSMENT
62 y/o M with PMHx ESRD (T/TH/S), IDDM, COPD, gastroparesis, peripheral blindness, retinopathy, CAD, initially presented to [facility] with nausea, vomiting and diarrhea and missed HD, found to be hyperkalemic with acute respiratory failure secondary to fluid overload, C. diff colitis. Then found to have NSTEMI, s/p cath with MVD. Transferred for CABG evaluation. Endocrine consulted for DM management, a1c 9.4%.    1. Uncontrolled DM, a1c 9.4%  - Decrease lantus to 5 units qhs  - Decrease sliding scale to low coverage  - Premeal admelog 2 units tid    2. CAD/MVD  - Medical management, plan to follow up outpatient    3. ESRD  - HD as per nephrology    4. HLD  - Continue atorvastatin

## 2023-06-19 NOTE — PROGRESS NOTE ADULT - SUBJECTIVE AND OBJECTIVE BOX
Follow up for C diff , ESRd on HD     pt is feeling well, + BM formed stool     d/w Sw and  pending outpatient HD seat coordination prior Dc         Vital Signs Last 24 Hrs  T(C): 36.5 (19 Jun 2023 07:44), Max: 36.8 (18 Jun 2023 16:15)  T(F): 97.7 (19 Jun 2023 07:44), Max: 98.2 (18 Jun 2023 16:15)  HR: 62 (19 Jun 2023 07:44) (61 - 74)  BP: 143/63 (19 Jun 2023 07:44) (132/59 - 178/62)  BP(mean): --  RR: 17 (19 Jun 2023 07:44) (16 - 18)  SpO2: 95% (19 Jun 2023 07:44) (95% - 100%)    Parameters below as of 19 Jun 2023 07:44  Patient On (Oxygen Delivery Method): room air        General: Pt. sitting in chair byt he bathroom, suing bathroom frequently  HEENT: AT, NC. PERRL. intact EOM. no throat erythema or exudate.   Neck: supple. no JVD.   Chest: CTA bilaterally  Heart: S1,S2. RRR. no heart murmur.  Abdomen: soft. mild pain to palpation mid abd. area, no RT, no gaurding, non-distended. + BS.  Ext: no calf tenderness. RUE av graft with thrill.   Neuro: AAO x3. no focal weakness. no speech disorder  Skin: warm and dry    06-18    133<L>  |  95<L>  |  42.6<H>  ----------------------------<  164<H>  4.7   |  24.0  |  6.28<H>    Ca    8.2<L>      18 Jun 2023 05:34

## 2023-06-19 NOTE — PROGRESS NOTE ADULT - NS ATTEND OPT1A GEN_ALL_CORE
History/Medical decision making
History/Exam/Medical decision making

## 2023-06-19 NOTE — PROGRESS NOTE ADULT - NS ATTEND OPT1 GEN_ALL_CORE

## 2023-06-19 NOTE — PROGRESS NOTE ADULT - SUBJECTIVE AND OBJECTIVE BOX
INTERVAL EVENTS:  Follow up diabetes management    ROS: Denies chest pain, sob, abd pain.     MEDICATIONS  (STANDING):  aspirin  chewable 81 milliGRAM(s) Oral daily  atorvastatin 40 milliGRAM(s) Oral at bedtime  calcium acetate 667 milliGRAM(s) Oral three times a day with meals  chlorhexidine 2% Cloths 1 Application(s) Topical <User Schedule>  clopidogrel Tablet 75 milliGRAM(s) Oral daily  dextrose 5%. 1000 milliLiter(s) (100 mL/Hr) IV Continuous <Continuous>  dextrose 5%. 1000 milliLiter(s) (50 mL/Hr) IV Continuous <Continuous>  dextrose 50% Injectable 25 Gram(s) IV Push once  dextrose 50% Injectable 12.5 Gram(s) IV Push once  dextrose 50% Injectable 25 Gram(s) IV Push once  epoetin lois-epbx (RETACRIT) Injectable 4000 Unit(s) IV Push <User Schedule>  gabapentin 300 milliGRAM(s) Oral <User Schedule>  glucagon  Injectable 1 milliGRAM(s) IntraMuscular once  heparin   Injectable 5000 Unit(s) SubCutaneous every 8 hours  hydrALAZINE 50 milliGRAM(s) Oral every 12 hours  insulin glargine Injectable (LANTUS) 5 Unit(s) SubCutaneous at bedtime  insulin lispro (ADMELOG) corrective regimen sliding scale   SubCutaneous three times a day before meals  insulin lispro Injectable (ADMELOG) 2 Unit(s) SubCutaneous three times a day with meals  lidocaine   4% Patch 1 Patch Transdermal daily  loratadine 10 milliGRAM(s) Oral daily  losartan 25 milliGRAM(s) Oral daily  melatonin 5 milliGRAM(s) Oral at bedtime  metoprolol succinate  milliGRAM(s) Oral daily  montelukast 10 milliGRAM(s) Oral at bedtime  saccharomyces boulardii 250 milliGRAM(s) Oral two times a day  sodium chloride 0.9% lock flush 3 milliLiter(s) IV Push every 8 hours  tamsulosin 0.4 milliGRAM(s) Oral at bedtime  vancomycin    Solution 125 milliGRAM(s) Oral every 6 hours    MEDICATIONS  (PRN):  acetaminophen     Tablet .. 650 milliGRAM(s) Oral every 8 hours PRN Mild Pain (1 - 3)  albuterol    90 MICROgram(s) HFA Inhaler 2 Puff(s) Inhalation every 6 hours PRN Bronchospasm  dextrose Oral Gel 15 Gram(s) Oral once PRN Blood Glucose LESS THAN 70 milliGRAM(s)/deciliter  guaiFENesin Oral Liquid (Sugar-Free) 100 milliGRAM(s) Oral every 8 hours PRN Cough  hyoscyamine SL 0.125 milliGRAM(s) SubLingual four times a day PRN abdominal spasm  methocarbamol 500 milliGRAM(s) Oral three times a day PRN Muscle Spasm    Allergies  shellfish (Rash)  No Known Drug Allergies    Vital Signs Last 24 Hrs  T(C): 36.5 (19 Jun 2023 07:44), Max: 36.8 (18 Jun 2023 16:15)  T(F): 97.7 (19 Jun 2023 07:44), Max: 98.2 (18 Jun 2023 16:15)  HR: 62 (19 Jun 2023 07:44) (61 - 67)  BP: 143/63 (19 Jun 2023 07:44) (132/59 - 178/62)  BP(mean): --  RR: 17 (19 Jun 2023 07:44) (17 - 18)  SpO2: 95% (19 Jun 2023 07:44) (95% - 100%)    Parameters below as of 19 Jun 2023 07:44  Patient On (Oxygen Delivery Method): room air      PHYSICAL EXAM:  General: No apparent distress  Neck: Supple, trachea midline, no thyromegaly  Respiratory: Lungs clear bilaterally, normal rate, effort  Cardiac: +S1, S2, no m/r/g  GI: +BS, soft, non tender, non distended  Extremities: No peripheral edema, no pedal lesions  Neuro: A+O X3, no tremor    LABS:    06-18    133<L>  |  95<L>  |  42.6<H>  ----------------------------<  164<H>  4.7   |  24.0  |  6.28<H>    Ca    8.2<L>      18 Jun 2023 05:34      POCT Blood Glucose.: 99 mg/dL (06-19-23 @ 12:02)  POCT Blood Glucose.: 99 mg/dL (06-19-23 @ 07:59)  POCT Blood Glucose.: 176 mg/dL (06-18-23 @ 22:27)  POCT Blood Glucose.: 204 mg/dL (06-18-23 @ 21:08)  POCT Blood Glucose.: 79 mg/dL (06-18-23 @ 17:04)        Thyroid Stimulating Hormone, Serum: 4.14 uIU/mL (06-12-23 @ 21:50)

## 2023-06-19 NOTE — PROGRESS NOTE ADULT - ASSESSMENT
63M with PMHx ESRD on HD (T,Th, S), IDDM, COPD, gastroparesis, peripheral blindness and retinopathy, and CAD, who initially presented to [facility] on 5/31 with n/v/d, missing his HD as a result, and found to be hyperkalemic with acute respiratory failure secondary to fluid overload vs pneumonia and NSTEMI in the context of C. diff colitis (positive on 6/1). Completed 10 day course of oral vancomycin on 6/12 per chart.  Patient became fluid overloaded during hospitalization at [facility] requiring HFNC, improved with additional HD. Blood culture negative to date, patient received 1 dose of IV cefepime. Patient tolerating 2L NC prior to transfer to [facility].  Patient found to have NSTEMI s/p heparin drip 6/10, with cardiac cath 6/12 demonstrating MVD requiring transfer to [facility] for CT Surgery evaluation for possible CABG. CTS evaluated and recommend he f/u o/p with cardiology for PCI.    1- Enterocolitis- Adenovirus and known C diff colitis from [facility] records   , present on admission   improving diarrhea     Completed 10 day course of oral vancomycin on 6/12 per chart  GI PCR + for Adenovirus.  Cont Vanco PO  Gi rec appreciated   follows at [facility] for Chronic  diarrhea.   D/W ID- recs appreciated no need for Vanco long regimen.   To complete RPt Vanco total 10 day course star date 6/15th.     2-Gastroparesis  has Gastric Pacer  CT findings noted  GI follow up noted     3- NSTEMI S/p [facility] with severe MVD  CTS evaluated and recommend he f/u o/p with cardiology for PCI.  Plavix was put on hold prior to sending him here  cont now   Cont ASA, BB, statins, ARB  o/p f/u with Dr Becerra for high risk PCI    4- Cronic  CHFrEF and grade 2 Diastolic HF   HTN  cont Toprol and Losartan   Cards follow up   added hydralazine for better BP control on 6/18   will cont to monitor     5-ESRD on HD TTS  cont HD per schedule     6- DM2 uncontrolled  cont lantus and ISS   uptitrate as needed

## 2023-06-19 NOTE — PROGRESS NOTE ADULT - NUTRITIONAL ASSESSMENT
This patient has been assessed with a concern for Malnutrition and has been determined to have a diagnosis/diagnoses of Moderate protein-calorie malnutrition.    This patient is being managed with:   Diet DASH/TLC-  Sodium & Cholesterol Restricted  Consistent Carbohydrate {No Snacks} (CSTCHO)  For patients receiving Renal Replacement - No Protein Restr No Conc K No Conc Phos Low  Sodium (RENAL)  Entered: Jun 13 2023  8:07PM  
This patient has been assessed with a concern for Malnutrition and has been determined to have a diagnosis/ diagnoses of Moderate protein-calorie malnutrition.    This patient is being managed with:   Diet DASH/ TLC-  Sodium & Cholesterol Restricted  Consistent Carbohydrate {No Snacks} (CSTCHO)  For patients receiving Renal Replacement - No Protein Restr No Conc K No Conc Phos Low  Sodium (RENAL)  Entered: Jun 13 2023  8:07PM
This patient has been assessed with a concern for Malnutrition and has been determined to have a diagnosis/diagnoses of Moderate protein-calorie malnutrition.    This patient is being managed with:   Diet DASH/TLC-  Sodium & Cholesterol Restricted  Consistent Carbohydrate {No Snacks} (CSTCHO)  For patients receiving Renal Replacement - No Protein Restr No Conc K No Conc Phos Low  Sodium (RENAL)  Entered: Jun 13 2023  8:07PM  

## 2023-06-19 NOTE — PROGRESS NOTE ADULT - ASSESSMENT
The patient is a 63 year old legally blind male with PMH of DM complicated by neuropathy, HTN, HLD, CAD, PVD, gastroparesis, ESRD on HD TTS and BPH initially presented to [facility] on 5/31 with N/V/D after missing hemodialysis. Admitted for hyperkalemic and acute respiratory failure secondary to fluid overload vs pneumonia. Hospital course notable for NSTEMI with cardiac cath on 06/12 showing multivessel disease. He was transferred to [facility] on 06/13/2023 for CABG evaluation. Nephrology is consulted for resumption of hemodialysis.     ESRD on HD  TTS schedule - ? unknown outpt unit  Access: R AV fistula   HD today  Pt seen and re-evaluated on HD  tolerating well; no issues with access  goal UF 2. L/ 3 hours    HTN  BP high  UF as above  increase Losartan if BP remains high      Anemia   Hb at goal  EAS with HD; will decrease dose to 4000 IU tiw     Mineral Bone Disease in setting of CKD   - continue oral phos binders with meals   Monitor Ca++ and phos

## 2023-06-20 ENCOUNTER — FORM ENCOUNTER (OUTPATIENT)
Age: 63
End: 2023-06-20

## 2023-07-06 ENCOUNTER — APPOINTMENT (OUTPATIENT)
Dept: CARDIOLOGY | Facility: CLINIC | Age: 63
End: 2023-07-06
Payer: MEDICAID

## 2023-07-06 VITALS
BODY MASS INDEX: 22.88 KG/M2 | TEMPERATURE: 97.7 F | WEIGHT: 134 LBS | HEIGHT: 64 IN | DIASTOLIC BLOOD PRESSURE: 68 MMHG | SYSTOLIC BLOOD PRESSURE: 142 MMHG | OXYGEN SATURATION: 96 % | HEART RATE: 66 BPM

## 2023-07-06 PROCEDURE — 99203 OFFICE O/P NEW LOW 30 MIN: CPT

## 2023-07-06 PROCEDURE — 93000 ELECTROCARDIOGRAM COMPLETE: CPT | Mod: NC

## 2023-07-06 RX ORDER — MELATONIN 5 MG
5 CAPSULE ORAL AT BEDTIME
Refills: 0 | Status: ACTIVE | COMMUNITY

## 2023-07-06 RX ORDER — HYOSCYAMINE SULFATE 0.12 MG/1
0.12 TABLET ORAL
Refills: 0 | Status: ACTIVE | COMMUNITY

## 2023-07-06 RX ORDER — TAMSULOSIN HYDROCHLORIDE 0.4 MG/1
0.4 CAPSULE ORAL DAILY
Refills: 0 | Status: ACTIVE | COMMUNITY

## 2023-07-06 RX ORDER — NITROGLYCERIN 0.4 MG/1
0.4 TABLET SUBLINGUAL
Qty: 3 | Refills: 0 | Status: ACTIVE | COMMUNITY
Start: 2023-07-05 | End: 1900-01-01

## 2023-07-06 RX ORDER — AMLODIPINE BESYLATE 10 MG/1
10 TABLET ORAL DAILY
Refills: 0 | Status: ACTIVE | COMMUNITY

## 2023-07-06 RX ORDER — MONTELUKAST 10 MG/1
10 TABLET, FILM COATED ORAL DAILY
Refills: 0 | Status: ACTIVE | COMMUNITY

## 2023-07-11 NOTE — DISCUSSION/SUMMARY
[FreeTextEntry1] : Had a lengthy discussion with patient and family. Images reviewed with patient. \par COnsidering extent of disease, previous significant instent restenosis, patient would be considered a better surgical candidate. \par Discussed with Dr. Erick Newsome. He is on Plavix, this will have to be held as per protocol. \par Add imdur for medical management. \par  [EKG obtained to assist in diagnosis and management of assessed problem(s)] : EKG obtained to assist in diagnosis and management of assessed problem(s)

## 2023-07-11 NOTE — HISTORY OF PRESENT ILLNESS
[FreeTextEntry1] : Patient with history of CAD, ESRD, recent c diff presenting for evaluation for pCI for significant multivessel disease. \par Patient was trasnferred from [place] one month prior to [facility] for CABG but due to conditions at the time, was not considered to be a CABG candidate. \par Now doing well. \par Has had chest pain episodes.

## 2023-07-12 ENCOUNTER — APPOINTMENT (OUTPATIENT)
Dept: CARDIOTHORACIC SURGERY | Facility: CLINIC | Age: 63
End: 2023-07-12
Payer: SELF-PAY

## 2023-07-12 VITALS
HEIGHT: 64 IN | BODY MASS INDEX: 23.56 KG/M2 | OXYGEN SATURATION: 98 % | RESPIRATION RATE: 14 BRPM | TEMPERATURE: 98 F | SYSTOLIC BLOOD PRESSURE: 157 MMHG | WEIGHT: 138 LBS | DIASTOLIC BLOOD PRESSURE: 64 MMHG | HEART RATE: 65 BPM

## 2023-07-12 DIAGNOSIS — H54.8 LEGAL BLINDNESS, AS DEFINED IN USA: ICD-10-CM

## 2023-07-12 DIAGNOSIS — Z87.898 PERSONAL HISTORY OF OTHER SPECIFIED CONDITIONS: ICD-10-CM

## 2023-07-12 DIAGNOSIS — Z84.1 FAMILY HISTORY OF DISORDERS OF KIDNEY AND URETER: ICD-10-CM

## 2023-07-12 DIAGNOSIS — Z86.79 PERSONAL HISTORY OF OTHER DISEASES OF THE CIRCULATORY SYSTEM: ICD-10-CM

## 2023-07-12 DIAGNOSIS — Z83.3 FAMILY HISTORY OF DIABETES MELLITUS: ICD-10-CM

## 2023-07-12 PROCEDURE — 99242 OFF/OP CONSLTJ NEW/EST SF 20: CPT

## 2023-07-12 RX ORDER — INSULIN LISPRO 100 [IU]/ML
100 INJECTION, SOLUTION INTRAVENOUS; SUBCUTANEOUS
Refills: 0 | Status: ACTIVE | COMMUNITY
Start: 2023-07-12

## 2023-07-12 RX ORDER — INSULIN GLARGINE 100 [IU]/ML
100 INJECTION, SOLUTION SUBCUTANEOUS AT BEDTIME
Refills: 0 | Status: ACTIVE | COMMUNITY
Start: 2023-07-12

## 2023-07-12 RX ORDER — VANCOMYCIN HYDROCHLORIDE 250 MG/1
250 CAPSULE ORAL TWICE DAILY
Refills: 0 | Status: COMPLETED | COMMUNITY
End: 2023-07-12

## 2023-07-12 RX ORDER — ACETAMINOPHEN 325 MG/1
325 TABLET ORAL EVERY 8 HOURS
Refills: 0 | Status: COMPLETED | COMMUNITY
End: 2023-07-12

## 2023-07-12 NOTE — ASSESSMENT
[FreeTextEntry1] : Mr. LOCO is a 63 year old male referred by Dr. Becerra who presents for consultation. His past medical history includes IDDM, HTN, HLD, ESRD on hemodialysis for three years (HD on Tues, Thurs, Sat), gastroparesis, glaucoma (legally blind), peripheral vascular disease, and coronary artery disease (PCI), left foot partial amputation. \par \par He was evaluated at [facility] for high risk PCI stenting for his known multivessel CAD. Considering extent of disease, previous significant in-stent restenosis, it was discussed with Cardiology that he would be considered a better surgical candidate. He reports angina every day and shortness of breath when walking short distances. He is pain free today in the office. \par \par Cardiac imaging, medical records and reports reviewed at length with patient. Cardiac Catheterization from 06/17/23 LAD 70%, Distal DEISY ISR 70%, LCx 80%, OM 90%, RCA 80%, RPDA 90%.  Risks, benefits and alternatives to surgery discussed. Risks included but not limited to bleeding, risk of stroke, myocardial Infarction, kidney problems, blood transfusion, permanent pacemaker implantation, infections and death. Operative mortality and complication risks are low. Various approaches discussed in detail. The patient will benefit and is a candidate for surgical coronary revascularization. \par \par All questions have been fully answered and concerns addressed. Patient would like to proceed with surgery.\par \par Plan:\par 1) Plan for CABG \par 2) Report to ER if you develop any chest pain, palpitations, increasing SOB or CABAN or syncope \par \par \par

## 2023-07-12 NOTE — REVIEW OF SYSTEMS
[Feeling Tired] : feeling tired [Chest Pain] : chest pain [SOB on Exertion] : shortness of breath during exertion [Joint Stiffness] : joint stiffness [Easy Bleeding] : a tendency for easy bleeding [Eyesight Problems] : eyesight problems [Eye Pain] : no eye pain [Loss Of Hearing] : no hearing loss [Nosebleeds] : no nosebleeds [Lower Ext Edema] : no extremity edema [Abdominal Pain] : no abdominal pain [Vomiting] : no vomiting [Dizziness] : no dizziness

## 2023-07-12 NOTE — HISTORY OF PRESENT ILLNESS
[Diabetes Mellitus] : Diabetes Mellitus [Insulin] : controlled with insulin [Dyslipidemia] : Dyslipidemia [Hypertension] : Hypertension [Peripheral Artery Disease] : Peripheral Artery Disease [Anginal Classification within 2 wks] : Angina over the last 2 weeks [FreeTextEntry1] : Mr. LOCO is a 63 year old male referred by Dr. Becerra who presents for consultation. His past medical history includes IDDM, HTN, HLD, ESRD on hemodialysis for three years (HD on Tues, Thurs, Sat), gastroparesis, glaucoma (legally blind), peripheral vascular disease, and coronary artery disease (PCI), left foot partial amputation. \par \par He was evaluated at [facility] for high risk PCI stenting for his known multivessel CAD. Considering extent of disease, previous significant in-stent restenosis, it was discussed with Cardiology that he would be considered a better surgical candidate. He reports angina every day and shortness of breath when walking short distances. He is pain free today in the office. \par \par \par \par  [Dialysis] : no dialysis [Infectious Endocarditis] : no infectious endocarditis [Home Oxygen] : no home oxygen use [Inhaled Medication Therapy] : no inhaled medication therapy [Immunocompromise Present] : not immunocompromised [Unresponsive Neurologic State] : not in a unresponsive neurologic state [Syncope] : no syncope

## 2023-07-12 NOTE — CONSULT LETTER
[Dear  ___] : Dear  [unfilled], [Consult Letter:] : I had the pleasure of evaluating your patient, [unfilled]. [Please see my note below.] : Please see my note below. [Consult Closing:] : Thank you very much for allowing me to participate in the care of this patient.  If you have any questions, please do not hesitate to contact me. [Sincerely,] : Sincerely, [FreeTextEntry2] : Jaleel Becerra MD [FreeTextEntry3] : Erick Newsome MD\par Chief, Cardiovascular Surgery at [facility]\par System Director of Surgical Heart Failure\par Professor, Cardiothoracic Surgery, [employer] [facility] [employer]\par \par

## 2023-07-12 NOTE — PHYSICAL EXAM
[General Appearance - Alert] : alert [Jugular Venous Distention Increased] : there was no jugular-venous distention [Respiration, Rhythm And Depth] : normal respiratory rhythm and effort [Auscultation Breath Sounds / Voice Sounds] : lungs were clear to auscultation bilaterally [Heart Rate And Rhythm] : heart rate was normal and rhythm regular [Examination Of The Chest] : the chest was normal in appearance [Skin Color & Pigmentation] : normal skin color and pigmentation [Oriented To Time, Place, And Person] : oriented to person, place, and time [Bowel Sounds] : normal bowel sounds [FreeTextEntry1] : RIGHT UPPER EXTREMITY AV FISTULA

## 2023-07-12 NOTE — DATA REVIEWED
[FreeTextEntry1] : Cardiac Catheterization from 06/17/23\par - LAD 70%\par - Distal DEISY ISR 70%\par - LCx 80%\par - OM 90%\par - RCA 80%\par - RPDA 90%\par \par \par \par Transthoracic Echocardiogram from 06/13/23 at [facility] \par - LVEF 35-40%\par - Moderately to severely decreased LVEF\par - Moderate to severe left atrial enlargement\par - Mild MVR \par - Sclerotic aortic valve with normal opening\par - There is a small pleural effusion in both left and right lateral regions

## 2023-07-12 NOTE — REASON FOR VISIT
[Consultation] : a consultation visit [Spouse] : spouse [FreeTextEntry1] : multivessel coronary artery disease

## 2023-07-12 NOTE — END OF VISIT
[FreeTextEntry3] : I, Dr. Erick Newsome, personally performed the evaluation and management (E/M) services for this new patient.  That E/M includes conducting the initial examination, assessing all conditions, and establishing the plan of care.  Today, Shweta Hillman NP was here to observe my evaluation and management services for this patient to be followed going forward.\par

## 2023-07-13 ENCOUNTER — INPATIENT (INPATIENT)
Facility: HOSPITAL | Age: 63
LOS: 17 days | Discharge: HOME | DRG: 235 | End: 2023-07-31
Attending: THORACIC SURGERY (CARDIOTHORACIC VASCULAR SURGERY) | Admitting: THORACIC SURGERY (CARDIOTHORACIC VASCULAR SURGERY)
Payer: MEDICAID

## 2023-07-13 ENCOUNTER — TRANSCRIPTION ENCOUNTER (OUTPATIENT)
Age: 63
End: 2023-07-13

## 2023-07-13 VITALS
HEIGHT: 63 IN | HEART RATE: 65 BPM | TEMPERATURE: 98 F | OXYGEN SATURATION: 96 % | DIASTOLIC BLOOD PRESSURE: 62 MMHG | SYSTOLIC BLOOD PRESSURE: 142 MMHG | WEIGHT: 136.03 LBS

## 2023-07-13 DIAGNOSIS — Z29.9 ENCOUNTER FOR PROPHYLACTIC MEASURES, UNSPECIFIED: ICD-10-CM

## 2023-07-13 DIAGNOSIS — I24.9 ACUTE ISCHEMIC HEART DISEASE, UNSPECIFIED: ICD-10-CM

## 2023-07-13 DIAGNOSIS — I21.4 NON-ST ELEVATION (NSTEMI) MYOCARDIAL INFARCTION: ICD-10-CM

## 2023-07-13 DIAGNOSIS — I25.10 ATHEROSCLEROTIC HEART DISEASE OF NATIVE CORONARY ARTERY WITHOUT ANGINA PECTORIS: ICD-10-CM

## 2023-07-13 DIAGNOSIS — N18.6 END STAGE RENAL DISEASE: ICD-10-CM

## 2023-07-13 DIAGNOSIS — I20.0 UNSTABLE ANGINA: ICD-10-CM

## 2023-07-13 LAB
ALBUMIN SERPL ELPH-MCNC: 3.7 G/DL — SIGNIFICANT CHANGE UP (ref 3.3–5.2)
ALBUMIN SERPL ELPH-MCNC: 4 G/DL — SIGNIFICANT CHANGE UP (ref 3.3–5.2)
ALP SERPL-CCNC: 102 U/L — SIGNIFICANT CHANGE UP (ref 40–120)
ALP SERPL-CCNC: 85 U/L — SIGNIFICANT CHANGE UP (ref 40–120)
ALT FLD-CCNC: 15 U/L — SIGNIFICANT CHANGE UP
ALT FLD-CCNC: 18 U/L — SIGNIFICANT CHANGE UP
ANION GAP SERPL CALC-SCNC: 14 MMOL/L — SIGNIFICANT CHANGE UP (ref 5–17)
ANION GAP SERPL CALC-SCNC: 16 MMOL/L — SIGNIFICANT CHANGE UP (ref 5–17)
ANISOCYTOSIS BLD QL: SLIGHT — SIGNIFICANT CHANGE UP
APTT BLD: 33.4 SEC — SIGNIFICANT CHANGE UP (ref 27.5–35.5)
APTT BLD: 44.4 SEC — HIGH (ref 27.5–35.5)
AST SERPL-CCNC: 12 U/L — SIGNIFICANT CHANGE UP
AST SERPL-CCNC: 14 U/L — SIGNIFICANT CHANGE UP
BASE EXCESS BLDV CALC-SCNC: 4.2 MMOL/L — HIGH (ref -2–3)
BASOPHILS # BLD AUTO: 0 K/UL — SIGNIFICANT CHANGE UP (ref 0–0.2)
BASOPHILS NFR BLD AUTO: 0 % — SIGNIFICANT CHANGE UP (ref 0–2)
BILIRUB SERPL-MCNC: 0.4 MG/DL — SIGNIFICANT CHANGE UP (ref 0.4–2)
BILIRUB SERPL-MCNC: 0.4 MG/DL — SIGNIFICANT CHANGE UP (ref 0.4–2)
BLD GP AB SCN SERPL QL: SIGNIFICANT CHANGE UP
BUN SERPL-MCNC: 47.5 MG/DL — HIGH (ref 8–20)
BUN SERPL-MCNC: 50.1 MG/DL — HIGH (ref 8–20)
CA-I SERPL-SCNC: 0.93 MMOL/L — LOW (ref 1.15–1.33)
CALCIUM SERPL-MCNC: 8.1 MG/DL — LOW (ref 8.4–10.5)
CALCIUM SERPL-MCNC: 8.4 MG/DL — SIGNIFICANT CHANGE UP (ref 8.4–10.5)
CHLORIDE BLDV-SCNC: 90 MMOL/L — LOW (ref 96–108)
CHLORIDE SERPL-SCNC: 86 MMOL/L — LOW (ref 96–108)
CHLORIDE SERPL-SCNC: 91 MMOL/L — LOW (ref 96–108)
CO2 SERPL-SCNC: 28 MMOL/L — SIGNIFICANT CHANGE UP (ref 22–29)
CO2 SERPL-SCNC: 29 MMOL/L — SIGNIFICANT CHANGE UP (ref 22–29)
CREAT SERPL-MCNC: 8.7 MG/DL — HIGH (ref 0.5–1.3)
CREAT SERPL-MCNC: 8.81 MG/DL — HIGH (ref 0.5–1.3)
EGFR: 6 ML/MIN/1.73M2 — LOW
EGFR: 6 ML/MIN/1.73M2 — LOW
ELLIPTOCYTES BLD QL SMEAR: SLIGHT — SIGNIFICANT CHANGE UP
EOSINOPHIL # BLD AUTO: 0.17 K/UL — SIGNIFICANT CHANGE UP (ref 0–0.5)
EOSINOPHIL NFR BLD AUTO: 2.6 % — SIGNIFICANT CHANGE UP (ref 0–6)
GAS PNL BLDV: 128 MMOL/L — LOW (ref 136–145)
GAS PNL BLDV: SIGNIFICANT CHANGE UP
GIANT PLATELETS BLD QL SMEAR: PRESENT — SIGNIFICANT CHANGE UP
GLUCOSE BLDC GLUCOMTR-MCNC: 136 MG/DL — HIGH (ref 70–99)
GLUCOSE BLDC GLUCOMTR-MCNC: 146 MG/DL — HIGH (ref 70–99)
GLUCOSE BLDC GLUCOMTR-MCNC: 60 MG/DL — LOW (ref 70–99)
GLUCOSE BLDV-MCNC: 318 MG/DL — HIGH (ref 70–99)
GLUCOSE SERPL-MCNC: 147 MG/DL — HIGH (ref 70–99)
GLUCOSE SERPL-MCNC: 316 MG/DL — HIGH (ref 70–99)
HCO3 BLDV-SCNC: 26 MMOL/L — SIGNIFICANT CHANGE UP (ref 22–29)
HCT VFR BLD CALC: 29.4 % — LOW (ref 39–50)
HCT VFR BLD CALC: 32.1 % — LOW (ref 39–50)
HCT VFR BLDA CALC: 33 % — SIGNIFICANT CHANGE UP
HGB BLD CALC-MCNC: 11.1 G/DL — LOW (ref 12.6–17.4)
HGB BLD-MCNC: 10.8 G/DL — LOW (ref 13–17)
HGB BLD-MCNC: 9.9 G/DL — LOW (ref 13–17)
HYPOCHROMIA BLD QL: SLIGHT — SIGNIFICANT CHANGE UP
INR BLD: 1.04 RATIO — SIGNIFICANT CHANGE UP (ref 0.88–1.16)
LACTATE BLDV-MCNC: 4 MMOL/L — CRITICAL HIGH (ref 0.5–2)
LACTATE SERPL-SCNC: 1.4 MMOL/L — SIGNIFICANT CHANGE UP (ref 0.5–2)
LIDOCAIN IGE QN: 15 U/L — LOW (ref 22–51)
LYMPHOCYTES # BLD AUTO: 1.01 K/UL — SIGNIFICANT CHANGE UP (ref 1–3.3)
LYMPHOCYTES # BLD AUTO: 15.9 % — SIGNIFICANT CHANGE UP (ref 13–44)
MACROCYTES BLD QL: SLIGHT — SIGNIFICANT CHANGE UP
MAGNESIUM SERPL-MCNC: 2.4 MG/DL — SIGNIFICANT CHANGE UP (ref 1.6–2.6)
MANUAL SMEAR VERIFICATION: SIGNIFICANT CHANGE UP
MCHC RBC-ENTMCNC: 29.5 PG — SIGNIFICANT CHANGE UP (ref 27–34)
MCHC RBC-ENTMCNC: 30.1 PG — SIGNIFICANT CHANGE UP (ref 27–34)
MCHC RBC-ENTMCNC: 33.6 GM/DL — SIGNIFICANT CHANGE UP (ref 32–36)
MCHC RBC-ENTMCNC: 33.7 GM/DL — SIGNIFICANT CHANGE UP (ref 32–36)
MCV RBC AUTO: 87.7 FL — SIGNIFICANT CHANGE UP (ref 80–100)
MCV RBC AUTO: 89.4 FL — SIGNIFICANT CHANGE UP (ref 80–100)
METAMYELOCYTES # FLD: 0.9 % — HIGH (ref 0–0)
MICROCYTES BLD QL: SLIGHT — SIGNIFICANT CHANGE UP
MONOCYTES # BLD AUTO: 0.85 K/UL — SIGNIFICANT CHANGE UP (ref 0–0.9)
MONOCYTES NFR BLD AUTO: 13.3 % — SIGNIFICANT CHANGE UP (ref 2–14)
MYELOCYTES NFR BLD: 0.9 % — HIGH (ref 0–0)
NEUTROPHILS # BLD AUTO: 4.22 K/UL — SIGNIFICANT CHANGE UP (ref 1.8–7.4)
NEUTROPHILS NFR BLD AUTO: 66.4 % — SIGNIFICANT CHANGE UP (ref 43–77)
NT-PROBNP SERPL-SCNC: HIGH PG/ML (ref 0–300)
OVALOCYTES BLD QL SMEAR: SLIGHT — SIGNIFICANT CHANGE UP
PCO2 BLDV: 27 MMHG — LOW (ref 42–55)
PH BLDV: 7.59 — HIGH (ref 7.32–7.43)
PLAT MORPH BLD: NORMAL — SIGNIFICANT CHANGE UP
PLATELET # BLD AUTO: 128 K/UL — LOW (ref 150–400)
PLATELET # BLD AUTO: 138 K/UL — LOW (ref 150–400)
PO2 BLDV: 205 MMHG — HIGH (ref 25–45)
POIKILOCYTOSIS BLD QL AUTO: SLIGHT — SIGNIFICANT CHANGE UP
POLYCHROMASIA BLD QL SMEAR: SLIGHT — SIGNIFICANT CHANGE UP
POTASSIUM BLDV-SCNC: 6.1 MMOL/L — HIGH (ref 3.5–5.1)
POTASSIUM SERPL-MCNC: 4.6 MMOL/L — SIGNIFICANT CHANGE UP (ref 3.5–5.3)
POTASSIUM SERPL-MCNC: 6.4 MMOL/L — CRITICAL HIGH (ref 3.5–5.3)
POTASSIUM SERPL-SCNC: 4.6 MMOL/L — SIGNIFICANT CHANGE UP (ref 3.5–5.3)
POTASSIUM SERPL-SCNC: 6.4 MMOL/L — CRITICAL HIGH (ref 3.5–5.3)
PROT SERPL-MCNC: 6.3 G/DL — LOW (ref 6.6–8.7)
PROT SERPL-MCNC: 7 G/DL — SIGNIFICANT CHANGE UP (ref 6.6–8.7)
PROTHROM AB SERPL-ACNC: 12.1 SEC — SIGNIFICANT CHANGE UP (ref 10.5–13.4)
RBC # BLD: 3.29 M/UL — LOW (ref 4.2–5.8)
RBC # BLD: 3.66 M/UL — LOW (ref 4.2–5.8)
RBC # FLD: 14.4 % — SIGNIFICANT CHANGE UP (ref 10.3–14.5)
RBC # FLD: 14.5 % — SIGNIFICANT CHANGE UP (ref 10.3–14.5)
RBC BLD AUTO: ABNORMAL
SAO2 % BLDV: 100 % — SIGNIFICANT CHANGE UP
SMUDGE CELLS # BLD: PRESENT — SIGNIFICANT CHANGE UP
SODIUM SERPL-SCNC: 130 MMOL/L — LOW (ref 135–145)
SODIUM SERPL-SCNC: 134 MMOL/L — LOW (ref 135–145)
TROPONIN T SERPL-MCNC: 0.08 NG/ML — HIGH (ref 0–0.06)
WBC # BLD: 6.31 K/UL — SIGNIFICANT CHANGE UP (ref 3.8–10.5)
WBC # BLD: 6.36 K/UL — SIGNIFICANT CHANGE UP (ref 3.8–10.5)
WBC # FLD AUTO: 6.31 K/UL — SIGNIFICANT CHANGE UP (ref 3.8–10.5)
WBC # FLD AUTO: 6.36 K/UL — SIGNIFICANT CHANGE UP (ref 3.8–10.5)

## 2023-07-13 PROCEDURE — 99291 CRITICAL CARE FIRST HOUR: CPT

## 2023-07-13 PROCEDURE — 90935 HEMODIALYSIS ONE EVALUATION: CPT

## 2023-07-13 PROCEDURE — 99223 1ST HOSP IP/OBS HIGH 75: CPT | Mod: 25

## 2023-07-13 PROCEDURE — 99222 1ST HOSP IP/OBS MODERATE 55: CPT | Mod: 57

## 2023-07-13 PROCEDURE — 99255 IP/OBS CONSLTJ NEW/EST HI 80: CPT | Mod: 57

## 2023-07-13 PROCEDURE — 71045 X-RAY EXAM CHEST 1 VIEW: CPT | Mod: 26,77

## 2023-07-13 PROCEDURE — 71045 X-RAY EXAM CHEST 1 VIEW: CPT | Mod: 26

## 2023-07-13 RX ORDER — CHLORHEXIDINE GLUCONATE 213 G/1000ML
1 SOLUTION TOPICAL
Refills: 0 | Status: DISCONTINUED | OUTPATIENT
Start: 2023-07-13 | End: 2023-07-14

## 2023-07-13 RX ORDER — ALBUTEROL 90 UG/1
10 AEROSOL, METERED ORAL ONCE
Refills: 0 | Status: COMPLETED | OUTPATIENT
Start: 2023-07-13 | End: 2023-07-13

## 2023-07-13 RX ORDER — MORPHINE SULFATE 50 MG/1
4 CAPSULE, EXTENDED RELEASE ORAL ONCE
Refills: 0 | Status: DISCONTINUED | OUTPATIENT
Start: 2023-07-13 | End: 2023-07-13

## 2023-07-13 RX ORDER — VANCOMYCIN HCL 1 G
1000 VIAL (EA) INTRAVENOUS ONCE
Refills: 0 | Status: DISCONTINUED | OUTPATIENT
Start: 2023-07-13 | End: 2023-07-14

## 2023-07-13 RX ORDER — NITROGLYCERIN 6.5 MG
0.4 CAPSULE, EXTENDED RELEASE ORAL ONCE
Refills: 0 | Status: COMPLETED | OUTPATIENT
Start: 2023-07-13 | End: 2023-07-13

## 2023-07-13 RX ORDER — METHOCARBAMOL 500 MG/1
500 TABLET, FILM COATED ORAL
Refills: 0 | Status: DISCONTINUED | OUTPATIENT
Start: 2023-07-13 | End: 2023-07-14

## 2023-07-13 RX ORDER — CEFUROXIME AXETIL 250 MG
1500 TABLET ORAL ONCE
Refills: 0 | Status: DISCONTINUED | OUTPATIENT
Start: 2023-07-14 | End: 2023-07-14

## 2023-07-13 RX ORDER — ERYTHROPOIETIN 10000 [IU]/ML
10000 INJECTION, SOLUTION INTRAVENOUS; SUBCUTANEOUS
Refills: 0 | Status: DISCONTINUED | OUTPATIENT
Start: 2023-07-13 | End: 2023-07-13

## 2023-07-13 RX ORDER — HYDRALAZINE HCL 50 MG
50 TABLET ORAL EVERY 12 HOURS
Refills: 0 | Status: DISCONTINUED | OUTPATIENT
Start: 2023-07-13 | End: 2023-07-13

## 2023-07-13 RX ORDER — HYOSCYAMINE SULFATE 0.13 MG
0.12 TABLET ORAL EVERY 6 HOURS
Refills: 0 | Status: DISCONTINUED | OUTPATIENT
Start: 2023-07-13 | End: 2023-07-14

## 2023-07-13 RX ORDER — LORATADINE 10 MG/1
10 TABLET ORAL DAILY
Refills: 0 | Status: DISCONTINUED | OUTPATIENT
Start: 2023-07-13 | End: 2023-07-14

## 2023-07-13 RX ORDER — HEPARIN SODIUM 5000 [USP'U]/ML
INJECTION INTRAVENOUS; SUBCUTANEOUS
Qty: 25000 | Refills: 0 | Status: DISCONTINUED | OUTPATIENT
Start: 2023-07-13 | End: 2023-07-14

## 2023-07-13 RX ORDER — HEPARIN SODIUM 5000 [USP'U]/ML
3800 INJECTION INTRAVENOUS; SUBCUTANEOUS EVERY 6 HOURS
Refills: 0 | Status: DISCONTINUED | OUTPATIENT
Start: 2023-07-13 | End: 2023-07-14

## 2023-07-13 RX ORDER — LANOLIN ALCOHOL/MO/W.PET/CERES
5 CREAM (GRAM) TOPICAL AT BEDTIME
Refills: 0 | Status: DISCONTINUED | OUTPATIENT
Start: 2023-07-13 | End: 2023-07-14

## 2023-07-13 RX ORDER — METOPROLOL TARTRATE 50 MG
100 TABLET ORAL DAILY
Refills: 0 | Status: DISCONTINUED | OUTPATIENT
Start: 2023-07-13 | End: 2023-07-14

## 2023-07-13 RX ORDER — SODIUM CHLORIDE 9 MG/ML
3 INJECTION INTRAMUSCULAR; INTRAVENOUS; SUBCUTANEOUS EVERY 8 HOURS
Refills: 0 | Status: DISCONTINUED | OUTPATIENT
Start: 2023-07-13 | End: 2023-07-14

## 2023-07-13 RX ORDER — TAMSULOSIN HYDROCHLORIDE 0.4 MG/1
0.4 CAPSULE ORAL AT BEDTIME
Refills: 0 | Status: DISCONTINUED | OUTPATIENT
Start: 2023-07-13 | End: 2023-07-14

## 2023-07-13 RX ORDER — NITROGLYCERIN 6.5 MG
10 CAPSULE, EXTENDED RELEASE ORAL
Qty: 50 | Refills: 0 | Status: DISCONTINUED | OUTPATIENT
Start: 2023-07-13 | End: 2023-07-14

## 2023-07-13 RX ORDER — NITROGLYCERIN 6.5 MG
0.4 CAPSULE, EXTENDED RELEASE ORAL
Refills: 0 | Status: DISCONTINUED | OUTPATIENT
Start: 2023-07-13 | End: 2023-07-13

## 2023-07-13 RX ORDER — GABAPENTIN 400 MG/1
300 CAPSULE ORAL
Refills: 0 | Status: DISCONTINUED | OUTPATIENT
Start: 2023-07-13 | End: 2023-07-14

## 2023-07-13 RX ORDER — METOPROLOL TARTRATE 50 MG
5 TABLET ORAL ONCE
Refills: 0 | Status: COMPLETED | OUTPATIENT
Start: 2023-07-13 | End: 2023-07-13

## 2023-07-13 RX ORDER — HEPARIN SODIUM 5000 [USP'U]/ML
3800 INJECTION INTRAVENOUS; SUBCUTANEOUS ONCE
Refills: 0 | Status: COMPLETED | OUTPATIENT
Start: 2023-07-13 | End: 2023-07-13

## 2023-07-13 RX ORDER — CALCIUM GLUCONATE 100 MG/ML
2 VIAL (ML) INTRAVENOUS ONCE
Refills: 0 | Status: COMPLETED | OUTPATIENT
Start: 2023-07-13 | End: 2023-07-13

## 2023-07-13 RX ORDER — ERYTHROPOIETIN 10000 [IU]/ML
10000 INJECTION, SOLUTION INTRAVENOUS; SUBCUTANEOUS
Refills: 0 | Status: DISCONTINUED | OUTPATIENT
Start: 2023-07-13 | End: 2023-07-14

## 2023-07-13 RX ORDER — CALCIUM ACETATE 667 MG
667 TABLET ORAL
Refills: 0 | Status: DISCONTINUED | OUTPATIENT
Start: 2023-07-13 | End: 2023-07-14

## 2023-07-13 RX ORDER — PANTOPRAZOLE SODIUM 20 MG/1
40 TABLET, DELAYED RELEASE ORAL
Refills: 0 | Status: DISCONTINUED | OUTPATIENT
Start: 2023-07-13 | End: 2023-07-14

## 2023-07-13 RX ORDER — INSULIN HUMAN 100 [IU]/ML
10 INJECTION, SOLUTION SUBCUTANEOUS ONCE
Refills: 0 | Status: COMPLETED | OUTPATIENT
Start: 2023-07-13 | End: 2023-07-13

## 2023-07-13 RX ORDER — ASPIRIN/CALCIUM CARB/MAGNESIUM 324 MG
81 TABLET ORAL DAILY
Refills: 0 | Status: DISCONTINUED | OUTPATIENT
Start: 2023-07-13 | End: 2023-07-14

## 2023-07-13 RX ORDER — CHLORHEXIDINE GLUCONATE 213 G/1000ML
15 SOLUTION TOPICAL
Refills: 0 | Status: DISCONTINUED | OUTPATIENT
Start: 2023-07-13 | End: 2023-07-14

## 2023-07-13 RX ORDER — ASPIRIN/CALCIUM CARB/MAGNESIUM 324 MG
324 TABLET ORAL ONCE
Refills: 0 | Status: COMPLETED | OUTPATIENT
Start: 2023-07-13 | End: 2023-07-13

## 2023-07-13 RX ORDER — ATORVASTATIN CALCIUM 80 MG/1
80 TABLET, FILM COATED ORAL AT BEDTIME
Refills: 0 | Status: DISCONTINUED | OUTPATIENT
Start: 2023-07-13 | End: 2023-07-14

## 2023-07-13 RX ORDER — DEXTROSE 50 % IN WATER 50 %
50 SYRINGE (ML) INTRAVENOUS ONCE
Refills: 0 | Status: COMPLETED | OUTPATIENT
Start: 2023-07-13 | End: 2023-07-13

## 2023-07-13 RX ORDER — NITROGLYCERIN 6.5 MG
100 CAPSULE, EXTENDED RELEASE ORAL
Qty: 50 | Refills: 0 | Status: DISCONTINUED | OUTPATIENT
Start: 2023-07-13 | End: 2023-07-13

## 2023-07-13 RX ORDER — ACETAMINOPHEN 500 MG
650 TABLET ORAL EVERY 6 HOURS
Refills: 0 | Status: DISCONTINUED | OUTPATIENT
Start: 2023-07-13 | End: 2023-07-14

## 2023-07-13 RX ORDER — MORPHINE SULFATE 50 MG/1
2 CAPSULE, EXTENDED RELEASE ORAL ONCE
Refills: 0 | Status: DISCONTINUED | OUTPATIENT
Start: 2023-07-13 | End: 2023-07-13

## 2023-07-13 RX ADMIN — CHLORHEXIDINE GLUCONATE 15 MILLILITER(S): 213 SOLUTION TOPICAL at 18:05

## 2023-07-13 RX ADMIN — Medication 3 MICROGRAM(S)/MIN: at 10:41

## 2023-07-13 RX ADMIN — HEPARIN SODIUM 3800 UNIT(S): 5000 INJECTION INTRAVENOUS; SUBCUTANEOUS at 09:51

## 2023-07-13 RX ADMIN — ALBUTEROL 10 MILLIGRAM(S): 90 AEROSOL, METERED ORAL at 11:07

## 2023-07-13 RX ADMIN — Medication 100 MILLIGRAM(S): at 12:27

## 2023-07-13 RX ADMIN — Medication 2 GRAM(S): at 09:36

## 2023-07-13 RX ADMIN — LORATADINE 10 MILLIGRAM(S): 10 TABLET ORAL at 18:06

## 2023-07-13 RX ADMIN — Medication 5 MILLIGRAM(S): at 21:49

## 2023-07-13 RX ADMIN — MORPHINE SULFATE 2 MILLIGRAM(S): 50 CAPSULE, EXTENDED RELEASE ORAL at 10:10

## 2023-07-13 RX ADMIN — MORPHINE SULFATE 4 MILLIGRAM(S): 50 CAPSULE, EXTENDED RELEASE ORAL at 10:10

## 2023-07-13 RX ADMIN — TAMSULOSIN HYDROCHLORIDE 0.4 MILLIGRAM(S): 0.4 CAPSULE ORAL at 21:49

## 2023-07-13 RX ADMIN — ATORVASTATIN CALCIUM 80 MILLIGRAM(S): 80 TABLET, FILM COATED ORAL at 21:49

## 2023-07-13 RX ADMIN — MORPHINE SULFATE 4 MILLIGRAM(S): 50 CAPSULE, EXTENDED RELEASE ORAL at 09:04

## 2023-07-13 RX ADMIN — Medication 0.4 MILLIGRAM(S): at 09:36

## 2023-07-13 RX ADMIN — MORPHINE SULFATE 2 MILLIGRAM(S): 50 CAPSULE, EXTENDED RELEASE ORAL at 09:40

## 2023-07-13 RX ADMIN — Medication 667 MILLIGRAM(S): at 18:05

## 2023-07-13 RX ADMIN — Medication 50 MILLIGRAM(S): at 18:05

## 2023-07-13 RX ADMIN — GABAPENTIN 300 MILLIGRAM(S): 400 CAPSULE ORAL at 18:05

## 2023-07-13 RX ADMIN — CHLORHEXIDINE GLUCONATE 1 APPLICATION(S): 213 SOLUTION TOPICAL at 18:09

## 2023-07-13 RX ADMIN — HEPARIN SODIUM 750 UNIT(S)/HR: 5000 INJECTION INTRAVENOUS; SUBCUTANEOUS at 09:50

## 2023-07-13 RX ADMIN — HEPARIN SODIUM 850 UNIT(S)/HR: 5000 INJECTION INTRAVENOUS; SUBCUTANEOUS at 18:55

## 2023-07-13 RX ADMIN — Medication 50 MILLILITER(S): at 11:07

## 2023-07-13 RX ADMIN — Medication 200 GRAM(S): at 09:08

## 2023-07-13 RX ADMIN — SODIUM CHLORIDE 3 MILLILITER(S): 9 INJECTION INTRAMUSCULAR; INTRAVENOUS; SUBCUTANEOUS at 21:52

## 2023-07-13 RX ADMIN — Medication 324 MILLIGRAM(S): at 09:04

## 2023-07-13 RX ADMIN — METHOCARBAMOL 500 MILLIGRAM(S): 500 TABLET, FILM COATED ORAL at 18:06

## 2023-07-13 RX ADMIN — INSULIN HUMAN 10 UNIT(S): 100 INJECTION, SOLUTION SUBCUTANEOUS at 11:07

## 2023-07-13 RX ADMIN — SODIUM CHLORIDE 3 MILLILITER(S): 9 INJECTION INTRAMUSCULAR; INTRAVENOUS; SUBCUTANEOUS at 17:16

## 2023-07-13 NOTE — ED ADULT TRIAGE NOTE - CHIEF COMPLAINT QUOTE
pt c/o CP & shortness of breathe for a while, MD sent in for evaluation  A&Ox3, resp wnl, pt legally blind

## 2023-07-13 NOTE — CONSULT NOTE ADULT - ASSESSMENT
62 y/o male with PMH ESRD dialysis, IRDM, PVD toe amputations, HTN, HLD Ischemic cardiomyopathy ef 35%CAD stents s/p cath 6/23 with Lad 70%, Distal DEISY isr 70%, l circ 80%, Om 99% Rca 80% and Rpda 90%  referred for cabg  presents today with cp at home mid sternal. arrived in er with no st elevation  Developed chest pain and had elevation in AVR with st depression in inf/lateral which returned to Baseline when pain improved Bs and cp normalized and now EKG back to normal.  Patient is now comfortable  trop 0.08 bnp 26k

## 2023-07-13 NOTE — PROGRESS NOTE ADULT - SUBJECTIVE AND OBJECTIVE BOX
BENJAMIN LOCO  MRN-[id number]    HPI:  63M with PMHx ESRD on HD (T,Th, S), IDDM, COPD, gastroparesis, peripheral blindness and retinopathy, C. Diff, and NSTEMI 6/2022 with recent hospital admission for CABG eval now presents from home with complaints of worsening chest pain associated with difficulty breathing. In ER EKGs with intermittent diffuse ST changes. Pain slightly improved with 2 doses IV Morphine. Seen by cardiology and started on Tridil and Heparin gtt for NSTEMI. At time of exam, patient with labored breathing complaining of substernal chest pain radiating to entire body. Denies palpitations, dizziness, headache, abdominal pain or N/V/D/C.  (13 Jul 2023 10:13)      Surgery/Hospital Course:  Cardiac Catheterization from 06/17/23  - LAD 70%  - Distal DEISY ISR 70%  - LCx 80%  - OM 90%  - RCA 80%  - RPDA 90%    S/P IABP placed via LFA.  IABP 1:1. . 07/13/23    Today:  No acute events     ICU Vital Signs Last 24 Hrs  T(C): 36.5 (13 Jul 2023 16:00), Max: 36.8 (13 Jul 2023 12:22)  T(F): 97.7 (13 Jul 2023 16:00), Max: 98.2 (13 Jul 2023 12:22)  HR: 64 (13 Jul 2023 17:00) (63 - 79)  BP: 129/60 (13 Jul 2023 17:00) (126/60 - 182/74)  BP(mean): 87 (13 Jul 2023 17:00) (85 - 106)  ABP: --  ABP(mean): --  RR: 18 (13 Jul 2023 17:00) (12 - 32)  SpO2: 100% (13 Jul 2023 17:00) (96% - 100%)    O2 Parameters below as of 13 Jul 2023 17:00  Patient On (Oxygen Delivery Method): nasal cannula  O2 Flow (L/min): 2          Physical Exam:  Gen: A&O   CNS: non focal 	  Neck: no JVD  RES : clear , no wheezing              CVS: Regular  rhythm. Normal S1/S2  Abd: Soft, non-distended. Bowel sounds present.  Skin: No rash.  Ext:  no edema    ============================I/O===========================   I&O's Detail    13 Jul 2023 07:01  -  13 Jul 2023 18:12  --------------------------------------------------------  IN:    Heparin Infusion: 15 mL    Nitroglycerin: 30 mL  Total IN: 45 mL    OUT:  Total OUT: 0 mL    Total NET: 45 mL        ============================ LABS =========================                        10.8   6.36  )-----------( 138      ( 13 Jul 2023 09:10 )             32.1     07-13    134<L>  |  91<L>  |  50.1<H>  ----------------------------<  147<H>  4.6   |  29.0  |  8.70<H>    Ca    8.4      13 Jul 2023 13:03  Phos  5.2     07-13  Mg     2.4     07-13    TPro  6.3<L>  /  Alb  3.7  /  TBili  0.4  /  DBili  x   /  AST  12  /  ALT  15  /  AlkPhos  85  07-13    LIVER FUNCTIONS - ( 13 Jul 2023 13:03 )  Alb: 3.7 g/dL / Pro: 6.3 g/dL / ALK PHOS: 85 U/L / ALT: 15 U/L / AST: 12 U/L / GGT: x           PT/INR - ( 13 Jul 2023 09:10 )   PT: 12.1 sec;   INR: 1.04 ratio         PTT - ( 13 Jul 2023 09:10 )  PTT:33.4 sec    Urinalysis Basic - ( 13 Jul 2023 13:03 )    Color: x / Appearance: x / SG: x / pH: x  Gluc: 147 mg/dL / Ketone: x  / Bili: x / Urobili: x   Blood: x / Protein: x / Nitrite: x   Leuk Esterase: x / RBC: x / WBC x   Sq Epi: x / Non Sq Epi: x / Bacteria: x      ======================Micro/Rad/Cardio=================  Culture: Reviewed   CXR: Reviewed  Echo:Reviewed  ======================================================  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      S/P amputation  left toes      Stented coronary artery        ====================ASSESSMENT ==============      ---HTN (Hypertension)   ---Hypercholesterolemia   ---Diabetes Mellitus with Neuropathy   ---BPH (Benign Prostatic Hypertrophy)   ---CAD (coronary artery disease)  ---CKD (chronic kidney disease)  ---Osteomyelitis  ---Gastroparesis  ---PVD (peripheral vascular disease)  ---Legally blind  ---Congenital Anomaly of Foot  ---S/P amputation left toes  ---Stented coronary artery      Plan:  -ECG and Labs reviewed  -Aspirin 325mg po in ED  -Treated for hyperkalemia by ED provider  -Plan for HD later today  -Plan for CABG 7/14/23  -Heparin gtt as per protocol. -    ====================== NEUROLOGY=====================  acetaminophen     Tablet .. 650 milliGRAM(s) Oral every 6 hours PRN Mild Pain (1 - 3)  gabapentin 300 milliGRAM(s) Oral two times a day  melatonin 5 milliGRAM(s) Oral at bedtime  methocarbamol 500 milliGRAM(s) Oral two times a day    ==================== RESPIRATORY======================  loratadine 10 milliGRAM(s) Oral daily    ====================CARDIOVASCULAR==================  hydrALAZINE 50 milliGRAM(s) Oral every 12 hours  metoprolol succinate  milliGRAM(s) Oral daily  nitroglycerin  Infusion 10 MICROgram(s)/Min (3 mL/Hr) IV Continuous <Continuous>    ===================HEMATOLOGIC/ONC ===================  Monitor H&H/Plts    aspirin enteric coated 81 milliGRAM(s) Oral daily  heparin   Injectable 3800 Unit(s) IV Push every 6 hours PRN For aPTT less than 40  heparin  Infusion.  Unit(s)/Hr (7.5 mL/Hr) IV Continuous <Continuous>    ===================== RENAL =========================  Continue monitoring urine output, I&OS, BUN/Cr   tamsulosin 0.4 milliGRAM(s) Oral at bedtime    ==================== GASTROINTESTINAL===================  calcium acetate 667 milliGRAM(s) Oral three times a day with meals  hyoscyamine SL 0.125 milliGRAM(s) SubLingual every 6 hours PRN Bowel Spasms  pantoprazole    Tablet 40 milliGRAM(s) Oral before breakfast  sodium chloride 0.9% lock flush 3 milliLiter(s) IV Push every 8 hours    =======================    ENDOCRINE  =====================  atorvastatin 80 milliGRAM(s) Oral at bedtime    ========================INFECTIOUS DISEASE================  vancomycin  IVPB 1000 milliGRAM(s) IV Intermittent once    -Monitor Neurologic status ,   -Head of the bed should remain elevated to 45 degrees,  -Monitor for arrhythmias and monitor parameters for organ perfusion,  -Glycemic control is satisfactory,  -Nutritional goals will be met using po eventually , insure adequate caloric intake and monitor the same ,  -Electrolytes have been repleted as necessary , pain control has been achieved  and wound care has been carried out ,  -Stress ulcer and VTE prophylaxis will be achieved,  -Agressive PT and early mobility and ambulation goals will be met,  -The family was updated about the course and plan .      I have spent 35 minutes providing acute care for this critically ill patient     Patient requires continuous monitoring with bedside rhythm monitoring, pulse ox monitoring, and intermittent blood gas analysis. Care plan discussed with ICU care team. Patient remained critical and at risk for life threatening decompensation.

## 2023-07-13 NOTE — CONSULT NOTE ADULT - PROBLEM SELECTOR RECOMMENDATION 9
Asa  trend trops  iv heparin drip  Iv nitroglycerin Titrate to pain  Hold Plavix due to upcoming surgery  Balloon pump  per CV surgery decision -timing of surgery  c.w high intensity statin,   Hold Norvasc and optimize ntg    c/w coreg 25mg bid Asa  trend trops  iv heparin drip  Iv nitroglycerin Titrate to pain  Hold Plavix due to upcoming surgery  Portable echo  Balloon pump  per CV surgery decision -timing of surgery  c.w high intensity statin,   Hold Norvasc and optimize ntg    c/w coreg 25mg bid

## 2023-07-13 NOTE — H&P ADULT - HISTORY OF PRESENT ILLNESS
63M with PMHx ESRD on HD (T,Th, S), IDDM, COPD, gastroparesis, peripheral blindness and retinopathy, C. Diff, and NSTEMI 6/2022 with recent hospital admission for CABG gregor now presents from home with complaints of worsening chest pain associated with difficulty breathing. In ER EKGs with intermittent diffuse ST changes. Pain slightly improved with 2 doses IV Morphine. Seen by cardiology and started on Tridil and Heparin gtt for NSTEMI. At time of exam, patient with labored breathing complaining of substernal chest pain radiating to entire body. Denies palpitations, dizziness, headache, abdominal pain or N/V/D/C.

## 2023-07-13 NOTE — H&P ADULT - NSHPPHYSICALEXAM_GEN_ALL_CORE
Constitutional: NAD  Neuro: A+O x 2-3, non-focal, speech clear and intact  CV: regular rate, regular rhythm, +S1S2, no murmurs or rub  Pulm/chest: lung sounds CTA and equal bilaterally, no accessory muscle use noted  Abd: +BS, soft, NT, ND  Ext: BARRETO x 4, no C/C/E  Skin: warm, well perfused  Psych: calm, appropriate affect  Lines: Right AF fistula c/d/i, +bruit/+thrill

## 2023-07-13 NOTE — CONSULT NOTE ADULT - SUBJECTIVE AND OBJECTIVE BOX
[facility]                                              CARDIOLOGY AT [place] [address]                                             Telephone: [phone]. Fax:[phone]                                                         CARDIOLOGY CONSULTATION NOTE                                                                                             Consult requested by:    History obtained by: Patient and medical record  Community Cardiologist:    obtained: Yes [  ] No [  ]  Reason for Consultation:   Avialable out pt records reviewed: Yes [  ] No [  ]    Chief complaint:    Patient is a 63y old  Male who presents with a chief complaint of       HPI:        CARDIAC TESTING   ECHO:    STRESS:    CATH:     ELECTROPHYSIOLOGY:       PAST MEDICAL HISTORY  HTN (Hypertension) (ICD9 401.9)    Hypercholesterolemia (ICD9 272.0)    Diabetes Mellitus (ICD9 250.00)    Diabetes Mellitus with Neuropathy (ICD9 250.60)    BPH (Benign Prostatic Hypertrophy) (ICD9 600.00)    Glaucoma    CAD (coronary artery disease)    CKD (chronic kidney disease)    Osteomyelitis    Gastroparesis    PVD (peripheral vascular disease)    Legally blind          PAST SURGICAL HISTORY  Diabetes Mellitus with Neuropathy (ICD9 250.60)      S/P amputation foot    Stented coronary artery          SOCIAL HISTORY:  Denies smoking/alcohol/drugs  CIGARETTES:     ALCOHOL:  DRUGS:      FAMILY HISTORY:  FAMILY HISTORY:  No pertinent family history in first degree relatives        Family History of Cardiovascular Disease:  Yes [  ] No [  ]  Coronary Artery Disease in first degree relative: Yes [  ] No [  ]  Sudden Cardiac Death in First degree relative: Yes [  ] No [  ]      HOME MEDICATIONS:      CURRENT MEDICATIONS:  nitroglycerin  Infusion 100 MICROgram(s)/Min IV Continuous <Continuous>     morphine  - Injectable  heparin   Injectable  heparin  Infusion.        ALLERGIES: Allergies    No Known Drug Allergies  shellfish (Rash)    Intolerances          REVIEW OF SYMPTOMS:   CONSTITUTIONAL: No fever, no chills, no weight loss, no weight gain, no fatigue   ENMT:  No vertigo; No sinus or throat pain  NECK: No pain or stiffness  CARDIOVASCULAR: No chest pain, no dyspnea, no syncope/presyncope, no palpitations, no dizziness, no Orthopnea, no Paroxsymal nocturnal dyspnea  RESPIRATORY: no Shortness of breath, no cough, no wheezing  : No dysuria, no hematuria   GI: No dark color stool, no nausea, no diarrhea, no constipation, no abdominal pain   NEURO: No headache, no slurred speech   MUSCULOSKELETAL: No joint pain or swelling; No muscle, back, or extremity pain  PSYCH: No agitation, no anxiety.    ALL OTHER REVIEW OF SYSTEMS ARE NEGATIVE.      Vital Signs Last 24 Hrs  T(C): 36.7 (13 Jul 2023 08:14), Max: 36.7 (13 Jul 2023 08:14)  T(F): 98 (13 Jul 2023 08:14), Max: 98 (13 Jul 2023 08:14)  HR: 77 (13 Jul 2023 09:27) (65 - 79)  BP: 132/60 (13 Jul 2023 09:27) (132/60 - 168/77)  BP(mean): --  RR: 32 (13 Jul 2023 09:27) (24 - 32)  SpO2: 100% (13 Jul 2023 09:27) (96% - 100%)    Parameters below as of 13 Jul 2023 09:27  Patient On (Oxygen Delivery Method): nasal cannula  O2 Flow (L/min): 2    INTAKE AND OUTPUT:     PHYSICAL EXAM:  Constitutional: Comfortable . No acute distress.   HEENT: Atraumatic and normocephalic , neck is supple . no JVD. No carotid bruit.  CNS: A&Ox3. No focal deficits.   Respiratory: CTAB, unlabored   Cardiovascular: RRR normal s1 s2. No murmur. No rubs or gallop.  Gastrointestinal: Soft, non-tender. +Bowel sounds.   MSK: full ROM extremities x 4  Extremities: No edema. No cyanosis   Psychiatric: Calm . no agitation.   Skin: Warm and dry, no ulcers on extremities       LABS:                        10.8   6.36  )-----------( x        ( 13 Jul 2023 09:10 )             32.1             ;p-BNP=          INTERPRETATION OF TELEMETRY:     ECG:   Prior ECG: Yes [  ] No [  ]      RADIOLOGY & ADDITIONAL STUDIES:    X-ray:  reviewed by me.   CT scan:   MRI:   US:                                                [facility]                                              CARDIOLOGY AT [facility]                                                   [address], [place] [address]                                             Telephone: [phone]. Fax:[phone]                                                         CARDIOLOGY CONSULTATION NOTE                                                                                             Consult requested by:  ER  History obtained by: Patient and medical record  Community Cardiologist: Dr. Becerra   obtained: Yes [  ] No [ x ]  Reason for Consultation: chest pain  Available out pt records reviewed: Yes [ x ] No [  ]    This is a 62 y/o male with PMH ESRD dialysis, IRDM, PVD toe amputations, HTN, HLD Ischemic cardiomyopathy ef 35%CAD stents s/p cath 6/23 with Lad 70%, Distal DEISY isr 70%, l circ 80%, Om 99% Rca 80% and Rpda 90%  referred for cabg  presents today with cp at home mid sternal. arrived in er with no st elevation  Developed chest pain and had elevation in AVR with st depression in inf/lateral which returned to Baseline when pain improved.         CARDIAC TESTING   ECHO: < from: TTE Echo Complete w/ Contrast w/ Doppler (06.13.23 @ 08:36) >  Summary:   1. Endocardial visualization was enhanced with intravenous echo contrast.   2. Left ventricular ejection fraction, by visual estimation, is 35 to   40%.   3. Moderately to severely decreased global left ventricular systolic   function.   4. Spectral Doppler shows pseudonormal pattern of left ventricular   myocardial filling (Grade II diastolic dysfunction).   5. Normal right ventricular size and function.   6. Moderate to severe left atrial enlargement.   7. Mild mitral valve regurgitation.   8. Sclerotic aortic valve with normal opening.      PAST MEDICAL HISTORY  HTN (Hypertension) (ICD9 401.9)  Hypercholesterolemia (ICD9 272.0)  Diabetes Mellitus (ICD9 250.00)  BPH (Benign Prostatic Hypertrophy) (ICD9 600.00)  Glaucoma  CAD (coronary artery disease)  CKD (chronic kidney disease)  Osteomyelitis  Gastroparesis  PVD (peripheral vascular disease)  Legally blind    PAST SURGICAL HISTORY  toe amputations bilaterally        SOCIAL HISTORY:  Denies smoking/alcohol/drugs  CIGARETTES:     ALCOHOL:  DRUGS:      FAMILY HISTORY:  FAMILY HISTORY:  No pertinent family history in first degree relatives        Family History of Cardiovascular Disease:  Yes [  ] No [  ]  Coronary Artery Disease in first degree relative: Yes [  ] No [  ]  Sudden Cardiac Death in First degree relative: Yes [  ] No [  ]      HOME MEDICATIONS:      CURRENT MEDICATIONS:  nitroglycerin  Infusion 100 MICROgram(s)/Min IV Continuous <Continuous>     morphine  - Injectable  heparin   Injectable  heparin  Infusion.        ALLERGIES: Allergies    No Known Drug Allergies  shellfish (Rash)    Intolerances          REVIEW OF SYMPTOMS:   CONSTITUTIONAL: No fever, no chills, no weight loss, no weight gain, no fatigue   ENMT:  No vertigo; No sinus or throat pain  NECK: No pain or stiffness  CARDIOVASCULAR: No chest pain, no dyspnea, no syncope/presyncope, no palpitations, no dizziness, no Orthopnea, no Paroxsymal nocturnal dyspnea  RESPIRATORY: no Shortness of breath, no cough, no wheezing  : No dysuria, no hematuria   GI: No dark color stool, no nausea, no diarrhea, no constipation, no abdominal pain   NEURO: No headache, no slurred speech   MUSCULOSKELETAL: No joint pain or swelling; No muscle, back, or extremity pain  PSYCH: No agitation, no anxiety.    ALL OTHER REVIEW OF SYSTEMS ARE NEGATIVE.      Vital Signs Last 24 Hrs  T(C): 36.7 (13 Jul 2023 08:14), Max: 36.7 (13 Jul 2023 08:14)  T(F): 98 (13 Jul 2023 08:14), Max: 98 (13 Jul 2023 08:14)  HR: 77 (13 Jul 2023 09:27) (65 - 79)  BP: 132/60 (13 Jul 2023 09:27) (132/60 - 168/77)  BP(mean): --  RR: 32 (13 Jul 2023 09:27) (24 - 32)  SpO2: 100% (13 Jul 2023 09:27) (96% - 100%)    Parameters below as of 13 Jul 2023 09:27  Patient On (Oxygen Delivery Method): nasal cannula  O2 Flow (L/min): 2    INTAKE AND OUTPUT:     PHYSICAL EXAM:  Constitutional: Comfortable . No acute distress.   HEENT: Atraumatic and normocephalic , neck is supple . no JVD. No carotid bruit.  CNS: A&Ox3. No focal deficits.   Respiratory: CTAB, unlabored   Cardiovascular: RRR normal s1 s2. No murmur. No rubs or gallop.  Gastrointestinal: Soft, non-tender. +Bowel sounds.   MSK: full ROM extremities x 4  Extremities: No edema. No cyanosis   Psychiatric: Calm . no agitation.   Skin: Warm and dry, no ulcers on extremities       LABS:                        10.8   6.36  )-----------( x        ( 13 Jul 2023 09:10 )             32.1             ;p-BNP=          INTERPRETATION OF TELEMETRY:     ECG:   Prior ECG: Yes [  ] No [  ]      RADIOLOGY & ADDITIONAL STUDIES:    X-ray:  reviewed by me.   CT scan:   MRI:   US:                                                [facility]                                              CARDIOLOGY AT [address], [place] [address]                                             Telephone: [phone]. Fax:[phone]                                                         CARDIOLOGY CONSULTATION NOTE                                                                                             Consult requested by:  ER  History obtained by: Patient and medical record  Community Cardiologist: Dr. Becerra   obtained: Yes [  ] No [ x ]  Reason for Consultation: chest pain  Available out pt records reviewed: Yes [ x ] No [  ]    This is a 62 y/o male with PMH ESRD dialysis, IRDM, PVD toe amputations, HTN, HLD Ischemic cardiomyopathy ef 35%CAD stents s/p cath 6/23 with Lad 70%, Distal DEISY isr 70%, l circ 80%, Om 99% Rca 80% and Rpda 90%  referred for cabg  presents today with cp at home mid sternal. arrived in er with no st elevation  Developed chest pain and had elevation in AVR with st depression in inf/lateral which returned to Baseline when pain improved Bs and cp normalized and now EKG back to normal.  Patient is now comfortable      CARDIAC TESTING   ECHO: < from: TTE Echo Complete w/ Contrast w/ Doppler (06.13.23 @ 08:36) >  Summary:   1. Endocardial visualization was enhanced with intravenous echo contrast.   2. Left ventricular ejection fraction, by visual estimation, is 35 to   40%.   3. Moderately to severely decreased global left ventricular systolic   function.   4. Spectral Doppler shows pseudonormal pattern of left ventricular   myocardial filling (Grade II diastolic dysfunction).   5. Normal right ventricular size and function.   6. Moderate to severe left atrial enlargement.   7. Mild mitral valve regurgitation.   8. Sclerotic aortic valve with normal opening.      PAST MEDICAL HISTORY  HTN (Hypertension) (ICD9 401.9)  Hypercholesterolemia (ICD9 272.0)  Diabetes Mellitus (ICD9 250.00)  BPH (Benign Prostatic Hypertrophy) (ICD9 600.00)  Glaucoma  CAD (coronary artery disease)  CKD (chronic kidney disease)  Osteomyelitis  Gastroparesis  PVD (peripheral vascular disease)  Legally blind    PAST SURGICAL HISTORY  toe amputations bilaterally    SOCIAL HISTORY:    CIGARETTES:   No  ALCOHOL:  No  DRUGS:  NO      FAMILY HISTORY:    Family History of Cardiovascular Disease:  Yes [x  ] No [  ]  Coronary Artery Disease in first degree relative: Yes x[  ] No [  ]  Sudden Cardiac Death in First degree relative: Yes [  ] No [ x ]      HOME MEDICATIONS:  Pravastatin 40mg  Imdur 30mg  Plavix 75mg  Coreg 25 bid  Asa 81mg  Norvasc 10mg      CURRENT MEDICATIONS:  nitroglycerin  Infusion 100 MICROgram(s)/Min IV Continuous <Continuous>  morphine  - Injectable  heparin   Injectable  heparin  Infusion.      ALLERGIES: Allergies  shellfish    REVIEW OF SYMPTOMS:   CONSTITUTIONAL: No fever, no chills, no weight loss, no weight gain, no fatigue   ENMT:  No vertigo; No sinus or throat pain  NECK: No pain or stiffness  CARDIOVASCULAR: ++ chest pain sob  RESPIRATORY: no Shortness of breath, no cough, no wheezing  : No dysuria, no hematuria   GI: No dark color stool, no nausea, no diarrhea, no constipation, no abdominal pain   NEURO: No headache, no slurred speech   MUSCULOSKELETAL: No joint pain or swelling; No muscle, back, or extremity pain  PSYCH: No agitation, no anxiety.    ALL OTHER REVIEW OF SYSTEMS ARE NEGATIVE.      Vital Signs Last 24 Hrs  T(C): 36.7 (13 Jul 2023 08:14), Max: 36.7 (13 Jul 2023 08:14)  T(F): 98 (13 Jul 2023 08:14), Max: 98 (13 Jul 2023 08:14)  HR: 77 (13 Jul 2023 09:27) (65 - 79)  BP: 132/60 (13 Jul 2023 09:27) (132/60 - 168/77)  BP(mean): --  RR: 32 (13 Jul 2023 09:27) (24 - 32)  SpO2: 100% (13 Jul 2023 09:27) (96% - 100%)    Parameters below as of 13 Jul 2023 09:27  Patient On (Oxygen Delivery Method): nasal cannula  O2 Flow (L/min): 2    INTAKE AND OUTPUT:     PHYSICAL EXAM:  Constitutional  Initally uncomfortable now comfortable smiling  HEENT: Atraumatic and normocephalic , neck is supple . no JVD. No carotid bruit.  CNS: A&Ox3. No focal deficits.   Respiratory: CTAB, unlabored   Cardiovascular: RRR normal s1 s2.   Gastrointestinal: Soft, non-tender. +Bowel sounds.   MSK: full ROM extremities x 4  Extremities: trace edema. No cyanosis Multiple toes missing  Psychiatric: Calm . no agitation.   Skin: Warm and dry, no ulcers on extremities       LABS:                        10.8   6.36  )-----------( x        ( 13 Jul 2023 09:10 )             32.1     BNP 26,903  trop 0.08  k 6.2  creat 8    INTERPRETATION OF TELEMETRY:  NSR    ECG: Nsr wnl  2  NSR  with st elevation in AVR st depression inf/lat  3.  NSR no ischemic changes  Prior ECG: Yes [  ] No [  ]      RADIOLOGY & ADDITIONAL STUDIES:    X-ray:  reviewed by me.   CT scan: [facility]

## 2023-07-13 NOTE — H&P ADULT - ASSESSMENT
63M with PMHx ESRD on HD (T,Th, S), IDDM, COPD, gastroparesis, peripheral blindness and retinopathy, C. Diff, and NSTEMI 6/2022 with recent hospital admission for CABG gregor now presents from home with complaints of worsening chest pain associated with difficulty breathing. In ER EKGs with intermittent diffuse ST changes. Pain slightly improved with 2 doses IV Morphine. Seen by cardiology and started on Tridil and Heparin gtt for NSTEMI. Will be admitted to CTICU for IABP placement later today and potential CABG tomorrow.

## 2023-07-13 NOTE — ED PROVIDER NOTE - OBJECTIVE STATEMENT
64 y/o male w/PMHx of ESRD on HD (T/Th/Sat), COPD, CAD, and IDDM presenting with worsening CP this morning. Pain has been constant and ongoing for weeks, worse while in the ER. Told by CT surgeon yday that he would likely need a CABG outpatient. Hx limited by pain severity.

## 2023-07-13 NOTE — CONSULT NOTE ADULT - NS ATTEND AMEND GEN_ALL_CORE FT
chest pain syndrome. discussed with cts, plan bypass tomorrow however with persistent chest pain on nitroglycerin drips needs bridging iabp. heparin drip on. also rechecked K+ after treatment.
history of multivessel coronary artery disease awaiting cabg.    Non-ST elevation Myocardial Infarction . persistent chest pain. iv heparin drip. nitro drip. control of BP. IABP.  admit to ICU. plan for CABg tomorrow. hemodialysis today.

## 2023-07-13 NOTE — H&P ADULT - NSICDXPASTMEDICALHX_GEN_ALL_CORE_FT
----- Message from Yvroseneftali Collins sent at 3/29/2017  4:01 PM CDT -----  Contact: self   Pt request to speak to Dr. Gordillo regarding about her sugar level. Please contact the pt at [phone]. Thanks!    PAST MEDICAL HISTORY:  BPH (Benign Prostatic Hypertrophy) (ICD9 600.00)     CAD (coronary artery disease)     CKD (chronic kidney disease)     Diabetes Mellitus with Neuropathy (ICD9 250.60) x 8 yrs without nephropathy or retinopathy    Gastroparesis     Glaucoma     HTN (Hypertension) (ICD9 401.9)     Hypercholesterolemia (ICD9 272.0)     Legally blind     Osteomyelitis     PVD (peripheral vascular disease)

## 2023-07-13 NOTE — ED ADULT NURSE NOTE - OBJECTIVE STATEMENT
Received pt from [facility]. Pt A&O x 2 (disoriented to time at baseline) hx of DM, HTN, CAD and high cholesterol c/o worsening chest pain x 2 weeks. Denies pain radiating. Airway patent. Respirations even and unlabored. No JVD or diaphoresis. Cap refill <2 seconds. Pt on telemonitor. Pt medicated as prescribed. Bed locked and in lowest position. Providers at bedside.

## 2023-07-13 NOTE — CONSULT NOTE ADULT - SUBJECTIVE AND OBJECTIVE BOX
[facility]                                              INTERVENTIONAL CARDIOLOGY AT [place] [address]                                             Telephone: [phone]. Fax:[phone]                                                       INTERVENTIONAL CARDIOLOGY CONSULTATION NOTE                                                                                             History obtained by: Patient and medical record  Community Cardiologist:   Reason for Consultation: Evaluation for cardiac catheterization  Available pt records reviewed: Yes [ x ] No [  ]    Chief complaint:    Patient is a 63y old  Male who presents with a chief complaint of Chest pain (13 Jul 2023 09:37)      HPI: 63M with PMHx ESRD on HD (T,Th, S), IDDM, COPD, gastroparesis, peripheral blindness and retinopathy, C. Diff, and NSTEMI 6/2022 with recent hospital admission for CABG eval now presents from home with complaints of worsening chest pain associated with difficulty breathing. In ER EKGs with intermittent diffuse ST changes. Pain slightly improved with 2 doses IV Morphine. Seen by cardiology and started on Tridil and Heparin gtt for NSTEMI. At time of exam, patient with labored breathing complaining of substernal chest pain radiating to entire body. Denies palpitations, dizziness, headache, abdominal pain or N/V/D/C.       Anginal Class:        Angina (Class): IV       Ischemic Symptoms: chest pain    Heart Failure:        Systolic/Diastolic/Combined: HFrEF       NYHA Class (within 2 weeks): III      PAST MEDICAL HISTORY  HTN (Hypertension) (ICD9 401.9)  Hypercholesterolemia (ICD9 272.0)  Diabetes Mellitus (ICD9 250.00)  Diabetes Mellitus with Neuropathy (ICD9 250.60)  BPH (Benign Prostatic Hypertrophy) (ICD9 600.00)  Glaucoma  CAD (coronary artery disease)  CKD (chronic kidney disease)  Osteomyelitis  Gastroparesis  PVD (peripheral vascular disease)  Legally blind      Associated Risk Factors:        Frailty Assessment: (none/mild/mod/severe): moderate       Cerebrovascular Disease: N/A       Chronic Lung Disease: N/A       Peripheral Arterial Disease: N/A       Chronic Kidney Disease (if yes, what is GFR): N/A       Uncontrolled Diabetes (if yes, what is HgbA1C or FBS): yes, A1C 9.4       Poorly Controlled Hypertension (if yes, what is SBP): N/A       Morbid Obesity (if yes, what is BMI): N/A       History of Recent Ventricular Arrhythmia: N/A       Inability to Ambulate Safely: N/A       Need for Therapeutic Anticoagulation: N/A       Antiplatelet or Contrast Allergy: N/A      PAST SURGICAL HISTORY  Diabetes Mellitus with Neuropathy (ICD9 250.60)  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)  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)  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)  S/P amputation  Stented coronary artery    FAMILY HISTORY:  No pertinent family history in first degree relatives      Family History of Premature Cardiovascular Disease:  Yes [  ] No [  ]    HOME MEDICATIONS:  acetaminophen 325 mg oral tablet: 2 tab(s) orally every 8 hours as needed for  mild pain (13 Jun 2023 04:19)  Albuterol (Eqv-ProAir HFA) 90 mcg/inh inhalation aerosol: 2 puff(s) inhaled 4 times a day as needed for  bronchospasm (19 Jun 2023 12:03)  calcium acetate 667 mg oral tablet: 1 tab(s) orally 3 times a day (19 Jun 2023 12:03)  epoetin lois 10,000 units/mL injectable solution: 10,000 unit(s) subcutaneous 3 times a week MWF (13 Jun 2023 04:19)  Flomax 0.4 mg oral capsule: 1 cap(s) orally once a day (13 Jun 2023 04:35)  gabapentin 300 mg oral capsule: 1 cap(s) orally 2 times a day (18 Jun 2023 07:06)  hyoscyamine 0.125 mg oral tablet: 1 tab(s) orally 4 times a day as needed for  abdominal spasm (13 Jun 2023 04:33)  Lantus 100 units/mL subcutaneous solution: 6 unit(s) subcutaneous once a day (at bedtime) (18 Jun 2023 07:06)  lidocaine 4% topical film: Apply topically to affected area once a day (13 Jun 2023 04:19)  loratadine 10 mg oral tablet: 1 tab(s) orally once a day (13 Jun 2023 04:19)  melatonin 5 mg oral tablet: 1 tab(s) orally once a day (at bedtime) (13 Jun 2023 04:19)  methocarbamol 500 mg oral tablet: 1 tab(s) orally 2 times a day (13 Jun 2023 04:33)  nitroglycerin 0.4 mg sublingual tablet: 1 tab(s) sublingually once a day as needed for  chest pain (13 Jun 2023 04:19)  omeprazole 20 mg oral delayed release capsule: 1 cap(s) orally once a day (13 Jun 2023 04:33)  pravastatin 40 mg oral tablet: 1 tab(s) orally once a day (at bedtime) (13 Jun 2023 04:33)  sodium bicarbonate 650 mg oral tablet: 2 tab(s) orally 3 times a day (13 Jun 2023 04:33)      CURRENT CARDIAC MEDICATIONS:  hydrALAZINE 50 milliGRAM(s) Oral every 12 hours  metoprolol succinate  milliGRAM(s) Oral daily  metoprolol tartrate Injectable 5 milliGRAM(s) IV Push once, Stop order after: 1 Doses  nitroglycerin  Infusion 10 MICROgram(s)/Min IV Continuous <Continuous>      Antianginal Therapies:        Beta Blockers:  Toprol       Calcium Channel Blockers:        Long Acting Nitrates:        Ranexa:     ALLERGIES:   No Known Drug Allergies  shellfish (Rash)    VITAL SIGNS:  T(C): 36.8 (07-13-23 @ 12:22), Max: 36.8 (07-13-23 @ 12:22)  T(F): 98.2 (07-13-23 @ 12:22), Max: 98.2 (07-13-23 @ 12:22)  HR: 77 (07-13-23 @ 12:22) (65 - 79)  BP: 137/60 (07-13-23 @ 12:22) (132/60 - 182/74)  RR: 16 (07-13-23 @ 12:22) (16 - 32)  SpO2: 100% (07-13-23 @ 12:22) (96% - 100%)    INTAKE AND OUTPUT:       ROS: as stated in HPI otherwise negative    PHYSICAL EXAM:  Constitutional: Comfortable, NAD   HEENT: Atraumatic and normocephalic, neck is supple  CNS: A&Ox3. No focal deficits.   Respiratory: clear inspiratory expiratory coarse breath sounds throughout  Cardiovascular: RRR normal s1 s2. No murmur   Gastrointestinal: Soft, non-tender. +Bowel sounds.   Extremities: + Peripheral Pulses, No clubbing, cyanosis, or edema  Psychiatric: Calm, no agitation.   Skin: Warm, dry, intact     LABS:  ( 13 Jul 2023 09:10 )  Troponin T  0.08<H>,  CPK  X    , CKMB  X    , BNP X                                10.8   6.36  )-----------( 138      ( 13 Jul 2023 09:10 )             32.1     07-13    130<L>  |  86<L>  |  47.5<H>  ----------------------------<  316<H>  6.4<HH>   |  28.0  |  8.81<H>    Ca    8.1<L>      13 Jul 2023 09:10  Phos  5.2     07-13  Mg     2.4     07-13    TPro  7.0  /  Alb  4.0  /  TBili  0.4  /  DBili  x   /  AST  14  /  ALT  18  /  AlkPhos  102  07-13    PT/INR - ( 13 Jul 2023 09:10 )   PT: 12.1 sec;   INR: 1.04 ratio         PTT - ( 13 Jul 2023 09:10 )  PTT:33.4 sec  Urinalysis Basic - ( 13 Jul 2023 09:10 )    Color: x / Appearance: x / SG: x / pH: x  Gluc: 316 mg/dL / Ketone: x  / Bili: x / Urobili: x   Blood: x / Protein: x / Nitrite: x   Leuk Esterase: x / RBC: x / WBC x   Sq Epi: x / Non Sq Epi: x / Bacteria: x      ECG:   Prior ECG: Yes [  ] No [  ]    ECHO:  PHYSICIAN INTERPRETATION:  Left Ventricle: The left ventricular internal cavity size is normal. Left   ventricular wall thickness is increased.  Global LV systolic function was normal. Left ventricular ejection   fraction, by visual estimation, is 50 to 55%. Spectral Doppler shows   normal pattern of LV diastolic filling.  Right Ventricle: Normal right ventricular size and function.  Left Atrium: The left atrium is normal in size.  Right Atrium: The right atrium is normal in size.  Pericardium: There is no evidence of pericardial effusion.  Mitral Valve: Mild thickening of the anterior and posterior mitral valve   leaflets. There is mild mitral annular calcification. Trace mitral valve   regurgitation is seen.  Tricuspid Valve: The tricuspid valve is normal in structure. Trivial   tricuspid regurgitation is visualized.  Aortic Valve: The aortic valve is trileaflet. Sclerotic aortic valve with   normal opening. No evidence of aortic valve regurgitation is seen.  Pulmonic Valve: Structurally normal pulmonic valve, with normal leaflet   excursion. Trace pulmonic valve regurgitation.  Aorta: The aortic root and ascending aorta are structurally normal, with   no evidence of dilitation.  Pulmonary Artery: The main pulmonary artery is normal in size.  Venous: The inferior vena cava was normal sized, with respiratory size   variation greater than 50%.    Summary:   1. Technically suboptimal study.   2. The left atrium is normal in size.   3. Normal wall motion. Left ventricular ejection fraction, by visual   estimation, is 50 to 55%.   4. The right atrium is normal in size.   5. Normal right ventricular size and function.   6. NO significant valvular abnormality.   7. There is no evidence of pericardial effusion.      Cardiac Interventions: prior PCIs    CATH: multivessel CAD for CABG 7/14/23                                                [facility]                                              INTERVENTIONAL CARDIOLOGY AT [address], [place] [address]                                             Telephone: [phone]. Fax:[phone]                                                       INTERVENTIONAL CARDIOLOGY CONSULTATION NOTE                                                                                             History obtained by: Patient and medical record  Community Cardiologist:   Reason for Consultation: Evaluation for cardiac catheterization  Available pt records reviewed: Yes [ x ] No [  ]    Chief complaint:    Patient is a 63y old  Male who presents with a chief complaint of Chest pain (13 Jul 2023 09:37)      HPI: 63M with PMHx HTN, HLD, IDDM, ESRD on HD (T,Th,S), COPD, left 1st toe amputation, gastroparesis, peripheral blindness and retinopathy, prior C. Diff, and NSTEMI 6/2022, found to have multivessel CAD and referred to Dr Newsome for CABG eval and seen for out-patient CTS consult by Dr Newsome, plan for CABG but subsequently presented to the ED with worsening and constant chest pain associated with difficulty breathing. In ER EKGs with intermittent diffuse ST changes. Pain slightly improved with 2 doses IV Morphine. Seen by cardiology and started on Tridil and Heparin gtt for NSTEMI. At time of exam, patient with labored breathing complaining of substernal chest pain radiating to entire body. Denies palpitations, dizziness, headache, abdominal pain or N/V/D/C.     Anginal Class:        Angina (Class): IV       Ischemic Symptoms: chest pain    Heart Failure:        Systolic/Diastolic/Combined: HFrEF       NYHA Class (within 2 weeks): III      PAST MEDICAL HISTORY  HTN (Hypertension) (ICD9 401.9)  Hypercholesterolemia (ICD9 272.0)  Diabetes Mellitus (ICD9 250.00)  Diabetes Mellitus with Neuropathy (ICD9 250.60)  BPH (Benign Prostatic Hypertrophy) (ICD9 600.00)  Glaucoma  CAD (coronary artery disease)  CKD (chronic kidney disease)  Osteomyelitis  Gastroparesis  PVD (peripheral vascular disease)  Legally blind      Associated Risk Factors:        Frailty Assessment: (none/mild/mod/severe): moderate       Cerebrovascular Disease: N/A       Chronic Lung Disease: N/A       Peripheral Arterial Disease: N/A       Chronic Kidney Disease (if yes, what is GFR): N/A       Uncontrolled Diabetes (if yes, what is HgbA1C or FBS): yes, A1C 9.4       Poorly Controlled Hypertension (if yes, what is SBP): N/A       Morbid Obesity (if yes, what is BMI): N/A       History of Recent Ventricular Arrhythmia: N/A       Inability to Ambulate Safely: N/A       Need for Therapeutic Anticoagulation: N/A       Antiplatelet or Contrast Allergy: N/A      PAST SURGICAL HISTORY  Diabetes Mellitus with Neuropathy (ICD9 250.60)  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)  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)  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)  S/P amputation  Stented coronary artery    FAMILY HISTORY:  No pertinent family history in first degree relatives      Family History of Premature Cardiovascular Disease:  Yes [  ] No [  ]    HOME MEDICATIONS:  acetaminophen 325 mg oral tablet: 2 tab(s) orally every 8 hours as needed for  mild pain (13 Jun 2023 04:19)  Albuterol (Eqv-ProAir HFA) 90 mcg/inh inhalation aerosol: 2 puff(s) inhaled 4 times a day as needed for  bronchospasm (19 Jun 2023 12:03)  calcium acetate 667 mg oral tablet: 1 tab(s) orally 3 times a day (19 Jun 2023 12:03)  epoetin lois 10,000 units/mL injectable solution: 10,000 unit(s) subcutaneous 3 times a week MWF (13 Jun 2023 04:19)  Flomax 0.4 mg oral capsule: 1 cap(s) orally once a day (13 Jun 2023 04:35)  gabapentin 300 mg oral capsule: 1 cap(s) orally 2 times a day (18 Jun 2023 07:06)  hyoscyamine 0.125 mg oral tablet: 1 tab(s) orally 4 times a day as needed for  abdominal spasm (13 Jun 2023 04:33)  Lantus 100 units/mL subcutaneous solution: 6 unit(s) subcutaneous once a day (at bedtime) (18 Jun 2023 07:06)  lidocaine 4% topical film: Apply topically to affected area once a day (13 Jun 2023 04:19)  loratadine 10 mg oral tablet: 1 tab(s) orally once a day (13 Jun 2023 04:19)  melatonin 5 mg oral tablet: 1 tab(s) orally once a day (at bedtime) (13 Jun 2023 04:19)  methocarbamol 500 mg oral tablet: 1 tab(s) orally 2 times a day (13 Jun 2023 04:33)  nitroglycerin 0.4 mg sublingual tablet: 1 tab(s) sublingually once a day as needed for  chest pain (13 Jun 2023 04:19)  omeprazole 20 mg oral delayed release capsule: 1 cap(s) orally once a day (13 Jun 2023 04:33)  pravastatin 40 mg oral tablet: 1 tab(s) orally once a day (at bedtime) (13 Jun 2023 04:33)  sodium bicarbonate 650 mg oral tablet: 2 tab(s) orally 3 times a day (13 Jun 2023 04:33)      CURRENT CARDIAC MEDICATIONS:  hydrALAZINE 50 milliGRAM(s) Oral every 12 hours  metoprolol succinate  milliGRAM(s) Oral daily  metoprolol tartrate Injectable 5 milliGRAM(s) IV Push once, Stop order after: 1 Doses  nitroglycerin  Infusion 10 MICROgram(s)/Min IV Continuous <Continuous>      Antianginal Therapies:        Beta Blockers:  Toprol       Calcium Channel Blockers:        Long Acting Nitrates:        Ranexa:     ALLERGIES:   No Known Drug Allergies  shellfish (Rash)    VITAL SIGNS:  T(C): 36.8 (07-13-23 @ 12:22), Max: 36.8 (07-13-23 @ 12:22)  T(F): 98.2 (07-13-23 @ 12:22), Max: 98.2 (07-13-23 @ 12:22)  HR: 77 (07-13-23 @ 12:22) (65 - 79)  BP: 137/60 (07-13-23 @ 12:22) (132/60 - 182/74)  RR: 16 (07-13-23 @ 12:22) (16 - 32)  SpO2: 100% (07-13-23 @ 12:22) (96% - 100%)    INTAKE AND OUTPUT:       ROS: as stated in HPI otherwise negative    PHYSICAL EXAM:  Constitutional: Comfortable, NAD   HEENT: Atraumatic and normocephalic, neck is supple  CNS: A&Ox3. No focal deficits.   Respiratory: clear inspiratory expiratory coarse breath sounds throughout  Cardiovascular: RRR normal s1 s2. No murmur   Gastrointestinal: Soft, non-tender. +Bowel sounds.   Extremities: + Peripheral Pulses, No clubbing, cyanosis, or edema  Psychiatric: Calm, no agitation.   Skin: Warm, dry, intact     LABS:  ( 13 Jul 2023 09:10 )  Troponin T  0.08<H>,  CPK  X    , CKMB  X    , BNP X                                10.8   6.36  )-----------( 138      ( 13 Jul 2023 09:10 )             32.1     07-13    130<L>  |  86<L>  |  47.5<H>  ----------------------------<  316<H>  6.4<HH>   |  28.0  |  8.81<H>    Ca    8.1<L>      13 Jul 2023 09:10  Phos  5.2     07-13  Mg     2.4     07-13    TPro  7.0  /  Alb  4.0  /  TBili  0.4  /  DBili  x   /  AST  14  /  ALT  18  /  AlkPhos  102  07-13    PT/INR - ( 13 Jul 2023 09:10 )   PT: 12.1 sec;   INR: 1.04 ratio         PTT - ( 13 Jul 2023 09:10 )  PTT:33.4 sec  Urinalysis Basic - ( 13 Jul 2023 09:10 )    Color: x / Appearance: x / SG: x / pH: x  Gluc: 316 mg/dL / Ketone: x  / Bili: x / Urobili: x   Blood: x / Protein: x / Nitrite: x   Leuk Esterase: x / RBC: x / WBC x   Sq Epi: x / Non Sq Epi: x / Bacteria: x      ECG:   Prior ECG: Yes [  ] No [  ]    ECHO:  PHYSICIAN INTERPRETATION:  Left Ventricle: The left ventricular internal cavity size is normal. Left   ventricular wall thickness is increased.  Global LV systolic function was normal. Left ventricular ejection   fraction, by visual estimation, is 50 to 55%. Spectral Doppler shows   normal pattern of LV diastolic filling.  Right Ventricle: Normal right ventricular size and function.  Left Atrium: The left atrium is normal in size.  Right Atrium: The right atrium is normal in size.  Pericardium: There is no evidence of pericardial effusion.  Mitral Valve: Mild thickening of the anterior and posterior mitral valve   leaflets. There is mild mitral annular calcification. Trace mitral valve   regurgitation is seen.  Tricuspid Valve: The tricuspid valve is normal in structure. Trivial   tricuspid regurgitation is visualized.  Aortic Valve: The aortic valve is trileaflet. Sclerotic aortic valve with   normal opening. No evidence of aortic valve regurgitation is seen.  Pulmonic Valve: Structurally normal pulmonic valve, with normal leaflet   excursion. Trace pulmonic valve regurgitation.  Aorta: The aortic root and ascending aorta are structurally normal, with   no evidence of dilitation.  Pulmonary Artery: The main pulmonary artery is normal in size.  Venous: The inferior vena cava was normal sized, with respiratory size   variation greater than 50%.    Summary:   1. Technically suboptimal study.   2. The left atrium is normal in size.   3. Normal wall motion. Left ventricular ejection fraction, by visual   estimation, is 50 to 55%.   4. The right atrium is normal in size.   5. Normal right ventricular size and function.   6. NO significant valvular abnormality.   7. There is no evidence of pericardial effusion.      Cardiac Interventions: prior PCIs    CATH: multivessel CAD for CABG 7/14/23                                                [facility]                                              INTERVENTIONAL CARDIOLOGY AT [address], [place] [address]                                             Telephone: [phone]. Fax:[phone]                                                       INTERVENTIONAL CARDIOLOGY CONSULTATION NOTE                                                                                             History obtained by: Patient and medical record  Community Cardiologist: Dr Becerra  Reason for Consultation: Evaluation for cardiac catheterization  Available pt records reviewed: Yes [ x ] No [  ]    Chief complaint:    Patient is a 63y old  Male who presents with a chief complaint of Chest pain (13 Jul 2023 09:37)      HPI: 63M with PMHx HTN, HLD, IDDM, ESRD on HD (T,Th,S), COPD, left 1st toe amputation, gastroparesis, peripheral blindness and retinopathy, prior C. Diff, and NSTEMI 6/2022, found to have multivessel CAD and referred to Dr Newsome for CABG eval and seen for out-patient CTS consult by Dr Newsome, plan for CABG but subsequently presented to the ED with worsening and constant chest pain associated with difficulty breathing. In ER EKGs with intermittent diffuse ST changes. Pain slightly improved with 2 doses IV Morphine. Seen by cardiology and started on Tridil and Heparin gtt for NSTEMI. At time of exam, patient with labored breathing complaining of substernal chest pain radiating to entire body. Denies palpitations, dizziness, headache, abdominal pain or N/V/D/C.     Anginal Class:        Angina (Class): IV       Ischemic Symptoms: chest pain    Heart Failure:        Systolic/Diastolic/Combined: HFrEF       NYHA Class (within 2 weeks): III      PAST MEDICAL HISTORY  HTN (Hypertension) (ICD9 401.9)  Hypercholesterolemia (ICD9 272.0)  Diabetes Mellitus (ICD9 250.00)  Diabetes Mellitus with Neuropathy (ICD9 250.60)  BPH (Benign Prostatic Hypertrophy) (ICD9 600.00)  Glaucoma  CAD (coronary artery disease)  CKD (chronic kidney disease)  Osteomyelitis  Gastroparesis  PVD (peripheral vascular disease)  Legally blind      Associated Risk Factors:        Frailty Assessment: (none/mild/mod/severe): moderate       Cerebrovascular Disease: N/A       Chronic Lung Disease: N/A       Peripheral Arterial Disease: N/A       Chronic Kidney Disease (if yes, what is GFR): N/A       Uncontrolled Diabetes (if yes, what is HgbA1C or FBS): yes, A1C 9.4       Poorly Controlled Hypertension (if yes, what is SBP): N/A       Morbid Obesity (if yes, what is BMI): N/A       History of Recent Ventricular Arrhythmia: N/A       Inability to Ambulate Safely: N/A       Need for Therapeutic Anticoagulation: N/A       Antiplatelet or Contrast Allergy: N/A      PAST SURGICAL HISTORY  Diabetes Mellitus with Neuropathy (ICD9 250.60)  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)  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)  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)  S/P amputation  Stented coronary artery    FAMILY HISTORY:  No pertinent family history in first degree relatives      Family History of Premature Cardiovascular Disease:  Yes [  ] No [  ]    HOME MEDICATIONS:  acetaminophen 325 mg oral tablet: 2 tab(s) orally every 8 hours as needed for  mild pain (13 Jun 2023 04:19)  Albuterol (Eqv-ProAir HFA) 90 mcg/inh inhalation aerosol: 2 puff(s) inhaled 4 times a day as needed for  bronchospasm (19 Jun 2023 12:03)  calcium acetate 667 mg oral tablet: 1 tab(s) orally 3 times a day (19 Jun 2023 12:03)  epoetin lois 10,000 units/mL injectable solution: 10,000 unit(s) subcutaneous 3 times a week MWF (13 Jun 2023 04:19)  Flomax 0.4 mg oral capsule: 1 cap(s) orally once a day (13 Jun 2023 04:35)  gabapentin 300 mg oral capsule: 1 cap(s) orally 2 times a day (18 Jun 2023 07:06)  hyoscyamine 0.125 mg oral tablet: 1 tab(s) orally 4 times a day as needed for  abdominal spasm (13 Jun 2023 04:33)  Lantus 100 units/mL subcutaneous solution: 6 unit(s) subcutaneous once a day (at bedtime) (18 Jun 2023 07:06)  lidocaine 4% topical film: Apply topically to affected area once a day (13 Jun 2023 04:19)  loratadine 10 mg oral tablet: 1 tab(s) orally once a day (13 Jun 2023 04:19)  melatonin 5 mg oral tablet: 1 tab(s) orally once a day (at bedtime) (13 Jun 2023 04:19)  methocarbamol 500 mg oral tablet: 1 tab(s) orally 2 times a day (13 Jun 2023 04:33)  nitroglycerin 0.4 mg sublingual tablet: 1 tab(s) sublingually once a day as needed for  chest pain (13 Jun 2023 04:19)  omeprazole 20 mg oral delayed release capsule: 1 cap(s) orally once a day (13 Jun 2023 04:33)  pravastatin 40 mg oral tablet: 1 tab(s) orally once a day (at bedtime) (13 Jun 2023 04:33)  sodium bicarbonate 650 mg oral tablet: 2 tab(s) orally 3 times a day (13 Jun 2023 04:33)      CURRENT CARDIAC MEDICATIONS:  hydrALAZINE 50 milliGRAM(s) Oral every 12 hours  metoprolol succinate  milliGRAM(s) Oral daily  metoprolol tartrate Injectable 5 milliGRAM(s) IV Push once, Stop order after: 1 Doses  nitroglycerin  Infusion 10 MICROgram(s)/Min IV Continuous <Continuous>      Antianginal Therapies:        Beta Blockers:  Toprol       Calcium Channel Blockers:        Long Acting Nitrates:        Ranexa:     ALLERGIES:   No Known Drug Allergies  shellfish (Rash)    VITAL SIGNS:  T(C): 36.8 (07-13-23 @ 12:22), Max: 36.8 (07-13-23 @ 12:22)  T(F): 98.2 (07-13-23 @ 12:22), Max: 98.2 (07-13-23 @ 12:22)  HR: 77 (07-13-23 @ 12:22) (65 - 79)  BP: 137/60 (07-13-23 @ 12:22) (132/60 - 182/74)  RR: 16 (07-13-23 @ 12:22) (16 - 32)  SpO2: 100% (07-13-23 @ 12:22) (96% - 100%)    INTAKE AND OUTPUT:       ROS: as stated in HPI otherwise negative    PHYSICAL EXAM:  Constitutional: Comfortable, NAD   HEENT: Atraumatic and normocephalic, neck is supple  CNS: A&Ox3. No focal deficits.   Respiratory: clear inspiratory expiratory coarse breath sounds throughout  Cardiovascular: RRR normal s1 s2. No murmur   Gastrointestinal: Soft, non-tender. +Bowel sounds.   Extremities: + Peripheral Pulses, No clubbing, cyanosis, or edema  Psychiatric: Calm, no agitation.   Skin: Warm, dry, intact     LABS:  ( 13 Jul 2023 09:10 )  Troponin T  0.08<H>,  CPK  X    , CKMB  X    , BNP X                                10.8   6.36  )-----------( 138      ( 13 Jul 2023 09:10 )             32.1     07-13    130<L>  |  86<L>  |  47.5<H>  ----------------------------<  316<H>  6.4<HH>   |  28.0  |  8.81<H>    Ca    8.1<L>      13 Jul 2023 09:10  Phos  5.2     07-13  Mg     2.4     07-13    TPro  7.0  /  Alb  4.0  /  TBili  0.4  /  DBili  x   /  AST  14  /  ALT  18  /  AlkPhos  102  07-13    PT/INR - ( 13 Jul 2023 09:10 )   PT: 12.1 sec;   INR: 1.04 ratio         PTT - ( 13 Jul 2023 09:10 )  PTT:33.4 sec  Urinalysis Basic - ( 13 Jul 2023 09:10 )    Color: x / Appearance: x / SG: x / pH: x  Gluc: 316 mg/dL / Ketone: x  / Bili: x / Urobili: x   Blood: x / Protein: x / Nitrite: x   Leuk Esterase: x / RBC: x / WBC x   Sq Epi: x / Non Sq Epi: x / Bacteria: x      ECG:   Prior ECG: Yes [  ] No [  ]    ECHO:  PHYSICIAN INTERPRETATION:  Left Ventricle: The left ventricular internal cavity size is normal. Left   ventricular wall thickness is increased.  Global LV systolic function was normal. Left ventricular ejection   fraction, by visual estimation, is 50 to 55%. Spectral Doppler shows   normal pattern of LV diastolic filling.  Right Ventricle: Normal right ventricular size and function.  Left Atrium: The left atrium is normal in size.  Right Atrium: The right atrium is normal in size.  Pericardium: There is no evidence of pericardial effusion.  Mitral Valve: Mild thickening of the anterior and posterior mitral valve   leaflets. There is mild mitral annular calcification. Trace mitral valve   regurgitation is seen.  Tricuspid Valve: The tricuspid valve is normal in structure. Trivial   tricuspid regurgitation is visualized.  Aortic Valve: The aortic valve is trileaflet. Sclerotic aortic valve with   normal opening. No evidence of aortic valve regurgitation is seen.  Pulmonic Valve: Structurally normal pulmonic valve, with normal leaflet   excursion. Trace pulmonic valve regurgitation.  Aorta: The aortic root and ascending aorta are structurally normal, with   no evidence of dilitation.  Pulmonary Artery: The main pulmonary artery is normal in size.  Venous: The inferior vena cava was normal sized, with respiratory size   variation greater than 50%.    Summary:   1. Technically suboptimal study.   2. The left atrium is normal in size.   3. Normal wall motion. Left ventricular ejection fraction, by visual   estimation, is 50 to 55%.   4. The right atrium is normal in size.   5. Normal right ventricular size and function.   6. NO significant valvular abnormality.   7. There is no evidence of pericardial effusion.      Cardiac Interventions: prior PCIs    CATH: multivessel CAD for CABG 7/14/23

## 2023-07-13 NOTE — H&P ADULT - NSHPLABSRESULTS_GEN_ALL_CORE
10.8   6.36  )-----------( 138      ( 13 Jul 2023 09:10 )             32.1       07-13    130<L>  |  86<L>  |  47.5<H>  ----------------------------<  316<H>  6.4<HH>   |  28.0  |  8.81<H>    Ca    8.1<L>      13 Jul 2023 09:10  Phos  5.2     07-13  Mg     2.4     07-13    TPro  7.0  /  Alb  4.0  /  TBili  0.4  /  DBili  x   /  AST  14  /  ALT  18  /  AlkPhos  102  07-13

## 2023-07-13 NOTE — PATIENT PROFILE ADULT - FALL HARM RISK - HARM RISK INTERVENTIONS

## 2023-07-13 NOTE — H&P ADULT - NSHPSOCIALHISTORY_GEN_ALL_CORE
Marital status:   Lives with: wife  ADLs: independent  Assistive Devices: none    Tobacco use: denies  Alcohol use: denies  Illicit drug use: denies

## 2023-07-13 NOTE — CONSULT NOTE ADULT - CONSULT REASON
Eval for IABP support device placement Eval for IABP support device placement for high risk chest pain syndrome and known multivessel disease

## 2023-07-13 NOTE — PROGRESS NOTE ADULT - SUBJECTIVE AND OBJECTIVE BOX
S/P IABP placed via LFA. Site benign, IABP 1:1. Patient to remain on bedrest with HOB < 30 degrees while IABP in place. Heparin gtt as per protocol.

## 2023-07-13 NOTE — ED ADULT NURSE NOTE - NSFALLUNIVINTERV_ED_ALL_ED
Bed/Stretcher in lowest position, wheels locked, appropriate side rails in place/Call bell, personal items and telephone in reach/Instruct patient to call for assistance before getting out of bed/chair/stretcher/Non-slip footwear applied when patient is off stretcher/[place]orter to call system/Physically safe environment - no spills, clutter or unnecessary equipment/Purposeful proactive rounding/Room/bathroom lighting operational, light cord in reach

## 2023-07-13 NOTE — H&P ADULT - PROBLEM SELECTOR PLAN 1
Presents with ST changes and +trop  Ongoing angina despite morphine, tridill and heparin  Discussed with Cardiology and will plan for IABP  Will plan for OR tomorrow 7/14 for CABG  Continue asa and statin  Hold plavix, follow up P2Y12  Pre operative work up completed on prior admission

## 2023-07-13 NOTE — CONSULT NOTE ADULT - ASSESSMENT
Indication:   ROMY Risk Score:     Risk Stratification:  ASA:  Mallampati:  Bleeding Risk:  Creatinine:  GFR:    Coronary anatomy:    Plan/Recommendations:   -plan for ****  -preferred access: RRA ***  -patient seen and examined  -confirmed appropriate NPO duration  -ECG and Labs reviewed  -Aspirin 81mg po pre-cath***  -NS 250mL IV bolus pre-cath***  -procedure discussed with patient; risks and benefits explained, questions answered  -Informed consent obtained by attending IC      Risks, benefits, and alternatives reviewed.  Risks including but not limited to MI, death, stroke, bleeding, infection, vessel injury, hematoma, renal failure, allergic reaction, urgent open heart surgery, restenosis and stent thrombosis were reviewed.  All questions answered.  Patient is agreeable to proceed.    Indication: 64yo male presenting with class IV angina, with persistent 10/10 chest pressure a/w SOB and palpitations, tnl 0.08, initial ECGs with ABELARDO AVR a/w diffuse reciprocal STD, repeat ECG with some improvement, h/o ICM, BNP >26,000  am labs: venous gas 7.59/27/205/26, lactate 4, k 6.4 with repeat after dextrose/insulin 4.6    ROMY Risk Score: 6    Risk Stratification:  ASA: 3  Mallampati: 2  Bleeding Risk: 12%  Creatinine: 8.7  GFR: 6    Cardiac Catheterization from 06/17/23  - LAD 70%  - Distal DEISY ISR 70%  - LCx 80%  - OM 90%  - RCA 80%  - RPDA 90%    Plan/Recommendations:   -plan for IABP placement  -preferred access: femoral artery  -patient seen and examined  -confirmed appropriate NPO duration  -ECG and Labs reviewed  -Aspirin 325mg po in ED  -Treated for hyperkalemia by ED provider  -Plan for HD later today  -Plan for CABG 7/14/23  -procedure discussed with patient; risks and benefits explained, questions answered  -Informed consent obtained by attending IC      Risks, benefits, and alternatives reviewed.  Risks including but not limited to MI, death, stroke, bleeding, infection, vessel injury, hematoma, renal failure, allergic reaction, urgent open heart surgery, restenosis and stent thrombosis were reviewed.  All questions answered.  Patient is agreeable to proceed.

## 2023-07-13 NOTE — CONSULT NOTE ADULT - ASSESSMENT
63M with PMHx ESRD on HD (T,Th, S), IDDM, COPD and NSTEMI 6/2022 with recent hospital admission for CABG eval now presents w/ worsening chest pain associated with difficulty breathing. +ve NSTEMI. Nephrology consulted to manage HD needs.     RECOMMENDATIONS:  1.  ESRD on HD  -Outpatient HD unit: Patient and wife unable to recall correctly  -Outpatient HD days: Tuesday, Thursday, Saturday.   -Access: JOANF working fine  -I planned for HD today   -And, later patient was seen on HD.  Qd, Qb, UF rate reviewed.  Vitals stable.  Access working well.  Patient tolerating HD well.  2.  Hypertension: BP high. Resume home Antihypertensive medications, will follow and titrate further if needed.   3.  Anemia: Will place on KIMBERLY.  4.  Hyperkalemia: Expecting to improve w/ HD.  Will follow.  5.  Mineral bone disease of chronic kidney disease: calcium okay; c/w binder.  6.  NSTEMI: IABP scheduled for later today, CABG soon.

## 2023-07-13 NOTE — ED PROVIDER NOTE - ATTENDING CONTRIBUTION TO CARE
High risk chest pain with ischemic changes on ECG, does not meet STEMI criteria however, already planned for CABG. Nitro infusion started, heparin ACS nomogram. Cardiology and cardiac surgery consulting. Admitted to CTICU for management.

## 2023-07-13 NOTE — CONSULT NOTE ADULT - SUBJECTIVE AND OBJECTIVE BOX
Patient is a 63y old  Male who presents with a chief complaint of CAD , IABP (13 Jul 2023 18:11)      HPI:  63M with PMHx ESRD on HD (T,Th, S), IDDM, COPD, gastroparesis, peripheral blindness and retinopathy, C. Diff, and NSTEMI 6/2022 with recent hospital admission for CABG gregor now presents from home with complaints of worsening chest pain associated with difficulty breathing. In ER EKGs with intermittent diffuse ST changes. Pain slightly improved with 2 doses IV Morphine. Seen by cardiology and started on Tridil and Heparin gtt for NSTEMI. At time of exam, patient with labored breathing complaining of substernal chest pain radiating to entire body. Denies palpitations, dizziness, headache, abdominal pain or N/V/D/C.  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      S/P amputation  left toes      Stented coronary artery          FAMILY HISTORY:  No pertinent family history in first degree relatives        SOCIAL HISTORY:    MEDICATIONS  (STANDING):  aspirin enteric coated 81 milliGRAM(s) Oral daily  atorvastatin 80 milliGRAM(s) Oral at bedtime  calcium acetate 667 milliGRAM(s) Oral three times a day with meals  chlorhexidine 0.12% Liquid 15 milliLiter(s) Swish and Spit two times a day  chlorhexidine 4% Liquid 1 Application(s) Topical two times a day  epoetin lois (PROCRIT) Injectable 69133 Unit(s) SubCutaneous <User Schedule>  gabapentin 300 milliGRAM(s) Oral two times a day  heparin  Infusion.  Unit(s)/Hr (7.5 mL/Hr) IV Continuous <Continuous>  loratadine 10 milliGRAM(s) Oral daily  melatonin 5 milliGRAM(s) Oral at bedtime  methocarbamol 500 milliGRAM(s) Oral two times a day  metoprolol succinate  milliGRAM(s) Oral daily  nitroglycerin  Infusion 10 MICROgram(s)/Min (3 mL/Hr) IV Continuous <Continuous>  pantoprazole    Tablet 40 milliGRAM(s) Oral before breakfast  sodium chloride 0.9% lock flush 3 milliLiter(s) IV Push every 8 hours  tamsulosin 0.4 milliGRAM(s) Oral at bedtime  vancomycin  IVPB 1000 milliGRAM(s) IV Intermittent once    MEDICATIONS  (PRN):  acetaminophen     Tablet .. 650 milliGRAM(s) Oral every 6 hours PRN Mild Pain (1 - 3)  heparin   Injectable 3800 Unit(s) IV Push every 6 hours PRN For aPTT less than 40  hyoscyamine SL 0.125 milliGRAM(s) SubLingual every 6 hours PRN Bowel Spasms      Allergies    No Known Drug Allergies  shellfish (Rash)    Intolerances        REVIEW OF SYSTEMS:  CONSTITUTIONAL: No fever, weight loss, or fatigue  EYES: No eye pain, visual disturbances, or discharge  ENMT:  No difficulty hearing, tinnitus, vertigo; No sinus or throat pain  NECK: No pain or stiffness  BREASTS: No pain, masses, or nipple discharge  RESPIRATORY: No cough, wheezing, chills or hemoptysis; No shortness of breath  CARDIOVASCULAR: No chest pain, palpitations, dizziness, or leg swelling  GASTROINTESTINAL: No abdominal or epigastric pain. No nausea, vomiting, or hematemesis; No diarrhea or constipation. No melena or hematochezia.  GENITOURINARY: No dysuria, frequency, hematuria, or incontinence  NEUROLOGICAL: No headaches, memory loss, loss of strength, numbness, or tremors  SKIN: No itching, burning, rashes, or lesions   LYMPH NODES: No enlarged glands  ENDOCRINE: No heat or cold intolerance; No hair loss  MUSCULOSKELETAL: No joint pain or swelling; No muscle, back, or extremity pain  PSYCHIATRIC: No depression, anxiety, mood swings, or difficulty sleeping  HEME/LYMPH: No easy bruising, or bleeding gums  ALLERGY AND IMMUNOLOGIC: No hives or eczema    Vital Signs Last 24 Hrs  T(C): 36.8 (13 Jul 2023 19:55), Max: 36.8 (13 Jul 2023 12:22)  T(F): 98.3 (13 Jul 2023 19:55), Max: 98.3 (13 Jul 2023 19:55)  HR: 69 (13 Jul 2023 23:00) (60 - 79)  BP: 109/51 (13 Jul 2023 23:00) (109/51 - 182/74)  BP(mean): 74 (13 Jul 2023 23:00) (74 - 106)  RR: 11 (13 Jul 2023 23:00) (8 - 32)  SpO2: 100% (13 Jul 2023 23:00) (91% - 100%)    Parameters below as of 13 Jul 2023 23:00  Patient On (Oxygen Delivery Method): nasal cannula  O2 Flow (L/min): 2      PHYSICAL EXAM:  GENERAL: NAD, well-groomed, well-developed  HEAD:  Atraumatic, Normocephalic  EYES: EOMI, PERRLA, conjunctiva and sclera clear  ENMT: No tonsillar erythema, exudates, or enlargement; Moist mucous membranes, Good dentition, No lesions  NECK: Supple, No JVD, Normal thyroid  NERVOUS SYSTEM:  Alert & Oriented X3, Good concentration; Motor Strength 5/5 B/L upper and lower extremities; DTRs 2+ intact and symmetric  CHEST/LUNG: Clear to percussion bilaterally; No rales, rhonchi, wheezing, or rubs  HEART: Regular rate and rhythm; No murmurs, rubs, or gallops  ABDOMEN: Soft, Nontender, Nondistended; Bowel sounds present  EXTREMITIES:  2+ Peripheral Pulses, No clubbing, cyanosis, or edema  LYMPH: No lymphadenopathy noted  SKIN: No rashes or lesions    LABS:                        9.9    6.31  )-----------( 128      ( 13 Jul 2023 18:15 )             29.4     07-13    134<L>  |  91<L>  |  50.1<H>  ----------------------------<  147<H>  4.6   |  29.0  |  8.70<H>    Ca    8.4      13 Jul 2023 13:03  Phos  5.2     07-13  Mg     2.4     07-13    TPro  6.3<L>  /  Alb  3.7  /  TBili  0.4  /  DBili  x   /  AST  12  /  ALT  15  /  AlkPhos  85  07-13    PT/INR - ( 13 Jul 2023 09:10 )   PT: 12.1 sec;   INR: 1.04 ratio         PTT - ( 13 Jul 2023 18:15 )  PTT:44.4 sec  Urinalysis Basic - ( 13 Jul 2023 13:03 )    Color: x / Appearance: x / SG: x / pH: x  Gluc: 147 mg/dL / Ketone: x  / Bili: x / Urobili: x   Blood: x / Protein: x / Nitrite: x   Leuk Esterase: x / RBC: x / WBC x   Sq Epi: x / Non Sq Epi: x / Bacteria: x      Magnesium: 2.4 mg/dL (07-13 @ 09:10)  Phosphorus: 5.2 mg/dL (07-13 @ 09:10)      RADIOLOGY & ADDITIONAL TESTS: Reviewed   HPI: 63M with PMHx ESRD on HD (T,Th, S), IDDM, COPD and NSTEMI 6/2022 with recent hospital admission for CABG gregor now presents from home with complaints of worsening chest pain associated with difficulty breathing. In ER EKGs with intermittent diffuse ST changes. Seen by cardiology and started on Tridil and Heparin gtt for NSTEMI. At time of exam, patient c/o substernal chest pain and difficulty breathing. Denies palpitations, dizziness, headache, abdominal pain, fever. Nephrology consulted to manage HD needs. K elevated to 6.4. Last HD Tuesday.     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    SOCIAL HISTORY:  Marital status:   Lives with: wife  ADLs: independent  Assistive Devices: none    Tobacco use: denies  Alcohol use: denies  Illicit drug use: denies    MEDICATIONS  (STANDING):  aspirin enteric coated 81 milliGRAM(s) Oral daily  atorvastatin 80 milliGRAM(s) Oral at bedtime  calcium acetate 667 milliGRAM(s) Oral three times a day with meals  chlorhexidine 0.12% Liquid 15 milliLiter(s) Swish and Spit two times a day  chlorhexidine 4% Liquid 1 Application(s) Topical two times a day  epoetin lois (PROCRIT) Injectable 49065 Unit(s) SubCutaneous <User Schedule>  gabapentin 300 milliGRAM(s) Oral two times a day  heparin  Infusion.  Unit(s)/Hr (7.5 mL/Hr) IV Continuous <Continuous>  loratadine 10 milliGRAM(s) Oral daily  melatonin 5 milliGRAM(s) Oral at bedtime  methocarbamol 500 milliGRAM(s) Oral two times a day  metoprolol succinate  milliGRAM(s) Oral daily  nitroglycerin  Infusion 10 MICROgram(s)/Min (3 mL/Hr) IV Continuous <Continuous>  pantoprazole    Tablet 40 milliGRAM(s) Oral before breakfast  sodium chloride 0.9% lock flush 3 milliLiter(s) IV Push every 8 hours  tamsulosin 0.4 milliGRAM(s) Oral at bedtime  vancomycin  IVPB 1000 milliGRAM(s) IV Intermittent once    MEDICATIONS  (PRN):  acetaminophen     Tablet .. 650 milliGRAM(s) Oral every 6 hours PRN Mild Pain (1 - 3)  heparin   Injectable 3800 Unit(s) IV Push every 6 hours PRN For aPTT less than 40  hyoscyamine SL 0.125 milliGRAM(s) SubLingual every 6 hours PRN Bowel Spasms    ALLERGIES:  No Known Drug Allergies  shellfish (Rash)    REVIEW OF SYSTEMS: All systems were reviewed in detail. Pertinent positive and negative have been detailed in HPI, otherwise negative.     Vital Signs Last 24 Hrs  T(C): 36.8 (13 Jul 2023 19:55), Max: 36.8 (13 Jul 2023 12:22)  T(F): 98.3 (13 Jul 2023 19:55), Max: 98.3 (13 Jul 2023 19:55)  HR: 69 (13 Jul 2023 23:00) (60 - 79)  BP: 109/51 (13 Jul 2023 23:00) (109/51 - 182/74)  BP(mean): 74 (13 Jul 2023 23:00) (74 - 106)  RR: 11 (13 Jul 2023 23:00) (8 - 32)  SpO2: 100% (13 Jul 2023 23:00) (91% - 100%)  Patient On (Oxygen Delivery Method): nasal cannula  O2 Flow (L/min): 2    PHYSICAL EXAM:  General: WDWN male in mild acute respiratory distress   HEENT: Normocephalic, NM +  Cardiac: S1S2 RRR  Respiratory: CTAB  Abdomen: Soft, NT  Extremities: No appreciable edema  Neuro: AAOx3  Access: RUE AV fistula with thrill and bruit     LABS:                        9.9    6.31  )-----------( 128      ( 13 Jul 2023 18:15 )             29.4     07-13    134<L>  |  91<L>  |  50.1<H>  ----------------------------<  147<H>  4.6   |  29.0  |  8.70<H>    Ca    8.4      13 Jul 2023 13:03  Phos  5.2     07-13  Mg     2.4     07-13    TPro  6.3<L>  /  Alb  3.7  /  TBili  0.4  /  DBili  x   /  AST  12  /  ALT  15  /  AlkPhos  85  07-13  PT/INR - ( 13 Jul 2023 09:10 )   PT: 12.1 sec;   INR: 1.04 ratio     PTT - ( 13 Jul 2023 18:15 )  PTT:44.4 sec    Urinalysis Basic - ( 13 Jul 2023 13:03 )  Color: x / Appearance: x / SG: x / pH: x  Gluc: 147 mg/dL / Ketone: x  / Bili: x / Urobili: x   Blood: x / Protein: x / Nitrite: x   Leuk Esterase: x / RBC: x / WBC x   Sq Epi: x / Non Sq Epi: x / Bacteria: x    RADIOLOGY & ADDITIONAL TESTS: Reviewed

## 2023-07-14 ENCOUNTER — APPOINTMENT (OUTPATIENT)
Dept: CARDIOTHORACIC SURGERY | Facility: HOSPITAL | Age: 63
End: 2023-07-14

## 2023-07-14 LAB
ALBUMIN SERPL ELPH-MCNC: 3.3 G/DL — SIGNIFICANT CHANGE UP (ref 3.3–5.2)
ALP SERPL-CCNC: 68 U/L — SIGNIFICANT CHANGE UP (ref 40–120)
ALT FLD-CCNC: 12 U/L — SIGNIFICANT CHANGE UP
ANION GAP SERPL CALC-SCNC: 12 MMOL/L — SIGNIFICANT CHANGE UP (ref 5–17)
ANION GAP SERPL CALC-SCNC: 17 MMOL/L — SIGNIFICANT CHANGE UP (ref 5–17)
ANION GAP SERPL CALC-SCNC: 18 MMOL/L — HIGH (ref 5–17)
APTT BLD: 31.6 SEC — SIGNIFICANT CHANGE UP (ref 27.5–35.5)
APTT BLD: 43.5 SEC — HIGH (ref 27.5–35.5)
AST SERPL-CCNC: 23 U/L — SIGNIFICANT CHANGE UP
BASOPHILS # BLD AUTO: 0 K/UL — SIGNIFICANT CHANGE UP (ref 0–0.2)
BASOPHILS NFR BLD AUTO: 0 % — SIGNIFICANT CHANGE UP (ref 0–2)
BILIRUB SERPL-MCNC: 0.7 MG/DL — SIGNIFICANT CHANGE UP (ref 0.4–2)
BUN SERPL-MCNC: 23.6 MG/DL — HIGH (ref 8–20)
BUN SERPL-MCNC: 26.1 MG/DL — HIGH (ref 8–20)
BUN SERPL-MCNC: 30.2 MG/DL — HIGH (ref 8–20)
CALCIUM SERPL-MCNC: 8 MG/DL — LOW (ref 8.4–10.5)
CALCIUM SERPL-MCNC: 8.6 MG/DL — SIGNIFICANT CHANGE UP (ref 8.4–10.5)
CALCIUM SERPL-MCNC: 8.7 MG/DL — SIGNIFICANT CHANGE UP (ref 8.4–10.5)
CHLORIDE SERPL-SCNC: 100 MMOL/L — SIGNIFICANT CHANGE UP (ref 96–108)
CHLORIDE SERPL-SCNC: 92 MMOL/L — LOW (ref 96–108)
CHLORIDE SERPL-SCNC: 97 MMOL/L — SIGNIFICANT CHANGE UP (ref 96–108)
CK MB CFR SERPL CALC: 12.3 NG/ML — HIGH (ref 0–6.7)
CK SERPL-CCNC: 211 U/L — HIGH (ref 30–200)
CO2 SERPL-SCNC: 21 MMOL/L — LOW (ref 22–29)
CO2 SERPL-SCNC: 21 MMOL/L — LOW (ref 22–29)
CO2 SERPL-SCNC: 26 MMOL/L — SIGNIFICANT CHANGE UP (ref 22–29)
CREAT SERPL-MCNC: 4.79 MG/DL — HIGH (ref 0.5–1.3)
CREAT SERPL-MCNC: 5.19 MG/DL — HIGH (ref 0.5–1.3)
CREAT SERPL-MCNC: 5.32 MG/DL — HIGH (ref 0.5–1.3)
EGFR: 11 ML/MIN/1.73M2 — LOW
EGFR: 12 ML/MIN/1.73M2 — LOW
EGFR: 13 ML/MIN/1.73M2 — LOW
EOSINOPHIL # BLD AUTO: 0.1 K/UL — SIGNIFICANT CHANGE UP (ref 0–0.5)
EOSINOPHIL NFR BLD AUTO: 1.7 % — SIGNIFICANT CHANGE UP (ref 0–6)
FIBRINOGEN PPP-MCNC: 527 MG/DL — HIGH (ref 200–450)
GAS PNL BLDA: SIGNIFICANT CHANGE UP
GAS PNL BLDA: SIGNIFICANT CHANGE UP
GIANT PLATELETS BLD QL SMEAR: PRESENT — SIGNIFICANT CHANGE UP
GLUCOSE BLDC GLUCOMTR-MCNC: 104 MG/DL — HIGH (ref 70–99)
GLUCOSE BLDC GLUCOMTR-MCNC: 107 MG/DL — HIGH (ref 70–99)
GLUCOSE BLDC GLUCOMTR-MCNC: 111 MG/DL — HIGH (ref 70–99)
GLUCOSE BLDC GLUCOMTR-MCNC: 118 MG/DL — HIGH (ref 70–99)
GLUCOSE BLDC GLUCOMTR-MCNC: 142 MG/DL — HIGH (ref 70–99)
GLUCOSE BLDC GLUCOMTR-MCNC: 151 MG/DL — HIGH (ref 70–99)
GLUCOSE BLDC GLUCOMTR-MCNC: 155 MG/DL — HIGH (ref 70–99)
GLUCOSE BLDC GLUCOMTR-MCNC: 157 MG/DL — HIGH (ref 70–99)
GLUCOSE BLDC GLUCOMTR-MCNC: 158 MG/DL — HIGH (ref 70–99)
GLUCOSE SERPL-MCNC: 124 MG/DL — HIGH (ref 70–99)
GLUCOSE SERPL-MCNC: 135 MG/DL — HIGH (ref 70–99)
GLUCOSE SERPL-MCNC: 178 MG/DL — HIGH (ref 70–99)
HBV SURFACE AB SER-ACNC: 15 MIU/ML — SIGNIFICANT CHANGE UP
HBV SURFACE AG SER-ACNC: SIGNIFICANT CHANGE UP
HCT VFR BLD CALC: 29.4 % — LOW (ref 39–50)
HCT VFR BLD CALC: 30.9 % — LOW (ref 39–50)
HGB BLD-MCNC: 10.4 G/DL — LOW (ref 13–17)
HGB BLD-MCNC: 9.6 G/DL — LOW (ref 13–17)
INR BLD: 1.21 RATIO — HIGH (ref 0.88–1.16)
LYMPHOCYTES # BLD AUTO: 0.2 K/UL — LOW (ref 1–3.3)
LYMPHOCYTES # BLD AUTO: 3.5 % — LOW (ref 13–44)
MAGNESIUM SERPL-MCNC: 2.1 MG/DL — SIGNIFICANT CHANGE UP (ref 1.6–2.6)
MAGNESIUM SERPL-MCNC: 2.8 MG/DL — HIGH (ref 1.6–2.6)
MAGNESIUM SERPL-MCNC: 2.8 MG/DL — HIGH (ref 1.6–2.6)
MANUAL SMEAR VERIFICATION: SIGNIFICANT CHANGE UP
MCHC RBC-ENTMCNC: 29.6 PG — SIGNIFICANT CHANGE UP (ref 27–34)
MCHC RBC-ENTMCNC: 30.1 PG — SIGNIFICANT CHANGE UP (ref 27–34)
MCHC RBC-ENTMCNC: 32.7 GM/DL — SIGNIFICANT CHANGE UP (ref 32–36)
MCHC RBC-ENTMCNC: 33.7 GM/DL — SIGNIFICANT CHANGE UP (ref 32–36)
MCV RBC AUTO: 89.6 FL — SIGNIFICANT CHANGE UP (ref 80–100)
MCV RBC AUTO: 90.7 FL — SIGNIFICANT CHANGE UP (ref 80–100)
MONOCYTES # BLD AUTO: 0.25 K/UL — SIGNIFICANT CHANGE UP (ref 0–0.9)
MONOCYTES NFR BLD AUTO: 4.4 % — SIGNIFICANT CHANGE UP (ref 2–14)
MRSA PCR RESULT.: SIGNIFICANT CHANGE UP
NEUTROPHILS # BLD AUTO: 5.11 K/UL — SIGNIFICANT CHANGE UP (ref 1.8–7.4)
NEUTROPHILS NFR BLD AUTO: 90.4 % — HIGH (ref 43–77)
OVALOCYTES BLD QL SMEAR: SLIGHT — SIGNIFICANT CHANGE UP
PA ADP PRP-ACNC: 249 PRU — SIGNIFICANT CHANGE UP (ref 180–376)
PHOSPHATE SERPL-MCNC: 2.6 MG/DL — SIGNIFICANT CHANGE UP (ref 2.4–4.7)
PLAT MORPH BLD: NORMAL — SIGNIFICANT CHANGE UP
PLATELET # BLD AUTO: 120 K/UL — LOW (ref 150–400)
PLATELET # BLD AUTO: 158 K/UL — SIGNIFICANT CHANGE UP (ref 150–400)
POLYCHROMASIA BLD QL SMEAR: SLIGHT — SIGNIFICANT CHANGE UP
POTASSIUM SERPL-MCNC: 4.9 MMOL/L — SIGNIFICANT CHANGE UP (ref 3.5–5.3)
POTASSIUM SERPL-MCNC: 4.9 MMOL/L — SIGNIFICANT CHANGE UP (ref 3.5–5.3)
POTASSIUM SERPL-MCNC: 5 MMOL/L — SIGNIFICANT CHANGE UP (ref 3.5–5.3)
POTASSIUM SERPL-SCNC: 4.9 MMOL/L — SIGNIFICANT CHANGE UP (ref 3.5–5.3)
POTASSIUM SERPL-SCNC: 4.9 MMOL/L — SIGNIFICANT CHANGE UP (ref 3.5–5.3)
POTASSIUM SERPL-SCNC: 5 MMOL/L — SIGNIFICANT CHANGE UP (ref 3.5–5.3)
PROT SERPL-MCNC: 5.7 G/DL — LOW (ref 6.6–8.7)
PROTHROM AB SERPL-ACNC: 14.1 SEC — HIGH (ref 10.5–13.4)
RBC # BLD: 3.24 M/UL — LOW (ref 4.2–5.8)
RBC # BLD: 3.45 M/UL — LOW (ref 4.2–5.8)
RBC # FLD: 14.5 % — SIGNIFICANT CHANGE UP (ref 10.3–14.5)
RBC # FLD: 15 % — HIGH (ref 10.3–14.5)
RBC BLD AUTO: ABNORMAL
S AUREUS DNA NOSE QL NAA+PROBE: SIGNIFICANT CHANGE UP
SODIUM SERPL-SCNC: 130 MMOL/L — LOW (ref 135–145)
SODIUM SERPL-SCNC: 135 MMOL/L — SIGNIFICANT CHANGE UP (ref 135–145)
SODIUM SERPL-SCNC: 137 MMOL/L — SIGNIFICANT CHANGE UP (ref 135–145)
TROPONIN T SERPL-MCNC: 0.19 NG/ML — HIGH (ref 0–0.06)
WBC # BLD: 5.65 K/UL — SIGNIFICANT CHANGE UP (ref 3.8–10.5)
WBC # BLD: 7.36 K/UL — SIGNIFICANT CHANGE UP (ref 3.8–10.5)
WBC # FLD AUTO: 5.65 K/UL — SIGNIFICANT CHANGE UP (ref 3.8–10.5)
WBC # FLD AUTO: 7.36 K/UL — SIGNIFICANT CHANGE UP (ref 3.8–10.5)

## 2023-07-14 PROCEDURE — 33519 CABG ARTERY-VEIN THREE: CPT

## 2023-07-14 PROCEDURE — 33508 ENDOSCOPIC VEIN HARVEST: CPT | Mod: 59

## 2023-07-14 PROCEDURE — 93010 ELECTROCARDIOGRAM REPORT: CPT

## 2023-07-14 PROCEDURE — 99291 CRITICAL CARE FIRST HOUR: CPT

## 2023-07-14 PROCEDURE — 33533 CABG ARTERIAL SINGLE: CPT | Mod: AS

## 2023-07-14 PROCEDURE — 93010 ELECTROCARDIOGRAM REPORT: CPT | Mod: 77

## 2023-07-14 PROCEDURE — 33968 REMOVE AORTIC ASSIST DEVICE: CPT

## 2023-07-14 PROCEDURE — 71045 X-RAY EXAM CHEST 1 VIEW: CPT | Mod: 26

## 2023-07-14 PROCEDURE — 33519 CABG ARTERY-VEIN THREE: CPT | Mod: AS

## 2023-07-14 PROCEDURE — 33533 CABG ARTERIAL SINGLE: CPT

## 2023-07-14 DEVICE — PACING WIRE ORANGE M-25 WINGED WIRE 37MM X 89MM: Type: IMPLANTABLE DEVICE | Status: FUNCTIONAL

## 2023-07-14 DEVICE — MEDIASTINAL CATH DRAIN 9MM: Type: IMPLANTABLE DEVICE | Status: FUNCTIONAL

## 2023-07-14 DEVICE — TISSEEL 10ML: Type: IMPLANTABLE DEVICE | Status: FUNCTIONAL

## 2023-07-14 DEVICE — CANNULA VENOUS 2 STAGE THIN FLEX 29/29FR X 3/8" NON-VENTED: Type: IMPLANTABLE DEVICE | Status: FUNCTIONAL

## 2023-07-14 DEVICE — FLOSEAL FAST PREP 10ML: Type: IMPLANTABLE DEVICE | Status: FUNCTIONAL

## 2023-07-14 DEVICE — SURGICEL FIBRILLAR 4 X 4": Type: IMPLANTABLE DEVICE | Status: FUNCTIONAL

## 2023-07-14 DEVICE — KIT CVC 2LUM MAC 9FR CHG: Type: IMPLANTABLE DEVICE | Status: FUNCTIONAL

## 2023-07-14 DEVICE — CANNULA ARTERIAL STRAIGHT 20FR X 3/8" VENTED: Type: IMPLANTABLE DEVICE | Status: FUNCTIONAL

## 2023-07-14 DEVICE — OCCLUDER INTERNAL VESSEL FLO-RESTER 1 X 12MM: Type: IMPLANTABLE DEVICE | Status: FUNCTIONAL

## 2023-07-14 DEVICE — BONE WAX 2.5GM: Type: IMPLANTABLE DEVICE | Status: FUNCTIONAL

## 2023-07-14 DEVICE — PACING WIRE WHITE M-25 WINGED WIRE 37MM X 89MM: Type: IMPLANTABLE DEVICE | Status: FUNCTIONAL

## 2023-07-14 DEVICE — KIT A-LINE 1LUM 20G X 12CM SAFE KIT: Type: IMPLANTABLE DEVICE | Status: FUNCTIONAL

## 2023-07-14 DEVICE — CANNULA AORTIC ROOT 12G X 14CM FLANGED: Type: IMPLANTABLE DEVICE | Status: FUNCTIONAL

## 2023-07-14 RX ORDER — ASPIRIN/CALCIUM CARB/MAGNESIUM 324 MG
81 TABLET ORAL ONCE
Refills: 0 | Status: COMPLETED | OUTPATIENT
Start: 2023-07-14 | End: 2023-07-14

## 2023-07-14 RX ORDER — FENTANYL CITRATE 50 UG/ML
25 INJECTION INTRAVENOUS ONCE
Refills: 0 | Status: DISCONTINUED | OUTPATIENT
Start: 2023-07-14 | End: 2023-07-14

## 2023-07-14 RX ORDER — DEXTROSE 50 % IN WATER 50 %
25 SYRINGE (ML) INTRAVENOUS
Refills: 0 | Status: DISCONTINUED | OUTPATIENT
Start: 2023-07-14 | End: 2023-07-31

## 2023-07-14 RX ORDER — ATORVASTATIN CALCIUM 80 MG/1
80 TABLET, FILM COATED ORAL AT BEDTIME
Refills: 0 | Status: DISCONTINUED | OUTPATIENT
Start: 2023-07-14 | End: 2023-07-31

## 2023-07-14 RX ORDER — ACETAMINOPHEN 500 MG
1000 TABLET ORAL ONCE
Refills: 0 | Status: COMPLETED | OUTPATIENT
Start: 2023-07-14 | End: 2023-07-14

## 2023-07-14 RX ORDER — CEFUROXIME AXETIL 250 MG
1500 TABLET ORAL ONCE
Refills: 0 | Status: COMPLETED | OUTPATIENT
Start: 2023-07-15 | End: 2023-07-15

## 2023-07-14 RX ORDER — PANTOPRAZOLE SODIUM 20 MG/1
40 TABLET, DELAYED RELEASE ORAL ONCE
Refills: 0 | Status: COMPLETED | OUTPATIENT
Start: 2023-07-14 | End: 2023-07-14

## 2023-07-14 RX ORDER — SODIUM CHLORIDE 9 MG/ML
1000 INJECTION INTRAMUSCULAR; INTRAVENOUS; SUBCUTANEOUS
Refills: 0 | Status: DISCONTINUED | OUTPATIENT
Start: 2023-07-14 | End: 2023-07-16

## 2023-07-14 RX ORDER — DEXMEDETOMIDINE HYDROCHLORIDE IN 0.9% SODIUM CHLORIDE 4 UG/ML
0.1 INJECTION INTRAVENOUS
Qty: 200 | Refills: 0 | Status: DISCONTINUED | OUTPATIENT
Start: 2023-07-14 | End: 2023-07-14

## 2023-07-14 RX ORDER — POLYETHYLENE GLYCOL 3350 17 G/17G
17 POWDER, FOR SOLUTION ORAL DAILY
Refills: 0 | Status: DISCONTINUED | OUTPATIENT
Start: 2023-07-14 | End: 2023-07-25

## 2023-07-14 RX ORDER — ASPIRIN/CALCIUM CARB/MAGNESIUM 324 MG
81 TABLET ORAL DAILY
Refills: 0 | Status: DISCONTINUED | OUTPATIENT
Start: 2023-07-15 | End: 2023-07-25

## 2023-07-14 RX ORDER — PANTOPRAZOLE SODIUM 20 MG/1
40 TABLET, DELAYED RELEASE ORAL DAILY
Refills: 0 | Status: DISCONTINUED | OUTPATIENT
Start: 2023-07-15 | End: 2023-07-31

## 2023-07-14 RX ORDER — PROPOFOL 10 MG/ML
5 INJECTION, EMULSION INTRAVENOUS
Qty: 1000 | Refills: 0 | Status: DISCONTINUED | OUTPATIENT
Start: 2023-07-14 | End: 2023-07-14

## 2023-07-14 RX ORDER — INSULIN HUMAN 100 [IU]/ML
2 INJECTION, SOLUTION SUBCUTANEOUS
Qty: 100 | Refills: 0 | Status: DISCONTINUED | OUTPATIENT
Start: 2023-07-14 | End: 2023-07-15

## 2023-07-14 RX ORDER — SENNA PLUS 8.6 MG/1
2 TABLET ORAL AT BEDTIME
Refills: 0 | Status: DISCONTINUED | OUTPATIENT
Start: 2023-07-14 | End: 2023-07-25

## 2023-07-14 RX ORDER — NOREPINEPHRINE BITARTRATE/D5W 8 MG/250ML
0.05 PLASTIC BAG, INJECTION (ML) INTRAVENOUS
Qty: 8 | Refills: 0 | Status: DISCONTINUED | OUTPATIENT
Start: 2023-07-14 | End: 2023-07-15

## 2023-07-14 RX ORDER — CHLORHEXIDINE GLUCONATE 213 G/1000ML
1 SOLUTION TOPICAL DAILY
Refills: 0 | Status: DISCONTINUED | OUTPATIENT
Start: 2023-07-14 | End: 2023-07-17

## 2023-07-14 RX ORDER — DEXTROSE 50 % IN WATER 50 %
50 SYRINGE (ML) INTRAVENOUS
Refills: 0 | Status: DISCONTINUED | OUTPATIENT
Start: 2023-07-14 | End: 2023-07-31

## 2023-07-14 RX ORDER — CEFUROXIME AXETIL 250 MG
750 TABLET ORAL EVERY 8 HOURS
Refills: 0 | Status: DISCONTINUED | OUTPATIENT
Start: 2023-07-14 | End: 2023-07-14

## 2023-07-14 RX ORDER — OXYCODONE HYDROCHLORIDE 5 MG/1
5 TABLET ORAL EVERY 4 HOURS
Refills: 0 | Status: DISCONTINUED | OUTPATIENT
Start: 2023-07-14 | End: 2023-07-21

## 2023-07-14 RX ORDER — VANCOMYCIN HCL 1 G
1000 VIAL (EA) INTRAVENOUS ONCE
Refills: 0 | Status: COMPLETED | OUTPATIENT
Start: 2023-07-15 | End: 2023-07-15

## 2023-07-14 RX ADMIN — PANTOPRAZOLE SODIUM 40 MILLIGRAM(S): 20 TABLET, DELAYED RELEASE ORAL at 17:51

## 2023-07-14 RX ADMIN — DEXMEDETOMIDINE HYDROCHLORIDE IN 0.9% SODIUM CHLORIDE 1.54 MICROGRAM(S)/KG/HR: 4 INJECTION INTRAVENOUS at 17:51

## 2023-07-14 RX ADMIN — FENTANYL CITRATE 25 MICROGRAM(S): 50 INJECTION INTRAVENOUS at 15:30

## 2023-07-14 RX ADMIN — ATORVASTATIN CALCIUM 80 MILLIGRAM(S): 80 TABLET, FILM COATED ORAL at 21:02

## 2023-07-14 RX ADMIN — Medication 81 MILLIGRAM(S): at 20:57

## 2023-07-14 RX ADMIN — OXYCODONE HYDROCHLORIDE 5 MILLIGRAM(S): 5 TABLET ORAL at 22:41

## 2023-07-14 RX ADMIN — CHLORHEXIDINE GLUCONATE 15 MILLILITER(S): 213 SOLUTION TOPICAL at 05:31

## 2023-07-14 RX ADMIN — Medication 100 MILLIGRAM(S): at 05:31

## 2023-07-14 RX ADMIN — Medication 3 MICROGRAM(S)/MIN: at 00:12

## 2023-07-14 RX ADMIN — SODIUM CHLORIDE 3 MILLILITER(S): 9 INJECTION INTRAMUSCULAR; INTRAVENOUS; SUBCUTANEOUS at 06:18

## 2023-07-14 RX ADMIN — SENNA PLUS 2 TABLET(S): 8.6 TABLET ORAL at 21:02

## 2023-07-14 RX ADMIN — INSULIN HUMAN 2 UNIT(S)/HR: 100 INJECTION, SOLUTION SUBCUTANEOUS at 17:51

## 2023-07-14 RX ADMIN — Medication 1000 MILLIGRAM(S): at 18:15

## 2023-07-14 RX ADMIN — CHLORHEXIDINE GLUCONATE 1 APPLICATION(S): 213 SOLUTION TOPICAL at 05:31

## 2023-07-14 RX ADMIN — HEPARIN SODIUM 850 UNIT(S)/HR: 5000 INJECTION INTRAVENOUS; SUBCUTANEOUS at 02:10

## 2023-07-14 RX ADMIN — METHOCARBAMOL 500 MILLIGRAM(S): 500 TABLET, FILM COATED ORAL at 05:31

## 2023-07-14 RX ADMIN — GABAPENTIN 300 MILLIGRAM(S): 400 CAPSULE ORAL at 05:31

## 2023-07-14 RX ADMIN — Medication 400 MILLIGRAM(S): at 18:00

## 2023-07-14 RX ADMIN — OXYCODONE HYDROCHLORIDE 5 MILLIGRAM(S): 5 TABLET ORAL at 21:41

## 2023-07-14 RX ADMIN — Medication 5.78 MICROGRAM(S)/KG/MIN: at 17:51

## 2023-07-14 RX ADMIN — FENTANYL CITRATE 25 MICROGRAM(S): 50 INJECTION INTRAVENOUS at 15:45

## 2023-07-14 NOTE — CHART NOTE - NSCHARTNOTEFT_GEN_A_CORE
PROCEDURE NOTE  REMOVAL OF INTRA AORTIC BALLOON PUMP    Hemodynamics remained stable.  Decision made to remove IABP. Signals in the    Right           lower extremity are audible by doppler. The patient was placed in the supine position.  The insertion site was identified and the sutures were removed.  The IABP was turned off and the balloon deflated.  The IABP was then removed.  Direct pressure was applied for      45         minutes.  A sandbag was applied and is  to remain in place for three hours.    Monitoring of the       right      groin and both lower extremities including neuro-vascular checks and vital signs every 15 minutes  x4, then every 30 minutes x 2, then every 1 hr x 4 was ordered.    Post removal Doppler signals in B/L DP and PT.       Complications: none PROCEDURE NOTE  REMOVAL OF INTRA AORTIC BALLOON PUMP    Hemodynamics remained stable.  Decision made to remove IABP. Signals in the    Left           lower extremity are audible by doppler. The patient was placed in the supine position.  The insertion site was identified and the sutures were removed.  The IABP was turned off and the balloon deflated.  The IABP was then removed.  Direct pressure was applied for      45         minutes.  A sandbag was applied and is  to remain in place for three hours.    Monitoring of the       Left      groin and both lower extremities including neuro-vascular checks and vital signs every 15 minutes  x4, then every 30 minutes x 2, then every 1 hr x 4 was ordered.    Post removal Doppler signals in B/L DP and PT.       Complications: none

## 2023-07-14 NOTE — CHART NOTE - NSCHARTNOTEFT_GEN_A_CORE
s/p IABP placement 7/13/23 for CABG, patient already in OR upon arrival to CTICU for left groin site check.

## 2023-07-14 NOTE — BRIEF OPERATIVE NOTE - COMMENTS
1. No qualified resident was available to assist in this case. I have personally first assisted the Cardiothoracic Surgeon listed in this brief op note throughout the entirety of this case.  2. Unable to calculate blood loss due to use of cell saver.

## 2023-07-14 NOTE — PROGRESS NOTE ADULT - SUBJECTIVE AND OBJECTIVE BOX
BENJAMIN LOCO  MRN-[id number]    HPI:  63M with PMHx ESRD on HD (T,Th, S), IDDM, COPD, gastroparesis, peripheral blindness and retinopathy, C. Diff, and NSTEMI 6/2022 with recent hospital admission for CABG eval now presents from home with complaints of worsening chest pain associated with difficulty breathing. In ER EKGs with intermittent diffuse ST changes. Pain slightly improved with 2 doses IV Morphine. Seen by cardiology and started on Tridil and Heparin gtt for NSTEMI. At time of exam, patient with labored breathing complaining of substernal chest pain radiating to entire body. Denies palpitations, dizziness, headache, abdominal pain or N/V/D/C.  (13 Jul 2023 10:13)      Surgery/Hospital Course:  ·  PRE-OP DIAGNOSIS:  CAD (coronary atherosclerotic disease)   ·  POST-OP DIAGNOSIS:  CAD (coronary artery disease), native coronary artery   ·  PROCEDURES:  CABG, 4 or more vessels 14-Jul-2023   CABG x 4 (LIMA-LAD, [id number]-[id number]-PDA)   Today:  No acute events     ICU Vital Signs Last 24 Hrs  T(C): 36.4 (14 Jul 2023 16:01), Max: 37.1 (13 Jul 2023 23:10)  T(F): 97.5 (14 Jul 2023 16:01), Max: 98.7 (13 Jul 2023 23:10)  HR: 70 (14 Jul 2023 17:30) (60 - 79)  BP: 112/59 (14 Jul 2023 15:45) (109/51 - 138/60)  BP(mean): 83 (14 Jul 2023 15:45) (74 - 94)  ABP: 135/53 (14 Jul 2023 17:30) (81/67 - 135/53)  ABP(mean): 84 (14 Jul 2023 17:30) (62 - 116)  RR: 21 (14 Jul 2023 17:30) (8 - 22)  SpO2: 100% (14 Jul 2023 17:30) (91% - 100%)    O2 Parameters below as of 14 Jul 2023 17:58    O2 Flow (L/min): 2          Physical Exam:  Gen: sedated  CNS: non focal 	  Neck: no JVD  RES : clear , no wheezing              CVS: Regular  rhythm. Normal S1/S2  Abd: Soft, non-distended. Bowel sounds present.  Skin: No rash.  Ext:  no edema    ============================I/O===========================   I&O's Detail    13 Jul 2023 07:01  -  14 Jul 2023 07:00  --------------------------------------------------------  IN:    Heparin Infusion: 124.5 mL    Nitroglycerin: 264 mL    Oral Fluid: 250 mL    Other (mL): 600 mL  Total IN: 1238.5 mL    OUT:    Other (mL): 1600 mL  Total OUT: 1600 mL    Total NET: -361.5 mL      14 Jul 2023 07:01  -  14 Jul 2023 17:59  --------------------------------------------------------  IN:    Insulin: 4 mL    Norepinephrine: 15 mL    Propofol: 20 mL    sodium chloride 0.9%: 50 mL    sodium chloride 0.9%: 25 mL  Total IN: 114 mL    OUT:    Chest Tube (mL): 20 mL    Chest Tube (mL): 110 mL    Voided (mL): 53 mL  Total OUT: 183 mL    Total NET: -69 mL        ============================ LABS =========================                        10.4   5.65  )-----------( 158      ( 14 Jul 2023 13:45 )             30.9     07-14    137  |  100  |  26.1<H>  ----------------------------<  124<H>  4.9   |  21.0<L>  |  4.79<H>    Ca    8.7      14 Jul 2023 13:45  Phos  2.6     07-14  Mg     2.8     07-14    TPro  5.7<L>  /  Alb  3.3  /  TBili  0.7  /  DBili  x   /  AST  23  /  ALT  12  /  AlkPhos  68  07-14    LIVER FUNCTIONS - ( 14 Jul 2023 13:45 )  Alb: 3.3 g/dL / Pro: 5.7 g/dL / ALK PHOS: 68 U/L / ALT: 12 U/L / AST: 23 U/L / GGT: x           PT/INR - ( 14 Jul 2023 13:45 )   PT: 14.1 sec;   INR: 1.21 ratio         PTT - ( 14 Jul 2023 13:45 )  PTT:31.6 sec  ABG - ( 14 Jul 2023 17:42 )  pH, Arterial: 7.570 pH, Blood: x     /  pCO2: 26    /  pO2: 173   / HCO3: 24    / Base Excess: 1.8   /  SaO2: 99.8              Urinalysis Basic - ( 14 Jul 2023 13:45 )    Color: x / Appearance: x / SG: x / pH: x  Gluc: 124 mg/dL / Ketone: x  / Bili: x / Urobili: x   Blood: x / Protein: x / Nitrite: x   Leuk Esterase: x / RBC: x / WBC x   Sq Epi: x / Non Sq Epi: x / Bacteria: x      ======================Micro/Rad/Cardio=================  Culture: Reviewed   CXR: Reviewed  Echo:Reviewed  ======================================================  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      S/P amputation  left toes      Stented coronary artery        ====================ASSESSMENT ==============    ---S/p CABG, 4 or more vessels 14-Jul-2023              CABG x 4 (LIMA-LAD, JFL-KL2-VO1-PDA)   ---BPH (Benign Prostatic Hypertrophy)   ---CAD (coronary artery disease)  ---CKD (chronic kidney disease)  ---Osteomyelitis  ---Gastroparesis  ---PVD (peripheral vascular disease)  ---Legally blind  ---Congenital Anomaly of Foot  ---S/P amputation left toes  ---Stented coronary artery-  ---Post op Hypovolemic shock  ---Post op respiratory insufficiency       Plan:  ====================== NEUROLOGY=====================  acetaminophen   IVPB .. 1000 milliGRAM(s) IV Intermittent once    ==================== RESPIRATORY======================  Post op respiratory insufficiency  Mechanical Ventilation:  Mode: CPAP with PS  FiO2: 35  PEEP: 5  PS: 5      ====================CARDIOVASCULAR==================  Post op Hypovolemia  norepinephrine Infusion 0.05 MICROgram(s)/kG/Min (5.78 mL/Hr) IV Continuous <Continuous>    ===================HEMATOLOGIC/ONC ===================  Monitor H&H/Plts    aspirin  chewable 81 milliGRAM(s) Oral once    ===================== RENAL =========================  Continue monitoring urine output, I&OS, BUN/Cr     ==================== GASTROINTESTINAL===================  polyethylene glycol 3350 17 Gram(s) Oral daily  senna 2 Tablet(s) Oral at bedtime  sodium chloride 0.9%. 1000 milliLiter(s) (10 mL/Hr) IV Continuous <Continuous>  sodium chloride 0.9%. 1000 milliLiter(s) (5 mL/Hr) IV Continuous <Continuous>    =======================    ENDOCRINE  =====================  atorvastatin 80 milliGRAM(s) Oral at bedtime  dextrose 50% Injectable 50 milliLiter(s) IV Push every 15 minutes  dextrose 50% Injectable 25 milliLiter(s) IV Push every 15 minutes  insulin regular Infusion 2 Unit(s)/Hr (2 mL/Hr) IV Continuous <Continuous>    ========================INFECTIOUS DISEASE================      -Close hemodynamic , ventilatory and drain monitoring and management per post op routine .  -Monitor Neurologic status ,   -Head of the bed should remain elevated to 45 degrees,  -Monitor adequacy of oxygenation and ventilation and attempt to wean oxygen ,  -Monitor for arrhythmias and monitor parameters for organ perfusion,  -Glycemic control is satisfactory,  -Nutritional goals will be met using po eventually , insure adequate caloric intake and monitor the same ,  -Electrolytes have been repleted as necessary , pain control has been achieved  and wound care has been carried out ,  -Stress ulcer and VTE prophylaxis will be achieved,  -Agressive PT and early mobility and ambulation goals will be met,    I have spent 35 minutes providing acute care for this critically ill patient     Patient requires continuous monitoring with bedside rhythm monitoring, pulse ox monitoring, and intermittent blood gas analysis. Care plan discussed with ICU care team. Patient remained critical and at risk for life threatening decompensation.            BENJAMIN LOCO  MRN-[id number]    HPI:  63M with PMHx ESRD on HD (T,Th, S), IDDM, COPD, gastroparesis, peripheral blindness and retinopathy, C. Diff, and NSTEMI 6/2022 with recent hospital admission for CABG eval now presents from home with complaints of worsening chest pain associated with difficulty breathing. In ER EKGs with intermittent diffuse ST changes. Pain slightly improved with 2 doses IV Morphine. Seen by cardiology and started on Tridil and Heparin gtt for NSTEMI. At time of exam, patient with labored breathing complaining of substernal chest pain radiating to entire body. Denies palpitations, dizziness, headache, abdominal pain or N/V/D/C.  (13 Jul 2023 10:13)      Surgery/Hospital Course:  ·  PRE-OP DIAGNOSIS:  CAD (coronary atherosclerotic disease)   ·  POST-OP DIAGNOSIS:  CAD (coronary artery disease), native coronary artery   ·  PROCEDURES:  CABG, 4 or more vessels 14-Jul-2023   CABG x 4 (LIMA-LAD, [id number]-PDA)   Today:  No acute events     ICU Vital Signs Last 24 Hrs  T(C): 36.4 (14 Jul 2023 16:01), Max: 37.1 (13 Jul 2023 23:10)  T(F): 97.5 (14 Jul 2023 16:01), Max: 98.7 (13 Jul 2023 23:10)  HR: 70 (14 Jul 2023 17:30) (60 - 79)  BP: 112/59 (14 Jul 2023 15:45) (109/51 - 138/60)  BP(mean): 83 (14 Jul 2023 15:45) (74 - 94)  ABP: 135/53 (14 Jul 2023 17:30) (81/67 - 135/53)  ABP(mean): 84 (14 Jul 2023 17:30) (62 - 116)  RR: 21 (14 Jul 2023 17:30) (8 - 22)  SpO2: 100% (14 Jul 2023 17:30) (91% - 100%)    O2 Parameters below as of 14 Jul 2023 17:58    O2 Flow (L/min): 2          Physical Exam:  Gen: sedated  CNS: non focal 	  Neck: no JVD  RES : clear , no wheezing              CVS: Regular  rhythm. Normal S1/S2  Abd: Soft, non-distended. Bowel sounds present.  Skin: No rash.  Ext:  no edema    ============================I/O===========================   I&O's Detail    13 Jul 2023 07:01  -  14 Jul 2023 07:00  --------------------------------------------------------  IN:    Heparin Infusion: 124.5 mL    Nitroglycerin: 264 mL    Oral Fluid: 250 mL    Other (mL): 600 mL  Total IN: 1238.5 mL    OUT:    Other (mL): 1600 mL  Total OUT: 1600 mL    Total NET: -361.5 mL      14 Jul 2023 07:01  -  14 Jul 2023 17:59  --------------------------------------------------------  IN:    Insulin: 4 mL    Norepinephrine: 15 mL    Propofol: 20 mL    sodium chloride 0.9%: 50 mL    sodium chloride 0.9%: 25 mL  Total IN: 114 mL    OUT:    Chest Tube (mL): 20 mL    Chest Tube (mL): 110 mL    Voided (mL): 53 mL  Total OUT: 183 mL    Total NET: -69 mL        ============================ LABS =========================                        10.4   5.65  )-----------( 158      ( 14 Jul 2023 13:45 )             30.9     07-14    137  |  100  |  26.1<H>  ----------------------------<  124<H>  4.9   |  21.0<L>  |  4.79<H>    Ca    8.7      14 Jul 2023 13:45  Phos  2.6     07-14  Mg     2.8     07-14    TPro  5.7<L>  /  Alb  3.3  /  TBili  0.7  /  DBili  x   /  AST  23  /  ALT  12  /  AlkPhos  68  07-14    LIVER FUNCTIONS - ( 14 Jul 2023 13:45 )  Alb: 3.3 g/dL / Pro: 5.7 g/dL / ALK PHOS: 68 U/L / ALT: 12 U/L / AST: 23 U/L / GGT: x           PT/INR - ( 14 Jul 2023 13:45 )   PT: 14.1 sec;   INR: 1.21 ratio         PTT - ( 14 Jul 2023 13:45 )  PTT:31.6 sec  ABG - ( 14 Jul 2023 17:42 )  pH, Arterial: 7.570 pH, Blood: x     /  pCO2: 26    /  pO2: 173   / HCO3: 24    / Base Excess: 1.8   /  SaO2: 99.8              Urinalysis Basic - ( 14 Jul 2023 13:45 )    Color: x / Appearance: x / SG: x / pH: x  Gluc: 124 mg/dL / Ketone: x  / Bili: x / Urobili: x   Blood: x / Protein: x / Nitrite: x   Leuk Esterase: x / RBC: x / WBC x   Sq Epi: x / Non Sq Epi: x / Bacteria: x      ======================Micro/Rad/Cardio=================  Culture: Reviewed   CXR: Reviewed  Echo:Reviewed  ======================================================  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      S/P amputation  left toes      Stented coronary artery        ====================ASSESSMENT ==============    ---S/p CABG, 4 or more vessels 14-Jul-2023              CABG x 4 (LIMA-LAD, RTN-ZN1-MR2-PDA)   ---BPH (Benign Prostatic Hypertrophy)   ---CAD (coronary artery disease)  ---CKD (chronic kidney disease)  ---Osteomyelitis  ---Gastroparesis  ---PVD (peripheral vascular disease)  ---Legally blind  ---Congenital Anomaly of Foot  ---S/P amputation left toes  ---Stented coronary artery-  ---Post op Hypovolemic shock  ---Post op respiratory insufficiency       Plan:  DC IABP  ====================== NEUROLOGY=====================  acetaminophen   IVPB .. 1000 milliGRAM(s) IV Intermittent once    ==================== RESPIRATORY======================  Post op respiratory insufficiency  Mechanical Ventilation:  Mode: CPAP with PS  FiO2: 35  PEEP: 5  PS: 5      ====================CARDIOVASCULAR==================  Post op Hypovolemia  norepinephrine Infusion 0.05 MICROgram(s)/kG/Min (5.78 mL/Hr) IV Continuous <Continuous>    ===================HEMATOLOGIC/ONC ===================  Monitor H&H/Plts    aspirin  chewable 81 milliGRAM(s) Oral once    ===================== RENAL =========================  Continue monitoring urine output, I&OS, BUN/Cr     ==================== GASTROINTESTINAL===================  polyethylene glycol 3350 17 Gram(s) Oral daily  senna 2 Tablet(s) Oral at bedtime  sodium chloride 0.9%. 1000 milliLiter(s) (10 mL/Hr) IV Continuous <Continuous>  sodium chloride 0.9%. 1000 milliLiter(s) (5 mL/Hr) IV Continuous <Continuous>    =======================    ENDOCRINE  =====================  atorvastatin 80 milliGRAM(s) Oral at bedtime  dextrose 50% Injectable 50 milliLiter(s) IV Push every 15 minutes  dextrose 50% Injectable 25 milliLiter(s) IV Push every 15 minutes  insulin regular Infusion 2 Unit(s)/Hr (2 mL/Hr) IV Continuous <Continuous>    ========================INFECTIOUS DISEASE================      -Close hemodynamic , ventilatory and drain monitoring and management per post op routine .  -Monitor Neurologic status ,   -Head of the bed should remain elevated to 45 degrees,  -Monitor adequacy of oxygenation and ventilation and attempt to wean oxygen ,  -Monitor for arrhythmias and monitor parameters for organ perfusion,  -Glycemic control is satisfactory,  -Nutritional goals will be met using po eventually , insure adequate caloric intake and monitor the same ,  -Electrolytes have been repleted as necessary , pain control has been achieved  and wound care has been carried out ,  -Stress ulcer and VTE prophylaxis will be achieved,  -Agressive PT and early mobility and ambulation goals will be met,    I have spent 35 minutes providing acute care for this critically ill patient     Patient requires continuous monitoring with bedside rhythm monitoring, pulse ox monitoring, and intermittent blood gas analysis. Care plan discussed with ICU care team. Patient remained critical and at risk for life threatening decompensation.

## 2023-07-15 LAB
ANION GAP SERPL CALC-SCNC: 19 MMOL/L — HIGH (ref 5–17)
ANION GAP SERPL CALC-SCNC: 19 MMOL/L — HIGH (ref 5–17)
ANION GAP SERPL CALC-SCNC: 21 MMOL/L — HIGH (ref 5–17)
BASOPHILS # BLD AUTO: 0.01 K/UL — SIGNIFICANT CHANGE UP (ref 0–0.2)
BASOPHILS NFR BLD AUTO: 0.1 % — SIGNIFICANT CHANGE UP (ref 0–2)
BUN SERPL-MCNC: 23.1 MG/DL — HIGH (ref 8–20)
BUN SERPL-MCNC: 39 MG/DL — HIGH (ref 8–20)
BUN SERPL-MCNC: 43.5 MG/DL — HIGH (ref 8–20)
CALCIUM SERPL-MCNC: 8.4 MG/DL — SIGNIFICANT CHANGE UP (ref 8.4–10.5)
CALCIUM SERPL-MCNC: 8.6 MG/DL — SIGNIFICANT CHANGE UP (ref 8.4–10.5)
CALCIUM SERPL-MCNC: 8.6 MG/DL — SIGNIFICANT CHANGE UP (ref 8.4–10.5)
CHLORIDE SERPL-SCNC: 94 MMOL/L — LOW (ref 96–108)
CHLORIDE SERPL-SCNC: 97 MMOL/L — SIGNIFICANT CHANGE UP (ref 96–108)
CHLORIDE SERPL-SCNC: 99 MMOL/L — SIGNIFICANT CHANGE UP (ref 96–108)
CK MB CFR SERPL CALC: 13.5 NG/ML — HIGH (ref 0–6.7)
CK SERPL-CCNC: 299 U/L — HIGH (ref 30–200)
CO2 SERPL-SCNC: 20 MMOL/L — LOW (ref 22–29)
CO2 SERPL-SCNC: 20 MMOL/L — LOW (ref 22–29)
CO2 SERPL-SCNC: 21 MMOL/L — LOW (ref 22–29)
CREAT SERPL-MCNC: 3.69 MG/DL — HIGH (ref 0.5–1.3)
CREAT SERPL-MCNC: 5.92 MG/DL — HIGH (ref 0.5–1.3)
CREAT SERPL-MCNC: 6.61 MG/DL — HIGH (ref 0.5–1.3)
EGFR: 10 ML/MIN/1.73M2 — LOW
EGFR: 18 ML/MIN/1.73M2 — LOW
EGFR: 9 ML/MIN/1.73M2 — LOW
EOSINOPHIL # BLD AUTO: 0 K/UL — SIGNIFICANT CHANGE UP (ref 0–0.5)
EOSINOPHIL NFR BLD AUTO: 0 % — SIGNIFICANT CHANGE UP (ref 0–6)
GAS PNL BLDA: SIGNIFICANT CHANGE UP
GLUCOSE BLDC GLUCOMTR-MCNC: 105 MG/DL — HIGH (ref 70–99)
GLUCOSE BLDC GLUCOMTR-MCNC: 109 MG/DL — HIGH (ref 70–99)
GLUCOSE BLDC GLUCOMTR-MCNC: 115 MG/DL — HIGH (ref 70–99)
GLUCOSE BLDC GLUCOMTR-MCNC: 116 MG/DL — HIGH (ref 70–99)
GLUCOSE BLDC GLUCOMTR-MCNC: 121 MG/DL — HIGH (ref 70–99)
GLUCOSE BLDC GLUCOMTR-MCNC: 123 MG/DL — HIGH (ref 70–99)
GLUCOSE BLDC GLUCOMTR-MCNC: 126 MG/DL — HIGH (ref 70–99)
GLUCOSE BLDC GLUCOMTR-MCNC: 127 MG/DL — HIGH (ref 70–99)
GLUCOSE BLDC GLUCOMTR-MCNC: 138 MG/DL — HIGH (ref 70–99)
GLUCOSE BLDC GLUCOMTR-MCNC: 150 MG/DL — HIGH (ref 70–99)
GLUCOSE BLDC GLUCOMTR-MCNC: 154 MG/DL — HIGH (ref 70–99)
GLUCOSE BLDC GLUCOMTR-MCNC: 155 MG/DL — HIGH (ref 70–99)
GLUCOSE BLDC GLUCOMTR-MCNC: 158 MG/DL — HIGH (ref 70–99)
GLUCOSE BLDC GLUCOMTR-MCNC: 158 MG/DL — HIGH (ref 70–99)
GLUCOSE BLDC GLUCOMTR-MCNC: 171 MG/DL — HIGH (ref 70–99)
GLUCOSE BLDC GLUCOMTR-MCNC: 176 MG/DL — HIGH (ref 70–99)
GLUCOSE BLDC GLUCOMTR-MCNC: 200 MG/DL — HIGH (ref 70–99)
GLUCOSE BLDC GLUCOMTR-MCNC: 254 MG/DL — HIGH (ref 70–99)
GLUCOSE BLDC GLUCOMTR-MCNC: 54 MG/DL — CRITICAL LOW (ref 70–99)
GLUCOSE BLDC GLUCOMTR-MCNC: 94 MG/DL — SIGNIFICANT CHANGE UP (ref 70–99)
GLUCOSE SERPL-MCNC: 155 MG/DL — HIGH (ref 70–99)
GLUCOSE SERPL-MCNC: 158 MG/DL — HIGH (ref 70–99)
GLUCOSE SERPL-MCNC: 178 MG/DL — HIGH (ref 70–99)
HCT VFR BLD CALC: 29.5 % — LOW (ref 39–50)
HGB BLD-MCNC: 9.6 G/DL — LOW (ref 13–17)
IMM GRANULOCYTES NFR BLD AUTO: 0.3 % — SIGNIFICANT CHANGE UP (ref 0–0.9)
LYMPHOCYTES # BLD AUTO: 0.26 K/UL — LOW (ref 1–3.3)
LYMPHOCYTES # BLD AUTO: 3.4 % — LOW (ref 13–44)
MAGNESIUM SERPL-MCNC: 3 MG/DL — HIGH (ref 1.6–2.6)
MCHC RBC-ENTMCNC: 29.4 PG — SIGNIFICANT CHANGE UP (ref 27–34)
MCHC RBC-ENTMCNC: 32.5 GM/DL — SIGNIFICANT CHANGE UP (ref 32–36)
MCV RBC AUTO: 90.2 FL — SIGNIFICANT CHANGE UP (ref 80–100)
MONOCYTES # BLD AUTO: 0.28 K/UL — SIGNIFICANT CHANGE UP (ref 0–0.9)
MONOCYTES NFR BLD AUTO: 3.6 % — SIGNIFICANT CHANGE UP (ref 2–14)
NEUTROPHILS # BLD AUTO: 7.16 K/UL — SIGNIFICANT CHANGE UP (ref 1.8–7.4)
NEUTROPHILS NFR BLD AUTO: 92.6 % — HIGH (ref 43–77)
PHOSPHATE SERPL-MCNC: 5.2 MG/DL — HIGH (ref 2.4–4.7)
PLATELET # BLD AUTO: 145 K/UL — LOW (ref 150–400)
POTASSIUM SERPL-MCNC: 4.5 MMOL/L — SIGNIFICANT CHANGE UP (ref 3.5–5.3)
POTASSIUM SERPL-MCNC: 5.4 MMOL/L — HIGH (ref 3.5–5.3)
POTASSIUM SERPL-MCNC: 6.2 MMOL/L — CRITICAL HIGH (ref 3.5–5.3)
POTASSIUM SERPL-SCNC: 4.5 MMOL/L — SIGNIFICANT CHANGE UP (ref 3.5–5.3)
POTASSIUM SERPL-SCNC: 5.4 MMOL/L — HIGH (ref 3.5–5.3)
POTASSIUM SERPL-SCNC: 6.2 MMOL/L — CRITICAL HIGH (ref 3.5–5.3)
RBC # BLD: 3.27 M/UL — LOW (ref 4.2–5.8)
RBC # FLD: 15.5 % — HIGH (ref 10.3–14.5)
SODIUM SERPL-SCNC: 136 MMOL/L — SIGNIFICANT CHANGE UP (ref 135–145)
SODIUM SERPL-SCNC: 136 MMOL/L — SIGNIFICANT CHANGE UP (ref 135–145)
SODIUM SERPL-SCNC: 138 MMOL/L — SIGNIFICANT CHANGE UP (ref 135–145)
TROPONIN T SERPL-MCNC: 0.51 NG/ML — HIGH (ref 0–0.06)
WBC # BLD: 7.73 K/UL — SIGNIFICANT CHANGE UP (ref 3.8–10.5)
WBC # FLD AUTO: 7.73 K/UL — SIGNIFICANT CHANGE UP (ref 3.8–10.5)

## 2023-07-15 PROCEDURE — 71045 X-RAY EXAM CHEST 1 VIEW: CPT | Mod: 26

## 2023-07-15 PROCEDURE — 99024 POSTOP FOLLOW-UP VISIT: CPT

## 2023-07-15 PROCEDURE — 99291 CRITICAL CARE FIRST HOUR: CPT

## 2023-07-15 PROCEDURE — 93010 ELECTROCARDIOGRAM REPORT: CPT

## 2023-07-15 PROCEDURE — 90937 HEMODIALYSIS REPEATED EVAL: CPT

## 2023-07-15 RX ORDER — HYDROMORPHONE HYDROCHLORIDE 2 MG/ML
0.25 INJECTION INTRAMUSCULAR; INTRAVENOUS; SUBCUTANEOUS ONCE
Refills: 0 | Status: DISCONTINUED | OUTPATIENT
Start: 2023-07-15 | End: 2023-07-15

## 2023-07-15 RX ORDER — TAMSULOSIN HYDROCHLORIDE 0.4 MG/1
0.4 CAPSULE ORAL AT BEDTIME
Refills: 0 | Status: DISCONTINUED | OUTPATIENT
Start: 2023-07-15 | End: 2023-07-31

## 2023-07-15 RX ORDER — DEXTROSE 50 % IN WATER 50 %
15 SYRINGE (ML) INTRAVENOUS ONCE
Refills: 0 | Status: DISCONTINUED | OUTPATIENT
Start: 2023-07-15 | End: 2023-07-31

## 2023-07-15 RX ORDER — GLUCAGON INJECTION, SOLUTION 0.5 MG/.1ML
1 INJECTION, SOLUTION SUBCUTANEOUS ONCE
Refills: 0 | Status: DISCONTINUED | OUTPATIENT
Start: 2023-07-15 | End: 2023-07-31

## 2023-07-15 RX ORDER — HEPARIN SODIUM 5000 [USP'U]/ML
5000 INJECTION INTRAVENOUS; SUBCUTANEOUS EVERY 8 HOURS
Refills: 0 | Status: DISCONTINUED | OUTPATIENT
Start: 2023-07-15 | End: 2023-07-19

## 2023-07-15 RX ORDER — GABAPENTIN 400 MG/1
100 CAPSULE ORAL THREE TIMES A DAY
Refills: 0 | Status: DISCONTINUED | OUTPATIENT
Start: 2023-07-15 | End: 2023-07-31

## 2023-07-15 RX ORDER — ACETAMINOPHEN 500 MG
650 TABLET ORAL EVERY 6 HOURS
Refills: 0 | Status: COMPLETED | OUTPATIENT
Start: 2023-07-15 | End: 2023-07-18

## 2023-07-15 RX ORDER — INSULIN HUMAN 100 [IU]/ML
10 INJECTION, SOLUTION SUBCUTANEOUS ONCE
Refills: 0 | Status: COMPLETED | OUTPATIENT
Start: 2023-07-15 | End: 2023-07-15

## 2023-07-15 RX ORDER — CLOPIDOGREL BISULFATE 75 MG/1
75 TABLET, FILM COATED ORAL DAILY
Refills: 0 | Status: DISCONTINUED | OUTPATIENT
Start: 2023-07-15 | End: 2023-07-31

## 2023-07-15 RX ORDER — DEXTROSE 50 % IN WATER 50 %
50 SYRINGE (ML) INTRAVENOUS ONCE
Refills: 0 | Status: COMPLETED | OUTPATIENT
Start: 2023-07-15 | End: 2023-07-15

## 2023-07-15 RX ORDER — INSULIN LISPRO 100/ML
2 VIAL (ML) SUBCUTANEOUS
Refills: 0 | Status: DISCONTINUED | OUTPATIENT
Start: 2023-07-15 | End: 2023-07-21

## 2023-07-15 RX ORDER — ACETAMINOPHEN 500 MG
1000 TABLET ORAL ONCE
Refills: 0 | Status: COMPLETED | OUTPATIENT
Start: 2023-07-15 | End: 2023-07-15

## 2023-07-15 RX ORDER — INSULIN GLARGINE 100 [IU]/ML
6 INJECTION, SOLUTION SUBCUTANEOUS AT BEDTIME
Refills: 0 | Status: DISCONTINUED | OUTPATIENT
Start: 2023-07-15 | End: 2023-07-20

## 2023-07-15 RX ORDER — SODIUM CHLORIDE 9 MG/ML
1000 INJECTION, SOLUTION INTRAVENOUS
Refills: 0 | Status: DISCONTINUED | OUTPATIENT
Start: 2023-07-15 | End: 2023-07-19

## 2023-07-15 RX ORDER — SODIUM BICARBONATE 1 MEQ/ML
50 SYRINGE (ML) INTRAVENOUS ONCE
Refills: 0 | Status: COMPLETED | OUTPATIENT
Start: 2023-07-15 | End: 2023-07-15

## 2023-07-15 RX ORDER — INSULIN LISPRO 100/ML
VIAL (ML) SUBCUTANEOUS AT BEDTIME
Refills: 0 | Status: DISCONTINUED | OUTPATIENT
Start: 2023-07-15 | End: 2023-07-19

## 2023-07-15 RX ORDER — INSULIN LISPRO 100/ML
VIAL (ML) SUBCUTANEOUS
Refills: 0 | Status: DISCONTINUED | OUTPATIENT
Start: 2023-07-15 | End: 2023-07-19

## 2023-07-15 RX ORDER — METOPROLOL TARTRATE 50 MG
25 TABLET ORAL
Refills: 0 | Status: DISCONTINUED | OUTPATIENT
Start: 2023-07-15 | End: 2023-07-16

## 2023-07-15 RX ORDER — CALCIUM GLUCONATE 100 MG/ML
1 VIAL (ML) INTRAVENOUS ONCE
Refills: 0 | Status: COMPLETED | OUTPATIENT
Start: 2023-07-15 | End: 2023-07-15

## 2023-07-15 RX ADMIN — HYDROMORPHONE HYDROCHLORIDE 0.25 MILLIGRAM(S): 2 INJECTION INTRAMUSCULAR; INTRAVENOUS; SUBCUTANEOUS at 15:30

## 2023-07-15 RX ADMIN — Medication 250 MILLIGRAM(S): at 18:28

## 2023-07-15 RX ADMIN — TAMSULOSIN HYDROCHLORIDE 0.4 MILLIGRAM(S): 0.4 CAPSULE ORAL at 22:08

## 2023-07-15 RX ADMIN — INSULIN HUMAN 10 UNIT(S): 100 INJECTION, SOLUTION SUBCUTANEOUS at 08:57

## 2023-07-15 RX ADMIN — Medication 400 MILLIGRAM(S): at 12:50

## 2023-07-15 RX ADMIN — OXYCODONE HYDROCHLORIDE 5 MILLIGRAM(S): 5 TABLET ORAL at 15:08

## 2023-07-15 RX ADMIN — CLOPIDOGREL BISULFATE 75 MILLIGRAM(S): 75 TABLET, FILM COATED ORAL at 12:50

## 2023-07-15 RX ADMIN — ATORVASTATIN CALCIUM 80 MILLIGRAM(S): 80 TABLET, FILM COATED ORAL at 22:05

## 2023-07-15 RX ADMIN — POLYETHYLENE GLYCOL 3350 17 GRAM(S): 17 POWDER, FOR SOLUTION ORAL at 12:49

## 2023-07-15 RX ADMIN — Medication 50 MILLILITER(S): at 08:57

## 2023-07-15 RX ADMIN — Medication 650 MILLIGRAM(S): at 23:09

## 2023-07-15 RX ADMIN — PANTOPRAZOLE SODIUM 40 MILLIGRAM(S): 20 TABLET, DELAYED RELEASE ORAL at 12:50

## 2023-07-15 RX ADMIN — GABAPENTIN 100 MILLIGRAM(S): 400 CAPSULE ORAL at 22:09

## 2023-07-15 RX ADMIN — OXYCODONE HYDROCHLORIDE 5 MILLIGRAM(S): 5 TABLET ORAL at 07:20

## 2023-07-15 RX ADMIN — Medication 50 MILLILITER(S): at 04:42

## 2023-07-15 RX ADMIN — Medication 1000 MILLIGRAM(S): at 13:05

## 2023-07-15 RX ADMIN — INSULIN HUMAN 10 UNIT(S): 100 INJECTION, SOLUTION SUBCUTANEOUS at 04:42

## 2023-07-15 RX ADMIN — Medication 100 GRAM(S): at 04:42

## 2023-07-15 RX ADMIN — SENNA PLUS 2 TABLET(S): 8.6 TABLET ORAL at 22:05

## 2023-07-15 RX ADMIN — Medication 100 MILLIGRAM(S): at 17:14

## 2023-07-15 RX ADMIN — INSULIN GLARGINE 6 UNIT(S): 100 INJECTION, SOLUTION SUBCUTANEOUS at 22:04

## 2023-07-15 RX ADMIN — Medication 25 MILLIGRAM(S): at 18:30

## 2023-07-15 RX ADMIN — Medication 50 MILLIEQUIVALENT(S): at 09:09

## 2023-07-15 RX ADMIN — OXYCODONE HYDROCHLORIDE 5 MILLIGRAM(S): 5 TABLET ORAL at 14:08

## 2023-07-15 RX ADMIN — Medication 81 MILLIGRAM(S): at 12:50

## 2023-07-15 RX ADMIN — HYDROMORPHONE HYDROCHLORIDE 0.25 MILLIGRAM(S): 2 INJECTION INTRAMUSCULAR; INTRAVENOUS; SUBCUTANEOUS at 14:30

## 2023-07-15 RX ADMIN — Medication 650 MILLIGRAM(S): at 22:09

## 2023-07-15 RX ADMIN — CHLORHEXIDINE GLUCONATE 1 APPLICATION(S): 213 SOLUTION TOPICAL at 12:50

## 2023-07-15 RX ADMIN — HEPARIN SODIUM 5000 UNIT(S): 5000 INJECTION INTRAVENOUS; SUBCUTANEOUS at 22:05

## 2023-07-15 RX ADMIN — OXYCODONE HYDROCHLORIDE 5 MILLIGRAM(S): 5 TABLET ORAL at 08:20

## 2023-07-15 NOTE — DIETITIAN INITIAL EVALUATION ADULT - NSFNSGIIOFT_GEN_A_CORE
07-14-23 @ 07:01  -  07-15-23 @ 07:00  --------------------------------------------------------  OUT:    Chest Tube (mL): 50 mL    Chest Tube (mL): 280 mL  Total OUT: 330 mL    Total NET: -330 mL

## 2023-07-15 NOTE — DIETITIAN INITIAL EVALUATION ADULT - NS FNS DIET ORDER
Diet, Regular:   Consistent Carbohydrate {No Snacks} (CSTCHO)  DASH/TLC {Sodium & Cholesterol Restricted} (DASH) (07-15-23 @ 06:15)

## 2023-07-15 NOTE — DIETITIAN NUTRITION RISK NOTIFICATION - TREATMENT: THE FOLLOWING DIET HAS BEEN RECOMMENDED
Diet, Regular:   Consistent Carbohydrate {No Snacks} (CSTCHO)  DASH/TLC {Sodium & Cholesterol Restricted} (DASH) (07-15-23 @ 06:15) [Active]

## 2023-07-15 NOTE — PROGRESS NOTE ADULT - SUBJECTIVE AND OBJECTIVE BOX
Pt seen and evaluated at bedside.   Pt states "Im ok, just having a hard time sleeping".  Pt denies any CP, palp, SOB, N/V, or other symptoms at this time.      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      S/P amputation  left toes      Stented coronary artery          Medications:  aspirin enteric coated 81 milliGRAM(s) Oral daily  atorvastatin 80 milliGRAM(s) Oral at bedtime  cefuroxime  IVPB 1500 milliGRAM(s) IV Intermittent once  chlorhexidine 2% Cloths 1 Application(s) Topical daily  dextrose 50% Injectable 50 milliLiter(s) IV Push every 15 minutes  dextrose 50% Injectable 25 milliLiter(s) IV Push every 15 minutes  insulin regular Infusion 2 Unit(s)/Hr IV Continuous <Continuous>  norepinephrine Infusion 0.05 MICROgram(s)/kG/Min IV Continuous <Continuous>  oxyCODONE    IR 5 milliGRAM(s) Oral every 4 hours PRN  pantoprazole    Tablet 40 milliGRAM(s) Oral daily  polyethylene glycol 3350 17 Gram(s) Oral daily  senna 2 Tablet(s) Oral at bedtime  sodium chloride 0.9%. 1000 milliLiter(s) IV Continuous <Continuous>  sodium chloride 0.9%. 1000 milliLiter(s) IV Continuous <Continuous>  vancomycin  IVPB 1000 milliGRAM(s) IV Intermittent once      MEDICATIONS  (PRN):  oxyCODONE    IR 5 milliGRAM(s) Oral every 4 hours PRN Moderate Pain (4 - 6)      Daily Review:                            10.4   5.65  )-----------( 158      ( 14 Jul 2023 13:45 )             30.9   07-14    135  |  97  |  30.2<H>  ----------------------------<  135<H>  5.0   |  21.0<L>  |  5.32<H>    Ca    8.6      14 Jul 2023 18:52  Phos  2.6     07-14  Mg     2.8     07-14    TPro  5.7<L>  /  Alb  3.3  /  TBili  0.7  /  DBili  x   /  AST  23  /  ALT  12  /  AlkPhos  68  07-14    CARDIAC MARKERS ( 14 Jul 2023 13:45 )  x     / 0.19 ng/mL / 211 U/L / x     / 12.3 ng/mL  CARDIAC MARKERS ( 13 Jul 2023 09:10 )  x     / 0.08 ng/mL / x     / x     / x        PT/INR - ( 14 Jul 2023 13:45 )   PT: 14.1 sec;   INR: 1.21 ratio         PTT - ( 14 Jul 2023 13:45 )  PTT:31.6 sec    T(C): 36.7 (07-15-23 @ 00:00), Max: 36.7 (07-15-23 @ 00:00)  HR: 56 (07-15-23 @ 01:00) (56 - 79)  BP: 151/70 (07-15-23 @ 01:00) (109/57 - 159/72)  RR: 19 (07-15-23 @ 01:00) (5 - 22)  SpO2: 99% (07-15-23 @ 01:00) (94% - 100%)      CAPILLARY BLOOD GLUCOSE      POCT Blood Glucose.: 155 mg/dL (15 Jul 2023 00:59)  POCT Blood Glucose.: 158 mg/dL (14 Jul 2023 23:58)  POCT Blood Glucose.: 157 mg/dL (14 Jul 2023 23:21)  POCT Blood Glucose.: 158 mg/dL (14 Jul 2023 22:03)  POCT Blood Glucose.: 155 mg/dL (14 Jul 2023 21:01)  POCT Blood Glucose.: 151 mg/dL (14 Jul 2023 20:03)  POCT Blood Glucose.: 142 mg/dL (14 Jul 2023 19:32)  POCT Blood Glucose.: 111 mg/dL (14 Jul 2023 18:09)  POCT Blood Glucose.: 107 mg/dL (14 Jul 2023 17:03)  POCT Blood Glucose.: 104 mg/dL (14 Jul 2023 16:02)  POCT Blood Glucose.: 118 mg/dL (14 Jul 2023 15:07)      I&O's Summary    13 Jul 2023 07:01  -  14 Jul 2023 07:00  --------------------------------------------------------  IN: 1238.5 mL / OUT: 1600 mL / NET: -361.5 mL    14 Jul 2023 07:01  -  15 Jul 2023 01:10  --------------------------------------------------------  IN: 503.2 mL / OUT: 308 mL / NET: 195.2 mL        Physical Exam  Neuro: A+O x 3, non-focal, speech clear and intact  Pulm: coarse breath sounds bilaterally, no wheezing or rales  CV: RRR, +S1S2  Abd: soft, NT, ND  Ext: Dopplerable DP Pulses and PT pulses b/l, no edema B/L LE with multiple digits missing  Inc: Mediastinal sternal incision C/D/I/stable w/ dressing, left groin soft, dressing CDI s/p IABP removal,   Chest tubes:  chest tubes in place with no airleak

## 2023-07-15 NOTE — PROGRESS NOTE ADULT - SUBJECTIVE AND OBJECTIVE BOX
BENJAMIN LOCO  MRN-[id number]    HPI:  63M with PMHx ESRD on HD (T,Th, S), IDDM, COPD, gastroparesis, peripheral blindness and retinopathy, C. Diff, and NSTEMI 6/2022 with recent hospital admission for CABG gregor now presents from home with complaints of worsening chest pain associated with difficulty breathing. In ER EKGs with intermittent diffuse ST changes. Pain slightly improved with 2 doses IV Morphine. Seen by cardiology and started on Tridil and Heparin gtt for NSTEMI. At time of exam, patient with labored breathing complaining of substernal chest pain radiating to entire body. Denies palpitations, dizziness, headache, abdominal pain or N/V/D/C.  (13 Jul 2023 10:13)    Surgery/Hospital Course:  ·  PRE-OP DIAGNOSIS:  CAD (coronary atherosclerotic disease)   ·  POST-OP DIAGNOSIS:  CAD (coronary artery disease), native coronary artery   ·  PROCEDURES:  CABG, 4 or more vessels 14-Jul-2023   CABG x 4 (LIMA-LAD, [id number]-PDA)   Today:  No acute events -  DC Levo  SR , 2L NC  CVP 10  K 6.2 , 5.3  HD today  Start Plavix  DC SS chest tube tomorrow    ICU Vital Signs Last 24 Hrs  T(C): 36.5 (15 Jul 2023 12:00), Max: 36.7 (15 Jul 2023 00:00)  T(F): 97.7 (15 Jul 2023 12:00), Max: 98.1 (15 Jul 2023 00:00)  HR: 50 (15 Jul 2023 12:00) (50 - 73)  BP: 137/74 (15 Jul 2023 08:00) (112/59 - 162/71)  BP(mean): 100 (15 Jul 2023 08:00) (82 - 113)  ABP: 97/39 (15 Jul 2023 12:00) (88/40 - 144/48)  ABP(mean): 66 (15 Jul 2023 08:00) (56 - 116)  RR: 16 (15 Jul 2023 12:00) (5 - 25)  SpO2: 99% (15 Jul 2023 12:00) (94% - 100%)    O2 Parameters below as of 15 Jul 2023 12:00  Patient On (Oxygen Delivery Method): room air            Physical Exam:  Gen: A&O   CNS: non focal 	  Neck: no JVD  RES : clear , no wheezing              CVS: Regular  rhythm. Normal S1/S2  Abd: Soft, non-distended. Bowel sounds present.  Skin: No rash.  Ext:  no edema    ============================I/O===========================   I&O's Detail    14 Jul 2023 07:01  -  15 Jul 2023 07:00  --------------------------------------------------------  IN:    Albumin 5%  - 250 mL: 250 mL    Insulin: 15.5 mL    IV PiggyBack: 50 mL    Norepinephrine: 46.2 mL    Oral Fluid: 480 mL    Propofol: 20 mL    sodium chloride 0.9%: 95 mL    sodium chloride 0.9%: 190 mL  Total IN: 1146.7 mL    OUT:    Chest Tube (mL): 50 mL    Chest Tube (mL): 280 mL    Voided (mL): 133 mL  Total OUT: 463 mL    Total NET: 683.7 mL        ============================ LABS =========================                        9.6    7.73  )-----------( 145      ( 15 Jul 2023 02:10 )             29.5     07-15    138  |  99  |  43.5<H>  ----------------------------<  178<H>  5.4<H>   |  20.0<L>  |  6.61<H>    Ca    8.6      15 Jul 2023 06:45  Phos  5.2     07-15  Mg     3.0     07-15    TPro  5.7<L>  /  Alb  3.3  /  TBili  0.7  /  DBili  x   /  AST  23  /  ALT  12  /  AlkPhos  68  07-14    LIVER FUNCTIONS - ( 14 Jul 2023 13:45 )  Alb: 3.3 g/dL / Pro: 5.7 g/dL / ALK PHOS: 68 U/L / ALT: 12 U/L / AST: 23 U/L / GGT: x           PT/INR - ( 14 Jul 2023 13:45 )   PT: 14.1 sec;   INR: 1.21 ratio         PTT - ( 14 Jul 2023 13:45 )  PTT:31.6 sec  ABG - ( 15 Jul 2023 10:00 )  pH, Arterial: 7.370 pH, Blood: x     /  pCO2: 40    /  pO2: 96    / HCO3: 23    / Base Excess: -2.2  /  SaO2: 98.9              Urinalysis Basic - ( 15 Jul 2023 06:45 )    Color: x / Appearance: x / SG: x / pH: x  Gluc: 178 mg/dL / Ketone: x  / Bili: x / Urobili: x   Blood: x / Protein: x / Nitrite: x   Leuk Esterase: x / RBC: x / WBC x   Sq Epi: x / Non Sq Epi: x / Bacteria: x      ======================Micro/Rad/Cardio=================  Culture: Reviewed   CXR: Reviewed  Echo:Reviewed  ======================================================  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 Diff, and NSTEMI 6/2022 with recent hospital admission for CABG eval now presents from home with complaints of worsening chest pain associated with difficulty breathing. In ER EKGs with intermittent diffuse ST changes. Pain slightly improved with 2 doses IV Morphine. Seen by cardiology and started on Tridil and Heparin gtt for NSTEMI. Pt was admitted to CTICU for IABP placement and HD session on 7/13.  Pt is now s/p CABG x4 on 7/14, IABP was removed shortly after arriving to ICU.-    ---S/p CABG, 4 or more vessels 14-Jul-2023              CABG x 4 (LIMA-LAD, [id number]-PDA)   ---BPH (Benign Prostatic Hypertrophy)   ---CAD (coronary artery disease)  ---CKD (chronic kidney disease)  ---Osteomyelitis  ---Gastroparesis  ---PVD (peripheral vascular disease)  ---Legally blind  ---Congenital Anomaly of Foot  ---S/P amputation left toes  ---Stented coronary artery-  ---Post op Hypovolemic shock  ---Post op respiratory insufficiency       Plan:  -Beta blocker as tolerated by HR and SBP   -Lipitor for chronic graft patency prophylaxis  -Aspirin for acute graft closure prophylaxis  -Chest PT and IS use with bedside nurse.  -HD T/Th/S  -Renal diet.  -SCDs for DVT prophylaxis  -Protonix for stress ulcer prophylaxis-  ====================== NEUROLOGY=====================  acetaminophen     Tablet .. 650 milliGRAM(s) Oral every 6 hours  acetaminophen   IVPB .. 1000 milliGRAM(s) IV Intermittent once  oxyCODONE    IR 5 milliGRAM(s) Oral every 4 hours PRN Moderate Pain (4 - 6)    ==================== RESPIRATORY======================  Post op respiratory insufficiency  ====================CARDIOVASCULAR==================  Post op Hypovolemia  norepinephrine Infusion 0.05 MICROgram(s)/kG/Min (5.78 mL/Hr) IV Continuous <Continuous>    ===================HEMATOLOGIC/ONC ===================  Monitor H&H/Plts    aspirin enteric coated 81 milliGRAM(s) Oral daily  clopidogrel Tablet 75 milliGRAM(s) Oral daily    ===================== RENAL =========================  Continue monitoring urine output, I&OS, BUN/Cr     ==================== GASTROINTESTINAL===================  pantoprazole    Tablet 40 milliGRAM(s) Oral daily  polyethylene glycol 3350 17 Gram(s) Oral daily  senna 2 Tablet(s) Oral at bedtime  sodium chloride 0.9%. 1000 milliLiter(s) (10 mL/Hr) IV Continuous <Continuous>  sodium chloride 0.9%. 1000 milliLiter(s) (5 mL/Hr) IV Continuous <Continuous>    =======================    ENDOCRINE  =====================  atorvastatin 80 milliGRAM(s) Oral at bedtime  dextrose 50% Injectable 25 milliLiter(s) IV Push every 15 minutes  dextrose 50% Injectable 50 milliLiter(s) IV Push every 15 minutes  insulin regular Infusion 2 Unit(s)/Hr (2 mL/Hr) IV Continuous <Continuous>    ========================INFECTIOUS DISEASE================  cefuroxime  IVPB 1500 milliGRAM(s) IV Intermittent once  vancomycin  IVPB 1000 milliGRAM(s) IV Intermittent once      -Monitor Neurologic status ,   -Head of the bed should remain elevated to 45 degrees,  -Monitor for arrhythmias and monitor parameters for organ perfusion,  -Glycemic control is satisfactory,  -Nutritional goals will be met using po eventually , insure adequate caloric intake and monitor the same ,  -Electrolytes have been repleted as necessary , pain control has been achieved  and wound care has been carried out ,  -Stress ulcer and VTE prophylaxis will be achieved,  -Agressive PT and early mobility and ambulation goals will be met,    I have spent 35 minutes providing acute care for this critically ill patient     Patient requires continuous monitoring with bedside rhythm monitoring, pulse ox monitoring, and intermittent blood gas analysis. Care plan discussed with ICU care team. Patient remained critical and at risk for life threatening decompensation.

## 2023-07-15 NOTE — PHYSICAL THERAPY INITIAL EVALUATION ADULT - ADDITIONAL COMMENTS
Pt states he lives in first floor apartment, no stairs. Has a cane, RW, and w/c. Takes a taxi to dialysis. Spouse is available to assist as needed.

## 2023-07-15 NOTE — DIETITIAN INITIAL EVALUATION ADULT - PERTINENT LABORATORY DATA
07-15    138  |  99  |  43.5<H>  ----------------------------<  178<H>  5.4<H>   |  20.0<L>  |  6.61<H>    Ca    8.6      15 Jul 2023 06:45  Phos  5.2     07-15  Mg     3.0     07-15    TPro  5.7<L>  /  Alb  3.3  /  TBili  0.7  /  DBili  x   /  AST  23  /  ALT  12  /  AlkPhos  68  07-14  POCT Blood Glucose.: 115 mg/dL (07-15-23 @ 08:55)  A1C with Estimated Average Glucose Result: 9.4 % (06-12-23 @ 21:50)  
WDL

## 2023-07-15 NOTE — PROGRESS NOTE ADULT - ASSESSMENT
63M with PMHx ESRD on HD (T,Th, S), IDDM, COPD, gastroparesis, peripheral blindness and retinopathy, C. Diff, and NSTEMI 6/2022 with recent hospital admission for CABG gregor now presents from home with complaints of worsening chest pain associated with difficulty breathing. In ER EKGs with intermittent diffuse ST changes. Pain slightly improved with 2 doses IV Morphine. Seen by cardiology and started on Tridil and Heparin gtt for NSTEMI. Pt was admitted to CTICU for IABP placement and HD session on 7/13.  Pt is now s/p CABG x4 on 7/14, IABP was removed shortly after arriving to ICU.

## 2023-07-15 NOTE — PROGRESS NOTE ADULT - ASSESSMENT
63M with PMHx ESRD on HD (T,Th, S), IDDM, COPD and NSTEMI 6/2022 with recent hospital admission for CABG eval now presents w/ worsening chest pain associated with difficulty breathing. +ve NSTEMI. Nephrology consulted to manage HD needs.     RECOMMENDATIONS:  1.  ESRD on HD  -Outpatient HD unit: Patient and wife unable to recall correctly  -Outpatient HD days: Tuesday, Thursday, Saturday.   -Access: LUEAVF working fine  -Patient was seen and then revaluated on HD.  Qd, Qb, UF rate reviewed.  Vitals UF was cut down to 1L to avoid hypotension  Access working well.  Patient tolerating HD well.  2.  Hypertension: BP WNL.  3.  Anemia: Will place on KIMBERLY.  4.  Hyperkalemia: improved w/ HD.  Will follow.  5.  Mineral bone disease of chronic kidney disease: calcium okay; c/w binder.  6.  NSTEMI now post CABG

## 2023-07-15 NOTE — DIETITIAN INITIAL EVALUATION ADULT - OTHER INFO
Pt is a 63 year old M with PMHx ESRD on HD (T,Th, S), IDDM, COPD, gastroparesis, peripheral blindness and retinopathy, C. Diff, and NSTEMI 6/2022 with recent hospital admission for CABG eval now presents from home with complaints of worsening chest pain associated with difficulty breathing. In ER EKGs with intermittent diffuse ST changes. Pain slightly improved with 2 doses IV Morphine. Seen by cardiology and started on Tridil and Heparin gtt for NSTEMI. Pt was admitted to CTICU for IABP placement and HD session on 7/13.  Pt is now s/p CABG x4 on 7/14, IABP was removed shortly after arriving to ICU.

## 2023-07-15 NOTE — PHYSICAL THERAPY INITIAL EVALUATION ADULT - PERTINENT HX OF CURRENT PROBLEM, REHAB EVAL
Pt is a 62 y/o male who presented from home with STEMI. Pt is on dialysis at baseline for end stage renal disease.

## 2023-07-15 NOTE — DIETITIAN INITIAL EVALUATION ADULT - ORAL INTAKE PTA/DIET HISTORY
Pt reports having fair to poor appetite at home; eats small meals throughout the day and gets full fast. Pt edentulous noted; reports needing meat cup in small pieces to chew. Pt endorses 10lb unintentional weight loss (timeline unspecified). Pt currently tolerating sips of clear liquid post op. Diet education declined at this time.

## 2023-07-15 NOTE — DIETITIAN INITIAL EVALUATION ADULT - ETIOLOGY
Related to inadequate protein energy intake with increased needs in setting of ESRD on HD, NSTEMI, now s/p CABG x4

## 2023-07-15 NOTE — PROGRESS NOTE ADULT - SUBJECTIVE AND OBJECTIVE BOX
HPI: Seen on HD in CTICU, feeling better. NO CP, SOB.    PHYSICAL EXAM:  General: WDWN male in no acute respiratory distress   HEENT: Normocephalic, NC +  Cardiac: S1S2 RRR  Respiratory: CTAB, chest tube+  Abdomen: Soft, NT  Extremities: No appreciable edema  Neuro: AAOx3  Access: RUE AV fistula with thrill and bruit     =======================================================  Vital Signs Last 24 Hrs  T(C): 37.3 (15 Jul 2023 16:00), Max: 37.3 (15 Jul 2023 16:00)  T(F): 99.1 (15 Jul 2023 16:00), Max: 99.1 (15 Jul 2023 16:00)  HR: 71 (15 Jul 2023 18:00) (47 - 73)  BP: 164/76 (15 Jul 2023 18:00) (115/57 - 170/73)  BP(mean): 109 (15 Jul 2023 18:00) (82 - 113)  RR: 16 (15 Jul 2023 18:00) (5 - 25)  SpO2: 98% (15 Jul 2023 18:00) (94% - 100%)    Parameters below as of 15 Jul 2023 12:00  Patient On (Oxygen Delivery Method): room air      I&O's Summary    14 Jul 2023 07:01  -  15 Jul 2023 07:00  --------------------------------------------------------  IN: 1146.7 mL / OUT: 463 mL / NET: 683.7 mL    15 Jul 2023 07:01  -  15 Jul 2023 19:13  --------------------------------------------------------  IN: 413 mL / OUT: 610 mL / NET: -197 mL      =======================================================  Current Antibiotics:    Other medications:  acetaminophen     Tablet .. 650 milliGRAM(s) Oral every 6 hours  aspirin enteric coated 81 milliGRAM(s) Oral daily  atorvastatin 80 milliGRAM(s) Oral at bedtime  chlorhexidine 2% Cloths 1 Application(s) Topical daily  clopidogrel Tablet 75 milliGRAM(s) Oral daily  dextrose 50% Injectable 25 milliLiter(s) IV Push every 15 minutes  dextrose 50% Injectable 50 milliLiter(s) IV Push every 15 minutes  insulin regular Infusion 2 Unit(s)/Hr IV Continuous <Continuous>  metoprolol tartrate 25 milliGRAM(s) Oral two times a day  pantoprazole    Tablet 40 milliGRAM(s) Oral daily  polyethylene glycol 3350 17 Gram(s) Oral daily  senna 2 Tablet(s) Oral at bedtime  sodium chloride 0.9%. 1000 milliLiter(s) IV Continuous <Continuous>  sodium chloride 0.9%. 1000 milliLiter(s) IV Continuous <Continuous>    =======================================================  07-15    136  |  94<L>  |  23.1<H>  ----------------------------<  155<H>  4.5   |  21.0<L>  |  3.69<H>    Ca    8.6      15 Jul 2023 17:00  Phos  5.2     07-15  Mg     3.0     07-15    TPro  5.7<L>  /  Alb  3.3  /  TBili  0.7  /  DBili  x   /  AST  23  /  ALT  12  /  AlkPhos  68  07-14    Creatinine: 3.69 mg/dL (07-15-23 @ 17:00)  Creatinine: 6.61 mg/dL (07-15-23 @ 06:45)  Creatinine: 5.92 mg/dL (07-15-23 @ 02:10)  Creatinine: 5.32 mg/dL (07-14-23 @ 18:52)  Creatinine: 4.79 mg/dL (07-14-23 @ 13:45)  Creatinine: 5.19 mg/dL (07-14-23 @ 02:20)  Creatinine: 8.70 mg/dL (07-13-23 @ 13:03)  Creatinine: 8.81 mg/dL (07-13-23 @ 09:10)    Urinalysis Basic - ( 15 Jul 2023 17:00 )    Color: x / Appearance: x / SG: x / pH: x  Gluc: 155 mg/dL / Ketone: x  / Bili: x / Urobili: x   Blood: x / Protein: x / Nitrite: x   Leuk Esterase: x / RBC: x / WBC x   Sq Epi: x / Non Sq Epi: x / Bacteria: x      =======================================================

## 2023-07-15 NOTE — DIETITIAN INITIAL EVALUATION ADULT - PERTINENT MEDS FT
MEDICATIONS  (STANDING):  aspirin enteric coated 81 milliGRAM(s) Oral daily  atorvastatin 80 milliGRAM(s) Oral at bedtime  cefuroxime  IVPB 1500 milliGRAM(s) IV Intermittent once  chlorhexidine 2% Cloths 1 Application(s) Topical daily  clopidogrel Tablet 75 milliGRAM(s) Oral daily  dextrose 50% Injectable 25 milliLiter(s) IV Push every 15 minutes  dextrose 50% Injectable 50 milliLiter(s) IV Push every 15 minutes  insulin regular Infusion 2 Unit(s)/Hr (2 mL/Hr) IV Continuous <Continuous>  norepinephrine Infusion 0.05 MICROgram(s)/kG/Min (5.78 mL/Hr) IV Continuous <Continuous>  pantoprazole    Tablet 40 milliGRAM(s) Oral daily  polyethylene glycol 3350 17 Gram(s) Oral daily  senna 2 Tablet(s) Oral at bedtime  sodium chloride 0.9%. 1000 milliLiter(s) (10 mL/Hr) IV Continuous <Continuous>  sodium chloride 0.9%. 1000 milliLiter(s) (5 mL/Hr) IV Continuous <Continuous>  vancomycin  IVPB 1000 milliGRAM(s) IV Intermittent once    MEDICATIONS  (PRN):  oxyCODONE    IR 5 milliGRAM(s) Oral every 4 hours PRN Moderate Pain (4 - 6)

## 2023-07-15 NOTE — PHYSICAL THERAPY INITIAL EVALUATION ADULT - ACTIVE RANGE OF MOTION EXAMINATION, REHAB EVAL
b/l shoulder flexion to 90deg 2/2 sternal precautions./bilateral upper extremity Active ROM was WFL (within functional limits)/bilateral  lower extremity Active ROM was WFL (within functional limits)/deficits as listed below

## 2023-07-16 LAB
ANION GAP SERPL CALC-SCNC: 20 MMOL/L — HIGH (ref 5–17)
BUN SERPL-MCNC: 34.8 MG/DL — HIGH (ref 8–20)
CALCIUM SERPL-MCNC: 7.9 MG/DL — LOW (ref 8.4–10.5)
CHLORIDE SERPL-SCNC: 96 MMOL/L — SIGNIFICANT CHANGE UP (ref 96–108)
CO2 SERPL-SCNC: 21 MMOL/L — LOW (ref 22–29)
CREAT SERPL-MCNC: 4.95 MG/DL — HIGH (ref 0.5–1.3)
EGFR: 12 ML/MIN/1.73M2 — LOW
GLUCOSE BLDC GLUCOMTR-MCNC: 152 MG/DL — HIGH (ref 70–99)
GLUCOSE BLDC GLUCOMTR-MCNC: 165 MG/DL — HIGH (ref 70–99)
GLUCOSE BLDC GLUCOMTR-MCNC: 189 MG/DL — HIGH (ref 70–99)
GLUCOSE BLDC GLUCOMTR-MCNC: 215 MG/DL — HIGH (ref 70–99)
GLUCOSE SERPL-MCNC: 154 MG/DL — HIGH (ref 70–99)
HCT VFR BLD CALC: 28.7 % — LOW (ref 39–50)
HGB BLD-MCNC: 9.5 G/DL — LOW (ref 13–17)
MAGNESIUM SERPL-MCNC: 2.3 MG/DL — SIGNIFICANT CHANGE UP (ref 1.6–2.6)
MCHC RBC-ENTMCNC: 29.9 PG — SIGNIFICANT CHANGE UP (ref 27–34)
MCHC RBC-ENTMCNC: 33.1 GM/DL — SIGNIFICANT CHANGE UP (ref 32–36)
MCV RBC AUTO: 90.3 FL — SIGNIFICANT CHANGE UP (ref 80–100)
PHOSPHATE SERPL-MCNC: 6.1 MG/DL — HIGH (ref 2.4–4.7)
PLATELET # BLD AUTO: 131 K/UL — LOW (ref 150–400)
POTASSIUM SERPL-MCNC: 5.2 MMOL/L — SIGNIFICANT CHANGE UP (ref 3.5–5.3)
POTASSIUM SERPL-SCNC: 5.2 MMOL/L — SIGNIFICANT CHANGE UP (ref 3.5–5.3)
RBC # BLD: 3.18 M/UL — LOW (ref 4.2–5.8)
RBC # FLD: 15.6 % — HIGH (ref 10.3–14.5)
SODIUM SERPL-SCNC: 137 MMOL/L — SIGNIFICANT CHANGE UP (ref 135–145)
WBC # BLD: 11.1 K/UL — HIGH (ref 3.8–10.5)
WBC # FLD AUTO: 11.1 K/UL — HIGH (ref 3.8–10.5)

## 2023-07-16 PROCEDURE — 99024 POSTOP FOLLOW-UP VISIT: CPT

## 2023-07-16 PROCEDURE — 71045 X-RAY EXAM CHEST 1 VIEW: CPT | Mod: 26

## 2023-07-16 RX ORDER — OXYCODONE HYDROCHLORIDE 5 MG/1
10 TABLET ORAL EVERY 4 HOURS
Refills: 0 | Status: DISCONTINUED | OUTPATIENT
Start: 2023-07-16 | End: 2023-07-17

## 2023-07-16 RX ORDER — METOPROLOL TARTRATE 50 MG
50 TABLET ORAL
Refills: 0 | Status: DISCONTINUED | OUTPATIENT
Start: 2023-07-16 | End: 2023-07-18

## 2023-07-16 RX ORDER — METHOCARBAMOL 500 MG/1
500 TABLET, FILM COATED ORAL
Refills: 0 | Status: DISCONTINUED | OUTPATIENT
Start: 2023-07-16 | End: 2023-07-31

## 2023-07-16 RX ORDER — ERYTHROPOIETIN 10000 [IU]/ML
10000 INJECTION, SOLUTION INTRAVENOUS; SUBCUTANEOUS
Refills: 0 | Status: DISCONTINUED | OUTPATIENT
Start: 2023-07-16 | End: 2023-07-17

## 2023-07-16 RX ORDER — HYOSCYAMINE SULFATE 0.13 MG
0.12 TABLET ORAL EVERY 6 HOURS
Refills: 0 | Status: DISCONTINUED | OUTPATIENT
Start: 2023-07-16 | End: 2023-07-17

## 2023-07-16 RX ORDER — CALCIUM ACETATE 667 MG
667 TABLET ORAL
Refills: 0 | Status: DISCONTINUED | OUTPATIENT
Start: 2023-07-16 | End: 2023-07-31

## 2023-07-16 RX ORDER — LORATADINE 10 MG/1
10 TABLET ORAL DAILY
Refills: 0 | Status: DISCONTINUED | OUTPATIENT
Start: 2023-07-16 | End: 2023-07-31

## 2023-07-16 RX ADMIN — OXYCODONE HYDROCHLORIDE 5 MILLIGRAM(S): 5 TABLET ORAL at 08:19

## 2023-07-16 RX ADMIN — Medication 650 MILLIGRAM(S): at 16:00

## 2023-07-16 RX ADMIN — OXYCODONE HYDROCHLORIDE 5 MILLIGRAM(S): 5 TABLET ORAL at 09:52

## 2023-07-16 RX ADMIN — OXYCODONE HYDROCHLORIDE 10 MILLIGRAM(S): 5 TABLET ORAL at 19:34

## 2023-07-16 RX ADMIN — Medication 650 MILLIGRAM(S): at 05:19

## 2023-07-16 RX ADMIN — HEPARIN SODIUM 5000 UNIT(S): 5000 INJECTION INTRAVENOUS; SUBCUTANEOUS at 05:19

## 2023-07-16 RX ADMIN — ATORVASTATIN CALCIUM 80 MILLIGRAM(S): 80 TABLET, FILM COATED ORAL at 21:20

## 2023-07-16 RX ADMIN — SENNA PLUS 2 TABLET(S): 8.6 TABLET ORAL at 21:20

## 2023-07-16 RX ADMIN — Medication 2 UNIT(S): at 08:32

## 2023-07-16 RX ADMIN — Medication 50 MILLIGRAM(S): at 17:24

## 2023-07-16 RX ADMIN — METHOCARBAMOL 500 MILLIGRAM(S): 500 TABLET, FILM COATED ORAL at 17:21

## 2023-07-16 RX ADMIN — CLOPIDOGREL BISULFATE 75 MILLIGRAM(S): 75 TABLET, FILM COATED ORAL at 11:37

## 2023-07-16 RX ADMIN — Medication 0: at 21:26

## 2023-07-16 RX ADMIN — Medication 50 MILLIGRAM(S): at 05:19

## 2023-07-16 RX ADMIN — HEPARIN SODIUM 5000 UNIT(S): 5000 INJECTION INTRAVENOUS; SUBCUTANEOUS at 15:25

## 2023-07-16 RX ADMIN — GABAPENTIN 100 MILLIGRAM(S): 400 CAPSULE ORAL at 05:19

## 2023-07-16 RX ADMIN — HEPARIN SODIUM 5000 UNIT(S): 5000 INJECTION INTRAVENOUS; SUBCUTANEOUS at 21:20

## 2023-07-16 RX ADMIN — Medication 667 MILLIGRAM(S): at 08:19

## 2023-07-16 RX ADMIN — INSULIN GLARGINE 6 UNIT(S): 100 INJECTION, SOLUTION SUBCUTANEOUS at 21:31

## 2023-07-16 RX ADMIN — Medication 2: at 08:34

## 2023-07-16 RX ADMIN — METHOCARBAMOL 500 MILLIGRAM(S): 500 TABLET, FILM COATED ORAL at 05:19

## 2023-07-16 RX ADMIN — PANTOPRAZOLE SODIUM 40 MILLIGRAM(S): 20 TABLET, DELAYED RELEASE ORAL at 11:37

## 2023-07-16 RX ADMIN — GABAPENTIN 100 MILLIGRAM(S): 400 CAPSULE ORAL at 21:20

## 2023-07-16 RX ADMIN — OXYCODONE HYDROCHLORIDE 10 MILLIGRAM(S): 5 TABLET ORAL at 20:34

## 2023-07-16 RX ADMIN — Medication 667 MILLIGRAM(S): at 17:21

## 2023-07-16 RX ADMIN — Medication 650 MILLIGRAM(S): at 11:53

## 2023-07-16 RX ADMIN — POLYETHYLENE GLYCOL 3350 17 GRAM(S): 17 POWDER, FOR SOLUTION ORAL at 11:38

## 2023-07-16 RX ADMIN — CHLORHEXIDINE GLUCONATE 1 APPLICATION(S): 213 SOLUTION TOPICAL at 11:53

## 2023-07-16 RX ADMIN — Medication 2 UNIT(S): at 11:38

## 2023-07-16 RX ADMIN — Medication 650 MILLIGRAM(S): at 10:12

## 2023-07-16 RX ADMIN — OXYCODONE HYDROCHLORIDE 10 MILLIGRAM(S): 5 TABLET ORAL at 11:55

## 2023-07-16 RX ADMIN — Medication 2: at 11:39

## 2023-07-16 RX ADMIN — Medication 667 MILLIGRAM(S): at 11:40

## 2023-07-16 RX ADMIN — GABAPENTIN 100 MILLIGRAM(S): 400 CAPSULE ORAL at 15:26

## 2023-07-16 RX ADMIN — OXYCODONE HYDROCHLORIDE 10 MILLIGRAM(S): 5 TABLET ORAL at 10:42

## 2023-07-16 RX ADMIN — Medication 2 UNIT(S): at 16:23

## 2023-07-16 RX ADMIN — Medication 650 MILLIGRAM(S): at 17:00

## 2023-07-16 RX ADMIN — Medication 81 MILLIGRAM(S): at 11:37

## 2023-07-16 RX ADMIN — Medication 4: at 16:24

## 2023-07-16 RX ADMIN — TAMSULOSIN HYDROCHLORIDE 0.4 MILLIGRAM(S): 0.4 CAPSULE ORAL at 21:20

## 2023-07-16 RX ADMIN — LORATADINE 10 MILLIGRAM(S): 10 TABLET ORAL at 11:40

## 2023-07-16 NOTE — PROGRESS NOTE ADULT - SUBJECTIVE AND OBJECTIVE BOX
Patient seen and examined.  Denies CP, SOB, N/V.    T(C): 36.5 (07-16-23 @ 00:00)  T(F): 97.7 (07-16-23 @ 00:00)  HR: 60 (07-16-23 @ 01:00)  BP: 167/75 (07-16-23 @ 01:00)  BP(mean): 108 (07-16-23 @ 01:00)  ABP: 144/51 (07-16-23 @ 01:00)  ABP(mean): 85 (07-16-23 @ 01:00)  RR: 10 (07-16-23 @ 01:00)  SpO2: 97% (07-16-23 @ 01:00)  Wt(kg): --  CVP(mm Hg): 37 (07-16-23 @ 01:00)        Physical Exam:  Gen: A&Ox3  Pulm:  CTA b/l, no r/r/w  CV:  S1S2, RRR, no m/r/g  Abd: +BS, soft, NT, ND  Ext:  +DP b/l, no c/c/e  Incision:  c/d/i no click, no exudate    I&O's Detail    14 Jul 2023 07:01  -  15 Jul 2023 07:00  --------------------------------------------------------  IN:    Albumin 5%  - 250 mL: 250 mL    Insulin: 15.5 mL    IV PiggyBack: 50 mL    Norepinephrine: 46.2 mL    Oral Fluid: 480 mL    Propofol: 20 mL    sodium chloride 0.9%: 95 mL    sodium chloride 0.9%: 190 mL  Total IN: 1146.7 mL    OUT:    Chest Tube (mL): 50 mL    Chest Tube (mL): 280 mL    Voided (mL): 133 mL  Total OUT: 463 mL    Total NET: 683.7 mL      15 Jul 2023 07:01  -  16 Jul 2023 01:33  --------------------------------------------------------  IN:    Insulin: 15.5 mL    IV PiggyBack: 400 mL    sodium chloride 0.9%: 60 mL    sodium chloride 0.9%: 30 mL  Total IN: 505.5 mL    OUT:    Chest Tube (mL): 45 mL    Chest Tube (mL): 130 mL    Indwelling Catheter - Urethral (mL): 5 mL    Other (mL): 500 mL  Total OUT: 680 mL    Total NET: -174.5 mL                              9.6    7.73  )-----------( 145      ( 15 Jul 2023 02:10 )             29.5   07-15    136  |  94<L>  |  23.1<H>  ----------------------------<  155<H>  4.5   |  21.0<L>  |  3.69<H>    Ca    8.6      15 Jul 2023 17:00  Phos  5.2     07-15  Mg     3.0     07-15    TPro  5.7<L>  /  Alb  3.3  /  TBili  0.7  /  DBili  x   /  AST  23  /  ALT  12  /  AlkPhos  68  07-14  aPTT: 31.6 sec; PT: 14.1 sec; INR: 1.21 ratio  07-14-23 @ 13:45       ABG - ( 15 Jul 2023 10:00 )  pH: 7.370 /  pCO2: 40    /  pO2: 96    / HCO3: 23    / Base Excess: -2.2  /  SaO2: 98.9 /  Lactate: x        CAPILLARY BLOOD GLUCOSE      POCT Blood Glucose.: 116 mg/dL (15 Jul 2023 22:49)        Medications:  acetaminophen     Tablet .. 650 milliGRAM(s) Oral every 6 hours  aspirin enteric coated 81 milliGRAM(s) Oral daily  atorvastatin 80 milliGRAM(s) Oral at bedtime  chlorhexidine 2% Cloths 1 Application(s) Topical daily  clopidogrel Tablet 75 milliGRAM(s) Oral daily  dextrose 5%. 1000 milliLiter(s) IV Continuous <Continuous>  dextrose 50% Injectable 50 milliLiter(s) IV Push every 15 minutes  dextrose 50% Injectable 25 milliLiter(s) IV Push every 15 minutes  dextrose Oral Gel 15 Gram(s) Oral once PRN  gabapentin 100 milliGRAM(s) Oral three times a day  glucagon  Injectable 1 milliGRAM(s) IntraMuscular once  heparin   Injectable 5000 Unit(s) SubCutaneous every 8 hours  insulin glargine Injectable (LANTUS) 6 Unit(s) SubCutaneous at bedtime  insulin lispro (ADMELOG) corrective regimen sliding scale   SubCutaneous at bedtime  insulin lispro (ADMELOG) corrective regimen sliding scale   SubCutaneous three times a day before meals  insulin lispro Injectable (ADMELOG) 2 Unit(s) SubCutaneous three times a day before meals  metoprolol tartrate 25 milliGRAM(s) Oral two times a day  oxyCODONE    IR 5 milliGRAM(s) Oral every 4 hours PRN  pantoprazole    Tablet 40 milliGRAM(s) Oral daily  polyethylene glycol 3350 17 Gram(s) Oral daily  senna 2 Tablet(s) Oral at bedtime  sodium chloride 0.9%. 1000 milliLiter(s) IV Continuous <Continuous>  sodium chloride 0.9%. 1000 milliLiter(s) IV Continuous <Continuous>  tamsulosin 0.4 milliGRAM(s) Oral at bedtime      CXR:    Assessment:  Pt is a 63y year old Male  s/p CABG, 4 or more vessels        Patent currently stable, NAD.    Plan:   Patient seen and examined.  Denies CP, SOB, N/V. Some post surgical pain relieved with pain medication    T(C): 36.5 (07-16-23 @ 00:00)  T(F): 97.7 (07-16-23 @ 00:00)  HR: 60 (07-16-23 @ 01:00)  BP: 167/75 (07-16-23 @ 01:00)  BP(mean): 108 (07-16-23 @ 01:00)  ABP: 144/51 (07-16-23 @ 01:00)  ABP(mean): 85 (07-16-23 @ 01:00)  RR: 10 (07-16-23 @ 01:00)  SpO2: 97% (07-16-23 @ 01:00)  Wt(kg): --  CVP(mm Hg): 37 (07-16-23 @ 01:00)        Physical Exam:  Gen: A&Ox3  Pulm:  CTA b/l, no r/r/w  CV:  S1S2, RRR, no m/r/g  Abd: +BS, soft, NT, ND  Ext:  +DP b/l, no c/c/e  Incision:  c/d/i no click, no exudate    I&O's Detail    14 Jul 2023 07:01  -  15 Jul 2023 07:00  --------------------------------------------------------  IN:    Albumin 5%  - 250 mL: 250 mL    Insulin: 15.5 mL    IV PiggyBack: 50 mL    Norepinephrine: 46.2 mL    Oral Fluid: 480 mL    Propofol: 20 mL    sodium chloride 0.9%: 95 mL    sodium chloride 0.9%: 190 mL  Total IN: 1146.7 mL    OUT:    Chest Tube (mL): 50 mL    Chest Tube (mL): 280 mL    Voided (mL): 133 mL  Total OUT: 463 mL    Total NET: 683.7 mL      15 Jul 2023 07:01  -  16 Jul 2023 01:33  --------------------------------------------------------  IN:    Insulin: 15.5 mL    IV PiggyBack: 400 mL    sodium chloride 0.9%: 60 mL    sodium chloride 0.9%: 30 mL  Total IN: 505.5 mL    OUT:    Chest Tube (mL): 45 mL    Chest Tube (mL): 130 mL    Indwelling Catheter - Urethral (mL): 5 mL    Other (mL): 500 mL  Total OUT: 680 mL    Total NET: -174.5 mL                              9.6    7.73  )-----------( 145      ( 15 Jul 2023 02:10 )             29.5   07-15    136  |  94<L>  |  23.1<H>  ----------------------------<  155<H>  4.5   |  21.0<L>  |  3.69<H>    Ca    8.6      15 Jul 2023 17:00  Phos  5.2     07-15  Mg     3.0     07-15    TPro  5.7<L>  /  Alb  3.3  /  TBili  0.7  /  DBili  x   /  AST  23  /  ALT  12  /  AlkPhos  68  07-14  aPTT: 31.6 sec; PT: 14.1 sec; INR: 1.21 ratio  07-14-23 @ 13:45       ABG - ( 15 Jul 2023 10:00 )  pH: 7.370 /  pCO2: 40    /  pO2: 96    / HCO3: 23    / Base Excess: -2.2  /  SaO2: 98.9 /  Lactate: x        CAPILLARY BLOOD GLUCOSE      POCT Blood Glucose.: 116 mg/dL (15 Jul 2023 22:49)        Medications:  acetaminophen     Tablet .. 650 milliGRAM(s) Oral every 6 hours  aspirin enteric coated 81 milliGRAM(s) Oral daily  atorvastatin 80 milliGRAM(s) Oral at bedtime  chlorhexidine 2% Cloths 1 Application(s) Topical daily  clopidogrel Tablet 75 milliGRAM(s) Oral daily  dextrose 5%. 1000 milliLiter(s) IV Continuous <Continuous>  dextrose 50% Injectable 50 milliLiter(s) IV Push every 15 minutes  dextrose 50% Injectable 25 milliLiter(s) IV Push every 15 minutes  dextrose Oral Gel 15 Gram(s) Oral once PRN  gabapentin 100 milliGRAM(s) Oral three times a day  glucagon  Injectable 1 milliGRAM(s) IntraMuscular once  heparin   Injectable 5000 Unit(s) SubCutaneous every 8 hours  insulin glargine Injectable (LANTUS) 6 Unit(s) SubCutaneous at bedtime  insulin lispro (ADMELOG) corrective regimen sliding scale   SubCutaneous at bedtime  insulin lispro (ADMELOG) corrective regimen sliding scale   SubCutaneous three times a day before meals  insulin lispro Injectable (ADMELOG) 2 Unit(s) SubCutaneous three times a day before meals  metoprolol tartrate 25 milliGRAM(s) Oral two times a day  oxyCODONE    IR 5 milliGRAM(s) Oral every 4 hours PRN  pantoprazole    Tablet 40 milliGRAM(s) Oral daily  polyethylene glycol 3350 17 Gram(s) Oral daily  senna 2 Tablet(s) Oral at bedtime  sodium chloride 0.9%. 1000 milliLiter(s) IV Continuous <Continuous>  sodium chloride 0.9%. 1000 milliLiter(s) IV Continuous <Continuous>  tamsulosin 0.4 milliGRAM(s) Oral at bedtime      CXR:    Assessment:  Pt is a 63y year old Male  s/p CABG, 4 or more vessels        Patent currently stable, NAD.    Plan:   Patient seen and examined.  Denies CP, SOB, N/V. Some post surgical pain relieved with pain medication    T(C): 36.5 (07-16-23 @ 00:00)  T(F): 97.7 (07-16-23 @ 00:00)  HR: 60 (07-16-23 @ 01:00)  BP: 167/75 (07-16-23 @ 01:00)  BP(mean): 108 (07-16-23 @ 01:00)  ABP: 144/51 (07-16-23 @ 01:00)  ABP(mean): 85 (07-16-23 @ 01:00)  RR: 10 (07-16-23 @ 01:00)  SpO2: 97% (07-16-23 @ 01:00)  Wt(kg): --  CVP(mm Hg): 37 (07-16-23 @ 01:00)        Physical Exam:  Gen: A&Ox3  Pulm:  CTA b/l, no r/r/w  CV:  S1S2, RRR, no m/r/g  Abd: +BS, soft, NT, ND  Ext:  +DP b/l, no c/c/e  Incision:  c/d/i no click, no exudate    I&O's Detail    14 Jul 2023 07:01  -  15 Jul 2023 07:00  --------------------------------------------------------  IN:    Albumin 5%  - 250 mL: 250 mL    Insulin: 15.5 mL    IV PiggyBack: 50 mL    Norepinephrine: 46.2 mL    Oral Fluid: 480 mL    Propofol: 20 mL    sodium chloride 0.9%: 95 mL    sodium chloride 0.9%: 190 mL  Total IN: 1146.7 mL    OUT:    Chest Tube (mL): 50 mL    Chest Tube (mL): 280 mL    Voided (mL): 133 mL  Total OUT: 463 mL    Total NET: 683.7 mL      15 Jul 2023 07:01  -  16 Jul 2023 01:33  --------------------------------------------------------  IN:    Insulin: 15.5 mL    IV PiggyBack: 400 mL    sodium chloride 0.9%: 60 mL    sodium chloride 0.9%: 30 mL  Total IN: 505.5 mL    OUT:    Chest Tube (mL): 45 mL    Chest Tube (mL): 130 mL    Indwelling Catheter - Urethral (mL): 5 mL    Other (mL): 500 mL  Total OUT: 680 mL    Total NET: -174.5 mL                              9.6    7.73  )-----------( 145      ( 15 Jul 2023 02:10 )             29.5   07-15    136  |  94<L>  |  23.1<H>  ----------------------------<  155<H>  4.5   |  21.0<L>  |  3.69<H>    Ca    8.6      15 Jul 2023 17:00  Phos  5.2     07-15  Mg     3.0     07-15    TPro  5.7<L>  /  Alb  3.3  /  TBili  0.7  /  DBili  x   /  AST  23  /  ALT  12  /  AlkPhos  68  07-14  aPTT: 31.6 sec; PT: 14.1 sec; INR: 1.21 ratio  07-14-23 @ 13:45       ABG - ( 15 Jul 2023 10:00 )  pH: 7.370 /  pCO2: 40    /  pO2: 96    / HCO3: 23    / Base Excess: -2.2  /  SaO2: 98.9 /  Lactate: x        CAPILLARY BLOOD GLUCOSE      POCT Blood Glucose.: 116 mg/dL (15 Jul 2023 22:49)        Medications:  acetaminophen     Tablet .. 650 milliGRAM(s) Oral every 6 hours  aspirin enteric coated 81 milliGRAM(s) Oral daily  atorvastatin 80 milliGRAM(s) Oral at bedtime  chlorhexidine 2% Cloths 1 Application(s) Topical daily  clopidogrel Tablet 75 milliGRAM(s) Oral daily  dextrose 5%. 1000 milliLiter(s) IV Continuous <Continuous>  dextrose 50% Injectable 50 milliLiter(s) IV Push every 15 minutes  dextrose 50% Injectable 25 milliLiter(s) IV Push every 15 minutes  dextrose Oral Gel 15 Gram(s) Oral once PRN  gabapentin 100 milliGRAM(s) Oral three times a day  glucagon  Injectable 1 milliGRAM(s) IntraMuscular once  heparin   Injectable 5000 Unit(s) SubCutaneous every 8 hours  insulin glargine Injectable (LANTUS) 6 Unit(s) SubCutaneous at bedtime  insulin lispro (ADMELOG) corrective regimen sliding scale   SubCutaneous at bedtime  insulin lispro (ADMELOG) corrective regimen sliding scale   SubCutaneous three times a day before meals  insulin lispro Injectable (ADMELOG) 2 Unit(s) SubCutaneous three times a day before meals  metoprolol tartrate 25 milliGRAM(s) Oral two times a day  oxyCODONE    IR 5 milliGRAM(s) Oral every 4 hours PRN  pantoprazole    Tablet 40 milliGRAM(s) Oral daily  polyethylene glycol 3350 17 Gram(s) Oral daily  senna 2 Tablet(s) Oral at bedtime  sodium chloride 0.9%. 1000 milliLiter(s) IV Continuous <Continuous>  sodium chloride 0.9%. 1000 milliLiter(s) IV Continuous <Continuous>  tamsulosin 0.4 milliGRAM(s) Oral at bedtime

## 2023-07-16 NOTE — PROGRESS NOTE ADULT - ASSESSMENT
63M with PMHx ESRD on HD (T,Th, S), IDDM, COPD, gastroparesis, peripheral blindness and retinopathy, C. Diff, and NSTEMI 6/2022 with recent hospital admission for CABG gregor now presents from home with complaints of worsening chest pain associated with difficulty breathing. In ER EKGs with intermittent diffuse ST changes. Pain slightly improved with 2 doses IV Morphine. Seen by cardiology and started on Tridil and Heparin gtt for NSTEMI. Pt was admitted to CTICU for IABP placement and HD session on 7/13.  Pt is now s/p CABG x4 on 7/14, IABP was removed shortly after arriving to ICU. 7/15 HD for -500.

## 2023-07-17 LAB
ANION GAP SERPL CALC-SCNC: 16 MMOL/L — SIGNIFICANT CHANGE UP (ref 5–17)
BUN SERPL-MCNC: 61.7 MG/DL — HIGH (ref 8–20)
CALCIUM SERPL-MCNC: 7.5 MG/DL — LOW (ref 8.4–10.5)
CHLORIDE SERPL-SCNC: 97 MMOL/L — SIGNIFICANT CHANGE UP (ref 96–108)
CO2 SERPL-SCNC: 24 MMOL/L — SIGNIFICANT CHANGE UP (ref 22–29)
CREAT SERPL-MCNC: 6.45 MG/DL — HIGH (ref 0.5–1.3)
EGFR: 9 ML/MIN/1.73M2 — LOW
GLUCOSE BLDC GLUCOMTR-MCNC: 144 MG/DL — HIGH (ref 70–99)
GLUCOSE BLDC GLUCOMTR-MCNC: 189 MG/DL — HIGH (ref 70–99)
GLUCOSE BLDC GLUCOMTR-MCNC: 222 MG/DL — HIGH (ref 70–99)
GLUCOSE BLDC GLUCOMTR-MCNC: 92 MG/DL — SIGNIFICANT CHANGE UP (ref 70–99)
GLUCOSE SERPL-MCNC: 168 MG/DL — HIGH (ref 70–99)
HCT VFR BLD CALC: 29.3 % — LOW (ref 39–50)
HGB BLD-MCNC: 9.6 G/DL — LOW (ref 13–17)
MAGNESIUM SERPL-MCNC: 2.6 MG/DL — SIGNIFICANT CHANGE UP (ref 1.6–2.6)
MCHC RBC-ENTMCNC: 29.4 PG — SIGNIFICANT CHANGE UP (ref 27–34)
MCHC RBC-ENTMCNC: 32.8 GM/DL — SIGNIFICANT CHANGE UP (ref 32–36)
MCV RBC AUTO: 89.6 FL — SIGNIFICANT CHANGE UP (ref 80–100)
PHOSPHATE SERPL-MCNC: 6.1 MG/DL — HIGH (ref 2.4–4.7)
PLATELET # BLD AUTO: 121 K/UL — LOW (ref 150–400)
POTASSIUM SERPL-MCNC: 5.2 MMOL/L — SIGNIFICANT CHANGE UP (ref 3.5–5.3)
POTASSIUM SERPL-SCNC: 5.2 MMOL/L — SIGNIFICANT CHANGE UP (ref 3.5–5.3)
RBC # BLD: 3.27 M/UL — LOW (ref 4.2–5.8)
RBC # FLD: 15.3 % — HIGH (ref 10.3–14.5)
SODIUM SERPL-SCNC: 137 MMOL/L — SIGNIFICANT CHANGE UP (ref 135–145)
WBC # BLD: 7.44 K/UL — SIGNIFICANT CHANGE UP (ref 3.8–10.5)
WBC # FLD AUTO: 7.44 K/UL — SIGNIFICANT CHANGE UP (ref 3.8–10.5)

## 2023-07-17 PROCEDURE — 71045 X-RAY EXAM CHEST 1 VIEW: CPT | Mod: 26,76

## 2023-07-17 PROCEDURE — 99232 SBSQ HOSP IP/OBS MODERATE 35: CPT

## 2023-07-17 PROCEDURE — 99291 CRITICAL CARE FIRST HOUR: CPT

## 2023-07-17 PROCEDURE — 99024 POSTOP FOLLOW-UP VISIT: CPT

## 2023-07-17 PROCEDURE — 93010 ELECTROCARDIOGRAM REPORT: CPT

## 2023-07-17 RX ORDER — ERYTHROPOIETIN 10000 [IU]/ML
2000 INJECTION, SOLUTION INTRAVENOUS; SUBCUTANEOUS
Refills: 0 | Status: DISCONTINUED | OUTPATIENT
Start: 2023-07-18 | End: 2023-07-22

## 2023-07-17 RX ORDER — SODIUM CHLORIDE 9 MG/ML
3 INJECTION INTRAMUSCULAR; INTRAVENOUS; SUBCUTANEOUS EVERY 8 HOURS
Refills: 0 | Status: DISCONTINUED | OUTPATIENT
Start: 2023-07-17 | End: 2023-07-31

## 2023-07-17 RX ORDER — METOPROLOL TARTRATE 50 MG
25 TABLET ORAL ONCE
Refills: 0 | Status: COMPLETED | OUTPATIENT
Start: 2023-07-17 | End: 2023-07-17

## 2023-07-17 RX ADMIN — Medication 650 MILLIGRAM(S): at 05:16

## 2023-07-17 RX ADMIN — POLYETHYLENE GLYCOL 3350 17 GRAM(S): 17 POWDER, FOR SOLUTION ORAL at 11:43

## 2023-07-17 RX ADMIN — GABAPENTIN 100 MILLIGRAM(S): 400 CAPSULE ORAL at 21:09

## 2023-07-17 RX ADMIN — SODIUM CHLORIDE 3 MILLILITER(S): 9 INJECTION INTRAMUSCULAR; INTRAVENOUS; SUBCUTANEOUS at 13:00

## 2023-07-17 RX ADMIN — METHOCARBAMOL 500 MILLIGRAM(S): 500 TABLET, FILM COATED ORAL at 05:18

## 2023-07-17 RX ADMIN — Medication 2: at 16:41

## 2023-07-17 RX ADMIN — CLOPIDOGREL BISULFATE 75 MILLIGRAM(S): 75 TABLET, FILM COATED ORAL at 11:43

## 2023-07-17 RX ADMIN — TAMSULOSIN HYDROCHLORIDE 0.4 MILLIGRAM(S): 0.4 CAPSULE ORAL at 21:09

## 2023-07-17 RX ADMIN — Medication 81 MILLIGRAM(S): at 11:43

## 2023-07-17 RX ADMIN — Medication 667 MILLIGRAM(S): at 07:42

## 2023-07-17 RX ADMIN — OXYCODONE HYDROCHLORIDE 10 MILLIGRAM(S): 5 TABLET ORAL at 12:26

## 2023-07-17 RX ADMIN — HEPARIN SODIUM 5000 UNIT(S): 5000 INJECTION INTRAVENOUS; SUBCUTANEOUS at 05:17

## 2023-07-17 RX ADMIN — GABAPENTIN 100 MILLIGRAM(S): 400 CAPSULE ORAL at 13:05

## 2023-07-17 RX ADMIN — GABAPENTIN 100 MILLIGRAM(S): 400 CAPSULE ORAL at 05:16

## 2023-07-17 RX ADMIN — Medication 667 MILLIGRAM(S): at 11:43

## 2023-07-17 RX ADMIN — SENNA PLUS 2 TABLET(S): 8.6 TABLET ORAL at 21:10

## 2023-07-17 RX ADMIN — ATORVASTATIN CALCIUM 80 MILLIGRAM(S): 80 TABLET, FILM COATED ORAL at 21:09

## 2023-07-17 RX ADMIN — Medication 650 MILLIGRAM(S): at 10:36

## 2023-07-17 RX ADMIN — METHOCARBAMOL 500 MILLIGRAM(S): 500 TABLET, FILM COATED ORAL at 17:17

## 2023-07-17 RX ADMIN — Medication 667 MILLIGRAM(S): at 17:15

## 2023-07-17 RX ADMIN — OXYCODONE HYDROCHLORIDE 10 MILLIGRAM(S): 5 TABLET ORAL at 13:26

## 2023-07-17 RX ADMIN — LORATADINE 10 MILLIGRAM(S): 10 TABLET ORAL at 11:42

## 2023-07-17 RX ADMIN — Medication 2 UNIT(S): at 16:42

## 2023-07-17 RX ADMIN — OXYCODONE HYDROCHLORIDE 5 MILLIGRAM(S): 5 TABLET ORAL at 09:24

## 2023-07-17 RX ADMIN — PANTOPRAZOLE SODIUM 40 MILLIGRAM(S): 20 TABLET, DELAYED RELEASE ORAL at 11:42

## 2023-07-17 RX ADMIN — OXYCODONE HYDROCHLORIDE 5 MILLIGRAM(S): 5 TABLET ORAL at 21:27

## 2023-07-17 RX ADMIN — OXYCODONE HYDROCHLORIDE 5 MILLIGRAM(S): 5 TABLET ORAL at 20:27

## 2023-07-17 RX ADMIN — Medication 650 MILLIGRAM(S): at 11:26

## 2023-07-17 RX ADMIN — HEPARIN SODIUM 5000 UNIT(S): 5000 INJECTION INTRAVENOUS; SUBCUTANEOUS at 13:06

## 2023-07-17 RX ADMIN — OXYCODONE HYDROCHLORIDE 5 MILLIGRAM(S): 5 TABLET ORAL at 08:24

## 2023-07-17 RX ADMIN — Medication 650 MILLIGRAM(S): at 17:16

## 2023-07-17 RX ADMIN — SODIUM CHLORIDE 3 MILLILITER(S): 9 INJECTION INTRAMUSCULAR; INTRAVENOUS; SUBCUTANEOUS at 21:04

## 2023-07-17 RX ADMIN — INSULIN GLARGINE 6 UNIT(S): 100 INJECTION, SOLUTION SUBCUTANEOUS at 21:09

## 2023-07-17 RX ADMIN — HEPARIN SODIUM 5000 UNIT(S): 5000 INJECTION INTRAVENOUS; SUBCUTANEOUS at 21:10

## 2023-07-17 RX ADMIN — Medication 25 MILLIGRAM(S): at 08:24

## 2023-07-17 RX ADMIN — Medication 2 UNIT(S): at 07:41

## 2023-07-17 RX ADMIN — Medication 650 MILLIGRAM(S): at 17:44

## 2023-07-17 RX ADMIN — Medication 650 MILLIGRAM(S): at 06:16

## 2023-07-17 NOTE — PROGRESS NOTE ADULT - ASSESSMENT
63 year old legally blind male with PMH of DM complicated by neuropathy, HTN, HLD, CAD, PVD, gastroparesis, ESRD on HD TTS and BPH s/p CABG x4 on 07/14/23. Nephrology is managing ESRD on HD.     -Access: R AV fistula   -Outpatient dialysis days are Tuesdays Thursdays Saturdays  -Outpatient dialysis unit is unclear - patient and wife are both poor historians and unable to provide details - reportedly somewhere around the [facility]   -Next hemodialysis will be tomorrow as per his usual outpatient dialysis schedule   -BP robust  -Volume status -UF as tolerated during HD   -Anemia - KIMBERLY with HD   -Mineral Bone Disease in setting of CKD - continue oral phos binder    Kamryn Li DO  Nephrology

## 2023-07-17 NOTE — PROGRESS NOTE ADULT - SUBJECTIVE AND OBJECTIVE BOX
Complained of SOB. Currently on RA.    Vital Signs Last 24 Hrs  T(C): 36.4 (17 Jul 2023 12:17), Max: 36.9 (16 Jul 2023 16:00)  T(F): 97.5 (17 Jul 2023 12:17), Max: 98.4 (16 Jul 2023 16:00)  HR: 56 (17 Jul 2023 12:00) (55 - 62)  BP: 141/63 (17 Jul 2023 12:00) (133/74 - 170/83)  BP(mean): 90 (17 Jul 2023 12:00) (11 - 119)  RR: 16 (17 Jul 2023 12:00) (11 - 27)  SpO2: 100% (17 Jul 2023 12:00) (95% - 100%)    Parameters below as of 17 Jul 2023 12:00  Patient On (Oxygen Delivery Method): room air    I&O's Summary    16 Jul 2023 07:01  -  17 Jul 2023 07:00  --------------------------------------------------------  IN: 960 mL / OUT: 15 mL / NET: 945 mL    17 Jul 2023 07:01  -  17 Jul 2023 12:51  --------------------------------------------------------  IN: 480 mL / OUT: 0 mL / NET: 480 mL    Physical Exam  General: WDWN male in NAD  HEENT: Normocephalic  Cardiac: S1S2 RRR  Respiratory: Bibasilar crackles  Abdomen: Soft, NT  Extremities: No appreciable edema  Neuro: Alert and awake  Psych: Calm     07-17    137  |  97  |  61.7<H>  ----------------------------<  168<H>  5.2   |  24.0  |  6.45<H>    Ca    7.5<L>      17 Jul 2023 02:40  Phos  6.1     07-17  Mg     2.6     07-17                        9.6    7.44  )-----------( 121      ( 17 Jul 2023 02:40 )             29.3     MEDICATIONS  (STANDING):  acetaminophen     Tablet .. 650 milliGRAM(s) Oral every 6 hours  aspirin enteric coated 81 milliGRAM(s) Oral daily  atorvastatin 80 milliGRAM(s) Oral at bedtime  calcium acetate 667 milliGRAM(s) Oral three times a day with meals  clopidogrel Tablet 75 milliGRAM(s) Oral daily  dextrose 5%. 1000 milliLiter(s) (100 mL/Hr) IV Continuous <Continuous>  dextrose 50% Injectable 25 milliLiter(s) IV Push every 15 minutes  dextrose 50% Injectable 50 milliLiter(s) IV Push every 15 minutes  epoetin lois (EPOGEN) Injectable 88708 Unit(s) IV Push <User Schedule>  gabapentin 100 milliGRAM(s) Oral three times a day  glucagon  Injectable 1 milliGRAM(s) IntraMuscular once  heparin   Injectable 5000 Unit(s) SubCutaneous every 8 hours  insulin glargine Injectable (LANTUS) 6 Unit(s) SubCutaneous at bedtime  insulin lispro (ADMELOG) corrective regimen sliding scale   SubCutaneous at bedtime  insulin lispro (ADMELOG) corrective regimen sliding scale   SubCutaneous three times a day before meals  insulin lispro Injectable (ADMELOG) 2 Unit(s) SubCutaneous three times a day before meals  loratadine 10 milliGRAM(s) Oral daily  methocarbamol 500 milliGRAM(s) Oral two times a day  metoprolol tartrate 50 milliGRAM(s) Oral two times a day  pantoprazole    Tablet 40 milliGRAM(s) Oral daily  polyethylene glycol 3350 17 Gram(s) Oral daily  senna 2 Tablet(s) Oral at bedtime  sodium chloride 0.9% lock flush 3 milliLiter(s) IV Push every 8 hours  tamsulosin 0.4 milliGRAM(s) Oral at bedtime

## 2023-07-17 NOTE — PROGRESS NOTE ADULT - SUBJECTIVE AND OBJECTIVE BOX
BENJAMIN LOCO  MRN-[id number]    HPI:  63M with PMHx ESRD on HD (T,Th, S), IDDM, COPD, gastroparesis, peripheral blindness and retinopathy, C. Diff, and NSTEMI 6/2022 with recent hospital admission for CABG gregor now presents from home with complaints of worsening chest pain associated with difficulty breathing. In ER EKGs with intermittent diffuse ST changes. Pain slightly improved with 2 doses IV Morphine. Seen by cardiology and started on Tridil and Heparin gtt for NSTEMI. At time of exam, patient with labored breathing complaining of substernal chest pain radiating to entire body. Denies palpitations, dizziness, headache, abdominal pain or N/V/D/C.  (13 Jul 2023 10:13)    Surgery/Hospital Course:  ·  PRE-OP DIAGNOSIS:  CAD (coronary atherosclerotic disease)   ·  POST-OP DIAGNOSIS:  CAD (coronary artery disease), native coronary artery   ·  PROCEDURES:  CABG, 4 or more vessels 14-Jul-2023   CABG x 4 (LIMA-LAD, PYG-CM2-OD6-PDA)   Today:  No acute events -  SR , RA  Continue  Plavix  DC L  chest tube tomorrow-    ICU Vital Signs Last 24 Hrs  T(C): 36.4 (17 Jul 2023 12:17), Max: 36.9 (16 Jul 2023 16:00)  T(F): 97.5 (17 Jul 2023 12:17), Max: 98.4 (16 Jul 2023 16:00)  HR: 56 (17 Jul 2023 12:00) (55 - 62)  BP: 141/63 (17 Jul 2023 12:00) (133/74 - 170/83)  BP(mean): 90 (17 Jul 2023 12:00) (11 - 119)  ABP: 136/57 (17 Jul 2023 10:00) (-14/-14 - 140/52)  ABP(mean): 85 (17 Jul 2023 10:00) (-14 - 85)  RR: 16 (17 Jul 2023 12:00) (11 - 27)  SpO2: 100% (17 Jul 2023 12:00) (95% - 100%)    O2 Parameters below as of 17 Jul 2023 12:00  Patient On (Oxygen Delivery Method): room air            Physical Exam:  Gen: A&O   CNS: non focal 	  Neck: no JVD  RES : clear , no wheezing              CVS: Regular  rhythm. Normal S1/S2  Abd: Soft, non-distended. Bowel sounds present.  Skin: No rash.  Ext:  no edema    ============================I/O===========================   I&O's Detail    16 Jul 2023 07:01  -  17 Jul 2023 07:00  --------------------------------------------------------  IN:    Oral Fluid: 960 mL  Total IN: 960 mL    OUT:    Chest Tube (mL): 15 mL  Total OUT: 15 mL    Total NET: 945 mL      17 Jul 2023 07:01  -  17 Jul 2023 12:24  --------------------------------------------------------  IN:    Oral Fluid: 480 mL  Total IN: 480 mL    OUT:    Chest Tube (mL): 0 mL  Total OUT: 0 mL    Total NET: 480 mL        ============================ LABS =========================                        9.6    7.44  )-----------( 121      ( 17 Jul 2023 02:40 )             29.3     07-17    137  |  97  |  61.7<H>  ----------------------------<  168<H>  5.2   |  24.0  |  6.45<H>    Ca    7.5<L>      17 Jul 2023 02:40  Phos  6.1     07-17  Mg     2.6     07-17            Urinalysis Basic - ( 17 Jul 2023 02:40 )    Color: x / Appearance: x / SG: x / pH: x  Gluc: 168 mg/dL / Ketone: x  / Bili: x / Urobili: x   Blood: x / Protein: x / Nitrite: x   Leuk Esterase: x / RBC: x / WBC x   Sq Epi: x / Non Sq Epi: x / Bacteria: x      ======================Micro/Rad/Cardio=================  Culture: Reviewed   CXR: Reviewed  Echo:Reviewed  ======================================================  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 Diff, and NSTEMI 6/2022 with recent hospital admission for CABG eval now presents from home with complaints of worsening chest pain associated with difficulty breathing. In ER EKGs with intermittent diffuse ST changes. Pain slightly improved with 2 doses IV Morphine. Seen by cardiology and started on Tridil and Heparin gtt for NSTEMI. Pt was admitted to CTICU for IABP placement and HD session on 7/13.  Pt is now s/p CABG x4 on 7/14, IABP was removed shortly after arriving to ICU.-    ---S/p CABG, 4 or more vessels 14-Jul-2023              CABG x 4 (LIMA-LAD, YID-EO3-CM4-PDA)   ---BPH (Benign Prostatic Hypertrophy)   ---CAD (coronary artery disease)  ---CKD (chronic kidney disease)  ---Osteomyelitis  ---Gastroparesis  ---PVD (peripheral vascular disease)  ---Legally blind  ---Congenital Anomaly of Foot  ---S/P amputation left toes  ---Stented coronary artery-  ---Post op Hypovolemic shock  ---Post op respiratory insufficiency       Plan:  -Beta blocker as tolerated by HR and SBP   -Lipitor for chronic graft patency prophylaxis  -Aspirin for acute graft closure prophylaxis  -Chest PT and IS use with bedside nurse.  -HD T/Th/S  -Renal diet.  -SCDs for DVT prophylaxis  -Protonix for stress ulcer prophylaxis--  ====================== NEUROLOGY=====================  acetaminophen     Tablet .. 650 milliGRAM(s) Oral every 6 hours  gabapentin 100 milliGRAM(s) Oral three times a day  methocarbamol 500 milliGRAM(s) Oral two times a day  oxyCODONE    IR 10 milliGRAM(s) Oral every 4 hours PRN Severe Pain (7 - 10)  oxyCODONE    IR 5 milliGRAM(s) Oral every 4 hours PRN Moderate Pain (4 - 6)    ==================== RESPIRATORY======================  Post op respiratory insufficiency  loratadine 10 milliGRAM(s) Oral daily    ====================CARDIOVASCULAR==================  Post op Hypovolemia  metoprolol tartrate 50 milliGRAM(s) Oral two times a day    ===================HEMATOLOGIC/ONC ===================  Monitor H&H/Plts    aspirin enteric coated 81 milliGRAM(s) Oral daily  clopidogrel Tablet 75 milliGRAM(s) Oral daily  heparin   Injectable 5000 Unit(s) SubCutaneous every 8 hours    ===================== RENAL =========================  Continue monitoring urine output, I&OS, BUN/Cr   tamsulosin 0.4 milliGRAM(s) Oral at bedtime    ==================== GASTROINTESTINAL===================  calcium acetate 667 milliGRAM(s) Oral three times a day with meals  dextrose 5%. 1000 milliLiter(s) (100 mL/Hr) IV Continuous <Continuous>  pantoprazole    Tablet 40 milliGRAM(s) Oral daily  polyethylene glycol 3350 17 Gram(s) Oral daily  senna 2 Tablet(s) Oral at bedtime  sodium chloride 0.9% lock flush 3 milliLiter(s) IV Push every 8 hours    =======================    ENDOCRINE  =====================  atorvastatin 80 milliGRAM(s) Oral at bedtime  dextrose 50% Injectable 25 milliLiter(s) IV Push every 15 minutes  dextrose 50% Injectable 50 milliLiter(s) IV Push every 15 minutes  dextrose Oral Gel 15 Gram(s) Oral once PRN Blood Glucose LESS THAN 70 milliGRAM(s)/deciliter  glucagon  Injectable 1 milliGRAM(s) IntraMuscular once  insulin glargine Injectable (LANTUS) 6 Unit(s) SubCutaneous at bedtime  insulin lispro (ADMELOG) corrective regimen sliding scale   SubCutaneous three times a day before meals  insulin lispro (ADMELOG) corrective regimen sliding scale   SubCutaneous at bedtime  insulin lispro Injectable (ADMELOG) 2 Unit(s) SubCutaneous three times a day before meals    ========================INFECTIOUS DISEASE================      -Monitor Neurologic status ,   -Head of the bed should remain elevated to 45 degrees,  -Monitor for arrhythmias and monitor parameters for organ perfusion,  -Glycemic control is satisfactory,  -Nutritional goals will be met using po eventually , insure adequate caloric intake and monitor the same ,  -Electrolytes have been repleted as necessary , pain control has been achieved  and wound care has been carried out ,  -Stress ulcer and VTE prophylaxis will be achieved,  -Agressive PT and early mobility and ambulation goals will be met,    I have spent 35 minutes providing acute care for this critically ill patient     Patient requires continuous monitoring with bedside rhythm monitoring, pulse ox monitoring, and intermittent blood gas analysis. Care plan discussed with ICU care team. Patient remained critical and at risk for life threatening decompensation.

## 2023-07-17 NOTE — PROGRESS NOTE ADULT - SUBJECTIVE AND OBJECTIVE BOX
63y Male seen and evaluated bedside. Endorsing no acute complaints. Denies any chest pain, palpitations, shortness of breath, wheezing, abd pain, nausea, vomiting, lightheadedness, headaches, fevers, or chills.     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 650 milliGRAM(s) Oral every 6 hours  aspirin enteric coated 81 milliGRAM(s) Oral daily  atorvastatin 80 milliGRAM(s) Oral at bedtime  calcium acetate 667 milliGRAM(s) Oral three times a day with meals  chlorhexidine 2% Cloths 1 Application(s) Topical daily  clopidogrel Tablet 75 milliGRAM(s) Oral daily  dextrose 5%. 1000 milliLiter(s) IV Continuous <Continuous>  dextrose 50% Injectable 25 milliLiter(s) IV Push every 15 minutes  dextrose 50% Injectable 50 milliLiter(s) IV Push every 15 minutes  dextrose Oral Gel 15 Gram(s) Oral once PRN  epoetin lois (EPOGEN) Injectable 64334 Unit(s) IV Push <User Schedule>  gabapentin 100 milliGRAM(s) Oral three times a day  glucagon  Injectable 1 milliGRAM(s) IntraMuscular once  heparin   Injectable 5000 Unit(s) SubCutaneous every 8 hours  hyoscyamine SL 0.125 milliGRAM(s) SubLingual every 6 hours PRN  insulin glargine Injectable (LANTUS) 6 Unit(s) SubCutaneous at bedtime  insulin lispro (ADMELOG) corrective regimen sliding scale   SubCutaneous three times a day before meals  insulin lispro (ADMELOG) corrective regimen sliding scale   SubCutaneous at bedtime  insulin lispro Injectable (ADMELOG) 2 Unit(s) SubCutaneous three times a day before meals  loratadine 10 milliGRAM(s) Oral daily  methocarbamol 500 milliGRAM(s) Oral two times a day  metoprolol tartrate 50 milliGRAM(s) Oral two times a day  oxyCODONE    IR 10 milliGRAM(s) Oral every 4 hours PRN  oxyCODONE    IR 5 milliGRAM(s) Oral every 4 hours PRN  pantoprazole    Tablet 40 milliGRAM(s) Oral daily  polyethylene glycol 3350 17 Gram(s) Oral daily  senna 2 Tablet(s) Oral at bedtime  tamsulosin 0.4 milliGRAM(s) Oral at bedtime      MEDICATIONS  (PRN):  dextrose Oral Gel 15 Gram(s) Oral once PRN Blood Glucose LESS THAN 70 milliGRAM(s)/deciliter  hyoscyamine SL 0.125 milliGRAM(s) SubLingual every 6 hours PRN Bowel Spasms  oxyCODONE    IR 5 milliGRAM(s) Oral every 4 hours PRN Moderate Pain (4 - 6)  oxyCODONE    IR 10 milliGRAM(s) Oral every 4 hours PRN Severe Pain (7 - 10)      Daily Review:        ABG - ( 15 Jul 2023 10:00 )  pH, Arterial: 7.370 pH, Blood: x     /  pCO2: 40    /  pO2: 96    / HCO3: 23    / Base Excess: -2.2  /  SaO2: 98.9                                    9.5    11.10 )-----------( 131      ( 16 Jul 2023 02:45 )             28.7   07-16    137  |  96  |  34.8<H>  ----------------------------<  154<H>  5.2   |  21.0<L>  |  4.95<H>    Ca    7.9<L>      16 Jul 2023 02:45  Phos  6.1     07-16  Mg     2.3     07-16      CARDIAC MARKERS ( 15 Jul 2023 02:10 )  x     / 0.51 ng/mL / 299 U/L / x     / 13.5 ng/mL        T(C): 36.9 (07-16-23 @ 16:00), Max: 36.9 (07-16-23 @ 16:00)  HR: 56 (07-17-23 @ 00:00) (55 - 62)  BP: 148/70 (07-17-23 @ 00:00) (133/74 - 199/94)  RR: 12 (07-17-23 @ 00:00) (8 - 27)  SpO2: 97% (07-17-23 @ 00:00) (95% - 98%)  Wt(kg): --    CAPILLARY BLOOD GLUCOSE      POCT Blood Glucose.: 165 mg/dL (16 Jul 2023 21:24)  POCT Blood Glucose.: 215 mg/dL (16 Jul 2023 16:21)  POCT Blood Glucose.: 189 mg/dL (16 Jul 2023 11:23)  POCT Blood Glucose.: 152 mg/dL (16 Jul 2023 08:25)      I&O's Summary    15 Jul 2023 07:01  -  16 Jul 2023 07:00  --------------------------------------------------------  IN: 550.5 mL / OUT: 710 mL / NET: -159.5 mL    16 Jul 2023 07:01  -  17 Jul 2023 01:05  --------------------------------------------------------  IN: 480 mL / OUT: 15 mL / NET: 465 mL        Physical Exam  General: Well appearing, laying in bed on an incline, NAD  Neurological: AOx3, no focal neurological deficits  Cardiovascular: Regular Rate/Rhythm, S1/2 auscultated, No extra heart sounds  Respiratory: CTA b/l over all lung fields, No adventitious breath sounds  Gastrointestinal: Bowel sounds present in all 4 quadrants, Abdomen is soft, non-tender, non-distended  Extremities: No peripheral edema noted, 5/5 strength and full range of motion in all 4 extremities b/l  Vascular: 2+ peripheral pulses b/l in upper and lower extremities, Radial, DP  Incision Sites: MSI, Vein [place] site, CT sites, c/d/i, no erythema, no purulence

## 2023-07-18 ENCOUNTER — TRANSCRIPTION ENCOUNTER (OUTPATIENT)
Age: 63
End: 2023-07-18

## 2023-07-18 DIAGNOSIS — E11.9 TYPE 2 DIABETES MELLITUS WITHOUT COMPLICATIONS: ICD-10-CM

## 2023-07-18 LAB
ANION GAP SERPL CALC-SCNC: 18 MMOL/L — HIGH (ref 5–17)
BUN SERPL-MCNC: 79.3 MG/DL — HIGH (ref 8–20)
CALCIUM SERPL-MCNC: 7.2 MG/DL — LOW (ref 8.4–10.5)
CHLORIDE SERPL-SCNC: 92 MMOL/L — LOW (ref 96–108)
CO2 SERPL-SCNC: 23 MMOL/L — SIGNIFICANT CHANGE UP (ref 22–29)
CREAT SERPL-MCNC: 8.37 MG/DL — HIGH (ref 0.5–1.3)
EGFR: 7 ML/MIN/1.73M2 — LOW
GLUCOSE BLDC GLUCOMTR-MCNC: 116 MG/DL — HIGH (ref 70–99)
GLUCOSE BLDC GLUCOMTR-MCNC: 219 MG/DL — HIGH (ref 70–99)
GLUCOSE BLDC GLUCOMTR-MCNC: 252 MG/DL — HIGH (ref 70–99)
GLUCOSE BLDC GLUCOMTR-MCNC: 88 MG/DL — SIGNIFICANT CHANGE UP (ref 70–99)
GLUCOSE SERPL-MCNC: 191 MG/DL — HIGH (ref 70–99)
HCT VFR BLD CALC: 31.7 % — LOW (ref 39–50)
HGB BLD-MCNC: 10.3 G/DL — LOW (ref 13–17)
MAGNESIUM SERPL-MCNC: 2.5 MG/DL — SIGNIFICANT CHANGE UP (ref 1.6–2.6)
MCHC RBC-ENTMCNC: 29.4 PG — SIGNIFICANT CHANGE UP (ref 27–34)
MCHC RBC-ENTMCNC: 32.5 GM/DL — SIGNIFICANT CHANGE UP (ref 32–36)
MCV RBC AUTO: 90.6 FL — SIGNIFICANT CHANGE UP (ref 80–100)
PHOSPHATE SERPL-MCNC: 5.8 MG/DL — HIGH (ref 2.4–4.7)
PLATELET # BLD AUTO: 123 K/UL — LOW (ref 150–400)
POTASSIUM SERPL-MCNC: 5.8 MMOL/L — HIGH (ref 3.5–5.3)
POTASSIUM SERPL-SCNC: 5.8 MMOL/L — HIGH (ref 3.5–5.3)
RBC # BLD: 3.5 M/UL — LOW (ref 4.2–5.8)
RBC # FLD: 15.2 % — HIGH (ref 10.3–14.5)
SODIUM SERPL-SCNC: 133 MMOL/L — LOW (ref 135–145)
WBC # BLD: 6.67 K/UL — SIGNIFICANT CHANGE UP (ref 3.8–10.5)
WBC # FLD AUTO: 6.67 K/UL — SIGNIFICANT CHANGE UP (ref 3.8–10.5)

## 2023-07-18 PROCEDURE — 99223 1ST HOSP IP/OBS HIGH 75: CPT

## 2023-07-18 PROCEDURE — 90937 HEMODIALYSIS REPEATED EVAL: CPT

## 2023-07-18 PROCEDURE — 71045 X-RAY EXAM CHEST 1 VIEW: CPT | Mod: 26

## 2023-07-18 PROCEDURE — 99024 POSTOP FOLLOW-UP VISIT: CPT

## 2023-07-18 RX ORDER — METOPROLOL TARTRATE 50 MG
50 TABLET ORAL EVERY 8 HOURS
Refills: 0 | Status: DISCONTINUED | OUTPATIENT
Start: 2023-07-18 | End: 2023-07-22

## 2023-07-18 RX ADMIN — INSULIN GLARGINE 6 UNIT(S): 100 INJECTION, SOLUTION SUBCUTANEOUS at 21:29

## 2023-07-18 RX ADMIN — Medication 667 MILLIGRAM(S): at 10:32

## 2023-07-18 RX ADMIN — CLOPIDOGREL BISULFATE 75 MILLIGRAM(S): 75 TABLET, FILM COATED ORAL at 10:33

## 2023-07-18 RX ADMIN — METHOCARBAMOL 500 MILLIGRAM(S): 500 TABLET, FILM COATED ORAL at 05:05

## 2023-07-18 RX ADMIN — Medication 50 MILLIGRAM(S): at 05:05

## 2023-07-18 RX ADMIN — SENNA PLUS 2 TABLET(S): 8.6 TABLET ORAL at 21:29

## 2023-07-18 RX ADMIN — SODIUM CHLORIDE 3 MILLILITER(S): 9 INJECTION INTRAMUSCULAR; INTRAVENOUS; SUBCUTANEOUS at 05:09

## 2023-07-18 RX ADMIN — Medication 667 MILLIGRAM(S): at 08:55

## 2023-07-18 RX ADMIN — METHOCARBAMOL 500 MILLIGRAM(S): 500 TABLET, FILM COATED ORAL at 18:07

## 2023-07-18 RX ADMIN — GABAPENTIN 100 MILLIGRAM(S): 400 CAPSULE ORAL at 18:05

## 2023-07-18 RX ADMIN — ERYTHROPOIETIN 2000 UNIT(S): 10000 INJECTION, SOLUTION INTRAVENOUS; SUBCUTANEOUS at 14:15

## 2023-07-18 RX ADMIN — HEPARIN SODIUM 5000 UNIT(S): 5000 INJECTION INTRAVENOUS; SUBCUTANEOUS at 18:06

## 2023-07-18 RX ADMIN — POLYETHYLENE GLYCOL 3350 17 GRAM(S): 17 POWDER, FOR SOLUTION ORAL at 10:34

## 2023-07-18 RX ADMIN — Medication 667 MILLIGRAM(S): at 18:05

## 2023-07-18 RX ADMIN — Medication 650 MILLIGRAM(S): at 10:32

## 2023-07-18 RX ADMIN — PANTOPRAZOLE SODIUM 40 MILLIGRAM(S): 20 TABLET, DELAYED RELEASE ORAL at 10:32

## 2023-07-18 RX ADMIN — LORATADINE 10 MILLIGRAM(S): 10 TABLET ORAL at 11:43

## 2023-07-18 RX ADMIN — Medication 650 MILLIGRAM(S): at 06:02

## 2023-07-18 RX ADMIN — Medication 50 MILLIGRAM(S): at 18:07

## 2023-07-18 RX ADMIN — GABAPENTIN 100 MILLIGRAM(S): 400 CAPSULE ORAL at 05:05

## 2023-07-18 RX ADMIN — Medication 81 MILLIGRAM(S): at 10:32

## 2023-07-18 RX ADMIN — SODIUM CHLORIDE 3 MILLILITER(S): 9 INJECTION INTRAMUSCULAR; INTRAVENOUS; SUBCUTANEOUS at 17:21

## 2023-07-18 RX ADMIN — ATORVASTATIN CALCIUM 80 MILLIGRAM(S): 80 TABLET, FILM COATED ORAL at 21:29

## 2023-07-18 RX ADMIN — HEPARIN SODIUM 5000 UNIT(S): 5000 INJECTION INTRAVENOUS; SUBCUTANEOUS at 05:06

## 2023-07-18 RX ADMIN — Medication 650 MILLIGRAM(S): at 11:32

## 2023-07-18 RX ADMIN — SODIUM CHLORIDE 3 MILLILITER(S): 9 INJECTION INTRAMUSCULAR; INTRAVENOUS; SUBCUTANEOUS at 21:34

## 2023-07-18 RX ADMIN — Medication 2 UNIT(S): at 12:37

## 2023-07-18 RX ADMIN — GABAPENTIN 100 MILLIGRAM(S): 400 CAPSULE ORAL at 21:29

## 2023-07-18 RX ADMIN — Medication 50 MILLIGRAM(S): at 21:29

## 2023-07-18 RX ADMIN — Medication 6: at 08:43

## 2023-07-18 RX ADMIN — HEPARIN SODIUM 5000 UNIT(S): 5000 INJECTION INTRAVENOUS; SUBCUTANEOUS at 21:29

## 2023-07-18 RX ADMIN — Medication 2 UNIT(S): at 18:04

## 2023-07-18 RX ADMIN — Medication 650 MILLIGRAM(S): at 05:06

## 2023-07-18 RX ADMIN — TAMSULOSIN HYDROCHLORIDE 0.4 MILLIGRAM(S): 0.4 CAPSULE ORAL at 21:29

## 2023-07-18 RX ADMIN — Medication 2 UNIT(S): at 08:44

## 2023-07-18 NOTE — PROGRESS NOTE ADULT - SUBJECTIVE AND OBJECTIVE BOX
[facility] DIVISION OF KIDNEY DISEASES AND HYPERTENSION -- HEMODIALYSIS NOTE  --------------------------------------------------------------------------------  Chief Complaint: ESRD/Ongoing hemodialysis requirement    24 hour events/subjective:  Pt seen and examined; re-evaluated on HD  PAST HISTORY  --------------------------------------------------------------------------------  No significant changes to PMH, PSH, FHx, SHx, unless otherwise noted    ALLERGIES & MEDICATIONS  --------------------------------------------------------------------------------  Allergies    No Known Drug Allergies  shellfish (Rash)        Standing Inpatient Medications  aspirin enteric coated 81 milliGRAM(s) Oral daily  atorvastatin 80 milliGRAM(s) Oral at bedtime  calcium acetate 667 milliGRAM(s) Oral three times a day with meals  clopidogrel Tablet 75 milliGRAM(s) Oral daily  dextrose 5%. 1000 milliLiter(s) IV Continuous <Continuous>  dextrose 50% Injectable 25 milliLiter(s) IV Push every 15 minutes  dextrose 50% Injectable 50 milliLiter(s) IV Push every 15 minutes  epoetin lois-epbx (RETACRIT) Injectable 2000 Unit(s) IV Push <User Schedule>  gabapentin 100 milliGRAM(s) Oral three times a day  glucagon  Injectable 1 milliGRAM(s) IntraMuscular once  heparin   Injectable 5000 Unit(s) SubCutaneous every 8 hours  insulin glargine Injectable (LANTUS) 6 Unit(s) SubCutaneous at bedtime  insulin lispro (ADMELOG) corrective regimen sliding scale   SubCutaneous at bedtime  insulin lispro (ADMELOG) corrective regimen sliding scale   SubCutaneous three times a day before meals  insulin lispro Injectable (ADMELOG) 2 Unit(s) SubCutaneous three times a day before meals  loratadine 10 milliGRAM(s) Oral daily  methocarbamol 500 milliGRAM(s) Oral two times a day  metoprolol tartrate 50 milliGRAM(s) Oral every 8 hours  pantoprazole    Tablet 40 milliGRAM(s) Oral daily  polyethylene glycol 3350 17 Gram(s) Oral daily  senna 2 Tablet(s) Oral at bedtime  sodium chloride 0.9% lock flush 3 milliLiter(s) IV Push every 8 hours  tamsulosin 0.4 milliGRAM(s) Oral at bedtime    PRN Inpatient Medications  dextrose Oral Gel 15 Gram(s) Oral once PRN  oxyCODONE    IR 5 milliGRAM(s) Oral every 4 hours PRN      REVIEW OF SYSTEMS  --------------------------------------------------------------------------------  Gen: No weight changes, fatigue, fevers/chills, weakness  Skin: No rashes  Head/Eyes/Ears/Mouth: No headache  Respiratory: No dyspnea, cough,  CV: No chest pain, orthopnea  GI: No abdominal pain, diarrhea, constipation, nausea, vomiting,  MSK: No joint pain  Neuro: No dizziness/lightheadedness, weakness  Heme: No bleeding  Psych: No significant nervousness, anxiety, stress, depression    All other systems were reviewed and are negative, except as noted.    VITALS/PHYSICAL EXAM  --------------------------------------------------------------------------------  T(C): 36.8 (07-18-23 @ 21:28), Max: 36.8 (07-18-23 @ 21:28)  HR: 68 (07-18-23 @ 21:28) (55 - 72)  BP: 150/65 (07-18-23 @ 22:50) (117/69 - 182/70)  RR: 17 (07-18-23 @ 21:28) (15 - 18)  SpO2: 95% (07-18-23 @ 21:28) (94% - 100%)  Wt(kg): --        07-17-23 @ 07:01  -  07-18-23 @ 07:00  --------------------------------------------------------  IN: 720 mL / OUT: 0 mL / NET: 720 mL    07-18-23 @ 07:01  -  07-18-23 @ 22:57  --------------------------------------------------------  IN: 800 mL / OUT: 1500 mL / NET: -700 mL      Physical Exam:  	Gen: NAD,  	HEENT:, supple neck,  	Pulm: CTA B/L  	CV: RRR, S1S2; no rub  	Abd: +BS, soft, nontender  	UE: Warm  	LE: Warm, no edema  	Neuro: No focal deficits  	Psych: Normal affect and mood  	Skin: Warm, without rashes  	Vascular access:    LABS/STUDIES  --------------------------------------------------------------------------------              10.3   6.67  >-----------<  123      [07-18-23 @ 05:15]              31.7     133  |  92  |  79.3  ----------------------------<  191      [07-18-23 @ 05:15]  5.8   |  23.0  |  8.37        Ca     7.2     [07-18-23 @ 05:15]      Mg     2.5     [07-18-23 @ 05:15]      Phos  5.8     [07-18-23 @ 05:15]            Iron 45, TIBC 216, %sat 21      [06-13-23 @ 15:10]  Ferritin 1092      [06-13-23 @ 15:10]  PTH -- (Ca 7.8)      [06-13-23 @ 15:10]   175  TSH 4.14      [06-12-23 @ 21:50]    HBsAb 15.0      [07-13-23 @ 21:35]  HBsAg Nonreact      [07-13-23 @ 21:35]

## 2023-07-18 NOTE — PROGRESS NOTE ADULT - ASSESSMENT
63 year old legally blind male with PMH of DM complicated by neuropathy, HTN, HLD, CAD, PVD, gastroparesis, ESRD on HD TTS and BPH s/p CABG x4 on 07/14/23. Nephrology is managing ESRD on HD.     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 monitor Ca++ and phos

## 2023-07-18 NOTE — CONSULT NOTE ADULT - ASSESSMENT
T2DM with multile complicaitons    post op CABG    cont current Rx   pt reports some labile BS at home   did try sensor in apst but found to be very expensive so did nto continue   ? possible to look into sensor again as outpt

## 2023-07-18 NOTE — CONSULT NOTE ADULT - SUBJECTIVE AND OBJECTIVE BOX
HPI:  63M with PMHx ESRD on HD (T,Th, S), IDDM, COPD, gastroparesis, peripheral blindness and retinopathy, C. Diff, and NSTEMI 6/2022 presented to ER with NSTEMI   pt had tohave IABP   now s/p CABG x 4v   pt seen  in HD suite     PAST MEDICAL & SURGICAL HISTORY:    HTN (Hypertension) (ICD9 401.9)      Hypercholesterolemia (ICD9 272.0)      Diabetes Mellitus with Neuropathy (ICD9 250.60)  x 8 yrs +nphropathy +etinopathy  + neuropathy     pt lsot visondue to DM   - wife gives him insulin injecitons     Takres a long and shortactingaccording to him     per chart  6 Lantus  2 Admelog premeal        BPH (Benign Prostatic Hypertrophy) (ICD9 600.00)      Glaucoma      CAD (coronary artery disease)      CKD (chronic kidney disease)      Osteomyelitis      Gastroparesis      PVD (peripheral vascular disease)      Legally blind      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    Eyes:+ vision loss   Neck: No neck pain, no change in voice.    Lungs: No shortness of breath, no wheezing, no cough    CV: No chest pain, no palpitations, no pain with walking.    GI: No nausea, no vomiting, no constipation, no diarrhea, no abdominal pain    : No urinary frequency, no blood in urine, no urinary burning, no difficulty voiding.    Musculoskeletal: No muscle pain, no joint pain, no swelling.    Skin: No rash, no infections.    Neurologic: No headaches, no weakness, no burning or pain in feet, no tremor.    Endocrine: No heat intolerance, no cold intolerance, no increased sweating, no shakiness between meals.    Psych: No depression, no anxiety, no trouble concentrating    MEDICATIONS  (STANDING):  aspirin enteric coated 81 milliGRAM(s) Oral daily  atorvastatin 80 milliGRAM(s) Oral at bedtime  calcium acetate 667 milliGRAM(s) Oral three times a day with meals  clopidogrel Tablet 75 milliGRAM(s) Oral daily  dextrose 5%. 1000 milliLiter(s) (100 mL/Hr) IV Continuous <Continuous>  dextrose 50% Injectable 50 milliLiter(s) IV Push every 15 minutes  dextrose 50% Injectable 25 milliLiter(s) IV Push every 15 minutes  epoetin lois-epbx (RETACRIT) Injectable 2000 Unit(s) IV Push <User Schedule>  gabapentin 100 milliGRAM(s) Oral three times a day  glucagon  Injectable 1 milliGRAM(s) IntraMuscular once  heparin   Injectable 5000 Unit(s) SubCutaneous every 8 hours  insulin glargine Injectable (LANTUS) 6 Unit(s) SubCutaneous at bedtime  insulin lispro (ADMELOG) corrective regimen sliding scale   SubCutaneous three times a day before meals  insulin lispro (ADMELOG) corrective regimen sliding scale   SubCutaneous at bedtime  insulin lispro Injectable (ADMELOG) 2 Unit(s) SubCutaneous three times a day before meals  loratadine 10 milliGRAM(s) Oral daily  methocarbamol 500 milliGRAM(s) Oral two times a day  metoprolol tartrate 50 milliGRAM(s) Oral every 8 hours  pantoprazole    Tablet 40 milliGRAM(s) Oral daily  polyethylene glycol 3350 17 Gram(s) Oral daily  senna 2 Tablet(s) Oral at bedtime  sodium chloride 0.9% lock flush 3 milliLiter(s) IV Push every 8 hours  tamsulosin 0.4 milliGRAM(s) Oral at bedtime    MEDICATIONS  (PRN):  dextrose Oral Gel 15 Gram(s) Oral once PRN Blood Glucose LESS THAN 70 milliGRAM(s)/deciliter  oxyCODONE    IR 5 milliGRAM(s) Oral every 4 hours PRN Moderate Pain (4 - 6)      Allergies    No Known Drug Allergies  shellfish (Rash)    Intolerances          CAPILLARY BLOOD GLUCOSE      POCT Blood Glucose.: 219 mg/dL (18 Jul 2023 21:18)      PHYSICAL EXAM:    Vital Signs Last 24 Hrs  T(C): 36.8 (18 Jul 2023 21:28), Max: 36.8 (18 Jul 2023 21:28)  T(F): 98.3 (18 Jul 2023 21:28), Max: 98.3 (18 Jul 2023 21:28)  HR: 68 (18 Jul 2023 21:28) (55 - 72)  BP: 150/65 (18 Jul 2023 22:50) (117/69 - 182/70)  BP(mean): --  RR: 17 (18 Jul 2023 21:28) (15 - 18)  SpO2: 95% (18 Jul 2023 21:28) (94% - 100%)    Parameters below as of 18 Jul 2023 21:28  Patient On (Oxygen Delivery Method): room air        General appearance: Well developed, well nourished.    Lungs: Normal respiratory excursion. Lungs clear.    CV: Regular cardiac rhythm. No murmur. Carotid and pedal pulses intact.      Musculoskeletal: No cyanosis, clubbing, or edema.     Skin: Warm and moist. No rash. No acanthosis.    Psych: Normal affect, good judgement.            LABS:                        10.3   6.67  )-----------( 123      ( 18 Jul 2023 05:15 )             31.7     07-18    133<L>  |  92<L>  |  79.3<H>  ----------------------------<  191<H>  5.8<H>   |  23.0  |  8.37<H>    Ca    7.2<L>      18 Jul 2023 05:15  Phos  5.8     07-18  Mg     2.5     07-18      Urinalysis Basic - ( 18 Jul 2023 05:15 )    Color: x / Appearance: x / SG: x / pH: x  Gluc: 191 mg/dL / Ketone: x  / Bili: x / Urobili: x   Blood: x / Protein: x / Nitrite: x   Leuk Esterase: x / RBC: x / WBC x   Sq Epi: x / Non Sq Epi: x / Bacteria: x      1cA1C with Estimated Average Glucose (06.12.23 @ 21:50)   A1C with Estimated Average Glucose Result: 9.4 %  Estimated Average Glucose: 223 mg/dL        CAPILLARY BLOOD GLUCOSE  CAPILLARY BLOOD GLUCOSE      POCT Blood Glucose.: 219 mg/dL (18 Jul 2023 21:18)  POCT Blood Glucose.: 88 mg/dL (18 Jul 2023 17:03)  POCT Blood Glucose.: 116 mg/dL (18 Jul 2023 11:52)  POCT Blood Glucose.: 252 mg/dL (18 Jul 2023 07:51)        RADIOLOGY & ADDITIONAL STUDIES:

## 2023-07-18 NOTE — PROGRESS NOTE ADULT - ASSESSMENT
63M PMHx ESRD on HD (T,Th, S), IDDM, COPD, gastroparesis, peripheral blindness and retinopathy, NSTEMI 6/2022 with recent hospital admission for CABG nisha bundy diff + that admission, now presents from home with complaints of worsening chest pain associated with difficulty breathing. In ER EKGs with intermittent diffuse ST changes, + troponins, Seen by cardiology and started on Tridil and Heparin gtt for NSTEMI, admitted to CTICU, IABP placed, now s/p C4L (LIMA-LAD, SVG-OM 1, OM2, PDA) 7/14, IABP removed after OR, postop course uneventful;

## 2023-07-18 NOTE — PROGRESS NOTE ADULT - SUBJECTIVE AND OBJECTIVE BOX
POD 4 s/p C4L (LIMA-LAD, SVG-OM 1, OM2, PDA)    Subjective: c/o incisional and back pain, improved with oxy earlier, denies CP, palpitations, SOB, cough, fever, chills, itchiness/rash, diaphoresis, vision changes, HA, dizziness/lightheadedness, numbness/tingling, abd pain, N/V     T(C): 36.7 (07-18-23 @ 00:30), Max: 36.9 (07-17-23 @ 20:23)  HR: 55 (07-18-23 @ 00:30) (55 - 59)  BP: 125/76 (07-18-23 @ 00:30) (118/52 - 170/83)  ABP: 136/57 (07-17-23 @ 10:00) (127/47 - 140/52)  ABP(mean): 85 (07-17-23 @ 10:00) (74 - 85)  RR: 16 (07-18-23 @ 00:30) (11 - 22)    07-17    137  |  97  |  61.7<H>  ----------------------------<  168<H>  5.2   |  24.0  |  6.45<H>    Ca    7.5<L>      17 Jul 2023 02:40  Phos  6.1     07-17  Mg     2.6     07-17                                 9.6    7.44  )-----------( 121      ( 17 Jul 2023 02:40 )             29.3                    CAPILLARY BLOOD GLUCOSE  POCT Blood Glucose.: 222 mg/dL (17 Jul 2023 21:08)  POCT Blood Glucose.: 189 mg/dL (17 Jul 2023 16:37)  POCT Blood Glucose.: 92 mg/dL (17 Jul 2023 11:34)  POCT Blood Glucose.: 144 mg/dL (17 Jul 2023 07:35)    I&O's Detail    16 Jul 2023 07:01  -  17 Jul 2023 07:00  --------------------------------------------------------  IN:    Oral Fluid: 960 mL  Total IN: 960 mL    OUT:    Chest Tube (mL): 15 mL  Total OUT: 15 mL  Total NET: 945 mL    17 Jul 2023 07:01  -  18 Jul 2023 02:17  --------------------------------------------------------  IN:    Oral Fluid: 600 mL  Total IN: 600 mL    OUT:    Chest Tube (mL): 0 mL  Total OUT: 0 mL  Total NET: 600 mL    Drug Dosing Weight  Height (cm): 160 (13 Jul 2023 23:27)  Weight (kg): 61.7 (13 Jul 2023 23:27)  BMI (kg/m2): 24.1 (13 Jul 2023 23:27)  BSA (m2): 1.64 (13 Jul 2023 23:27)    MEDICATIONS  (STANDING):  acetaminophen     Tablet .. 650 milliGRAM(s) Oral every 6 hours  aspirin enteric coated 81 milliGRAM(s) Oral daily  atorvastatin 80 milliGRAM(s) Oral at bedtime  calcium acetate 667 milliGRAM(s) Oral three times a day with meals  clopidogrel Tablet 75 milliGRAM(s) Oral daily  dextrose 5%. 1000 milliLiter(s) (100 mL/Hr) IV Continuous <Continuous>  dextrose 50% Injectable 50 milliLiter(s) IV Push every 15 minutes  dextrose 50% Injectable 25 milliLiter(s) IV Push every 15 minutes  epoetin lois-epbx (RETACRIT) Injectable 2000 Unit(s) IV Push <User Schedule>  gabapentin 100 milliGRAM(s) Oral three times a day  glucagon  Injectable 1 milliGRAM(s) IntraMuscular once  heparin   Injectable 5000 Unit(s) SubCutaneous every 8 hours  insulin glargine Injectable (LANTUS) 6 Unit(s) SubCutaneous at bedtime  insulin lispro (ADMELOG) corrective regimen sliding scale   SubCutaneous at bedtime  insulin lispro (ADMELOG) corrective regimen sliding scale   SubCutaneous three times a day before meals  insulin lispro Injectable (ADMELOG) 2 Unit(s) SubCutaneous three times a day before meals  loratadine 10 milliGRAM(s) Oral daily  methocarbamol 500 milliGRAM(s) Oral two times a day  metoprolol tartrate 50 milliGRAM(s) Oral two times a day  pantoprazole    Tablet 40 milliGRAM(s) Oral daily  polyethylene glycol 3350 17 Gram(s) Oral daily  senna 2 Tablet(s) Oral at bedtime  sodium chloride 0.9% lock flush 3 milliLiter(s) IV Push every 8 hours  tamsulosin 0.4 milliGRAM(s) Oral at bedtime    MEDICATIONS  (PRN):  dextrose Oral Gel 15 Gram(s) Oral once PRN Blood Glucose LESS THAN 70 milliGRAM(s)/deciliter  oxyCODONE    IR 10 milliGRAM(s) Oral every 4 hours PRN Severe Pain (7 - 10)  oxyCODONE    IR 5 milliGRAM(s) Oral every 4 hours PRN Moderate Pain (4 - 6)    Physical Exam  Gen: NAD  Neuro: A&Ox3 non focal speech clear an dintact  Pulm: decreased BS b/l no wheezing  CV: S1S2 RRR  Abd: +BS soft NT ND  Extrem/MS: trace b/l LE edema, no cyanosis BARRETO, RUE AVF +thrill   Incision(s): mid sternal dsg and LLE dsg intact, sternum stable no click  EPM VVI 40/10/1.0

## 2023-07-19 DIAGNOSIS — I48.91 UNSPECIFIED ATRIAL FIBRILLATION: ICD-10-CM

## 2023-07-19 LAB
ANION GAP SERPL CALC-SCNC: 18 MMOL/L — HIGH (ref 5–17)
APTT BLD: 34.3 SEC — SIGNIFICANT CHANGE UP (ref 27.5–35.5)
BUN SERPL-MCNC: 58 MG/DL — HIGH (ref 8–20)
CALCIUM SERPL-MCNC: 7.7 MG/DL — LOW (ref 8.4–10.5)
CHLORIDE SERPL-SCNC: 89 MMOL/L — LOW (ref 96–108)
CO2 SERPL-SCNC: 24 MMOL/L — SIGNIFICANT CHANGE UP (ref 22–29)
CREAT SERPL-MCNC: 6.79 MG/DL — HIGH (ref 0.5–1.3)
EGFR: 8 ML/MIN/1.73M2 — LOW
GLUCOSE BLDC GLUCOMTR-MCNC: 115 MG/DL — HIGH (ref 70–99)
GLUCOSE BLDC GLUCOMTR-MCNC: 291 MG/DL — HIGH (ref 70–99)
GLUCOSE BLDC GLUCOMTR-MCNC: 301 MG/DL — HIGH (ref 70–99)
GLUCOSE BLDC GLUCOMTR-MCNC: 68 MG/DL — LOW (ref 70–99)
GLUCOSE BLDC GLUCOMTR-MCNC: 72 MG/DL — SIGNIFICANT CHANGE UP (ref 70–99)
GLUCOSE BLDC GLUCOMTR-MCNC: 73 MG/DL — SIGNIFICANT CHANGE UP (ref 70–99)
GLUCOSE SERPL-MCNC: 273 MG/DL — HIGH (ref 70–99)
HCT VFR BLD CALC: 31.5 % — LOW (ref 39–50)
HGB BLD-MCNC: 10.1 G/DL — LOW (ref 13–17)
MAGNESIUM SERPL-MCNC: 2.3 MG/DL — SIGNIFICANT CHANGE UP (ref 1.6–2.6)
MCHC RBC-ENTMCNC: 29.2 PG — SIGNIFICANT CHANGE UP (ref 27–34)
MCHC RBC-ENTMCNC: 32.1 GM/DL — SIGNIFICANT CHANGE UP (ref 32–36)
MCV RBC AUTO: 91 FL — SIGNIFICANT CHANGE UP (ref 80–100)
PHOSPHATE SERPL-MCNC: 3.8 MG/DL — SIGNIFICANT CHANGE UP (ref 2.4–4.7)
PLATELET # BLD AUTO: 137 K/UL — LOW (ref 150–400)
POTASSIUM SERPL-MCNC: 5.8 MMOL/L — HIGH (ref 3.5–5.3)
POTASSIUM SERPL-SCNC: 5.8 MMOL/L — HIGH (ref 3.5–5.3)
RBC # BLD: 3.46 M/UL — LOW (ref 4.2–5.8)
RBC # FLD: 14.8 % — HIGH (ref 10.3–14.5)
SODIUM SERPL-SCNC: 131 MMOL/L — LOW (ref 135–145)
WBC # BLD: 5.03 K/UL — SIGNIFICANT CHANGE UP (ref 3.8–10.5)
WBC # FLD AUTO: 5.03 K/UL — SIGNIFICANT CHANGE UP (ref 3.8–10.5)

## 2023-07-19 PROCEDURE — 74018 RADEX ABDOMEN 1 VIEW: CPT | Mod: 26

## 2023-07-19 PROCEDURE — 99024 POSTOP FOLLOW-UP VISIT: CPT

## 2023-07-19 PROCEDURE — 90937 HEMODIALYSIS REPEATED EVAL: CPT

## 2023-07-19 PROCEDURE — 71045 X-RAY EXAM CHEST 1 VIEW: CPT | Mod: 26

## 2023-07-19 PROCEDURE — 99233 SBSQ HOSP IP/OBS HIGH 50: CPT

## 2023-07-19 PROCEDURE — 93306 TTE W/DOPPLER COMPLETE: CPT | Mod: 26

## 2023-07-19 RX ORDER — HEPARIN SODIUM 5000 [USP'U]/ML
INJECTION INTRAVENOUS; SUBCUTANEOUS
Qty: 25000 | Refills: 0 | Status: DISCONTINUED | OUTPATIENT
Start: 2023-07-19 | End: 2023-07-20

## 2023-07-19 RX ORDER — INSULIN LISPRO 100/ML
VIAL (ML) SUBCUTANEOUS
Refills: 0 | Status: DISCONTINUED | OUTPATIENT
Start: 2023-07-19 | End: 2023-07-31

## 2023-07-19 RX ORDER — AMLODIPINE BESYLATE 2.5 MG/1
5 TABLET ORAL DAILY
Refills: 0 | Status: DISCONTINUED | OUTPATIENT
Start: 2023-07-19 | End: 2023-07-31

## 2023-07-19 RX ORDER — INSULIN LISPRO 100/ML
VIAL (ML) SUBCUTANEOUS AT BEDTIME
Refills: 0 | Status: DISCONTINUED | OUTPATIENT
Start: 2023-07-19 | End: 2023-07-31

## 2023-07-19 RX ORDER — HYDRALAZINE HCL 50 MG
25 TABLET ORAL THREE TIMES A DAY
Refills: 0 | Status: DISCONTINUED | OUTPATIENT
Start: 2023-07-19 | End: 2023-07-31

## 2023-07-19 RX ORDER — HYDRALAZINE HCL 50 MG
5 TABLET ORAL ONCE
Refills: 0 | Status: COMPLETED | OUTPATIENT
Start: 2023-07-19 | End: 2023-07-19

## 2023-07-19 RX ORDER — LACTULOSE 10 G/15ML
20 SOLUTION ORAL ONCE
Refills: 0 | Status: COMPLETED | OUTPATIENT
Start: 2023-07-19 | End: 2023-07-19

## 2023-07-19 RX ADMIN — GABAPENTIN 100 MILLIGRAM(S): 400 CAPSULE ORAL at 21:33

## 2023-07-19 RX ADMIN — Medication 25 MILLIGRAM(S): at 16:36

## 2023-07-19 RX ADMIN — ATORVASTATIN CALCIUM 80 MILLIGRAM(S): 80 TABLET, FILM COATED ORAL at 21:32

## 2023-07-19 RX ADMIN — Medication 50 MILLIGRAM(S): at 16:46

## 2023-07-19 RX ADMIN — Medication 50 MILLIGRAM(S): at 21:33

## 2023-07-19 RX ADMIN — TAMSULOSIN HYDROCHLORIDE 0.4 MILLIGRAM(S): 0.4 CAPSULE ORAL at 21:32

## 2023-07-19 RX ADMIN — METHOCARBAMOL 500 MILLIGRAM(S): 500 TABLET, FILM COATED ORAL at 05:20

## 2023-07-19 RX ADMIN — Medication 50 MILLIGRAM(S): at 05:21

## 2023-07-19 RX ADMIN — OXYCODONE HYDROCHLORIDE 5 MILLIGRAM(S): 5 TABLET ORAL at 02:48

## 2023-07-19 RX ADMIN — HEPARIN SODIUM 5000 UNIT(S): 5000 INJECTION INTRAVENOUS; SUBCUTANEOUS at 16:37

## 2023-07-19 RX ADMIN — PANTOPRAZOLE SODIUM 40 MILLIGRAM(S): 20 TABLET, DELAYED RELEASE ORAL at 12:26

## 2023-07-19 RX ADMIN — SENNA PLUS 2 TABLET(S): 8.6 TABLET ORAL at 21:34

## 2023-07-19 RX ADMIN — Medication 2: at 21:34

## 2023-07-19 RX ADMIN — HEPARIN SODIUM 5000 UNIT(S): 5000 INJECTION INTRAVENOUS; SUBCUTANEOUS at 05:20

## 2023-07-19 RX ADMIN — HEPARIN SODIUM 800 UNIT(S)/HR: 5000 INJECTION INTRAVENOUS; SUBCUTANEOUS at 21:12

## 2023-07-19 RX ADMIN — GABAPENTIN 100 MILLIGRAM(S): 400 CAPSULE ORAL at 05:20

## 2023-07-19 RX ADMIN — CLOPIDOGREL BISULFATE 75 MILLIGRAM(S): 75 TABLET, FILM COATED ORAL at 12:24

## 2023-07-19 RX ADMIN — Medication 667 MILLIGRAM(S): at 16:36

## 2023-07-19 RX ADMIN — Medication 6: at 08:36

## 2023-07-19 RX ADMIN — LACTULOSE 20 GRAM(S): 10 SOLUTION ORAL at 03:00

## 2023-07-19 RX ADMIN — SODIUM CHLORIDE 3 MILLILITER(S): 9 INJECTION INTRAMUSCULAR; INTRAVENOUS; SUBCUTANEOUS at 05:24

## 2023-07-19 RX ADMIN — GABAPENTIN 100 MILLIGRAM(S): 400 CAPSULE ORAL at 16:36

## 2023-07-19 RX ADMIN — Medication 25 MILLIGRAM(S): at 21:33

## 2023-07-19 RX ADMIN — LORATADINE 10 MILLIGRAM(S): 10 TABLET ORAL at 12:24

## 2023-07-19 RX ADMIN — METHOCARBAMOL 500 MILLIGRAM(S): 500 TABLET, FILM COATED ORAL at 16:54

## 2023-07-19 RX ADMIN — Medication 2 UNIT(S): at 08:37

## 2023-07-19 RX ADMIN — OXYCODONE HYDROCHLORIDE 5 MILLIGRAM(S): 5 TABLET ORAL at 03:48

## 2023-07-19 RX ADMIN — SODIUM CHLORIDE 3 MILLILITER(S): 9 INJECTION INTRAMUSCULAR; INTRAVENOUS; SUBCUTANEOUS at 21:34

## 2023-07-19 RX ADMIN — Medication 667 MILLIGRAM(S): at 12:24

## 2023-07-19 RX ADMIN — SODIUM CHLORIDE 3 MILLILITER(S): 9 INJECTION INTRAMUSCULAR; INTRAVENOUS; SUBCUTANEOUS at 13:00

## 2023-07-19 RX ADMIN — AMLODIPINE BESYLATE 5 MILLIGRAM(S): 2.5 TABLET ORAL at 04:45

## 2023-07-19 RX ADMIN — POLYETHYLENE GLYCOL 3350 17 GRAM(S): 17 POWDER, FOR SOLUTION ORAL at 12:24

## 2023-07-19 RX ADMIN — Medication 5 MILLIGRAM(S): at 01:38

## 2023-07-19 RX ADMIN — Medication 667 MILLIGRAM(S): at 08:40

## 2023-07-19 RX ADMIN — Medication 81 MILLIGRAM(S): at 12:24

## 2023-07-19 NOTE — DISCHARGE NOTE PROVIDER - CARE PROVIDERS DIRECT ADDRESSES
,[email or web address]

## 2023-07-19 NOTE — PROGRESS NOTE ADULT - ASSESSMENT
63M PMHx ESRD on HD (T,Th, S), IDDM, COPD, gastroparesis, peripheral blindness and retinopathy, NSTEMI 6/2022 with recent hospital admission for CABG eval, now presents from home with complaints of worsening chest pain associated with difficulty breathing. Admitted to CTICU, IABP placed, now s/p CABG on 7/14.    1. DM2, a1c 9.4% - labile glucoses   - FS 72 at lunchtime, reduce sliding scale to mild coverage  - Continue premeal admelog 2 units tid  - Continue lantus 6 units qhs     2. CAD  - S/p CABG, care per CT surgery team    3. ESRD  - HD as per nephrology

## 2023-07-19 NOTE — PROGRESS NOTE ADULT - SUBJECTIVE AND OBJECTIVE BOX
[facility] DIVISION OF KIDNEY DISEASES AND HYPERTENSION -- HEMODIALYSIS NOTE  --------------------------------------------------------------------------------  Chief Complaint: ESRD/Ongoing hemodialysis requirement    24 hour events/subjective:  s/p HD yesterday; tolerated well      PAST HISTORY  --------------------------------------------------------------------------------  No significant changes to PMH, PSH, FHx, SHx, unless otherwise noted    ALLERGIES & MEDICATIONS  --------------------------------------------------------------------------------  Allergies    No Known Drug Allergies  shellfish (Rash)        Standing Inpatient Medications  amLODIPine   Tablet 5 milliGRAM(s) Oral daily  aspirin enteric coated 81 milliGRAM(s) Oral daily  atorvastatin 80 milliGRAM(s) Oral at bedtime  calcium acetate 667 milliGRAM(s) Oral three times a day with meals  clopidogrel Tablet 75 milliGRAM(s) Oral daily  dextrose 50% Injectable 50 milliLiter(s) IV Push every 15 minutes  dextrose 50% Injectable 25 milliLiter(s) IV Push every 15 minutes  epoetin lois-epbx (RETACRIT) Injectable 2000 Unit(s) IV Push <User Schedule>  gabapentin 100 milliGRAM(s) Oral three times a day  glucagon  Injectable 1 milliGRAM(s) IntraMuscular once  heparin   Injectable 5000 Unit(s) SubCutaneous every 8 hours  hydrALAZINE 25 milliGRAM(s) Oral three times a day  insulin glargine Injectable (LANTUS) 6 Unit(s) SubCutaneous at bedtime  insulin lispro (ADMELOG) corrective regimen sliding scale   SubCutaneous at bedtime  insulin lispro (ADMELOG) corrective regimen sliding scale   SubCutaneous three times a day before meals  insulin lispro Injectable (ADMELOG) 2 Unit(s) SubCutaneous three times a day before meals  loratadine 10 milliGRAM(s) Oral daily  methocarbamol 500 milliGRAM(s) Oral two times a day  metoprolol tartrate 50 milliGRAM(s) Oral every 8 hours  pantoprazole    Tablet 40 milliGRAM(s) Oral daily  polyethylene glycol 3350 17 Gram(s) Oral daily  senna 2 Tablet(s) Oral at bedtime  sodium chloride 0.9% lock flush 3 milliLiter(s) IV Push every 8 hours  tamsulosin 0.4 milliGRAM(s) Oral at bedtime    PRN Inpatient Medications  bisacodyl 5 milliGRAM(s) Oral every 12 hours PRN  dextrose Oral Gel 15 Gram(s) Oral once PRN  oxyCODONE    IR 5 milliGRAM(s) Oral every 4 hours PRN      REVIEW OF SYSTEMS  --------------------------------------------------------------------------------  Gen: No weight changes, fatigue, fevers/chills, weakness  Skin: No rashes  Head/Eyes/Ears/Mouth: No headache  Respiratory: No dyspnea, cough,  CV: No chest pain, orthopnea  GI: No abdominal pain, diarrhea, constipation, nausea, vomiting,  MSK: No joint pain  Neuro: No dizziness/lightheadedness, weakness  Heme: No bleeding  Psych: No significant nervousness, anxiety, stress, depression    All other systems were reviewed and are negative, except as noted.    VITALS/PHYSICAL EXAM  --------------------------------------------------------------------------------  T(C): 37.2 (07-19-23 @ 07:24), Max: 37.2 (07-19-23 @ 07:24)  HR: 61 (07-19-23 @ 07:24) (56 - 72)  BP: 158/68 (07-19-23 @ 07:24) (117/69 - 182/70)  RR: 18 (07-19-23 @ 07:24) (16 - 18)  SpO2: 97% (07-19-23 @ 07:24) (95% - 100%)  Wt(kg): --    Weight (kg): 68.4 (07-19-23 @ 04:34)      07-18-23 @ 07:01  -  07-19-23 @ 07:00  --------------------------------------------------------  IN: 1040 mL / OUT: 1500 mL / NET: -460 mL    07-19-23 @ 07:01  -  07-19-23 @ 11:28  --------------------------------------------------------  IN: 240 mL / OUT: 0 mL / NET: 240 mL      Physical Exam:  	Gen: NAD  	HEENT:  supple neck,  	Pulm: CTA B/L  	CV: RRR, S1S2; no rub  	Abd: +BS, soft, nontender  	UE: Warm  	LE: Warm, + edema  	Neuro: No focal deficits  	Psych: Normal affect and mood  	Skin: Warm  	Vascular access: RUE AVf    LABS/STUDIES  --------------------------------------------------------------------------------              10.1   5.03  >-----------<  137      [07-19-23 @ 05:16]              31.5     131  |  89  |  58.0  ----------------------------<  273      [07-19-23 @ 05:16]  5.8   |  24.0  |  6.79        Ca     7.7     [07-19-23 @ 05:16]      Mg     2.3     [07-19-23 @ 05:16]      Phos  3.8     [07-19-23 @ 05:16]            Iron 45, TIBC 216, %sat 21      [06-13-23 @ 15:10]  Ferritin 1092      [06-13-23 @ 15:10]  PTH -- (Ca 7.8)      [06-13-23 @ 15:10]   175  TSH 4.14      [06-12-23 @ 21:50]    HBsAb 15.0      [07-13-23 @ 21:35]  HBsAg Nonreact      [07-13-23 @ 21:35]

## 2023-07-19 NOTE — CONSULT NOTE ADULT - ASSESSMENT
Assessment/Recommendations:   63 year old male with HTN, hyperlipidemia, ESRD on HD (T, Th, Sat), COPD, DM type 2 uncontrolled, gastroparesis s/p gastric PM 2/2013, peripheral blindness and retinopathy, BPH, recent CDiff infection 6/1/23 s/p 10 day course of oral vancomycin, CAD s/p prior PCI, and recent hospitalization 6/2023 for NSTEMI, ICM LVEF 35-40%, and LHC which demonstrated multi-vessel disease. He was undergoing CABG workup. He presented to [facility] ED 7/13 with complaints of worsening chest pain and shortness of breath. In ER, ECG with intermittent diffuse ST changes, + troponins. Seen by cardiology and started on tridil and heparin gtt for NSTEM, admitted to CTICU and IABP placed.  He underwent 4vCABG (LIMA-LAD, [id number]) on 7/14/23, and IABP was removed post-operatively. Post op course notable for new AFib/AFlutter for which EP is consulted. Telemetry reviewed and shows mostly sinus rhythm w/ frequent APCs and some brief runs of AFib and AFlutter with conversion pause up to 4.26 seconds. Pt is asymptomatic during these events.     Recommend continuing current medical therapy, including beta blocker. If significant pauses greater than 5 seconds or if pt becomes symptomatic, he may require pacing support. He is high risk for transvenous device implant infection given ESRD/HD and therefore would favor leadless pacemaker.     - con't metoprolol   - recommend Eliquis 5mg Q 12 hrs when OK by CTSx, however would hold for now as pt may require PM implant. Can consider therapeutic heparin gtt in the interim.   - NPO after midnight  - telemetry monitoring   - will follow     Discussed w/ Dr. Dietrich

## 2023-07-19 NOTE — DISCHARGE NOTE PROVIDER - NSDCMRMEDTOKEN_GEN_ALL_CORE_FT
acetaminophen 325 mg oral tablet: 2 tab(s) orally every 8 hours as needed for  mild pain  Albuterol (Eqv-ProAir HFA) 90 mcg/inh inhalation aerosol: 2 puff(s) inhaled 4 times a day as needed for  bronchospasm  aspirin 81 mg oral tablet: 1 tab(s) orally once a day  calcium acetate 667 mg oral tablet: 1 tab(s) orally 3 times a day  epoetin lois 10,000 units/mL injectable solution: 10,000 unit(s) subcutaneous 3 times a week MWF  Flomax 0.4 mg oral capsule: 1 cap(s) orally once a day  gabapentin 300 mg oral capsule: 1 cap(s) orally 2 times a day  hydrALAZINE 50 mg oral tablet: 1 tab(s) orally every 12 hours  hyoscyamine 0.125 mg oral tablet: 1 tab(s) orally 4 times a day as needed for  abdominal spasm  Lantus 100 units/mL subcutaneous solution: 6 unit(s) subcutaneous once a day (at bedtime)  lidocaine 4% topical film: Apply topically to affected area once a day  loratadine 10 mg oral tablet: 1 tab(s) orally once a day  losartan 25 mg oral tablet: 1 tab(s) orally once a day  melatonin 5 mg oral tablet: 1 tab(s) orally once a day (at bedtime)  methocarbamol 500 mg oral tablet: 1 tab(s) orally 2 times a day  metoprolol succinate 100 mg oral tablet, extended release: 1 tab(s) orally once a day  nitroglycerin 0.4 mg sublingual tablet: 1 tab(s) sublingually once a day as needed for  chest pain  omeprazole 20 mg oral delayed release capsule: 1 cap(s) orally once a day  Plavix 75 mg oral tablet: 1 tab(s) orally once a day  pravastatin 40 mg oral tablet: 1 tab(s) orally once a day (at bedtime)  sodium bicarbonate 650 mg oral tablet: 2 tab(s) orally 3 times a day  vancomycin 125 mg oral capsule: 1 cap(s) orally every 6 hours   acetaminophen 325 mg oral tablet: 2 tab(s) orally every 8 hours as needed for  mild pain  Albuterol (Eqv-ProAir HFA) 90 mcg/inh inhalation aerosol: 2 puff(s) inhaled 4 times a day as needed for  bronchospasm  aspirin 81 mg oral tablet: 1 tab(s) orally once a day  calcium acetate 667 mg oral tablet: 1 tab(s) orally 3 times a day  Eliquis 2.5 mg oral tablet: 1 tab(s) orally 2 times a day  epoetin lois 10,000 units/mL injectable solution: 10,000 unit(s) subcutaneous 3 times a week MWF  Flomax 0.4 mg oral capsule: 1 cap(s) orally once a day  gabapentin 300 mg oral capsule: 1 cap(s) orally 2 times a day  hydrALAZINE 50 mg oral tablet: 1 tab(s) orally every 12 hours  hyoscyamine 0.125 mg oral tablet: 1 tab(s) orally 4 times a day as needed for  abdominal spasm  Lantus 100 units/mL subcutaneous solution: 6 unit(s) subcutaneous once a day (at bedtime)  lidocaine 4% topical film: Apply topically to affected area once a day  loratadine 10 mg oral tablet: 1 tab(s) orally once a day  losartan 25 mg oral tablet: 1 tab(s) orally once a day  melatonin 5 mg oral tablet: 1 tab(s) orally once a day (at bedtime)  methocarbamol 500 mg oral tablet: 1 tab(s) orally 2 times a day  metoprolol succinate 100 mg oral tablet, extended release: 1 tab(s) orally once a day  nitroglycerin 0.4 mg sublingual tablet: 1 tab(s) sublingually once a day as needed for  chest pain  omeprazole 20 mg oral delayed release capsule: 1 cap(s) orally once a day  Plavix 75 mg oral tablet: 1 tab(s) orally once a day  pravastatin 40 mg oral tablet: 1 tab(s) orally once a day (at bedtime)  sodium bicarbonate 650 mg oral tablet: 2 tab(s) orally 3 times a day  vancomycin 125 mg oral capsule: 1 cap(s) orally every 6 hours   acetaminophen 325 mg oral tablet: 2 tab(s) orally every 8 hours as needed for  mild pain  Albuterol (Eqv-ProAir HFA) 90 mcg/inh inhalation aerosol: 2 puff(s) inhaled 4 times a day as needed for  bronchospasm  amLODIPine 5 mg oral tablet: 1 tab(s) orally once a day  atorvastatin 80 mg oral tablet: 1 tab(s) orally once a day (at bedtime)  calcium acetate 667 mg oral tablet: 1 tab(s) orally 3 times a day  cephalexin 500 mg oral capsule: 1 cap(s) orally every 12 hours  Eliquis 2.5 mg oral tablet: 1 tab(s) orally 2 times a day  epoetin lois 10,000 units/mL injectable solution: 10,000 unit(s) subcutaneous 3 times a week MWF  Flomax 0.4 mg oral capsule: 1 cap(s) orally once a day  gabapentin 300 mg oral capsule: 1 cap(s) orally 2 times a day  hydrALAZINE 25 mg oral tablet: 1 tab(s) orally 3 times a day  hyoscyamine 0.125 mg oral tablet: 1 tab(s) orally 4 times a day as needed for  abdominal spasm  insulin glargine 100 units/mL subcutaneous solution: 7 unit(s) subcutaneous once a day (at bedtime)  lidocaine 4% topical film: Apply topically to affected area once a day  loratadine 10 mg oral tablet: 1 tab(s) orally once a day  melatonin 5 mg oral tablet: 1 tab(s) orally once a day (at bedtime)  methocarbamol 500 mg oral tablet: 1 tab(s) orally 2 times a day  metoprolol succinate 100 mg oral tablet, extended release: 1 tab(s) orally once a day  multivitamin: 1 tab(s) orally once a day  omeprazole 20 mg oral delayed release capsule: 1 cap(s) orally once a day  oxyCODONE 5 mg oral tablet: 1 tab(s) orally every 6 hours As needed Severe Pain (7 - 10)  Plavix 75 mg oral tablet: 1 tab(s) orally once a day  sodium bicarbonate 650 mg oral tablet: 2 tab(s) orally 3 times a day   acetaminophen 325 mg oral tablet: 2 tab(s) orally every 8 hours as needed for  mild pain  Albuterol (Eqv-ProAir HFA) 90 mcg/inh inhalation aerosol: 2 puff(s) inhaled 4 times a day as needed for  bronchospasm  amLODIPine 5 mg oral tablet: 1 tab(s) orally once a day  atorvastatin 80 mg oral tablet: 1 tab(s) orally once a day (at bedtime)  calcium acetate 667 mg oral tablet: 1 tab(s) orally 3 times a day  cephalexin 500 mg oral capsule: 1 cap(s) orally every 12 hours  cephalexin 500 mg oral capsule: 1 cap(s) orally every 12 hours  Eliquis 2.5 mg oral tablet: 1 tab(s) orally 2 times a day  epoetin lois 10,000 units/mL injectable solution: 10,000 unit(s) subcutaneous 3 times a week MWF  Flomax 0.4 mg oral capsule: 1 cap(s) orally once a day  gabapentin 300 mg oral capsule: 1 cap(s) orally 2 times a day  hydrALAZINE 25 mg oral tablet: 1 tab(s) orally 3 times a day  hyoscyamine 0.125 mg oral tablet: 1 tab(s) orally 4 times a day as needed for  abdominal spasm  insulin glargine 100 units/mL subcutaneous solution: 7 unit(s) subcutaneous once a day (at bedtime)  lidocaine 4% topical film: Apply topically to affected area once a day  loratadine 10 mg oral tablet: 1 tab(s) orally once a day  melatonin 5 mg oral tablet: 1 tab(s) orally once a day (at bedtime)  methocarbamol 500 mg oral tablet: 1 tab(s) orally 2 times a day  metoprolol succinate 100 mg oral tablet, extended release: 1 tab(s) orally once a day  multivitamin: 1 tab(s) orally once a day  omeprazole 20 mg oral delayed release capsule: 1 cap(s) orally once a day  oxyCODONE 5 mg oral tablet: 1 tab(s) orally every 6 hours as needed for Severe Pain (7 - 10) 1 tab every 6 hours as needed for moderate-severe postsurgig MDD: 4 tabs  Plavix 75 mg oral tablet: 1 tab(s) orally once a day  sodium bicarbonate 650 mg oral tablet: 2 tab(s) orally 3 times a day   acetaminophen 325 mg oral tablet: 2 tab(s) orally every 8 hours as needed for  mild pain  Albuterol (Eqv-ProAir HFA) 90 mcg/inh inhalation aerosol: 2 puff(s) inhaled 4 times a day as needed for  bronchospasm  amLODIPine 5 mg oral tablet: 1 tab(s) orally once a day  atorvastatin 80 mg oral tablet: 1 tab(s) orally once a day (at bedtime)  calcium acetate 667 mg oral tablet: 1 tab(s) orally 3 times a day  cephalexin 500 mg oral capsule: 1 cap(s) orally every 12 hours  Eliquis 2.5 mg oral tablet: 1 tab(s) orally 2 times a day  epoetin lois 10,000 units/mL injectable solution: 10,000 unit(s) subcutaneous 3 times a week MWF  Flomax 0.4 mg oral capsule: 1 cap(s) orally once a day  gabapentin 300 mg oral capsule: 1 cap(s) orally 2 times a day  hydrALAZINE 25 mg oral tablet: 1 tab(s) orally 3 times a day  hyoscyamine 0.125 mg oral tablet: 1 tab(s) orally 4 times a day as needed for  abdominal spasm  insulin glargine 100 units/mL subcutaneous solution: 7 unit(s) subcutaneous once a day (at bedtime)  lidocaine 4% topical film: Apply topically to affected area once a day  loratadine 10 mg oral tablet: 1 tab(s) orally once a day  melatonin 5 mg oral tablet: 1 tab(s) orally once a day (at bedtime)  methocarbamol 500 mg oral tablet: 1 tab(s) orally 2 times a day  metoprolol succinate 100 mg oral tablet, extended release: 1 tab(s) orally once a day  multivitamin: 1 tab(s) orally once a day  omeprazole 20 mg oral delayed release capsule: 1 cap(s) orally once a day  oxyCODONE 5 mg oral tablet: 1 tab(s) orally every 6 hours as needed for Severe Pain (7 - 10) 1 tab every 6 hours as needed for moderate-severe postsurgig MDD: 4 tabs  Plavix 75 mg oral tablet: 1 tab(s) orally once a day  sodium bicarbonate 650 mg oral tablet: 2 tab(s) orally 3 times a day

## 2023-07-19 NOTE — DISCHARGE NOTE PROVIDER - PROVIDER TOKENS
PROVIDER:[TOKEN:[[id number]]],PROVIDER:[TOKEN:[[id number]]] PROVIDER:[TOKEN:[[id number]]],PROVIDER:[TOKEN:[[id number]]],PROVIDER:[TOKEN:[[id number]]]

## 2023-07-19 NOTE — DISCHARGE NOTE PROVIDER - NSDCFUADDAPPT_GEN_ALL_CORE_FT
Follow up with Dr. Newsome on     The cardiac surgery office is located at [facility], first floor. Take a left at the end of the lobby until the end of that brown (past the elevator bank). Make a left and the office is on your right across from the elevators.     Your Care Navigator Nurse Practitioner will be in touch to see you in your home within a few days from discharge. The Follow Your Heart program can help ensure you understand your medications, discharge instructions and answer any questions you may have at that time. They are also a great source to address concerns during the day and may be reached at [phone].    Follow up with your cardiologist, primary care provider, and nephrologist in 2-4 weeks. Please follow up with Dr. Newsome by calling the CT Surgery office at [phone] on the next open business day to make an appointment.    The cardiac surgery office is located at [facility], first floor. Take a left at the end of the lobby until the end of that brown (past the elevator bank). Make a left and the office is on your right across from the elevators.     Your Care Navigator Nurse Practitioner will be in touch to see you in your home within a few days from discharge. The Follow Your Heart program can help ensure you understand your medications, discharge instructions and answer any questions you may have at that time. They are also a great source to address concerns during the day and may be reached at [phone].    Follow up with your EP Cardiologist Dr. Dietrich within 1-2 weeks.   Follow up with your cardiologist, primary care provider, and nephrologist in 2-4 weeks.

## 2023-07-19 NOTE — DISCHARGE NOTE PROVIDER - DETAILS OF MALNUTRITION DIAGNOSIS/DIAGNOSES
This patient has been assessed with a concern for Malnutrition and was treated during this hospitalization for the following Nutrition diagnosis/diagnoses:     -  07/15/2023: Moderate protein-calorie malnutrition

## 2023-07-19 NOTE — DISCHARGE NOTE PROVIDER - HOSPITAL COURSE
63M PMHx ESRD on HD (T,Th, S), IDDM, COPD, gastroparesis, peripheral blindness and retinopathy, NSTEMI 6/2022 with recent hospital admission for CABG eval, c diff + that admission, now presents from home with complaints of worsening chest pain associated with difficulty breathing. In ER EKGs with intermittent diffuse ST changes, + troponins, Seen by cardiology and started on Tridil and Heparin gtt for NSTEMI, admitted to CTICU, IABP placed, now s/p C4L (LIMA-LAD, SVG-OM 1, OM2, PDA) 7/14, IABP removed after OR, postop course notable for brief atrial fibrillation on POD #4 with spontaneous conversion to NSR without intervention. Patient remains stable from respiratory and hemodynamic standpoint. Discussed with Dr. Newsome and patient cleared for discharge.     HD T/TH/S 63M PMHx ESRD on HD (T,Th,S), IDDM, COPD, gastroparesis, peripheral blindness and retinopathy, NSTEMI 6/2022 with recent hospital admission for CABG eval, c diff + that admission, now presents from home with complaints of worsening chest pain associated with difficulty breathing. In ER EKGs with intermittent diffuse ST changes, + troponins, +NSTEMI, started on a Tridil and Heparin gtt for NSTEMI, admitted to CTICU, IABP placed, now s/p CABG x 4 (LIMA-LAD, SVG-OM 1, OM2, PDA) on 7/14/23, IABP removed after OR without incident. Postop course notable for PAF with long conversion pauses of 2-6.5 seconds (EP following, s/p Medtronic loop recorder and leadless Micra implant 7/28/23), Left pleural effusion (s/p pigtail catheter placement 7/28/23, since removed), and persistent hyperkalemia despite HD (s/p fistulogram for recirculation with percutaneous balloon angioplasty 7/25/23, hyperkalemia since resolved).  Patient remains hemodynamically stable and stable from a respiratory standpoint at this time. Plan for discharge home later today as per Dr. Enriquez (covering physician for Dr. Newsome).    HD T/TH/S 63M PMHx ESRD on HD (T,Th,S), IDDM, COPD, gastroparesis, peripheral blindness and retinopathy, NSTEMI 6/2022 with recent hospital admission for CABG devonal, c diff + that admission, now presents from home with complaints of worsening chest pain associated with difficulty breathing. In ER EKGs with intermittent diffuse ST changes, + troponins, +NSTEMI, started on a Tridil and Heparin gtt for NSTEMI, admitted to CTICU, IABP placed, now s/p CABG x 4 (LIMA-LAD, SVG-OM 1, OM2, PDA) on 7/14/23, IABP removed after OR without incident. Postop course notable for PAF with long conversion pauses of 2-6.5 seconds (EP following, s/p Medtronic loop recorder and leadless Micra implant 7/28/23), Left pleural effusion (s/p pigtail catheter placement 7/28/23, since removed), and persistent hyperkalemia despite HD (s/p fistulogram for recirculation with percutaneous balloon angioplasty 7/25/23, hyperkalemia since resolved).  Patient remains hemodynamically stable. Plan for discharge home later today as per Dr. Newsome, discussed in AM rounds.    HD T/TH/S: HD set up for outpatient treatments.    MEDICATIONS  (STANDING):  amLODIPine   Tablet 5 milliGRAM(s) Oral daily  apixaban 2.5 milliGRAM(s) Oral two times a day  atorvastatin 80 milliGRAM(s) Oral at bedtime  calcium acetate 667 milliGRAM(s) Oral three times a day with meals  cephalexin 500 milliGRAM(s) Oral every 12 hours  clopidogrel Tablet 75 milliGRAM(s) Oral daily  dextrose 50% Injectable 25 milliLiter(s) IV Push every 15 minutes  dextrose 50% Injectable 50 milliLiter(s) IV Push every 15 minutes  epoetin lois-epbx (RETACRIT) Injectable 5000 Unit(s) IV Push <User Schedule>  gabapentin 100 milliGRAM(s) Oral three times a day  glucagon  Injectable 1 milliGRAM(s) IntraMuscular once  hydrALAZINE 25 milliGRAM(s) Oral three times a day  insulin glargine Injectable (LANTUS) 7 Unit(s) SubCutaneous at bedtime  insulin lispro (ADMELOG) corrective regimen sliding scale   SubCutaneous three times a day before meals  insulin lispro (ADMELOG) corrective regimen sliding scale   SubCutaneous at bedtime  iron sucrose IVPB 100 milliGRAM(s) IV Intermittent every 48 hours  lidocaine   4% Patch 1 Patch Transdermal daily  lidocaine   4% Patch 1 Patch Transdermal daily  loratadine 10 milliGRAM(s) Oral daily  methocarbamol 500 milliGRAM(s) Oral two times a day  metoprolol tartrate 37.5 milliGRAM(s) Oral every 8 hours  Nephro-becky 1 Tablet(s) Oral daily  pantoprazole    Tablet 40 milliGRAM(s) Oral daily  sodium chloride 0.9% lock flush 3 milliLiter(s) IV Push every 8 hours  tamsulosin 0.4 milliGRAM(s) Oral at bedtime    ICU Vital Signs Last 24 Hrs  T(C): 37.4 (31 Jul 2023 10:06), Max: 37.6 (31 Jul 2023 07:34)  T(F): 99.4 (31 Jul 2023 10:06), Max: 99.6 (31 Jul 2023 07:34)  HR: 61 (31 Jul 2023 10:06) (60 - 68)  BP: 110/62 (31 Jul 2023 10:06) (110/62 - 161/64)  BP(mean): --  ABP: --  ABP(mean): --  RR: 19 (31 Jul 2023 10:06) (17 - 19)  SpO2: 93% (31 Jul 2023 10:06) (93% - 98%)    O2 Parameters below as of 31 Jul 2023 10:06  Patient On (Oxygen Delivery Method): room air    I&O's Summary    30 Jul 2023 07:01  -  31 Jul 2023 07:00  --------------------------------------------------------  IN: 700 mL / OUT: 300 mL / NET: 400 mL        PHYSICAL EXAM:  Constitutional: NAD, sitting up in chair.    Eyes: sclerae anicteric    ENMT:oral mucosa pink, no lesions    Neck: supple, trachea midlined, no JVD    Respiratory: non-labored breathing,     Cardiovascular:sternotomy site dry, no drainage, no click. S1 and s2 intact, PPM site dry and clean.     Gastrointestinal:abdomen soft, ND, non-tender. Groins soft b/l    Extremities: warm through out. RUE: AVF : +thrill.   LUE: warm, soft. Lower extremities warm, soft, no edema.     Vascular:RP/UP +2 b/l    Neurological: no gross deficits appreciated    Skin: ventral aspect of RUE approximately 5 mm circumferential erythema at fistulogram puncture site (performed 7/25), soft. No drainage    Psychiatric: normal affect.

## 2023-07-19 NOTE — PROGRESS NOTE ADULT - SUBJECTIVE AND OBJECTIVE BOX
Subjective: Patient seen and assessed s/p CABG. Patient states "i'm ok." At time of exam, Pt denies chest pain, palpitations, dizziness, headache, shortness of breath, abdominal pain or N/V/D/C.     Pertinent events of the past 24 hours: Uneventful, recovering as expected    Tele: NSR 60s    VITAL SIGNS  Vital Signs Last 24 Hrs  T(C): 36.8 (23 @ 21:28), Max: 36.8 (23 @ 21:28)  T(F): 98.3 (23 @ 21:28), Max: 98.3 (23 @ 21:28)  HR: 68 (23 @ 21:28) (55 - 72)  BP: 150/65 (23 @ 22:50) (117/69 - 182/70)  RR: 17 (23 @ 21:28) (15 - 18)  SpO2: 95% (23 @ 21:28) (94% - 100%)  on (O2)              MEDICATIONS  aspirin enteric coated 81 milliGRAM(s) Oral daily  atorvastatin 80 milliGRAM(s) Oral at bedtime  calcium acetate 667 milliGRAM(s) Oral three times a day with meals  clopidogrel Tablet 75 milliGRAM(s) Oral daily  epoetin lois-epbx (RETACRIT) Injectable 2000 Unit(s) IV Push <User Schedule>  gabapentin 100 milliGRAM(s) Oral three times a day  heparin   Injectable 5000 Unit(s) SubCutaneous every 8 hours  insulin glargine Injectable (LANTUS) 6 Unit(s) SubCutaneous at bedtime  insulin lispro (ADMELOG) corrective regimen sliding scale   SubCutaneous at bedtime  insulin lispro (ADMELOG) corrective regimen sliding scale   SubCutaneous three times a day before meals  insulin lispro Injectable (ADMELOG) 2 Unit(s) SubCutaneous three times a day before meals  loratadine 10 milliGRAM(s) Oral daily  methocarbamol 500 milliGRAM(s) Oral two times a day  metoprolol tartrate 50 milliGRAM(s) Oral every 8 hours  oxyCODONE    IR 5 milliGRAM(s) Oral every 4 hours PRN  pantoprazole    Tablet 40 milliGRAM(s) Oral daily  polyethylene glycol 3350 17 Gram(s) Oral daily  senna 2 Tablet(s) Oral at bedtime  sodium chloride 0.9% lock flush 3 milliLiter(s) IV Push every 8 hours  tamsulosin 0.4 milliGRAM(s) Oral at bedtime    PHYSICAL EXAM  Constitutional: NAD  Neuro: A+O x 3, non-focal, speech clear and intact  HEENT: NC/AT  CV: regular rate, regular rhythm, +S1S2, no murmurs or rub  Pulm/chest: lung sounds CTA and equal bilaterally, no accessory muscle use noted  Abd: +BS, soft, NT, ND  Ext: BARRETO x 4, +1 LE edema bilaterally  Skin: warm, well perfused  Psych: calm, appropriate affect   Incision: midline sternal incision c/d/i +mepilex ag, sternum stable no audible sternal click, Right LE EVH site c/d/i with mepilex ag  Lines: Right UE AVF +bruit/+thrill   Pacing wires: VVI 40/10/1    Intake and Output   @ 07:  -   @ 07:00  --------------------------------------------------------  IN: 720 mL / OUT: 0 mL / NET: 720 mL     @ 07:01  -  19 @ 00:22  --------------------------------------------------------  IN: 800 mL / OUT: 1500 mL / NET: -700 mL    Weights:  Daily     Daily Weight in k.1 (2023 17:15)  Admit Wt: Drug Dosing Weight  Height (cm): 160 (2023 23:27)  Weight (kg): 61.7 (2023 23:27)  BMI (kg/m2): 24.1 (2023 23:27)  BSA (m2): 1.64 (2023 23:27)    All laboratory results, radiology and medications reviewed.    LABS      133<L>  |  92<L>  |  79.3<H>  ----------------------------<  191<H>  5.8<H>   |  23.0  |  8.37<H>    Ca    7.2<L>      2023 05:15  Phos  5.8     07-18  Mg     2.5     07-18                                   10.3   6.67  )-----------( 123      ( 2023 05:15 )             31.7              CAPILLARY BLOOD GLUCOSE  POCT Blood Glucose.: 219 mg/dL (2023 21:18)  POCT Blood Glucose.: 88 mg/dL (2023 17:03)  POCT Blood Glucose.: 116 mg/dL (2023 11:52)  POCT Blood Glucose.: 252 mg/dL (2023 07:51)         < from: Xray Chest 1 View- PORTABLE-Routine (Xray Chest 1 View- PORTABLE-Routine in AM.) (23 @ 04:50) >    INTERPRETATION:  Chest one view 2023 5:42 AM    HISTORY: Postop    COMPARISON STUDY: 2023    Frontal expiratory view of the chest shows the heart to be borderline in   size. Left chest drainremains present.    The lungs are clear and there is no evidence of pneumothorax nor pleural   effusion.    Chest one view 2023 10:00 AM  Compared to the prior study, left chest drain has been removed. No   pneumothorax.    Chest one view 2023 3:54 AM  Compared to the prior study, there is mild basilar congestion with small   pleural effusions.    IMPRESSION:  Mild congestion in latest image. No pneumothorax.        < end of copied text >         Subjective: Patient seen and assessed s/p CABG. Patient states "i'm ok." At time of exam, Pt denies chest pain, palpitations, dizziness, headache, shortness of breath, abdominal pain or N/V/D/C.     Pertinent events of the past 24 hours: Uneventful, recovering as expected    Tele: NSR 60s    VITAL SIGNS  Vital Signs Last 24 Hrs  T(C): 36.8 (23 @ 21:28), Max: 36.8 (23 @ 21:28)  T(F): 98.3 (23 @ 21:28), Max: 98.3 (23 @ 21:28)  HR: 68 (23 @ 21:28) (55 - 72)  BP: 150/65 (23 @ 22:50) (117/69 - 182/70)  RR: 17 (23 @ 21:28) (15 - 18)  SpO2: 95% (23 @ 21:28) (94% - 100%)  on (O2)              MEDICATIONS  aspirin enteric coated 81 milliGRAM(s) Oral daily  atorvastatin 80 milliGRAM(s) Oral at bedtime  calcium acetate 667 milliGRAM(s) Oral three times a day with meals  clopidogrel Tablet 75 milliGRAM(s) Oral daily  epoetin lois-epbx (RETACRIT) Injectable 2000 Unit(s) IV Push <User Schedule>  gabapentin 100 milliGRAM(s) Oral three times a day  heparin   Injectable 5000 Unit(s) SubCutaneous every 8 hours  insulin glargine Injectable (LANTUS) 6 Unit(s) SubCutaneous at bedtime  insulin lispro (ADMELOG) corrective regimen sliding scale   SubCutaneous at bedtime  insulin lispro (ADMELOG) corrective regimen sliding scale   SubCutaneous three times a day before meals  insulin lispro Injectable (ADMELOG) 2 Unit(s) SubCutaneous three times a day before meals  loratadine 10 milliGRAM(s) Oral daily  methocarbamol 500 milliGRAM(s) Oral two times a day  metoprolol tartrate 50 milliGRAM(s) Oral every 8 hours  oxyCODONE    IR 5 milliGRAM(s) Oral every 4 hours PRN  pantoprazole    Tablet 40 milliGRAM(s) Oral daily  polyethylene glycol 3350 17 Gram(s) Oral daily  senna 2 Tablet(s) Oral at bedtime  sodium chloride 0.9% lock flush 3 milliLiter(s) IV Push every 8 hours  tamsulosin 0.4 milliGRAM(s) Oral at bedtime    PHYSICAL EXAM  Constitutional: NAD  Neuro: A+O x 3, non-focal, speech clear and intact  HEENT: NC/AT  CV: regular rate, regular rhythm, +S1S2, no murmurs or rub  Pulm/chest: lung sounds with bibasilar crackles,  otherwise clear to ascultation, no accessory muscle use noted  Abd: +BS, soft, NT, ND  Ext: BARRETO x 4, +1 LE edema bilaterally  Skin: warm, well perfused  Psych: calm, appropriate affect   Incision: midline sternal incision c/d/i +mepilex ag, sternum stable no audible sternal click, Right LE EVH site c/d/i with mepilex ag  Lines: Right UE AVF +bruit/+thrill   Pacing wires: VVI 40/10/1    Intake and Output   @ 07:  -   @ 07:00  --------------------------------------------------------  IN: 720 mL / OUT: 0 mL / NET: 720 mL     @ 07:01  -  19 @ 00:22  --------------------------------------------------------  IN: 800 mL / OUT: 1500 mL / NET: -700 mL    Weights:  Daily     Daily Weight in k.1 (2023 17:15)  Admit Wt: Drug Dosing Weight  Height (cm): 160 (2023 23:27)  Weight (kg): 61.7 (2023 23:27)  BMI (kg/m2): 24.1 (2023 23:27)  BSA (m2): 1.64 (2023 23:27)    All laboratory results, radiology and medications reviewed.    LABS      133<L>  |  92<L>  |  79.3<H>  ----------------------------<  191<H>  5.8<H>   |  23.0  |  8.37<H>    Ca    7.2<L>      2023 05:15  Phos  5.8       Mg     2.5                                        10.3   6.67  )-----------( 123      ( 2023 05:15 )             31.7              CAPILLARY BLOOD GLUCOSE  POCT Blood Glucose.: 219 mg/dL (2023 21:18)  POCT Blood Glucose.: 88 mg/dL (2023 17:03)  POCT Blood Glucose.: 116 mg/dL (2023 11:52)  POCT Blood Glucose.: 252 mg/dL (2023 07:51)         < from: Xray Chest 1 View- PORTABLE-Routine (Xray Chest 1 View- PORTABLE-Routine in AM.) (23 @ 04:50) >    INTERPRETATION:  Chest one view 2023 5:42 AM    HISTORY: Postop    COMPARISON STUDY: 2023    Frontal expiratory view of the chest shows the heart to be borderline in   size. Left chest drainremains present.    The lungs are clear and there is no evidence of pneumothorax nor pleural   effusion.    Chest one view 2023 10:00 AM  Compared to the prior study, left chest drain has been removed. No   pneumothorax.    Chest one view 2023 3:54 AM  Compared to the prior study, there is mild basilar congestion with small   pleural effusions.    IMPRESSION:  Mild congestion in latest image. No pneumothorax.        < end of copied text >

## 2023-07-19 NOTE — DISCHARGE NOTE PROVIDER - NSDCCPCAREPLAN_GEN_ALL_CORE_FT
PRINCIPAL DISCHARGE DIAGNOSIS  Diagnosis: Acute coronary syndrome  Assessment and Plan of Treatment: You had Coronary Artery Bypass Grafting on 7/14 with Dr. Newsome  please refer to discharge instructions in your folder  shower daily with soap and water to the incision, no bathing/hot tubs/pools/swimming  no lotions/creams to incisions  take all your medications as prescribed  keep your follow up appointments      SECONDARY DISCHARGE DIAGNOSES  Diagnosis: ESRD on dialysis  Assessment and Plan of Treatment: Continue dialaysis Tuesday, Thursday and Saturday    Diagnosis: Diabetes mellitus  Assessment and Plan of Treatment: It is extremely important to follow a diabetic (consistent carbohydrates, LOW/NO ADDED SUGAR) diet and monitor your glucose (sugar) levels. You have an increased risk of complications, including wound infections, due to the diabetes.  1. Check your glucoses 3-4 times daily before meals and bedtime.   2. Keep a log of your glucose levels every day.   3. Make an appointment to meet with your primary care provider or endocrinologist within a week.   4. Take ALL of your medications as prescribed. Only hold medications for low glucose levels (< 80) or if you are symptomatic (dizzy, sweating, lightheaded).  5. Ideally, we would like all of the glucose levels to be between .   You may have been discharged on different medications than you were taking before. Your body reacts differently to the medications after surgery. Please continue to take the medications as prescribed and notify the office, nurse practitioner or your PCP if you have any concerns right away.     PRINCIPAL DISCHARGE DIAGNOSIS  Diagnosis: Acute coronary syndrome  Assessment and Plan of Treatment: You had Coronary Artery Bypass Grafting on 7/14/23 with Dr. Newsome.  Please refer to discharge instructions in your folder.  Shower daily with soap and water to the incision, no bathing/hot tubs/pools/swimming.  no lotions/creams to incisions  Take all your medications as prescribed.  Keep your follow up appointments.      SECONDARY DISCHARGE DIAGNOSES  Diagnosis: ESRD on dialysis  Assessment and Plan of Treatment: Continue dialaysis Tuesday, Thursday and Saturday    Diagnosis: Atrial fibrillation  Assessment and Plan of Treatment: You had afib with long conversion pauses postoperatively and underwent Micra Leadless pacemaker placement 7/28/23.   Please follow up with your EP Cardiologist Dr. Dietrich within 1-2 weeks.   Take all medications as prescribed and listed on your discharge paperwork.  You are on a blood thinner (Eliquis) to reduce your risk of a stroke with this arrhythmia.   If you are experiencing chest pain or palpitations, please call us right away.  ****Discharge instruction for Micra Pacemaker*****   - Bruising at the groin, sometimes extending down the leg, and/or a small lump under the skin at the groin access site is normal and will resolve within 2 – 3 weeks.   - You may walk and take stairs at a regular pace.   - Do not perform any exercise more strenuous than walking for 1 week.   - Do not strain or lift heavy objects for 1 week.  - You may shower the day after the procedure.  - Do not soak in water (such as tub baths, hot tubs, swimming, etc.) for 1 week.   - You may resume all other activities the day after the procedure.  Call your doctor if:   - you notice bleeding, redness, drainage, swelling, increased tenderness or a hot sensation around the catheter insertion site.   - your temperature is greater than 100 degrees F for more than 24 hours.  - you have any questions or concerns regarding the procedure.  If significant bleeding and/or a large lump (the size of a golf ball or bigger) occurs:  - Lie flat and apply continuous direct pressure just above the puncture site for at least 10 minutes  - If the issue resolves, notify your physician immediately.    - If the bleeding cannot be controlled, please seek immediate medical attention.  If you experience increased difficulty breathing or chest pain, or if you faint or have dizzy spells, please seek immediate medical attention.      Diagnosis: Diabetes mellitus  Assessment and Plan of Treatment: It is extremely important to follow a diabetic (consistent carbohydrates, LOW/NO ADDED SUGAR) diet and monitor your glucose (sugar) levels. You have an increased risk of complications, including wound infections, due to the diabetes.  1. Check your glucoses 3-4 times daily before meals and bedtime.   2. Keep a log of your glucose levels every day.   3. Make an appointment to meet with your primary care provider or endocrinologist within a week.   4. Take ALL of your medications as prescribed. Only hold medications for low glucose levels (< 80) or if you are symptomatic (dizzy, sweating, lightheaded).  5. Ideally, we would like all of the glucose levels to be between .   You may have been discharged on different medications than you were taking before. Your body reacts differently to the medications after surgery. Please continue to take the medications as prescribed and notify the office, nurse practitioner or your PCP if you have any concerns right away.

## 2023-07-19 NOTE — DISCHARGE NOTE PROVIDER - NSDCFUADDINST_GEN_ALL_CORE_FT
Please call the Cardiothoracic Surgery office at [phone] if you are experiencing any shortness of breath, chest pain, fevers or chills, drainage from the incisions, persistent nausea, vomiting or if you have any questions about your medications. If the symptoms are severe, call 911 and go to the nearest hospital. You can also call [phone] for an emergency [facility] ambulance, which will take you to the closest [facility].    If you need any assistance for making any appointments for a new consult or referral in any specialty, please call our [facility] Clinical Coordination Center at [phone].

## 2023-07-19 NOTE — PROGRESS NOTE ADULT - ASSESSMENT
63 year old legally blind male with PMH of DM complicated by neuropathy, HTN, HLD, CAD, PVD, gastroparesis, ESRD on HD TTS and BPH s/p CABG x4 on 07/14/23. Nephrology is managing ESRD on HD.     -Access: R AV fistula   -Outpatient dialysis days are Tuesdays Thursdays Saturdays  -HD yesterday; pt however remains with hyperkalemia  - Will dialyze again today    -BP stable  -Volume status -UF as tolerated during HD   -Anemia - KIMBERLY with HD   -Mineral Bone Disease in setting of CKD - continue oral phos binders with meals. monitor Ca++ and phos

## 2023-07-19 NOTE — CONSULT NOTE ADULT - SUBJECTIVE AND OBJECTIVE BOX
[facility]   [facility]   Office: [address] [place] [address]   Telephone: [phone]  Fax: [phone]                     Electrophysiology Consult Note                                                                                                                                          Consult requested by: TONI Sheets  Reason for Consultation: post-op AFib/AFlutter  History obtained by: pt and EMR    obtained: n/a     HPI:  63 year old male with HTN, hyperlipidemia, ESRD on HD (T, Th, Sat), COPD, DM type 2 uncontrolled, gastroparesis s/p gastric PM 2/2013, peripheral blindness and retinopathy, BPH, recent CDiff infection 6/1/23 s/p 10 day course of oral vancomycin, CAD s/p prior PCI, and recent hospitalization 6/2023 for NSTEMI, ICM LVEF 35-40%, and LHC which demonstrated multi-vessel disease. He was undergoing CABG workup. He presented to [facility] ED 7/13 with complaints of worsening chest pain and shortness of breath. In ER, ECG with intermittent diffuse ST changes, + troponins. Seen by cardiology and started on tridil and heparin gtt for NSTEM, admitted to CTICU and IABP placed.  He underwent 4vCABG (LIMA-LAD, CPL-[id number]-PDA) on 7/14/23, and IABP was removed post-operatively. Post op course notable for new AFib/AFlutter today for which EP consult is requested. Denies prior history of atrial arrhythmia. Denies chest pain, palpitation, shortness of breath, or dizziness at this time.     Telemetry reviewed: currently in sinus rhythm 60s; tele notable for sinus rhythm w/ frequent APCs, brief runs of AFlutter, and few conversion pauses- longest on 7/14/23 @ 13:41 4.26 seconds in duration.   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 No nausea, vomiting, or hematemesis; No diarrhea or constipation. No melena or hematochezia.  GENITOURINARY: No dysuria, frequency, hematuria, or incontinence  NEUROLOGICAL: No headaches, memory loss, loss of strength, numbness, or tremors  SKIN: No itching, burning, rashes, or lesions   LYMPH NODES: No enlarged glands  ENDOCRINE: No heat or cold intolerance; No hair loss  MUSCULOSKELETAL: No joint pain or swelling; No muscle, back, or extremity pain  PSYCHIATRIC: No depression, anxiety, mood swings, or difficulty sleeping  HEME/LYMPH: No easy bruising, or bleeding gums  ALLERGY AND IMMUNOLOGIC: No hives or eczema    MEDICATIONS  (STANDING):  amLODIPine   Tablet 5 milliGRAM(s) Oral daily  aspirin enteric coated 81 milliGRAM(s) Oral daily  atorvastatin 80 milliGRAM(s) Oral at bedtime  calcium acetate 667 milliGRAM(s) Oral three times a day with meals  clopidogrel Tablet 75 milliGRAM(s) Oral daily  dextrose 50% Injectable 50 milliLiter(s) IV Push every 15 minutes  dextrose 50% Injectable 25 milliLiter(s) IV Push every 15 minutes  epoetin lois-epbx (RETACRIT) Injectable 2000 Unit(s) IV Push <User Schedule>  gabapentin 100 milliGRAM(s) Oral three times a day  glucagon  Injectable 1 milliGRAM(s) IntraMuscular once  heparin   Injectable 5000 Unit(s) SubCutaneous every 8 hours  hydrALAZINE 25 milliGRAM(s) Oral three times a day  insulin glargine Injectable (LANTUS) 6 Unit(s) SubCutaneous at bedtime  insulin lispro (ADMELOG) corrective regimen sliding scale   SubCutaneous three times a day before meals  insulin lispro (ADMELOG) corrective regimen sliding scale   SubCutaneous at bedtime  insulin lispro Injectable (ADMELOG) 2 Unit(s) SubCutaneous three times a day before meals  loratadine 10 milliGRAM(s) Oral daily  methocarbamol 500 milliGRAM(s) Oral two times a day  metoprolol tartrate 50 milliGRAM(s) Oral every 8 hours  pantoprazole    Tablet 40 milliGRAM(s) Oral daily  polyethylene glycol 3350 17 Gram(s) Oral daily  senna 2 Tablet(s) Oral at bedtime  sodium chloride 0.9% lock flush 3 milliLiter(s) IV Push every 8 hours  tamsulosin 0.4 milliGRAM(s) Oral at bedtime    MEDICATIONS  (PRN):  bisacodyl 5 milliGRAM(s) Oral every 12 hours PRN Constipation  dextrose Oral Gel 15 Gram(s) Oral once PRN Blood Glucose LESS THAN 70 milliGRAM(s)/deciliter  oxyCODONE    IR 5 milliGRAM(s) Oral every 4 hours PRN Moderate Pain (4 - 6)    Allergies:  No Known Drug Allergies  shellfish (Rash)    SOCIAL HISTORY: lives w/ family     FAMILY HISTORY:  No pertinent family history in first degree relatives    Vital Signs Last 24 Hrs  T(C): 36.6 (19 Jul 2023 17:21), Max: 37.2 (19 Jul 2023 07:24)  T(F): 97.9 (19 Jul 2023 17:21), Max: 98.9 (19 Jul 2023 07:24)  HR: 124 (19 Jul 2023 17:21) (59 - 124)  BP: 138/77 (19 Jul 2023 17:21) (138/77 - 177/73)  RR: 18 (19 Jul 2023 17:21) (16 - 18)  SpO2: 94% (19 Jul 2023 17:21) (94% - 100%)    Parameters below as of 19 Jul 2023 17:21  Patient On (Oxygen Delivery Method): room air    Physical Exam:  Constitutional: AAOx3, NAD  Neck: supple, No JVD  Cardiovascular: +S1S2 RRR, no murmurs, rubs, gallops   Chest: healing incision   Pulmonary: CTA b/l, unlabored, no wheezes   Abdomen: +BS, soft NTND  Extremities: mild edema b/l, +distal pulses b/l  Neuro: non focal, speech clear, BARRETO x 4    LABS:                        10.1   5.03  )-----------( 137      ( 19 Jul 2023 05:16 )             31.5   07-19    131<L>  |  89<L>  |  58.0<H>  ----------------------------<  273<H>  5.8<H>   |  24.0  |  6.79<H>    Ca    7.7<L>      19 Jul 2023 05:16  Phos  3.8     07-19  Mg     2.3     07-19    Urinalysis Basic - ( 19 Jul 2023 05:16 )    Color: x / Appearance: x / SG: x / pH: x  Gluc: 273 mg/dL / Ketone: x  / Bili: x / Urobili: x   Blood: x / Protein: x / Nitrite: x   Leuk Esterase: x / RBC: x / WBC x   Sq Epi: x / Non Sq Epi: x / Bacteria: x    RADIOLOGY & ADDITIONAL STUDIES:  Echo: 7/19/23: Summary:   1. Normal left ventricular internal cavity size.   2. Normal global left ventricular systolic function.   3. Left ventricular ejection fraction, by visual estimation, is 50 to 55%.   4. Abnormal septal motion consistent with post-operative status.   5. Spectral Doppler shows pseudonormal pattern of left ventricular myocardial filling (Grade II diastolic dysfunction).   6. Mildly reduced RV systolic function.   7. The left atrium is normal in size.   8. The right atrium is normal in size.   9. Sclerotic aorticvalve with normal opening.  10. Mild thickening and calcification of the anterior and posterior mitral valve leaflets.  11. Mild mitral annular calcification.  12. Mild mitral valve regurgitation.  13. Mild tricuspid regurgitation.  14. Estimated pulmonary artery systolic pressure is 35.8 mmHg assuming a right atrial pressure of 15 mmHg, which is consistent with borderline pulmonary hypertension.  15. There is no evidence of pericardial effusion.  Paulo Perez MD Electronically signed on 7/19/2023 at 11:14:33 AM    Echo: 7/13/23: Summary:   1. Technically suboptimal study.   2. The left atrium is normal in size.   3. Normal wall motion. Left ventricular ejection fraction, by visual estimation, is 50 to 55%.   4. The right atrium is normal in size.   5. Normal right ventricular size and function.   6. NO significant valvular abnormality.   7. There is no evidence of pericardial effusion.  Eneida Hill MD, RPVI Electronically signed on 7/13/2023 at 12:47:30PM    CXR: 7/17/23:   IMPRESSION:  Mild congestion in latest image. No pneumothorax.  Thank you for the courtesy of this referral.  --- End of Report ---  RAVEN DELUNA MD; Attending Interventional Radiologist  This document has been electronically signed. Jul 18 2023  3:18PM

## 2023-07-19 NOTE — PROGRESS NOTE ADULT - ASSESSMENT
63M PMHx ESRD on HD (T,Th, S), IDDM, COPD, gastroparesis, peripheral blindness and retinopathy, NSTEMI 6/2022 with recent hospital admission for CABG eval, c diff + that admission, now presents from home with complaints of worsening chest pain associated with difficulty breathing. In ER EKGs with intermittent diffuse ST changes, + troponins, Seen by cardiology and started on Tridil and Heparin gtt for NSTEMI, admitted to CTICU, IABP placed, now s/p C4L (LIMA-LAD, SVG-OM 1, OM2, PDA) 7/14, IABP removed after OR, postop course notable for brief atrial fibrillation on POD #4 with spontaneous conversion to NSR without intervention.

## 2023-07-19 NOTE — DISCHARGE NOTE PROVIDER - CARE PROVIDER_API CALL
Erick Newsome  Thoracic and Cardiac Surgery  [address] [place] [address]  Phone: [phone]  Fax: [phone]  Follow Up Time:     Jaleel Becerra  Interventional Cardiology  [address] [facility], [address]  [place] [address]  Phone: [phone]  Fax: [phone]  Follow Up Time:    Erick Newsome  Thoracic and Cardiac Surgery  [address] [place] [address]  Phone: [phone]  Fax: [phone]  Follow Up Time:     Jaleel Becerra  Interventional Cardiology  [address] [place] [address], [place] [id number]  Phone: [phone]  Fax: [phone]  Follow Up Time:     George Dietrich  Cardiac Electrophysiology  [address] [place] [address], [place] [id number]  Phone: [phone]  Fax: [phone]  Follow Up Time:

## 2023-07-19 NOTE — PROGRESS NOTE ADULT - PROBLEM SELECTOR PLAN 3
brief episode of AF noted POD #4   Spontaneous conversation to NSR without intervention  Lopressor increased to 50TID

## 2023-07-19 NOTE — PROGRESS NOTE ADULT - SUBJECTIVE AND OBJECTIVE BOX
INTERVAL EVENTS:  Follow up diabetes management    ROS: No chest pain, sob, abd pain.    MEDICATIONS  (STANDING):  amLODIPine   Tablet 5 milliGRAM(s) Oral daily  aspirin enteric coated 81 milliGRAM(s) Oral daily  atorvastatin 80 milliGRAM(s) Oral at bedtime  calcium acetate 667 milliGRAM(s) Oral three times a day with meals  clopidogrel Tablet 75 milliGRAM(s) Oral daily  dextrose 50% Injectable 50 milliLiter(s) IV Push every 15 minutes  dextrose 50% Injectable 25 milliLiter(s) IV Push every 15 minutes  epoetin lois-epbx (RETACRIT) Injectable 2000 Unit(s) IV Push <User Schedule>  gabapentin 100 milliGRAM(s) Oral three times a day  glucagon  Injectable 1 milliGRAM(s) IntraMuscular once  heparin   Injectable 5000 Unit(s) SubCutaneous every 8 hours  hydrALAZINE 25 milliGRAM(s) Oral three times a day  insulin glargine Injectable (LANTUS) 6 Unit(s) SubCutaneous at bedtime  insulin lispro (ADMELOG) corrective regimen sliding scale   SubCutaneous three times a day before meals  insulin lispro (ADMELOG) corrective regimen sliding scale   SubCutaneous at bedtime  insulin lispro Injectable (ADMELOG) 2 Unit(s) SubCutaneous three times a day before meals  loratadine 10 milliGRAM(s) Oral daily  methocarbamol 500 milliGRAM(s) Oral two times a day  metoprolol tartrate 50 milliGRAM(s) Oral every 8 hours  pantoprazole    Tablet 40 milliGRAM(s) Oral daily  polyethylene glycol 3350 17 Gram(s) Oral daily  senna 2 Tablet(s) Oral at bedtime  sodium chloride 0.9% lock flush 3 milliLiter(s) IV Push every 8 hours  tamsulosin 0.4 milliGRAM(s) Oral at bedtime    MEDICATIONS  (PRN):  bisacodyl 5 milliGRAM(s) Oral every 12 hours PRN Constipation  dextrose Oral Gel 15 Gram(s) Oral once PRN Blood Glucose LESS THAN 70 milliGRAM(s)/deciliter  oxyCODONE    IR 5 milliGRAM(s) Oral every 4 hours PRN Moderate Pain (4 - 6)    Allergies  No Known Drug Allergies  shellfish (Rash)    Vital Signs Last 24 Hrs  T(C): 37.2 (19 Jul 2023 07:24), Max: 37.2 (19 Jul 2023 07:24)  T(F): 98.9 (19 Jul 2023 07:24), Max: 98.9 (19 Jul 2023 07:24)  HR: 61 (19 Jul 2023 07:24) (56 - 72)  BP: 158/68 (19 Jul 2023 07:24) (134/67 - 182/70)  BP(mean): --  RR: 18 (19 Jul 2023 07:24) (16 - 18)  SpO2: 97% (19 Jul 2023 07:24) (95% - 100%)    Parameters below as of 19 Jul 2023 07:24  Patient On (Oxygen Delivery Method): room air    PHYSICAL EXAM:  General: No apparent distress  Neck: Supple, trachea midline, no thyromegaly  Respiratory: Lungs clear bilaterally, normal rate, effort  Cardiac: +S1, S2, no m/r/g  GI: +BS, soft, non tender, non distended  Extremities: No peripheral edema, no pedal lesions  Neuro: A+O X3, no tremor      LABS:                        10.1   5.03  )-----------( 137      ( 19 Jul 2023 05:16 )             31.5     07-19    131<L>  |  89<L>  |  58.0<H>  ----------------------------<  273<H>  5.8<H>   |  24.0  |  6.79<H>    Ca    7.7<L>      19 Jul 2023 05:16  Phos  3.8     07-19  Mg     2.3     07-19      Urinalysis Basic - ( 19 Jul 2023 05:16 )    Color: x / Appearance: x / SG: x / pH: x  Gluc: 273 mg/dL / Ketone: x  / Bili: x / Urobili: x   Blood: x / Protein: x / Nitrite: x   Leuk Esterase: x / RBC: x / WBC x   Sq Epi: x / Non Sq Epi: x / Bacteria: x      POCT Blood Glucose.: 72 mg/dL (07-19-23 @ 12:05)  POCT Blood Glucose.: 291 mg/dL (07-19-23 @ 08:00)  POCT Blood Glucose.: 219 mg/dL (07-18-23 @ 21:18)  POCT Blood Glucose.: 88 mg/dL (07-18-23 @ 17:03)

## 2023-07-20 LAB
ANION GAP SERPL CALC-SCNC: 14 MMOL/L — SIGNIFICANT CHANGE UP (ref 5–17)
APTT BLD: 42.2 SEC — HIGH (ref 27.5–35.5)
BUN SERPL-MCNC: 44 MG/DL — HIGH (ref 8–20)
CALCIUM SERPL-MCNC: 7.3 MG/DL — LOW (ref 8.4–10.5)
CHLORIDE SERPL-SCNC: 86 MMOL/L — LOW (ref 96–108)
CO2 SERPL-SCNC: 26 MMOL/L — SIGNIFICANT CHANGE UP (ref 22–29)
CREAT SERPL-MCNC: 5.77 MG/DL — HIGH (ref 0.5–1.3)
EGFR: 10 ML/MIN/1.73M2 — LOW
GLUCOSE BLDC GLUCOMTR-MCNC: 140 MG/DL — HIGH (ref 70–99)
GLUCOSE BLDC GLUCOMTR-MCNC: 210 MG/DL — HIGH (ref 70–99)
GLUCOSE BLDC GLUCOMTR-MCNC: 241 MG/DL — HIGH (ref 70–99)
GLUCOSE BLDC GLUCOMTR-MCNC: 272 MG/DL — HIGH (ref 70–99)
GLUCOSE BLDC GLUCOMTR-MCNC: 276 MG/DL — HIGH (ref 70–99)
GLUCOSE SERPL-MCNC: 275 MG/DL — HIGH (ref 70–99)
HCT VFR BLD CALC: 26.6 % — LOW (ref 39–50)
HCT VFR BLD CALC: 27.5 % — LOW (ref 39–50)
HGB BLD-MCNC: 9 G/DL — LOW (ref 13–17)
HGB BLD-MCNC: 9 G/DL — LOW (ref 13–17)
MAGNESIUM SERPL-MCNC: 2 MG/DL — SIGNIFICANT CHANGE UP (ref 1.6–2.6)
MCHC RBC-ENTMCNC: 29.7 PG — SIGNIFICANT CHANGE UP (ref 27–34)
MCHC RBC-ENTMCNC: 29.8 PG — SIGNIFICANT CHANGE UP (ref 27–34)
MCHC RBC-ENTMCNC: 32.7 GM/DL — SIGNIFICANT CHANGE UP (ref 32–36)
MCHC RBC-ENTMCNC: 33.8 GM/DL — SIGNIFICANT CHANGE UP (ref 32–36)
MCV RBC AUTO: 88.1 FL — SIGNIFICANT CHANGE UP (ref 80–100)
MCV RBC AUTO: 90.8 FL — SIGNIFICANT CHANGE UP (ref 80–100)
PLATELET # BLD AUTO: 128 K/UL — LOW (ref 150–400)
PLATELET # BLD AUTO: 128 K/UL — LOW (ref 150–400)
POTASSIUM SERPL-MCNC: 5.6 MMOL/L — HIGH (ref 3.5–5.3)
POTASSIUM SERPL-SCNC: 5.6 MMOL/L — HIGH (ref 3.5–5.3)
RBC # BLD: 3.02 M/UL — LOW (ref 4.2–5.8)
RBC # BLD: 3.03 M/UL — LOW (ref 4.2–5.8)
RBC # FLD: 14.6 % — HIGH (ref 10.3–14.5)
RBC # FLD: 14.7 % — HIGH (ref 10.3–14.5)
SODIUM SERPL-SCNC: 126 MMOL/L — LOW (ref 135–145)
WBC # BLD: 4.99 K/UL — SIGNIFICANT CHANGE UP (ref 3.8–10.5)
WBC # BLD: 5.04 K/UL — SIGNIFICANT CHANGE UP (ref 3.8–10.5)
WBC # FLD AUTO: 4.99 K/UL — SIGNIFICANT CHANGE UP (ref 3.8–10.5)
WBC # FLD AUTO: 5.04 K/UL — SIGNIFICANT CHANGE UP (ref 3.8–10.5)

## 2023-07-20 PROCEDURE — 85730 THROMBOPLASTIN TIME PARTIAL: CPT

## 2023-07-20 PROCEDURE — 83550 IRON BINDING TEST: CPT

## 2023-07-20 PROCEDURE — 86803 HEPATITIS C AB TEST: CPT

## 2023-07-20 PROCEDURE — 87077 CULTURE AEROBIC IDENTIFY: CPT

## 2023-07-20 PROCEDURE — 84466 ASSAY OF TRANSFERRIN: CPT

## 2023-07-20 PROCEDURE — 84702 CHORIONIC GONADOTROPIN TEST: CPT

## 2023-07-20 PROCEDURE — 86900 BLOOD TYPING SEROLOGIC ABO: CPT

## 2023-07-20 PROCEDURE — 85576 BLOOD PLATELET AGGREGATION: CPT

## 2023-07-20 PROCEDURE — 83880 ASSAY OF NATRIURETIC PEPTIDE: CPT

## 2023-07-20 PROCEDURE — 86923 COMPATIBILITY TEST ELECTRIC: CPT

## 2023-07-20 PROCEDURE — 87507 IADNA-DNA/RNA PROBE TQ 12-25: CPT

## 2023-07-20 PROCEDURE — 84443 ASSAY THYROID STIM HORMONE: CPT

## 2023-07-20 PROCEDURE — 85025 COMPLETE CBC W/AUTO DIFF WBC: CPT

## 2023-07-20 PROCEDURE — 74176 CT ABD & PELVIS W/O CONTRAST: CPT

## 2023-07-20 PROCEDURE — 99261: CPT

## 2023-07-20 PROCEDURE — 71250 CT THORAX DX C-: CPT

## 2023-07-20 PROCEDURE — 82310 ASSAY OF CALCIUM: CPT

## 2023-07-20 PROCEDURE — 36415 COLL VENOUS BLD VENIPUNCTURE: CPT

## 2023-07-20 PROCEDURE — C8929: CPT

## 2023-07-20 PROCEDURE — 87635 SARS-COV-2 COVID-19 AMP PRB: CPT

## 2023-07-20 PROCEDURE — 85027 COMPLETE CBC AUTOMATED: CPT

## 2023-07-20 PROCEDURE — 71045 X-RAY EXAM CHEST 1 VIEW: CPT

## 2023-07-20 PROCEDURE — 87641 MR-STAPH DNA AMP PROBE: CPT

## 2023-07-20 PROCEDURE — P9016: CPT

## 2023-07-20 PROCEDURE — 86704 HEP B CORE ANTIBODY TOTAL: CPT

## 2023-07-20 PROCEDURE — 99233 SBSQ HOSP IP/OBS HIGH 50: CPT

## 2023-07-20 PROCEDURE — 84134 ASSAY OF PREALBUMIN: CPT

## 2023-07-20 PROCEDURE — 80053 COMPREHEN METABOLIC PANEL: CPT

## 2023-07-20 PROCEDURE — 99232 SBSQ HOSP IP/OBS MODERATE 35: CPT

## 2023-07-20 PROCEDURE — 84100 ASSAY OF PHOSPHORUS: CPT

## 2023-07-20 PROCEDURE — 93010 ELECTROCARDIOGRAM REPORT: CPT

## 2023-07-20 PROCEDURE — 87177 OVA AND PARASITES SMEARS: CPT

## 2023-07-20 PROCEDURE — 86706 HEP B SURFACE ANTIBODY: CPT

## 2023-07-20 PROCEDURE — 82962 GLUCOSE BLOOD TEST: CPT

## 2023-07-20 PROCEDURE — 86850 RBC ANTIBODY SCREEN: CPT

## 2023-07-20 PROCEDURE — 85610 PROTHROMBIN TIME: CPT

## 2023-07-20 PROCEDURE — 83735 ASSAY OF MAGNESIUM: CPT

## 2023-07-20 PROCEDURE — 93005 ELECTROCARDIOGRAM TRACING: CPT

## 2023-07-20 PROCEDURE — 82728 ASSAY OF FERRITIN: CPT

## 2023-07-20 PROCEDURE — 36430 TRANSFUSION BLD/BLD COMPNT: CPT

## 2023-07-20 PROCEDURE — 94010 BREATHING CAPACITY TEST: CPT

## 2023-07-20 PROCEDURE — 80048 BASIC METABOLIC PNL TOTAL CA: CPT

## 2023-07-20 PROCEDURE — 93880 EXTRACRANIAL BILAT STUDY: CPT

## 2023-07-20 PROCEDURE — 83970 ASSAY OF PARATHORMONE: CPT

## 2023-07-20 PROCEDURE — 87046 STOOL CULTR AEROBIC BACT EA: CPT

## 2023-07-20 PROCEDURE — 83540 ASSAY OF IRON: CPT

## 2023-07-20 PROCEDURE — 86901 BLOOD TYPING SEROLOGIC RH(D): CPT

## 2023-07-20 PROCEDURE — 87340 HEPATITIS B SURFACE AG IA: CPT

## 2023-07-20 PROCEDURE — 93990 DOPPLER FLOW TESTING: CPT | Mod: 26

## 2023-07-20 PROCEDURE — 87045 FECES CULTURE AEROBIC BACT: CPT

## 2023-07-20 PROCEDURE — 99024 POSTOP FOLLOW-UP VISIT: CPT

## 2023-07-20 PROCEDURE — 82947 ASSAY GLUCOSE BLOOD QUANT: CPT

## 2023-07-20 PROCEDURE — 87640 STAPH A DNA AMP PROBE: CPT

## 2023-07-20 PROCEDURE — 71045 X-RAY EXAM CHEST 1 VIEW: CPT | Mod: 26

## 2023-07-20 PROCEDURE — 87493 C DIFF AMPLIFIED PROBE: CPT

## 2023-07-20 PROCEDURE — 83036 HEMOGLOBIN GLYCOSYLATED A1C: CPT

## 2023-07-20 RX ORDER — SODIUM ZIRCONIUM CYCLOSILICATE 10 G/10G
10 POWDER, FOR SUSPENSION ORAL ONCE
Refills: 0 | Status: COMPLETED | OUTPATIENT
Start: 2023-07-20 | End: 2023-07-21

## 2023-07-20 RX ORDER — INSULIN GLARGINE 100 [IU]/ML
7 INJECTION, SOLUTION SUBCUTANEOUS AT BEDTIME
Refills: 0 | Status: DISCONTINUED | OUTPATIENT
Start: 2023-07-20 | End: 2023-07-20

## 2023-07-20 RX ORDER — INSULIN GLARGINE 100 [IU]/ML
6 INJECTION, SOLUTION SUBCUTANEOUS AT BEDTIME
Refills: 0 | Status: DISCONTINUED | OUTPATIENT
Start: 2023-07-20 | End: 2023-07-27

## 2023-07-20 RX ADMIN — POLYETHYLENE GLYCOL 3350 17 GRAM(S): 17 POWDER, FOR SOLUTION ORAL at 13:17

## 2023-07-20 RX ADMIN — SODIUM CHLORIDE 3 MILLILITER(S): 9 INJECTION INTRAMUSCULAR; INTRAVENOUS; SUBCUTANEOUS at 05:27

## 2023-07-20 RX ADMIN — Medication 2 UNIT(S): at 10:39

## 2023-07-20 RX ADMIN — Medication 3: at 10:38

## 2023-07-20 RX ADMIN — METHOCARBAMOL 500 MILLIGRAM(S): 500 TABLET, FILM COATED ORAL at 17:35

## 2023-07-20 RX ADMIN — Medication 667 MILLIGRAM(S): at 17:45

## 2023-07-20 RX ADMIN — Medication 25 MILLIGRAM(S): at 13:16

## 2023-07-20 RX ADMIN — GABAPENTIN 100 MILLIGRAM(S): 400 CAPSULE ORAL at 13:16

## 2023-07-20 RX ADMIN — SODIUM CHLORIDE 3 MILLILITER(S): 9 INJECTION INTRAMUSCULAR; INTRAVENOUS; SUBCUTANEOUS at 21:39

## 2023-07-20 RX ADMIN — GABAPENTIN 100 MILLIGRAM(S): 400 CAPSULE ORAL at 05:05

## 2023-07-20 RX ADMIN — Medication 3: at 10:40

## 2023-07-20 RX ADMIN — Medication 2: at 17:37

## 2023-07-20 RX ADMIN — GABAPENTIN 100 MILLIGRAM(S): 400 CAPSULE ORAL at 21:34

## 2023-07-20 RX ADMIN — LORATADINE 10 MILLIGRAM(S): 10 TABLET ORAL at 13:17

## 2023-07-20 RX ADMIN — TAMSULOSIN HYDROCHLORIDE 0.4 MILLIGRAM(S): 0.4 CAPSULE ORAL at 21:34

## 2023-07-20 RX ADMIN — Medication 50 MILLIGRAM(S): at 05:06

## 2023-07-20 RX ADMIN — Medication 25 MILLIGRAM(S): at 21:34

## 2023-07-20 RX ADMIN — Medication 25 MILLIGRAM(S): at 05:06

## 2023-07-20 RX ADMIN — Medication 81 MILLIGRAM(S): at 14:07

## 2023-07-20 RX ADMIN — AMLODIPINE BESYLATE 5 MILLIGRAM(S): 2.5 TABLET ORAL at 05:05

## 2023-07-20 RX ADMIN — Medication 667 MILLIGRAM(S): at 12:43

## 2023-07-20 RX ADMIN — ATORVASTATIN CALCIUM 80 MILLIGRAM(S): 80 TABLET, FILM COATED ORAL at 21:34

## 2023-07-20 RX ADMIN — HEPARIN SODIUM 950 UNIT(S)/HR: 5000 INJECTION INTRAVENOUS; SUBCUTANEOUS at 05:06

## 2023-07-20 RX ADMIN — HEPARIN SODIUM 950 UNIT(S)/HR: 5000 INJECTION INTRAVENOUS; SUBCUTANEOUS at 07:34

## 2023-07-20 RX ADMIN — INSULIN GLARGINE 6 UNIT(S): 100 INJECTION, SOLUTION SUBCUTANEOUS at 21:35

## 2023-07-20 RX ADMIN — SODIUM CHLORIDE 3 MILLILITER(S): 9 INJECTION INTRAMUSCULAR; INTRAVENOUS; SUBCUTANEOUS at 13:04

## 2023-07-20 RX ADMIN — Medication 50 MILLIGRAM(S): at 13:17

## 2023-07-20 RX ADMIN — CLOPIDOGREL BISULFATE 75 MILLIGRAM(S): 75 TABLET, FILM COATED ORAL at 13:16

## 2023-07-20 RX ADMIN — PANTOPRAZOLE SODIUM 40 MILLIGRAM(S): 20 TABLET, DELAYED RELEASE ORAL at 13:17

## 2023-07-20 RX ADMIN — Medication 2 UNIT(S): at 10:35

## 2023-07-20 RX ADMIN — SENNA PLUS 2 TABLET(S): 8.6 TABLET ORAL at 21:35

## 2023-07-20 RX ADMIN — Medication 667 MILLIGRAM(S): at 10:31

## 2023-07-20 RX ADMIN — Medication 2 UNIT(S): at 17:36

## 2023-07-20 RX ADMIN — METHOCARBAMOL 500 MILLIGRAM(S): 500 TABLET, FILM COATED ORAL at 05:06

## 2023-07-20 NOTE — PROGRESS NOTE ADULT - SUBJECTIVE AND OBJECTIVE BOX
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 In ER EKGs with intermittent diffuse ST changes, + troponins, +NSTEMI, started on a Tridil and Heparin gtt for NSTEMI, admitted to CTICU, IABP placed, now s/p CABG x 4 (LIMA-LAD, SVG-OM 1, OM2, PDA) on 7/14/23, IABP removed after OR without incident. Postop course notable for PAF with 4.26 sec conversion pause 7/19/23, now undergoing evaluation for PPM with EP.      Significant recent/past 24 hr events: No overnight events reported.    Subjective: Patient lying in bed in NAD. +Tolerating diet. +Passing BMs since surgery. +Pain currently controlled. Denies fevers, chills, lightheadedness, dizziness, HA, CP, palpitations, SOB, cough, abdominal pain, N/V, diarrhea, numbness/tingling in extremities, or any other acute complaints. ROS negative x 10 systems except as noted above.    MEDICATIONS  (STANDING):  amLODIPine   Tablet 5 milliGRAM(s) Oral daily  aspirin enteric coated 81 milliGRAM(s) Oral daily  atorvastatin 80 milliGRAM(s) Oral at bedtime  calcium acetate 667 milliGRAM(s) Oral three times a day with meals  clopidogrel Tablet 75 milliGRAM(s) Oral daily  epoetin lois-epbx (RETACRIT) Injectable 2000 Unit(s) IV Push   gabapentin 100 milliGRAM(s) Oral three times a day  heparin  Infusion.  Unit(s)/Hr (8 mL/Hr) IV Continuous   hydrALAZINE 25 milliGRAM(s) Oral three times a day  insulin glargine Injectable (LANTUS) 6 Unit(s) SubCutaneous at bedtime  insulin lispro (ADMELOG) corrective regimen sliding scale   SubCutaneous three times a day before meals  insulin lispro (ADMELOG) corrective regimen sliding scale   SubCutaneous at bedtime  insulin lispro Injectable (ADMELOG) 2 Unit(s) SubCutaneous three times a day before meals  loratadine 10 milliGRAM(s) Oral daily  methocarbamol 500 milliGRAM(s) Oral two times a day  metoprolol tartrate 50 milliGRAM(s) Oral every 8 hours  pantoprazole    Tablet 40 milliGRAM(s) Oral daily  polyethylene glycol 3350 17 Gram(s) Oral daily  senna 2 Tablet(s) Oral at bedtime  sodium chloride 0.9% lock flush 3 milliLiter(s) IV Push every 8 hours  tamsulosin 0.4 milliGRAM(s) Oral at bedtime    MEDICATIONS  (PRN):  bisacodyl 5 milliGRAM(s) Oral every 12 hours PRN Constipation  dextrose Oral Gel 15 Gram(s) Oral once PRN Blood Glucose LESS THAN 70 milliGRAM(s)/deciliter  oxyCODONE    IR 5 milliGRAM(s) Oral every 4 hours PRN Moderate Pain (4 - 6)    Allergies:  No Known Drug Allergies  shellfish (Rash)    Vitals   T(C): 37 (19 Jul 2023 23:45), Max: 37.2 (19 Jul 2023 07:24)  T(F): 98.6 (19 Jul 2023 23:45), Max: 98.9 (19 Jul 2023 07:24)  HR: 61 (19 Jul 2023 23:45) (59 - 124)  BP: 133/52 (19 Jul 2023 23:45) (133/52 - 169/68)  RR: 18 (19 Jul 2023 23:45) (16 - 18)  SpO2: 99% (19 Jul 2023 23:45) (94% - 100%)  O2 Parameters below as of 19 Jul 2023 20:20  Patient On (Oxygen Delivery Method): room air    I&O's Detail    18 Jul 2023 07:01  -  19 Jul 2023 07:00  --------------------------------------------------------  IN:    Oral Fluid: 1040 mL  Total IN: 1040 mL    OUT:    Other (mL): 1500 mL    Voided (mL): 0 mL  Total OUT: 1500 mL    Total NET: -460 mL      19 Jul 2023 07:01  -  20 Jul 2023 01:53  --------------------------------------------------------  IN:    Oral Fluid: 840 mL  Total IN: 840 mL    OUT:    Voided (mL): 0 mL  Total OUT: 0 mL    Total NET: 840 mL    Physical Exam  Constitutional: NAD  Neuro: A+O x 3, non-focal, speech clear and intact  HEENT: NC/AT  Neck:  Supple, trachea midline=  Chest: +PW (settings: VVI, rate: 40, mA: 10, sensitivity: 2.0 0.8)  CV: regular rate, regular rhythm, +S1S2, no murmurs or rub  Pulm/chest: lung sounds diminished at bases with bibasilar crackles, no accessory muscle use noted  Abd: soft, NT, ND, + BS  Ext: BARRETO x 4, +1 LE pitting edema bilaterally, distal motor/neuro/circ intact, Right UE AVF +bruit/+thrill   Skin: warm, dry, perfused  Psych: calm, appropriate affect   Incision: midsternal incision open to air C/D/I, sternum stable, Right LE EVH site open to air, C/D/I    LABS                        10.1   5.03  )-----------( 137      ( 19 Jul 2023 05:16 )             31.5     07-19    131<L>  |  89<L>  |  58.0<H>  ----------------------------<  273<H>  5.8<H>   |  24.0  |  6.79<H>    Ca    7.7<L>      19 Jul 2023 05:16  Phos  3.8     07-19  Mg     2.3     07-19    PTT - ( 19 Jul 2023 21:16 )  PTT:34.3 sec    Urinalysis Basic - ( 19 Jul 2023 05:16 )  Color: x / Appearance: x / SG: x / pH: x  Gluc: 273 mg/dL / Ketone: x  / Bili: x / Urobili: x   Blood: x / Protein: x / Nitrite: x   Leuk Esterase: x / RBC: x / WBC x   Sq Epi: x / Non Sq Epi: x / Bacteria: x    POCT Blood Glucose.: 301 mg/dL (07-19-23 @ 20:53)  POCT Blood Glucose.: 115 mg/dL (07-19-23 @ 16:57)  POCT Blood Glucose.: 73 mg/dL (07-19-23 @ 16:22)  POCT Blood Glucose.: 68 mg/dL (07-19-23 @ 16:08)  POCT Blood Glucose.: 72 mg/dL (07-19-23 @ 12:05)  POCT Blood Glucose.: 291 mg/dL (07-19-23 @ 08:00)    Last CXR:  < from: Xray Chest 1 View- PORTABLE-Routine (Xray Chest 1 View- PORTABLE-Routine in AM.) (07.18.23 @ 04:50) >  IMPRESSION:  Mild congestion in latest image. No pneumothorax.  < end of copied text >

## 2023-07-20 NOTE — PROGRESS NOTE ADULT - ASSESSMENT
63 year old legally blind male with PMH of DM complicated by neuropathy, HTN, HLD, CAD, PVD, gastroparesis, ESRD on HD TTS and BPH s/p CABG x4 on 07/14/23. Nephrology is managing ESRD on HD.     -Access: R AV fistula   -Outpatient dialysis days are Tuesdays Thursdays Saturdays  -Got HD on Tuesday and Wednesday   - remains hyperkalemic  - Give Lokelma 10 g x1  -Vascular surgery eval to assess for recirculation  - HD tomorrow    -BP stable  -Volume status -UF as tolerated during HD   -Anemia - KIMBERLY with HD   -Mineral Bone Disease in setting of CKD - continue oral phos binders with meals. monitor Ca++ and phos       dw CTS PA

## 2023-07-20 NOTE — PROGRESS NOTE ADULT - SUBJECTIVE AND OBJECTIVE BOX
[facility]   [facility]   Office: [address] [place] [address]   Telephone: [phone]  Fax: [phone]                     Electrophysiology Consult Note                                                                                                                                          Consult requested by: TONI Sheets  Reason for Consultation: post-op AFib/AFlutter  History obtained by: pt and EMR    obtained: n/a     HPI:  63 year old male with HTN, hyperlipidemia, ESRD on HD (T, Th, Sat), COPD, DM type 2 uncontrolled, gastroparesis s/p gastric PM 2/2013, peripheral blindness and retinopathy, BPH, recent CDiff infection 6/1/23 s/p 10 day course of oral vancomycin, CAD s/p prior PCI, and recent hospitalization 6/2023 for NSTEMI, ICM LVEF 35-40%, and LHC which demonstrated multi-vessel disease. He was undergoing CABG workup. He presented to [facility] ED 7/13 with complaints of worsening chest pain and shortness of breath. In ER, ECG with intermittent diffuse ST changes, + troponins. Seen by cardiology and started on tridil and heparin gtt for NSTEM, admitted to CTICU and IABP placed.  He underwent 4vCABG (LIMA-LAD, XKA-UO9-AD1-PDA) on 7/14/23, and IABP was removed post-operatively. Post op course notable for new AFib/AFlutter today for which EP consult is requested. Denies prior history of atrial arrhythmia. Denies chest pain, palpitation, shortness of breath, or dizziness at this time.     Telemetry reviewed: currently in sinus rhythm 60s; tele notable for sinus rhythm w/ frequent APCs, brief runs of AFlutter, and few conversion pauses- longest on 7/14/23 @ 13:41 4.26 seconds in duration.   EKG 7/17/23 NSR 57 bpm, normal MO, normal QRS     Follow up 7/20/23:  Seen and examined in chair. No recurrent AF/AFL. No recurrent pauses. Tolerating current dose metoprolol. Has epicardial pacer wires. Denies any prior syncope (other than with hypoglycemia). Reports rare palpitation.       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 No nausea, vomiting, or hematemesis; No diarrhea or constipation. No melena or hematochezia.  GENITOURINARY: No dysuria, frequency, hematuria, or incontinence  NEUROLOGICAL: No headaches, memory loss, loss of strength, numbness, or tremors  SKIN: No itching, burning, rashes, or lesions   LYMPH NODES: No enlarged glands  ENDOCRINE: No heat or cold intolerance; No hair loss  MUSCULOSKELETAL: No joint pain or swelling; No muscle, back, or extremity pain  PSYCHIATRIC: No depression, anxiety, mood swings, or difficulty sleeping  HEME/LYMPH: No easy bruising, or bleeding gums  ALLERGY AND IMMUNOLOGIC: No hives or eczema    MEDICATIONS  (STANDING):  amLODIPine   Tablet 5 milliGRAM(s) Oral daily  aspirin enteric coated 81 milliGRAM(s) Oral daily  atorvastatin 80 milliGRAM(s) Oral at bedtime  calcium acetate 667 milliGRAM(s) Oral three times a day with meals  clopidogrel Tablet 75 milliGRAM(s) Oral daily  dextrose 50% Injectable 25 milliLiter(s) IV Push every 15 minutes  dextrose 50% Injectable 50 milliLiter(s) IV Push every 15 minutes  epoetin lois-epbx (RETACRIT) Injectable 2000 Unit(s) IV Push <User Schedule>  gabapentin 100 milliGRAM(s) Oral three times a day  glucagon  Injectable 1 milliGRAM(s) IntraMuscular once  hydrALAZINE 25 milliGRAM(s) Oral three times a day  insulin glargine Injectable (LANTUS) 6 Unit(s) SubCutaneous at bedtime  insulin lispro (ADMELOG) corrective regimen sliding scale   SubCutaneous three times a day before meals  insulin lispro (ADMELOG) corrective regimen sliding scale   SubCutaneous at bedtime  insulin lispro Injectable (ADMELOG) 2 Unit(s) SubCutaneous three times a day before meals  loratadine 10 milliGRAM(s) Oral daily  methocarbamol 500 milliGRAM(s) Oral two times a day  metoprolol tartrate 50 milliGRAM(s) Oral every 8 hours  pantoprazole    Tablet 40 milliGRAM(s) Oral daily  polyethylene glycol 3350 17 Gram(s) Oral daily  senna 2 Tablet(s) Oral at bedtime  sodium chloride 0.9% lock flush 3 milliLiter(s) IV Push every 8 hours  tamsulosin 0.4 milliGRAM(s) Oral at bedtime    MEDICATIONS  (PRN):  bisacodyl 5 milliGRAM(s) Oral every 12 hours PRN Constipation  dextrose Oral Gel 15 Gram(s) Oral once PRN Blood Glucose LESS THAN 70 milliGRAM(s)/deciliter  oxyCODONE    IR 5 milliGRAM(s) Oral every 4 hours PRN Moderate Pain (4 - 6)      Allergies:  No Known Drug Allergies  shellfish (Rash)    SOCIAL HISTORY: lives w/ family     FAMILY HISTORY:  No pertinent family history in first degree relatives    Vital Signs Last 24 Hrs  T(C): 36.8 (20 Jul 2023 07:45), Max: 37.4 (20 Jul 2023 04:40)  T(F): 98.3 (20 Jul 2023 07:45), Max: 99.4 (20 Jul 2023 04:40)  HR: 62 (20 Jul 2023 07:45) (59 - 124)  BP: 169/74 (20 Jul 2023 07:45) (133/52 - 169/74)  BP(mean): --  RR: 18 (20 Jul 2023 07:45) (17 - 18)  SpO2: 93% (20 Jul 2023 07:45) (93% - 100%)    Parameters below as of 20 Jul 2023 07:45  Patient On (Oxygen Delivery Method): room air        Physical Exam:  Constitutional: AAOx3, NAD  Neck: supple, No JVD  Cardiovascular: +S1S2 RRR, no murmurs, rubs, gallops   Chest: healing incision   Pulmonary: CTA b/l, unlabored, no wheezes   Abdomen:  soft NTND  Extremities: mild edema b/l,   Neuro: non focal, speech clear, BARRETO x 4    LABS:                                   9.0    5.04  )-----------( 128      ( 20 Jul 2023 05:00 )             27.5   07-20    126<L>  |  86<L>  |  44.0<H>  ----------------------------<  275<H>  5.6<H>   |  26.0  |  5.77<H>    Ca    7.3<L>      20 Jul 2023 05:00  Phos  3.8     07-19  Mg     2.0     07-20      RADIOLOGY & ADDITIONAL STUDIES:  Echo: 7/19/23: Summary:   1. Normal left ventricular internal cavity size.   2. Normal global left ventricular systolic function.   3. Left ventricular ejection fraction, by visual estimation, is 50 to 55%.   4. Abnormal septal motion consistent with post-operative status.   5. Spectral Doppler shows pseudonormal pattern of left ventricular myocardial filling (Grade II diastolic dysfunction).   6. Mildly reduced RV systolic function.   7. The left atrium is normal in size.   8. The right atrium is normal in size.   9. Sclerotic aorticvalve with normal opening.  10. Mild thickening and calcification of the anterior and posterior mitral valve leaflets.  11. Mild mitral annular calcification.  12. Mild mitral valve regurgitation.  13. Mild tricuspid regurgitation.  14. Estimated pulmonary artery systolic pressure is 35.8 mmHg assuming a right atrial pressure of 15 mmHg, which is consistent with borderline pulmonary hypertension.  15. There is no evidence of pericardial effusion.  Paulo Perez MD Electronically signed on 7/19/2023 at 11:14:33 AM    Echo: 7/13/23: Summary:   1. Technically suboptimal study.   2. The left atrium is normal in size.   3. Normal wall motion. Left ventricular ejection fraction, by visual estimation, is 50 to 55%.   4. The right atrium is normal in size.   5. Normal right ventricular size and function.   6. NO significant valvular abnormality.   7. There is no evidence of pericardial effusion.  Eneida Hill MD, RPVI Electronically signed on 7/13/2023 at 12:47:30PM    CXR: 7/17/23:   IMPRESSION:  Mild congestion in latest image. No pneumothorax.  Thank you for the courtesy of this referral.  --- End of Report ---  RAVEN DELUNA MD; Attending Interventional Radiologist  This document has been electronically signed. Jul 18 2023  3:18PM

## 2023-07-20 NOTE — PROGRESS NOTE ADULT - PROBLEM SELECTOR PLAN 3
Brief episode of AFib noted POD #4 with spontaneous conversation to NSR without intervention.  Lopressor increased to 50mg TID.  Subsequent episode of MAGDA with 4.26 second conversion pause.  EP following and evaluating need for PPM.   Remains NPO this AM pending decision.

## 2023-07-20 NOTE — CONSULT NOTE ADULT - ASSESSMENT
ASSESSMENT: Patient is a 63y old male who recently underwent emergent CABG, ESRD on HD with persistent hyperkalemia, with concern for recirculation through fistula, fistula has great thrill, will need duplex to assess fistula.    PLAN:    - f/u US HD access duplex of fistula to assess recirculation  - trend K  - patient in [facility]  - renal replacement diet with concentrated K restriction  - will follow results and progress

## 2023-07-20 NOTE — PROGRESS NOTE ADULT - SUBJECTIVE AND OBJECTIVE BOX
[facility] DIVISION OF KIDNEY DISEASES AND HYPERTENSION -- HEMODIALYSIS NOTE  --------------------------------------------------------------------------------  Chief Complaint: ESRD/Ongoing hemodialysis requirement    24 hour events/subjective:  pt s/p HD yesterday    PAST HISTORY  --------------------------------------------------------------------------------  No significant changes to PMH, PSH, FHx, SHx, unless otherwise noted    ALLERGIES & MEDICATIONS  --------------------------------------------------------------------------------  Allergies  No Known Drug Allergies  shellfish (Rash)      Standing Inpatient Medications  amLODIPine   Tablet 5 milliGRAM(s) Oral daily  aspirin enteric coated 81 milliGRAM(s) Oral daily  atorvastatin 80 milliGRAM(s) Oral at bedtime  calcium acetate 667 milliGRAM(s) Oral three times a day with meals  clopidogrel Tablet 75 milliGRAM(s) Oral daily  dextrose 50% Injectable 25 milliLiter(s) IV Push every 15 minutes  dextrose 50% Injectable 50 milliLiter(s) IV Push every 15 minutes  epoetin lois-epbx (RETACRIT) Injectable 2000 Unit(s) IV Push <User Schedule>  gabapentin 100 milliGRAM(s) Oral three times a day  glucagon  Injectable 1 milliGRAM(s) IntraMuscular once  hydrALAZINE 25 milliGRAM(s) Oral three times a day  insulin glargine Injectable (LANTUS) 6 Unit(s) SubCutaneous at bedtime  insulin lispro (ADMELOG) corrective regimen sliding scale   SubCutaneous at bedtime  insulin lispro (ADMELOG) corrective regimen sliding scale   SubCutaneous three times a day before meals  insulin lispro Injectable (ADMELOG) 2 Unit(s) SubCutaneous three times a day before meals  loratadine 10 milliGRAM(s) Oral daily  methocarbamol 500 milliGRAM(s) Oral two times a day  metoprolol tartrate 50 milliGRAM(s) Oral every 8 hours  pantoprazole    Tablet 40 milliGRAM(s) Oral daily  polyethylene glycol 3350 17 Gram(s) Oral daily  senna 2 Tablet(s) Oral at bedtime  sodium chloride 0.9% lock flush 3 milliLiter(s) IV Push every 8 hours  sodium zirconium cyclosilicate 10 Gram(s) Oral once  tamsulosin 0.4 milliGRAM(s) Oral at bedtime    PRN Inpatient Medications  bisacodyl 5 milliGRAM(s) Oral every 12 hours PRN  dextrose Oral Gel 15 Gram(s) Oral once PRN  oxyCODONE    IR 5 milliGRAM(s) Oral every 4 hours PRN      REVIEW OF SYSTEMS  --------------------------------------------------------------------------------  Gen: No weight changes, fatigue, fevers/chills, weakness  Skin: No rashes  Head/Eyes/Ears/Mouth: No headache  Respiratory: No dyspnea, cough,  CV: No chest pain, orthopnea  GI: No abdominal pain, diarrhea, constipation, nausea, vomiting,  MSK: No joint pain  Neuro: No dizziness/lightheadedness, weakness  Heme: No bleeding  Psych: No significant nervousness, anxiety, stress, depression    All other systems were reviewed and are negative, except as noted.    VITALS/PHYSICAL EXAM  --------------------------------------------------------------------------------  T(C): 37 (07-20-23 @ 16:00), Max: 37.4 (07-20-23 @ 04:40)  HR: 58 (07-20-23 @ 16:00) (58 - 101)  BP: 146/68 (07-20-23 @ 16:00) (133/52 - 169/74)  RR: 18 (07-20-23 @ 16:00) (17 - 18)  SpO2: 98% (07-20-23 @ 16:00) (93% - 100%)  Wt(kg): --    Weight (kg): 68.4 (07-19-23 @ 04:34)      07-19-23 @ 07:01  -  07-20-23 @ 07:00  --------------------------------------------------------  IN: 923 mL / OUT: 0 mL / NET: 923 mL    07-20-23 @ 07:01  -  07-20-23 @ 19:44  --------------------------------------------------------  IN: 480 mL / OUT: 240 mL / NET: 240 mL      Physical Exam:  	Gen: NAD  	HEENT:  supple neck  	Pulm: CTA B/L  	CV: RRR, S1S2; no rub  	Abd: +BS, soft, nontender  	UE: Warm  	LE: Warm, no edema  	Neuro: No focal deficits  	Psych: Normal affect and mood  	Skin: Warm  	Vascular access: RUE AVF with thrill/ bruit+    LABS/STUDIES  --------------------------------------------------------------------------------              9.0    5.04  >-----------<  128      [07-20-23 @ 05:00]              27.5     126  |  86  |  44.0  ----------------------------<  275      [07-20-23 @ 05:00]  5.6   |  26.0  |  5.77        Ca     7.3     [07-20-23 @ 05:00]      Mg     2.0     [07-20-23 @ 05:00]      Phos  3.8     [07-19-23 @ 05:16]        PTT: 42.2       [07-20-23 @ 03:33]      Iron 45, TIBC 216, %sat 21      [06-13-23 @ 15:10]  Ferritin 1092      [06-13-23 @ 15:10]  PTH -- (Ca 7.8)      [06-13-23 @ 15:10]   175  TSH 4.14      [06-12-23 @ 21:50]    HBsAb 15.0      [07-13-23 @ 21:35]  HBsAg Nonreact      [07-13-23 @ 21:35]

## 2023-07-20 NOTE — PROGRESS NOTE ADULT - SUBJECTIVE AND OBJECTIVE BOX
INTERVAL EVENTS:  Follow up diabetes management    ROS: Denies chest pain, sob, abd paim.    MEDICATIONS  (STANDING):  amLODIPine   Tablet 5 milliGRAM(s) Oral daily  aspirin enteric coated 81 milliGRAM(s) Oral daily  atorvastatin 80 milliGRAM(s) Oral at bedtime  calcium acetate 667 milliGRAM(s) Oral three times a day with meals  clopidogrel Tablet 75 milliGRAM(s) Oral daily  dextrose 50% Injectable 25 milliLiter(s) IV Push every 15 minutes  dextrose 50% Injectable 50 milliLiter(s) IV Push every 15 minutes  epoetin lois-epbx (RETACRIT) Injectable 2000 Unit(s) IV Push <User Schedule>  gabapentin 100 milliGRAM(s) Oral three times a day  glucagon  Injectable 1 milliGRAM(s) IntraMuscular once  hydrALAZINE 25 milliGRAM(s) Oral three times a day  insulin glargine Injectable (LANTUS) 7 Unit(s) SubCutaneous at bedtime  insulin lispro (ADMELOG) corrective regimen sliding scale   SubCutaneous three times a day before meals  insulin lispro (ADMELOG) corrective regimen sliding scale   SubCutaneous at bedtime  insulin lispro Injectable (ADMELOG) 2 Unit(s) SubCutaneous three times a day before meals  loratadine 10 milliGRAM(s) Oral daily  methocarbamol 500 milliGRAM(s) Oral two times a day  metoprolol tartrate 50 milliGRAM(s) Oral every 8 hours  pantoprazole    Tablet 40 milliGRAM(s) Oral daily  polyethylene glycol 3350 17 Gram(s) Oral daily  senna 2 Tablet(s) Oral at bedtime  sodium chloride 0.9% lock flush 3 milliLiter(s) IV Push every 8 hours  sodium zirconium cyclosilicate 10 Gram(s) Oral once  tamsulosin 0.4 milliGRAM(s) Oral at bedtime    MEDICATIONS  (PRN):  bisacodyl 5 milliGRAM(s) Oral every 12 hours PRN Constipation  dextrose Oral Gel 15 Gram(s) Oral once PRN Blood Glucose LESS THAN 70 milliGRAM(s)/deciliter  oxyCODONE    IR 5 milliGRAM(s) Oral every 4 hours PRN Moderate Pain (4 - 6)    Allergies  No Known Drug Allergies  shellfish (Rash)    Vital Signs Last 24 Hrs  T(C): 36.8 (20 Jul 2023 07:45), Max: 37.4 (20 Jul 2023 04:40)  T(F): 98.3 (20 Jul 2023 07:45), Max: 99.4 (20 Jul 2023 04:40)  HR: 62 (20 Jul 2023 12:00) (59 - 124)  BP: 169/74 (20 Jul 2023 07:45) (133/52 - 169/74)  BP(mean): --  RR: 18 (20 Jul 2023 12:00) (17 - 18)  SpO2: 98% (20 Jul 2023 12:00) (93% - 100%)    Parameters below as of 20 Jul 2023 12:00  Patient On (Oxygen Delivery Method): room air    PHYSICAL EXAM  General: No apparent distress  Neck: Supple, trachea midline, no thyromegaly  Respiratory: Lungs clear bilaterally, normal rate, effort  Cardiac: +S1, S2, no m/r/g  GI: +BS, soft, non tender, non distended  Extremities: No peripheral edema, no pedal lesions  Neuro: A+O X3, no tremor      LABS:                        9.0    5.04  )-----------( 128      ( 20 Jul 2023 05:00 )             27.5     07-20    126<L>  |  86<L>  |  44.0<H>  ----------------------------<  275<H>  5.6<H>   |  26.0  |  5.77<H>    Ca    7.3<L>      20 Jul 2023 05:00  Phos  3.8     07-19  Mg     2.0     07-20      Urinalysis Basic - ( 20 Jul 2023 05:00 )    Color: x / Appearance: x / SG: x / pH: x  Gluc: 275 mg/dL / Ketone: x  / Bili: x / Urobili: x   Blood: x / Protein: x / Nitrite: x   Leuk Esterase: x / RBC: x / WBC x   Sq Epi: x / Non Sq Epi: x / Bacteria: x    POCT Blood Glucose.: 241 mg/dL (07-20-23 @ 12:04)  POCT Blood Glucose.: 276 mg/dL (07-20-23 @ 10:33)  POCT Blood Glucose.: 272 mg/dL (07-20-23 @ 07:53)  POCT Blood Glucose.: 301 mg/dL (07-19-23 @ 20:53)  POCT Blood Glucose.: 115 mg/dL (07-19-23 @ 16:57)  POCT Blood Glucose.: 73 mg/dL (07-19-23 @ 16:22)  POCT Blood Glucose.: 68 mg/dL (07-19-23 @ 16:08)

## 2023-07-20 NOTE — PROGRESS NOTE ADULT - ASSESSMENT
63M PMHx ESRD on HD (T,Th, S), IDDM, COPD, gastroparesis, peripheral blindness and retinopathy, NSTEMI 6/2022 with recent hospital admission for CABG eval, now presents from home with complaints of worsening chest pain associated with difficulty breathing. Admitted to CTICU, IABP placed, now s/p CABG on 7/14.    1. DM2, a1c 9.4% - FS running in 200s today  - Lantus was held last night (was NPO for possible PPM)  - Continue current doses for now  - Premeal admelog 2 units tid  - Lantus 6 units qhs     2. CAD  - S/p CABG, care per CT surgery team    3. ESRD  - HD as per nephrology     4. Afib/aflutter  - EP following  - On metoprolol

## 2023-07-20 NOTE — PROGRESS NOTE ADULT - ASSESSMENT
Assessment/Recommendations:   63 year old male with HTN, hyperlipidemia, ESRD on HD (T, Th, Sat), COPD, DM type 2 uncontrolled, gastroparesis s/p gastric PM 2/2013, peripheral blindness and retinopathy, BPH, recent CDiff infection 6/1/23 s/p 10 day course of oral vancomycin, CAD s/p prior PCI, and recent hospitalization 6/2023 for NSTEMI, ICM LVEF 35-40%, and LHC which demonstrated multi-vessel disease. He was undergoing CABG workup. He presented to [facility] ED 7/13 with complaints of worsening chest pain and shortness of breath. In ER, ECG with intermittent diffuse ST changes, + troponins. Seen by cardiology and started on tridil and heparin gtt for NSTEM, admitted to CTICU and IABP placed.  He underwent 4vCABG (LIMA-LAD, [id number]-PDA) on 7/14/23, and IABP was removed post-operatively. Post op course notable for new AFib/AFlutter for which EP is consulted. Telemetry reviewed and shows mostly sinus rhythm w/ frequent APCs and some brief runs of AFib and AFlutter with conversion pause up to 4.26 seconds. Pt is asymptomatic during these events.     Recommend continuing current medical therapy, including beta blocker. If significant pauses greater than 5 seconds or if pt becomes symptomatic, he may require pacing support. He is high risk for transvenous device implant infection given ESRD/HD and therefore would favor leadless pacemaker.     Follow up 7/20/23:  Seen and examined in chair. No recurrent AF/AFL. No recurrent pauses. Tolerating current dose metoprolol. Has epicardial pacer wires. Denies any prior syncope (other than with hypoglycemia). Reports rare palpitation.     - con't metoprolol at current dose, add IV for sustained tachycardia PRN. Can change to long acting Toprol on discharge.   - recommend Eliquis 5mg Q 12 hrs when OK by CTSx, however would hold for now as pt may require PM implant. Can consider therapeutic heparin gtt in the interim.   - Can eat for now. No plans for PPM today.   - telemetry monitoring   - will follow     Will follow up

## 2023-07-20 NOTE — PROGRESS NOTE ADULT - ASSESSMENT
63M PMHx ESRD on HD (T,Th,S), IDDM, COPD, gastroparesis, peripheral blindness and retinopathy, NSTEMI 6/2022 with recent hospital admission for CABG eval, c diff + that admission, now presents from home with complaints of worsening chest pain associated with difficulty breathing. In ER EKGs with intermittent diffuse ST changes, + troponins, +NSTEMI, started on a Tridil and Heparin gtt for NSTEMI, admitted to CTICU, IABP placed, now s/p CABG x 4 (LIMA-LAD, SVG-OM 1, OM2, PDA) on 7/14/23, IABP removed after OR without incident. Postop course notable for PAF with 4.26 sec conversion pause 7/19/23, now undergoing evaluation for PPM with EP.

## 2023-07-20 NOTE — CONSULT NOTE ADULT - SUBJECTIVE AND OBJECTIVE BOX
ACUTE CARE SURGERY CONSULT     HPI: 63y Male admitted for emergent CABG, history of ESRD with a RUE AVF, created 5 years ago, usually without problems, however recently (past 3 days) has had Hyperkalemia despite HD, per patient he is tolerating his dialysis sessions without a problem and is very strict with his diet at home. we were consulted due to concern for recirculation given persistent hyperkalemia. he denies hand numbness pain with dialysis, his fistula feels similar as it did before intervention per patient    ROS: 10-system review is otherwise negative except HPI above.      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    SOCIAL HISTORY:  Denies any toxic habits    ALLERGIES: NKA No Known Drug Allergies  shellfish (Rash)      HOME MEDICATIONS: ***  Home Medications:  acetaminophen 325 mg oral tablet: 2 tab(s) orally every 8 hours as needed for  mild pain (13 Jun 2023 04:19)  Albuterol (Eqv-ProAir HFA) 90 mcg/inh inhalation aerosol: 2 puff(s) inhaled 4 times a day as needed for  bronchospasm (19 Jun 2023 12:03)  calcium acetate 667 mg oral tablet: 1 tab(s) orally 3 times a day (19 Jun 2023 12:03)  epoetin lois 10,000 units/mL injectable solution: 10,000 unit(s) subcutaneous 3 times a week MWF (13 Jun 2023 04:19)  Flomax 0.4 mg oral capsule: 1 cap(s) orally once a day (13 Jun 2023 04:35)  gabapentin 300 mg oral capsule: 1 cap(s) orally 2 times a day (18 Jun 2023 07:06)  hyoscyamine 0.125 mg oral tablet: 1 tab(s) orally 4 times a day as needed for  abdominal spasm (13 Jun 2023 04:33)  Lantus 100 units/mL subcutaneous solution: 6 unit(s) subcutaneous once a day (at bedtime) (18 Jun 2023 07:06)  lidocaine 4% topical film: Apply topically to affected area once a day (13 Jun 2023 04:19)  loratadine 10 mg oral tablet: 1 tab(s) orally once a day (13 Jun 2023 04:19)  melatonin 5 mg oral tablet: 1 tab(s) orally once a day (at bedtime) (13 Jun 2023 04:19)  methocarbamol 500 mg oral tablet: 1 tab(s) orally 2 times a day (13 Jun 2023 04:33)  nitroglycerin 0.4 mg sublingual tablet: 1 tab(s) sublingually once a day as needed for  chest pain (13 Jun 2023 04:19)  omeprazole 20 mg oral delayed release capsule: 1 cap(s) orally once a day (13 Jun 2023 04:33)  pravastatin 40 mg oral tablet: 1 tab(s) orally once a day (at bedtime) (13 Jun 2023 04:33)  sodium bicarbonate 650 mg oral tablet: 2 tab(s) orally 3 times a day (13 Jun 2023 04:33)      --------------------------------------------------------------------------------------------    PHYSICAL EXAM:   General: NAD, Lying in bed comfortably  Neuro: A+Ox3  Chest: Midsternotomy scar healing well  GI/Abd: Soft, NT/ND, no rebound/guarding, no masses palpated  Vascular: Right radial palpable pulse, RUE AVF with good thrill at antecubital fossa, dilated upstream where it was recently accessed, no scars in right neck indicating recent central catheterization    --------------------------------------------------------------------------------------------    LABS                 9.0    5.04   )----------(  128       ( 20 Jul 2023 05:00 )               27.5      126    |  86     |  44.0   ----------------------------<  275        ( 20 Jul 2023 05:00 )  5.6     |  26.0   |  5.77     Ca    7.3        ( 20 Jul 2023 05:00 )  Mg     2.0       ( 20 Jul 2023 05:00 )          PTT -  42.2 sec   ( 20 Jul 2023 03:33 )  CAPILLARY BLOOD GLUCOSE        Urinalysis Basic - ( 20 Jul 2023 05:00 )    Color: x / Appearance: x / SG: x / pH: x  Gluc: 275 mg/dL / Ketone: x  / Bili: x / Urobili: x   Blood: x / Protein: x / Nitrite: x   Leuk Esterase: x / RBC: x / WBC x   Sq Epi: x / Non Sq Epi: x / Bacteria: x

## 2023-07-21 LAB
ANION GAP SERPL CALC-SCNC: 16 MMOL/L — SIGNIFICANT CHANGE UP (ref 5–17)
APTT BLD: 31.3 SEC — SIGNIFICANT CHANGE UP (ref 27.5–35.5)
APTT BLD: 40.5 SEC — HIGH (ref 27.5–35.5)
BUN SERPL-MCNC: 55.2 MG/DL — HIGH (ref 8–20)
BUN SERPL-MCNC: 57.2 MG/DL — HIGH (ref 8–20)
BUN SERPL-MCNC: 61.8 MG/DL — HIGH (ref 8–20)
BUN SERPL-MCNC: 7.4 MG/DL — LOW (ref 8–20)
CALCIUM SERPL-MCNC: 7.9 MG/DL — LOW (ref 8.4–10.5)
CHLORIDE SERPL-SCNC: 87 MMOL/L — LOW (ref 96–108)
CO2 SERPL-SCNC: 26 MMOL/L — SIGNIFICANT CHANGE UP (ref 22–29)
CREAT SERPL-MCNC: 7.2 MG/DL — HIGH (ref 0.5–1.3)
EGFR: 8 ML/MIN/1.73M2 — LOW
GLUCOSE BLDC GLUCOMTR-MCNC: 156 MG/DL — HIGH (ref 70–99)
GLUCOSE BLDC GLUCOMTR-MCNC: 160 MG/DL — HIGH (ref 70–99)
GLUCOSE BLDC GLUCOMTR-MCNC: 185 MG/DL — HIGH (ref 70–99)
GLUCOSE BLDC GLUCOMTR-MCNC: 220 MG/DL — HIGH (ref 70–99)
GLUCOSE BLDC GLUCOMTR-MCNC: 310 MG/DL — HIGH (ref 70–99)
GLUCOSE BLDC GLUCOMTR-MCNC: 62 MG/DL — LOW (ref 70–99)
GLUCOSE BLDC GLUCOMTR-MCNC: 88 MG/DL — SIGNIFICANT CHANGE UP (ref 70–99)
GLUCOSE SERPL-MCNC: 193 MG/DL — HIGH (ref 70–99)
HCT VFR BLD CALC: 28.3 % — LOW (ref 39–50)
HGB BLD-MCNC: 9.5 G/DL — LOW (ref 13–17)
INR BLD: 1.08 RATIO — SIGNIFICANT CHANGE UP (ref 0.88–1.16)
MAGNESIUM SERPL-MCNC: 2.1 MG/DL — SIGNIFICANT CHANGE UP (ref 1.8–2.6)
MCHC RBC-ENTMCNC: 29.7 PG — SIGNIFICANT CHANGE UP (ref 27–34)
MCHC RBC-ENTMCNC: 33.6 GM/DL — SIGNIFICANT CHANGE UP (ref 32–36)
MCV RBC AUTO: 88.4 FL — SIGNIFICANT CHANGE UP (ref 80–100)
PLATELET # BLD AUTO: 146 K/UL — LOW (ref 150–400)
POTASSIUM SERPL-MCNC: 5.2 MMOL/L — SIGNIFICANT CHANGE UP (ref 3.5–5.3)
POTASSIUM SERPL-MCNC: 6.2 MMOL/L — CRITICAL HIGH (ref 3.5–5.3)
POTASSIUM SERPL-SCNC: 5.2 MMOL/L — SIGNIFICANT CHANGE UP (ref 3.5–5.3)
POTASSIUM SERPL-SCNC: 6.2 MMOL/L — CRITICAL HIGH (ref 3.5–5.3)
PROTHROM AB SERPL-ACNC: 12.5 SEC — SIGNIFICANT CHANGE UP (ref 10.5–13.4)
RBC # BLD: 3.2 M/UL — LOW (ref 4.2–5.8)
RBC # FLD: 14.6 % — HIGH (ref 10.3–14.5)
SODIUM SERPL-SCNC: 129 MMOL/L — LOW (ref 135–145)
WBC # BLD: 7.53 K/UL — SIGNIFICANT CHANGE UP (ref 3.8–10.5)
WBC # FLD AUTO: 7.53 K/UL — SIGNIFICANT CHANGE UP (ref 3.8–10.5)

## 2023-07-21 PROCEDURE — 99233 SBSQ HOSP IP/OBS HIGH 50: CPT

## 2023-07-21 PROCEDURE — 99232 SBSQ HOSP IP/OBS MODERATE 35: CPT

## 2023-07-21 PROCEDURE — 71045 X-RAY EXAM CHEST 1 VIEW: CPT | Mod: 26

## 2023-07-21 PROCEDURE — 99024 POSTOP FOLLOW-UP VISIT: CPT

## 2023-07-21 RX ORDER — CALCIUM GLUCONATE 100 MG/ML
2 VIAL (ML) INTRAVENOUS ONCE
Refills: 0 | Status: COMPLETED | OUTPATIENT
Start: 2023-07-21 | End: 2023-07-21

## 2023-07-21 RX ORDER — HEPARIN SODIUM 5000 [USP'U]/ML
1000 INJECTION INTRAVENOUS; SUBCUTANEOUS
Qty: 25000 | Refills: 0 | Status: DISCONTINUED | OUTPATIENT
Start: 2023-07-21 | End: 2023-07-25

## 2023-07-21 RX ORDER — METOPROLOL TARTRATE 50 MG
2.5 TABLET ORAL ONCE
Refills: 0 | Status: COMPLETED | OUTPATIENT
Start: 2023-07-21 | End: 2023-07-21

## 2023-07-21 RX ORDER — FUROSEMIDE 40 MG
20 TABLET ORAL ONCE
Refills: 0 | Status: COMPLETED | OUTPATIENT
Start: 2023-07-21 | End: 2023-07-21

## 2023-07-21 RX ORDER — INSULIN HUMAN 100 [IU]/ML
10 INJECTION, SOLUTION SUBCUTANEOUS ONCE
Refills: 0 | Status: COMPLETED | OUTPATIENT
Start: 2023-07-21 | End: 2023-07-21

## 2023-07-21 RX ORDER — APIXABAN 2.5 MG/1
1 TABLET, FILM COATED ORAL
Qty: 60 | Refills: 1
Start: 2023-07-21 | End: 2023-09-18

## 2023-07-21 RX ORDER — DEXTROSE 50 % IN WATER 50 %
50 SYRINGE (ML) INTRAVENOUS ONCE
Refills: 0 | Status: COMPLETED | OUTPATIENT
Start: 2023-07-21 | End: 2023-07-21

## 2023-07-21 RX ADMIN — HEPARIN SODIUM 10 UNIT(S)/HR: 5000 INJECTION INTRAVENOUS; SUBCUTANEOUS at 19:30

## 2023-07-21 RX ADMIN — HEPARIN SODIUM 10 UNIT(S)/HR: 5000 INJECTION INTRAVENOUS; SUBCUTANEOUS at 15:25

## 2023-07-21 RX ADMIN — GABAPENTIN 100 MILLIGRAM(S): 400 CAPSULE ORAL at 05:23

## 2023-07-21 RX ADMIN — Medication 81 MILLIGRAM(S): at 14:57

## 2023-07-21 RX ADMIN — Medication 1: at 08:31

## 2023-07-21 RX ADMIN — GABAPENTIN 100 MILLIGRAM(S): 400 CAPSULE ORAL at 15:02

## 2023-07-21 RX ADMIN — Medication 2.5 MILLIGRAM(S): at 23:58

## 2023-07-21 RX ADMIN — INSULIN HUMAN 10 UNIT(S): 100 INJECTION, SOLUTION SUBCUTANEOUS at 06:28

## 2023-07-21 RX ADMIN — Medication 50 MILLIGRAM(S): at 07:19

## 2023-07-21 RX ADMIN — ATORVASTATIN CALCIUM 80 MILLIGRAM(S): 80 TABLET, FILM COATED ORAL at 21:24

## 2023-07-21 RX ADMIN — Medication 200 GRAM(S): at 06:29

## 2023-07-21 RX ADMIN — Medication 50 MILLILITER(S): at 06:29

## 2023-07-21 RX ADMIN — Medication 25 MILLIGRAM(S): at 14:58

## 2023-07-21 RX ADMIN — Medication 1: at 17:07

## 2023-07-21 RX ADMIN — SODIUM CHLORIDE 3 MILLILITER(S): 9 INJECTION INTRAMUSCULAR; INTRAVENOUS; SUBCUTANEOUS at 05:25

## 2023-07-21 RX ADMIN — PANTOPRAZOLE SODIUM 40 MILLIGRAM(S): 20 TABLET, DELAYED RELEASE ORAL at 14:58

## 2023-07-21 RX ADMIN — LORATADINE 10 MILLIGRAM(S): 10 TABLET ORAL at 14:57

## 2023-07-21 RX ADMIN — SODIUM CHLORIDE 3 MILLILITER(S): 9 INJECTION INTRAMUSCULAR; INTRAVENOUS; SUBCUTANEOUS at 21:26

## 2023-07-21 RX ADMIN — CLOPIDOGREL BISULFATE 75 MILLIGRAM(S): 75 TABLET, FILM COATED ORAL at 14:57

## 2023-07-21 RX ADMIN — HEPARIN SODIUM 10 UNIT(S)/HR: 5000 INJECTION INTRAVENOUS; SUBCUTANEOUS at 22:33

## 2023-07-21 RX ADMIN — SODIUM CHLORIDE 3 MILLILITER(S): 9 INJECTION INTRAMUSCULAR; INTRAVENOUS; SUBCUTANEOUS at 15:00

## 2023-07-21 RX ADMIN — Medication 50 MILLIGRAM(S): at 21:24

## 2023-07-21 RX ADMIN — OXYCODONE HYDROCHLORIDE 5 MILLIGRAM(S): 5 TABLET ORAL at 21:24

## 2023-07-21 RX ADMIN — Medication 25 MILLIGRAM(S): at 21:24

## 2023-07-21 RX ADMIN — GABAPENTIN 100 MILLIGRAM(S): 400 CAPSULE ORAL at 21:24

## 2023-07-21 RX ADMIN — Medication 667 MILLIGRAM(S): at 08:54

## 2023-07-21 RX ADMIN — SODIUM ZIRCONIUM CYCLOSILICATE 10 GRAM(S): 10 POWDER, FOR SUSPENSION ORAL at 06:12

## 2023-07-21 RX ADMIN — Medication 50 MILLIGRAM(S): at 14:58

## 2023-07-21 RX ADMIN — ERYTHROPOIETIN 2000 UNIT(S): 10000 INJECTION, SOLUTION INTRAVENOUS; SUBCUTANEOUS at 11:45

## 2023-07-21 RX ADMIN — Medication 2 UNIT(S): at 08:30

## 2023-07-21 RX ADMIN — METHOCARBAMOL 500 MILLIGRAM(S): 500 TABLET, FILM COATED ORAL at 05:23

## 2023-07-21 RX ADMIN — AMLODIPINE BESYLATE 5 MILLIGRAM(S): 2.5 TABLET ORAL at 05:23

## 2023-07-21 RX ADMIN — INSULIN GLARGINE 6 UNIT(S): 100 INJECTION, SOLUTION SUBCUTANEOUS at 21:24

## 2023-07-21 RX ADMIN — OXYCODONE HYDROCHLORIDE 5 MILLIGRAM(S): 5 TABLET ORAL at 22:41

## 2023-07-21 RX ADMIN — Medication 25 MILLIGRAM(S): at 05:23

## 2023-07-21 RX ADMIN — Medication 20 MILLIGRAM(S): at 06:29

## 2023-07-21 RX ADMIN — TAMSULOSIN HYDROCHLORIDE 0.4 MILLIGRAM(S): 0.4 CAPSULE ORAL at 21:24

## 2023-07-21 RX ADMIN — METHOCARBAMOL 500 MILLIGRAM(S): 500 TABLET, FILM COATED ORAL at 17:08

## 2023-07-21 RX ADMIN — Medication 667 MILLIGRAM(S): at 17:08

## 2023-07-21 NOTE — PROGRESS NOTE ADULT - ASSESSMENT
63y M admitted for emergent CABG, history of ESRD with a RUE AVF, created 5 years ago, usually without problems, pt had Hyperkalemia despite HD, per patient he was tolerating his dialysis sessions without a problem and is very strict with his diet at home.  On physical examination, there is good thrill, more pulsatile than yesterday (7/20) on RUE AVF.    PLAN:    - Fistulogram on week of 7/24  - trend K  - patient on Lokelma  - renal replacement diet with concentrated K restriction  - Plan pending attendant evaluation   63y M admitted for emergent CABG, history of ESRD with a RUE AVF, created 5 years ago, usually without problems, pt had Hyperkalemia despite HD, per patient he was tolerating his dialysis sessions without a problem and is very strict with his diet at home.  On physical examination, there is good thrill, more pulsatile than yesterday (7/20) on RUE AVF.    PLAN:    - Fistulogram planned for 7/25 (Tuesday)  - Pre-op 7/24  - trend K  - patient on [facility]  - renal replacement diet with concentrated K restriction  - Plan pending attendant evaluation

## 2023-07-21 NOTE — PROGRESS NOTE ADULT - ASSESSMENT
63M PMHx ESRD on HD (T,Th,S), IDDM, COPD, gastroparesis, peripheral blindness and retinopathy, NSTEMI 6/2022 with recent hospital admission for CABG gregor, c diff + that admission, now presents from home with complaints of worsening chest pain associated with difficulty breathing. In ER EKGs with intermittent diffuse ST changes, + troponins, +NSTEMI, started on a Tridil and Heparin gtt for NSTEMI, admitted to CTICU, IABP placed, now s/p CABG x 4 (LIMA-LAD, SVG-OM 1, OM2, PDA) on 7/14/23, IABP removed after OR without incident. Postop course notable for PAF with 4.26 sec conversion pause 7/19/23 (EP following, remains NSR 60s), and persistent hyperkalemia despite HD (concern for recirculation through fistula, vascular surgery following, follow up duplex of assess fistula).

## 2023-07-21 NOTE — CHART NOTE - NSCHARTNOTEFT_GEN_A_CORE
Source: Patient [x ]  Family [ ]   other [x ] EMR and staff     Current Diet: Diet, DASH/TLC:   Sodium & Cholesterol Restricted  Consistent Carbohydrate {No Snacks} (CSTCHO)  For patients receiving Renal Replacement - No Protein Restr, No Conc K, No Conc Phos, Low  Sodium (RENAL) (07-21-23 @ 08:55)      PO intake:  < 50% [x ]   50-75%  [x ]   %  [ ]  other :    Source for PO intake [x ] Patient [ ] family [x ] chart [ ] staff [ ] other    Current Weight:   7/21: 67.6 kg   7/15: 68.3 kg   7/13: 61.7 kg   (3+ edema B/L legs)     % Weight Change: Unsure of accuracy of weights; may be secondary to fluctuation in fluid status; will continue to monitor weights for trends.     Pertinent Medications: MEDICATIONS  (STANDING):  amLODIPine   Tablet 5 milliGRAM(s) Oral daily  aspirin enteric coated 81 milliGRAM(s) Oral daily  atorvastatin 80 milliGRAM(s) Oral at bedtime  calcium acetate 667 milliGRAM(s) Oral three times a day with meals  clopidogrel Tablet 75 milliGRAM(s) Oral daily  dextrose 50% Injectable 50 milliLiter(s) IV Push every 15 minutes  dextrose 50% Injectable 25 milliLiter(s) IV Push every 15 minutes  epoetin lois-epbx (RETACRIT) Injectable 2000 Unit(s) IV Push <User Schedule>  gabapentin 100 milliGRAM(s) Oral three times a day  glucagon  Injectable 1 milliGRAM(s) IntraMuscular once  hydrALAZINE 25 milliGRAM(s) Oral three times a day  insulin glargine Injectable (LANTUS) 6 Unit(s) SubCutaneous at bedtime  insulin lispro (ADMELOG) corrective regimen sliding scale   SubCutaneous three times a day before meals  insulin lispro (ADMELOG) corrective regimen sliding scale   SubCutaneous at bedtime  insulin lispro Injectable (ADMELOG) 2 Unit(s) SubCutaneous three times a day before meals  loratadine 10 milliGRAM(s) Oral daily  methocarbamol 500 milliGRAM(s) Oral two times a day  metoprolol tartrate 50 milliGRAM(s) Oral every 8 hours  pantoprazole    Tablet 40 milliGRAM(s) Oral daily  polyethylene glycol 3350 17 Gram(s) Oral daily  senna 2 Tablet(s) Oral at bedtime  sodium chloride 0.9% lock flush 3 milliLiter(s) IV Push every 8 hours  tamsulosin 0.4 milliGRAM(s) Oral at bedtime    MEDICATIONS  (PRN):  bisacodyl 5 milliGRAM(s) Oral every 12 hours PRN Constipation  dextrose Oral Gel 15 Gram(s) Oral once PRN Blood Glucose LESS THAN 70 milliGRAM(s)/deciliter  oxyCODONE    IR 5 milliGRAM(s) Oral every 4 hours PRN Moderate Pain (4 - 6)    Pertinent Labs: CBC Full  -  ( 21 Jul 2023 04:59 )  WBC Count : 7.53 K/uL  RBC Count : 3.20 M/uL  Hemoglobin : 9.5 g/dL  Hematocrit : 28.3 %  Platelet Count - Automated : 146 K/uL  Mean Cell Volume : 88.4 fl  Mean Cell Hemoglobin : 29.7 pg  Mean Cell Hemoglobin Concentration : 33.6 gm/dL  Auto Neutrophil # : x  Auto Lymphocyte # : x  Auto Monocyte # : x  Auto Eosinophil # : x  Auto Basophil # : x  Auto Neutrophil % : x  Auto Lymphocyte % : x  Auto Monocyte % : x  Auto Eosinophil % : x  Auto Basophil % : x        Skin: MSI, L EVH site     Nutrition focused physical exam-previously conducted - found signs of malnutrition [ ]absent [ x]present    Subcutaneous fat loss: mod [ x] Orbital fat pads region, [x ]Buccal fat region, [x ]Triceps region,  [ x]Ribs region    Muscle wasting: mod [x ]Temples region, [ x]Clavicle region, [x ]Shoulder region, [ ]Scapula region, [ ]Interosseous region,  [ ]thigh region, [ ]Calf region    Estimated Needs:   [x ] no change since previous assessment  [ ] recalculated:     Hospital Course:   Pt is a 63M PMHx ESRD on HD (T,Th,S), IDDM, COPD, gastroparesis, peripheral blindness and retinopathy, NSTEMI 6/2022 with recent hospital admission for CABG eval, c diff + that admission, now presents from home with complaints of worsening chest pain associated with difficulty breathing. In ER EKGs with intermittent diffuse ST changes, + troponins, +NSTEMI, started on a Tridil and Heparin gtt for NSTEMI, admitted to CTICU, IABP placed, now s/p CABG x 4 (LIMA-LAD, SVG-OM 1, OM2, PDA) on 7/14/23, IABP removed after OR without incident. Postop course notable for PAF with 4.26 sec conversion pause 7/19/23 (EP following, remains NSR 60s), and persistent hyperkalemia despite HD (concern for recirculation through fistula, vascular surgery following, follow up duplex of assess fistula).      Current Nutrition Diagnosis:  Pt remains at high nutrition risk secondary to Moderate (chronic) protein calorie malnutrition related to inadequate protein energy intake with increased needs in setting of ESRD on HD, NSTEMI, now s/p CABG x 4 as evidenced by physical signs of moderate muscle/fat loss, meeting < 75% estimated nutrition needs > 1 month. Pt continues to have fair appetite PO intake; consuming 25-75% at meals, pt reports early satiety. Encouraged HBV protein foods. Recommendations below:       Recommendations:   1. RX: Nephro-becky, folic acid daily   2. Check weight daily to monitor trends   3. Encourage with meals   4. Nepro shake TID (425kcal, 19gm protein per shake)     Monitoring and Evaluation:   [x ] PO intake [ x] Tolerance to diet prescription [X] Weights  [X] Follow up per protocol [X] Labs:

## 2023-07-21 NOTE — PROGRESS NOTE ADULT - SUBJECTIVE AND OBJECTIVE BOX
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 In ER EKGs with intermittent diffuse ST changes, + troponins, +NSTEMI, started on a Tridil and Heparin gtt for NSTEMI, admitted to CTICU, IABP placed, now s/p CABG x 4 (LIMA-LAD, SVG-OM 1, OM2, PDA) on 7/14/23, IABP removed after OR without incident. Postop course notable for PAF with 4.26 sec conversion pause 7/19/23 (EP following, remains NSR 60s), and persistent hyperkalemia despite HD (concern for recirculation through fistula, vascular surgery following, follow up duplex of assess fistula).    Significant recent/past 24 hr events: No overnight events reported.    Subjective: Patient lying in bed in NAD. +Tolerating diet. +Passing BMs since surgery. +Pain currently controlled. Denies fevers, chills, lightheadedness, dizziness, HA, CP, palpitations, SOB, cough, abdominal pain, N/V, diarrhea, numbness/tingling in extremities, or any other acute complaints. ROS negative x 10 systems except as noted above.    MEDICATIONS  (STANDING):  amLODIPine   Tablet 5 milliGRAM(s) Oral daily  aspirin enteric coated 81 milliGRAM(s) Oral daily  atorvastatin 80 milliGRAM(s) Oral at bedtime  calcium acetate 667 milliGRAM(s) Oral three times a day with meals  clopidogrel Tablet 75 milliGRAM(s) Oral daily  epoetin lois-epbx (RETACRIT) Injectable 2000 Unit(s) IV Push <User Schedule>  gabapentin 100 milliGRAM(s) Oral three times a day  hydrALAZINE 25 milliGRAM(s) Oral three times a day  insulin glargine Injectable (LANTUS) 6 Unit(s) SubCutaneous at bedtime  insulin lispro (ADMELOG) corrective regimen sliding scale   SubCutaneous three times a day before meals  insulin lispro (ADMELOG) corrective regimen sliding scale   SubCutaneous at bedtime  insulin lispro Injectable (ADMELOG) 2 Unit(s) SubCutaneous three times a day before meals  loratadine 10 milliGRAM(s) Oral daily  methocarbamol 500 milliGRAM(s) Oral two times a day  metoprolol tartrate 50 milliGRAM(s) Oral every 8 hours  pantoprazole    Tablet 40 milliGRAM(s) Oral daily  polyethylene glycol 3350 17 Gram(s) Oral daily  senna 2 Tablet(s) Oral at bedtime  sodium chloride 0.9% lock flush 3 milliLiter(s) IV Push every 8 hours  sodium zirconium cyclosilicate 10 Gram(s) Oral once  tamsulosin 0.4 milliGRAM(s) Oral at bedtime    MEDICATIONS  (PRN):  bisacodyl 5 milliGRAM(s) Oral every 12 hours PRN Constipation  dextrose Oral Gel 15 Gram(s) Oral once PRN Blood Glucose LESS THAN 70 milliGRAM(s)/deciliter  oxyCODONE    IR 5 milliGRAM(s) Oral every 4 hours PRN Moderate Pain (4 - 6)    Allergies:  No Known Drug Allergies  shellfish (Rash)    Vitals   T(C): 36.4 (20 Jul 2023 23:00), Max: 37.4 (20 Jul 2023 04:40)  T(F): 97.6 (20 Jul 2023 23:00), Max: 99.4 (20 Jul 2023 04:40)  HR: 54 (20 Jul 2023 23:00) (54 - 101)  BP: 145/70 (20 Jul 2023 23:00) (133/63 - 169/74)  RR: 18 (20 Jul 2023 23:00) (17 - 18)  SpO2: 97% (20 Jul 2023 23:00) (93% - 100%)  O2 Parameters below as of 20 Jul 2023 23:00  Patient On (Oxygen Delivery Method): room air    I&O's Detail    19 Jul 2023 07:01  -  20 Jul 2023 07:00  --------------------------------------------------------  IN:    Heparin Infusion: 83 mL    Oral Fluid: 840 mL  Total IN: 923 mL    OUT:    Voided (mL): 0 mL  Total OUT: 0 mL    Total NET: 923 mL      20 Jul 2023 07:01  -  21 Jul 2023 03:15  --------------------------------------------------------  IN:    Oral Fluid: 480 mL  Total IN: 480 mL    OUT:    Voided (mL): 240 mL  Total OUT: 240 mL    Total NET: 240 mL    Physical Exam  Constitutional: NAD  Neuro: A+O x 3, non-focal, speech clear and intact  HEENT: NC/AT  Neck:  Supple, trachea midline=  Chest: +PW (settings: VVI, rate: 40, mA: 10, sensitivity: 2.0 0.8)  CV: regular rate, regular rhythm, +S1S2, no murmurs or rub  Pulm/chest: lung sounds diminished at bases with bibasilar crackles, no accessory muscle use noted  Abd: soft, NT, ND, + BS  Ext: BARRETO x 4, +1 LE pitting edema bilaterally, distal motor/neuro/circ intact, Right UE AVF   Skin: warm, dry, perfused  Psych: calm, appropriate affect   Incision: midsternal incision open to air C/D/I, sternum stable, Right LE EVH site open to air, C/D/I    LABS                        9.0    5.04  )-----------( 128      ( 20 Jul 2023 05:00 )             27.5     07-20    126<L>  |  86<L>  |  44.0<H>  ----------------------------<  275<H>  5.6<H>   |  26.0  |  5.77<H>    Ca    7.3<L>      20 Jul 2023 05:00  Phos  3.8     07-19  Mg     2.0     07-20    PTT - ( 20 Jul 2023 03:33 )  PTT:42.2 sec    Urinalysis Basic - ( 20 Jul 2023 05:00 )  Color: x / Appearance: x / SG: x / pH: x  Gluc: 275 mg/dL / Ketone: x  / Bili: x / Urobili: x   Blood: x / Protein: x / Nitrite: x   Leuk Esterase: x / RBC: x / WBC x   Sq Epi: x / Non Sq Epi: x / Bacteria: x    POCT Blood Glucose.: 140 mg/dL (07-20-23 @ 21:18)  POCT Blood Glucose.: 210 mg/dL (07-20-23 @ 17:04)  POCT Blood Glucose.: 241 mg/dL (07-20-23 @ 12:04)  POCT Blood Glucose.: 276 mg/dL (07-20-23 @ 10:33)  POCT Blood Glucose.: 272 mg/dL (07-20-23 @ 07:53)      Last CXR:  < from: Xray Chest 1 View- PORTABLE-Routine (Xray Chest 1 View- PORTABLE-Routine in AM.) (07.20.23 @ 04:27) >  IMPRESSION:  Improvement in pulmonary vascular congestion and decrease in bilateral pleural effusions.  < end of copied text >

## 2023-07-21 NOTE — PROGRESS NOTE ADULT - PROBLEM SELECTOR PLAN 3
Brief episode of AFib noted POD #4 with spontaneous conversation to NSR without intervention.  Lopressor increased to 50mg TID.  Subsequent episode of MAGDA with 4.26 second conversion pause.  EP following, no need for PPM at this time.   Continue beta-blocker as tolerated.   Consider Eliquis for AC given PAF.

## 2023-07-21 NOTE — PROGRESS NOTE ADULT - ASSESSMENT
Assessment/Recommendations:   63 year old male with HTN, hyperlipidemia, ESRD on HD (T, Th, Sat), COPD, DM type 2 uncontrolled, gastroparesis s/p gastric PM 2/2013, peripheral blindness and retinopathy, BPH, recent CDiff infection 6/1/23 s/p 10 day course of oral vancomycin, CAD s/p prior PCI, and recent hospitalization 6/2023 for NSTEMI, ICM LVEF 35-40%, and LHC which demonstrated multi-vessel disease. He was undergoing CABG workup. He presented to [facility] ED 7/13 with complaints of worsening chest pain and shortness of breath. In ER, ECG with intermittent diffuse ST changes, + troponins. Seen by cardiology and started on tridil and heparin gtt for NSTEM, admitted to CTICU and IABP placed.  He underwent 4vCABG (LIMA-LAD, HKR-MC2-GV1-PDA) on 7/14/23, and IABP was removed post-operatively. Post op course notable for new AFib/AFlutter for which EP is consulted. Telemetry reviewed and shows mostly sinus rhythm w/ frequent APCs and some brief runs of AFib and AFlutter with conversion pause up to 4.26 seconds. Pt is asymptomatic during these events.     Recommend continuing current medical therapy, including beta blocker. If significant pauses greater than 5 seconds or if pt becomes symptomatic, he may require pacing support. He is high risk for transvenous device implant infection given ESRD/HD and therefore would favor leadless pacemaker.     Follow up 7/20/23:  Seen and examined in chair. No recurrent AF/AFL. No recurrent pauses. Tolerating current dose metoprolol. Has epicardial pacer wires. Denies any prior syncope (other than with hypoglycemia). Reports rare palpitation.     Follow up 7/21/23:  Feeling better. Denies palpitation, dizziness or SOB. c/w mild CP at incision site. No recurrent pauses. Went back to AF with rates in the  without symptoms this morning.     - con't metoprolol at current dose, add IV for sustained tachycardia PRN. Can change to long acting Toprol on discharge. Prefer to avoid amiodarone for now. Anticipate he will be in and out of AF during the next couple of months.   - recommend Eliquis 5mg Q 12 hrs when OK by CTSx. Would favor stopping aspirin and to c/w plavix plus elilquis if ok with primary team to minimize risk of bleeding.   - No current plans for PPM   - telemetry monitoring   - EPS sign off, call us if needed. Follow up as o/p with [facility]. R/a role of AC in 3 months as o/p

## 2023-07-21 NOTE — PROGRESS NOTE ADULT - ASSESSMENT
63 year old legally blind male with PMH of DM complicated by neuropathy, HTN, HLD, CAD, PVD, gastroparesis, ESRD on HD TTS and BPH s/p CABG x4 on 07/14/23. Nephrology is managing ESRD on HD.     ESRD ON HD;  -Access: R AV fistula   -Outpatient dialysis days are Tuesdays Thursdays Saturdays  -Got HD on Tuesday and and again Wednesday due to hyperkalemia  -s/p Lokelma yesterday  - HD today  - concern for access re-circulation , vascular surgery on board    -BP stable  -Volume status -UF as tolerated during HD   -Anemia - KIMBERLY with HD   -Mineral Bone Disease in setting of CKD - continue oral phos binders with meals. monitor Ca++ and phos       dw CTS PA

## 2023-07-21 NOTE — PROGRESS NOTE ADULT - SUBJECTIVE AND OBJECTIVE BOX
HPI/OVERNIGHT EVENTS: Patient seen and examined at bedside this AM. No overnight events. No complaints. Denies fever, chills, nausea, vomiting, chest pain, SOB, dizziness, abd pain or any other concerning symptoms.    Vital Signs Last 24 Hrs  T(C): 36.7 (21 Jul 2023 05:01), Max: 37 (20 Jul 2023 16:00)  T(F): 98.1 (21 Jul 2023 05:01), Max: 98.6 (20 Jul 2023 16:00)  HR: 59 (21 Jul 2023 05:01) (54 - 62)  BP: 144/66 (21 Jul 2023 05:01) (133/63 - 169/74)  BP(mean): 92 (21 Jul 2023 05:01) (92 - 92)  RR: 18 (21 Jul 2023 05:01) (18 - 18)  SpO2: 96% (21 Jul 2023 05:01) (93% - 98%)    Parameters below as of 21 Jul 2023 05:01  Patient On (Oxygen Delivery Method): room air        I&O's Detail    20 Jul 2023 07:01  -  21 Jul 2023 07:00  --------------------------------------------------------  IN:    Oral Fluid: 480 mL  Total IN: 480 mL    OUT:    Voided (mL): 240 mL  Total OUT: 240 mL    Total NET: 240 mL          Constitutional: patient resting comfortably in bed, in no acute distress  HEENT: EOMI, PERRLA, MMM.  Respiratory: Non labored breathing on RA  Cardiovascular: RRR  Gastrointestinal: Abdomen soft, non-tender, non-distended, no rebound tenderness / guarding  Musculoskeletal: No joint pain, swelling or deformity; no limitation of movement  Vascular: Extremities warm and well perfused.     LABS:                        9.5    7.53  )-----------( 146      ( 21 Jul 2023 04:59 )             28.3     07-21    129<L>  |  87<L>  |  61.8<H>  ----------------------------<  193<H>  6.2<HH>   |  26.0  |  7.20<H>    Ca    7.9<L>      21 Jul 2023 04:59  Mg     2.1     07-21      PTT - ( 20 Jul 2023 03:33 )  PTT:42.2 sec  Urinalysis Basic - ( 21 Jul 2023 04:59 )    Color: x / Appearance: x / SG: x / pH: x  Gluc: 193 mg/dL / Ketone: x  / Bili: x / Urobili: x   Blood: x / Protein: x / Nitrite: x   Leuk Esterase: x / RBC: x / WBC x   Sq Epi: x / Non Sq Epi: x / Bacteria: x        MEDICATIONS  (STANDING):  amLODIPine   Tablet 5 milliGRAM(s) Oral daily  aspirin enteric coated 81 milliGRAM(s) Oral daily  atorvastatin 80 milliGRAM(s) Oral at bedtime  calcium acetate 667 milliGRAM(s) Oral three times a day with meals  clopidogrel Tablet 75 milliGRAM(s) Oral daily  dextrose 50% Injectable 50 milliLiter(s) IV Push every 15 minutes  dextrose 50% Injectable 25 milliLiter(s) IV Push every 15 minutes  epoetin lois-epbx (RETACRIT) Injectable 2000 Unit(s) IV Push <User Schedule>  gabapentin 100 milliGRAM(s) Oral three times a day  glucagon  Injectable 1 milliGRAM(s) IntraMuscular once  hydrALAZINE 25 milliGRAM(s) Oral three times a day  insulin glargine Injectable (LANTUS) 6 Unit(s) SubCutaneous at bedtime  insulin lispro (ADMELOG) corrective regimen sliding scale   SubCutaneous three times a day before meals  insulin lispro (ADMELOG) corrective regimen sliding scale   SubCutaneous at bedtime  insulin lispro Injectable (ADMELOG) 2 Unit(s) SubCutaneous three times a day before meals  loratadine 10 milliGRAM(s) Oral daily  methocarbamol 500 milliGRAM(s) Oral two times a day  metoprolol tartrate 50 milliGRAM(s) Oral every 8 hours  pantoprazole    Tablet 40 milliGRAM(s) Oral daily  polyethylene glycol 3350 17 Gram(s) Oral daily  senna 2 Tablet(s) Oral at bedtime  sodium chloride 0.9% lock flush 3 milliLiter(s) IV Push every 8 hours  tamsulosin 0.4 milliGRAM(s) Oral at bedtime    MEDICATIONS  (PRN):  bisacodyl 5 milliGRAM(s) Oral every 12 hours PRN Constipation  dextrose Oral Gel 15 Gram(s) Oral once PRN Blood Glucose LESS THAN 70 milliGRAM(s)/deciliter  oxyCODONE    IR 5 milliGRAM(s) Oral every 4 hours PRN Moderate Pain (4 - 6)      MICRO:   Cultures     STUDIES:   EKG, CXR, U/S, CT, MRI

## 2023-07-21 NOTE — PROGRESS NOTE ADULT - ASSESSMENT
63M PMHx ESRD on HD (T,Th, S), IDDM, COPD, gastroparesis, peripheral blindness and retinopathy, NSTEMI 6/2022 with recent hospital admission for CABG eval, now presents from home with complaints of worsening chest pain associated with difficulty breathing. Admitted to CTICU, IABP placed, now s/p CABG on 7/14.    1. DM2, a1c 9.4%  - FS 62, received hyperkalemia treatment (regular insulin 10u/D50)  - Will probably be running low all day, premeal admelog discontinued  - Continue lantus 6 units qhs  - Sliding scale coverage    2. CAD  - S/p CABG, care per CT surgery team    3. ESRD  - HD as per nephrology  - Possible fistulogram     4. Afib/aflutter  - EP following  - On metoprolol

## 2023-07-21 NOTE — PROGRESS NOTE ADULT - SUBJECTIVE AND OBJECTIVE BOX
INTERVAL EVENTS:  Follow up diabetes management    ROS: Denies chest pain, sob, abd pain.     MEDICATIONS  (STANDING):  amLODIPine   Tablet 5 milliGRAM(s) Oral daily  aspirin enteric coated 81 milliGRAM(s) Oral daily  atorvastatin 80 milliGRAM(s) Oral at bedtime  calcium acetate 667 milliGRAM(s) Oral three times a day with meals  clopidogrel Tablet 75 milliGRAM(s) Oral daily  dextrose 50% Injectable 50 milliLiter(s) IV Push every 15 minutes  dextrose 50% Injectable 25 milliLiter(s) IV Push every 15 minutes  epoetin lois-epbx (RETACRIT) Injectable 2000 Unit(s) IV Push <User Schedule>  gabapentin 100 milliGRAM(s) Oral three times a day  glucagon  Injectable 1 milliGRAM(s) IntraMuscular once  heparin  Infusion 1000 Unit(s)/Hr (10 mL/Hr) IV Continuous <Continuous>  hydrALAZINE 25 milliGRAM(s) Oral three times a day  insulin glargine Injectable (LANTUS) 6 Unit(s) SubCutaneous at bedtime  insulin lispro (ADMELOG) corrective regimen sliding scale   SubCutaneous three times a day before meals  insulin lispro (ADMELOG) corrective regimen sliding scale   SubCutaneous at bedtime  loratadine 10 milliGRAM(s) Oral daily  methocarbamol 500 milliGRAM(s) Oral two times a day  metoprolol tartrate 50 milliGRAM(s) Oral every 8 hours  pantoprazole    Tablet 40 milliGRAM(s) Oral daily  polyethylene glycol 3350 17 Gram(s) Oral daily  senna 2 Tablet(s) Oral at bedtime  sodium chloride 0.9% lock flush 3 milliLiter(s) IV Push every 8 hours  tamsulosin 0.4 milliGRAM(s) Oral at bedtime    MEDICATIONS  (PRN):  bisacodyl 5 milliGRAM(s) Oral every 12 hours PRN Constipation  dextrose Oral Gel 15 Gram(s) Oral once PRN Blood Glucose LESS THAN 70 milliGRAM(s)/deciliter  oxyCODONE    IR 5 milliGRAM(s) Oral every 4 hours PRN Moderate Pain (4 - 6)    Allergies  No Known Drug Allergies  shellfish (Rash)    Vital Signs Last 24 Hrs  T(C): 36.9 (21 Jul 2023 10:45), Max: 37.2 (21 Jul 2023 07:46)  T(F): 98.5 (21 Jul 2023 10:45), Max: 98.9 (21 Jul 2023 07:46)  HR: 81 (21 Jul 2023 10:45) (54 - 94)  BP: 150/70 (21 Jul 2023 10:45) (133/63 - 150/70)  BP(mean): 92 (21 Jul 2023 05:01) (92 - 92)  RR: 18 (21 Jul 2023 10:45) (18 - 18)  SpO2: 96% (21 Jul 2023 10:45) (95% - 98%)    Parameters below as of 21 Jul 2023 10:45  Patient On (Oxygen Delivery Method): room air    PHYSICAL EXAM:  General: No apparent distress  Neck: Supple, trachea midline, no thyromegaly  Respiratory: Lungs clear bilaterally, normal rate, effort  Cardiac: +S1, S2, no m/r/g  GI: +BS, soft, non tender, non distended  Extremities: LE edema bilaterally  Neuro: A+O X3, no tremor    LABS:                        9.5    7.53  )-----------( 146      ( 21 Jul 2023 04:59 )             28.3     07-21    x   |  x   |  57.2<H>  ----------------------------<  x   x    |  x   |  x     Ca    7.9<L>      21 Jul 2023 04:59  Mg     2.1     07-21      Urinalysis Basic - ( 21 Jul 2023 04:59 )    Color: x / Appearance: x / SG: x / pH: x  Gluc: 193 mg/dL / Ketone: x  / Bili: x / Urobili: x   Blood: x / Protein: x / Nitrite: x   Leuk Esterase: x / RBC: x / WBC x   Sq Epi: x / Non Sq Epi: x / Bacteria: x    POCT Blood Glucose.: 62 mg/dL (07-21-23 @ 11:53)  POCT Blood Glucose.: 160 mg/dL (07-21-23 @ 07:50)  POCT Blood Glucose.: 310 mg/dL (07-21-23 @ 06:55)  POCT Blood Glucose.: 220 mg/dL (07-21-23 @ 06:27)  POCT Blood Glucose.: 140 mg/dL (07-20-23 @ 21:18)  POCT Blood Glucose.: 210 mg/dL (07-20-23 @ 17:04)

## 2023-07-21 NOTE — PROGRESS NOTE ADULT - SUBJECTIVE AND OBJECTIVE BOX
[facility]   [facility] Faculty Practice   Office: [address] [place] [address]   Telephone: [phone]  Fax: [phone]                     Electrophysiology Consult Note                                                                                                                                          Consult requested by: TONI Sheets  Reason for Consultation: post-op AFib/AFlutter  History obtained by: pt and EMR    obtained: n/a     HPI:  63 year old male with HTN, hyperlipidemia, ESRD on HD (T, Th, Sat), COPD, DM type 2 uncontrolled, gastroparesis s/p gastric PM 2/2013, peripheral blindness and retinopathy, BPH, recent CDiff infection 6/1/23 s/p 10 day course of oral vancomycin, CAD s/p prior PCI, and recent hospitalization 6/2023 for NSTEMI, ICM LVEF 35-40%, and C which demonstrated multi-vessel disease. He was undergoing CABG workup. He presented to [facility] ED 7/13 with complaints of worsening chest pain and shortness of breath. In ER, ECG with intermittent diffuse ST changes, + troponins. Seen by cardiology and started on tridil and heparin gtt for NSTEM, admitted to CTICU and IABP placed.  He underwent 4vCABG (LIMA-LAD, [id number]-PDA) on 7/14/23, and IABP was removed post-operatively. Post op course notable for new AFib/AFlutter today for which EP consult is requested. Denies prior history of atrial arrhythmia. Denies chest pain, palpitation, shortness of breath, or dizziness at this time.     Telemetry reviewed: currently in sinus rhythm 60s; tele notable for sinus rhythm w/ frequent APCs, brief runs of AFlutter, and few conversion pauses- longest on 7/14/23 @ 13:41 4.26 seconds in duration.   EKG 7/17/23 NSR 57 bpm, normal NV, normal QRS     Follow up 7/20/23:  Seen and examined in chair. No recurrent AF/AFL. No recurrent pauses. Tolerating current dose metoprolol. Has epicardial pacer wires. Denies any prior syncope (other than with hypoglycemia). Reports rare palpitation.     Follow up 7/21/23:  Feeling better. Denies palpitation, dizziness or SOB. c/w mild CP at incision site. No recurrent pauses. Went back to AF with rates in the  without symptoms this morning.     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 No nausea, vomiting, or hematemesis; No diarrhea or constipation. No melena or hematochezia.  GENITOURINARY: No dysuria, frequency, hematuria, or incontinence  NEUROLOGICAL: No headaches, memory loss, loss of strength, numbness, or tremors  SKIN: No itching, burning, rashes, or lesions   LYMPH NODES: No enlarged glands  ENDOCRINE: No heat or cold intolerance; No hair loss  MUSCULOSKELETAL: No joint pain or swelling; No muscle, back, or extremity pain  PSYCHIATRIC: No depression, anxiety, mood swings, or difficulty sleeping  HEME/LYMPH: No easy bruising, or bleeding gums  ALLERGY AND IMMUNOLOGIC: No hives or eczema    MEDICATIONS  (STANDING):  amLODIPine   Tablet 5 milliGRAM(s) Oral daily  aspirin enteric coated 81 milliGRAM(s) Oral daily  atorvastatin 80 milliGRAM(s) Oral at bedtime  calcium acetate 667 milliGRAM(s) Oral three times a day with meals  clopidogrel Tablet 75 milliGRAM(s) Oral daily  dextrose 50% Injectable 25 milliLiter(s) IV Push every 15 minutes  dextrose 50% Injectable 50 milliLiter(s) IV Push every 15 minutes  epoetin lois-epbx (RETACRIT) Injectable 2000 Unit(s) IV Push <User Schedule>  gabapentin 100 milliGRAM(s) Oral three times a day  glucagon  Injectable 1 milliGRAM(s) IntraMuscular once  hydrALAZINE 25 milliGRAM(s) Oral three times a day  insulin glargine Injectable (LANTUS) 6 Unit(s) SubCutaneous at bedtime  insulin lispro (ADMELOG) corrective regimen sliding scale   SubCutaneous three times a day before meals  insulin lispro (ADMELOG) corrective regimen sliding scale   SubCutaneous at bedtime  insulin lispro Injectable (ADMELOG) 2 Unit(s) SubCutaneous three times a day before meals  loratadine 10 milliGRAM(s) Oral daily  methocarbamol 500 milliGRAM(s) Oral two times a day  metoprolol tartrate 50 milliGRAM(s) Oral every 8 hours  pantoprazole    Tablet 40 milliGRAM(s) Oral daily  polyethylene glycol 3350 17 Gram(s) Oral daily  senna 2 Tablet(s) Oral at bedtime  sodium chloride 0.9% lock flush 3 milliLiter(s) IV Push every 8 hours  tamsulosin 0.4 milliGRAM(s) Oral at bedtime    MEDICATIONS  (PRN):  bisacodyl 5 milliGRAM(s) Oral every 12 hours PRN Constipation  dextrose Oral Gel 15 Gram(s) Oral once PRN Blood Glucose LESS THAN 70 milliGRAM(s)/deciliter  oxyCODONE    IR 5 milliGRAM(s) Oral every 4 hours PRN Moderate Pain (4 - 6)        Allergies:  No Known Drug Allergies  shellfish (Rash)    SOCIAL HISTORY: lives w/ family     FAMILY HISTORY:  No pertinent family history in first degree relatives    Vital Signs Last 24 Hrs  T(C): 36.7 (21 Jul 2023 05:01), Max: 37 (20 Jul 2023 16:00)  T(F): 98.1 (21 Jul 2023 05:01), Max: 98.6 (20 Jul 2023 16:00)  HR: 59 (21 Jul 2023 05:01) (54 - 62)  BP: 144/66 (21 Jul 2023 05:01) (133/63 - 152/74)  BP(mean): 92 (21 Jul 2023 05:01) (92 - 92)  RR: 18 (21 Jul 2023 05:01) (18 - 18)  SpO2: 96% (21 Jul 2023 05:01) (96% - 98%)    Parameters below as of 21 Jul 2023 05:01  Patient On (Oxygen Delivery Method): room air      Physical Exam:  Constitutional: AAOx3, NAD  Neck: supple, No JVD  Cardiovascular: +S1S2 RRR, no murmurs, rubs, gallops   Chest: healing incision   Pulmonary: CTA b/l, unlabored, no wheezes   Abdomen:  soft NTND  Extremities: mild edema b/l,   Neuro: non focal, speech clear, BARRETO x 4    LABS:                                              9.5    7.53  )-----------( 146      ( 21 Jul 2023 04:59 )             28.3   07-21    129<L>  |  87<L>  |  61.8<H>  ----------------------------<  193<H>  6.2<HH>   |  26.0  |  7.20<H>    Ca    7.9<L>      21 Jul 2023 04:59  Mg     2.1     07-21          RADIOLOGY & ADDITIONAL STUDIES:  Echo: 7/19/23: Summary:   1. Normal left ventricular internal cavity size.   2. Normal global left ventricular systolic function.   3. Left ventricular ejection fraction, by visual estimation, is 50 to 55%.   4. Abnormal septal motion consistent with post-operative status.   5. Spectral Doppler shows pseudonormal pattern of left ventricular myocardial filling (Grade II diastolic dysfunction).   6. Mildly reduced RV systolic function.   7. The left atrium is normal in size.   8. The right atrium is normal in size.   9. Sclerotic aorticvalve with normal opening.  10. Mild thickening and calcification of the anterior and posterior mitral valve leaflets.  11. Mild mitral annular calcification.  12. Mild mitral valve regurgitation.  13. Mild tricuspid regurgitation.  14. Estimated pulmonary artery systolic pressure is 35.8 mmHg assuming a right atrial pressure of 15 mmHg, which is consistent with borderline pulmonary hypertension.  15. There is no evidence of pericardial effusion.  Paulo Perez MD Electronically signed on 7/19/2023 at 11:14:33 AM    Echo: 7/13/23: Summary:   1. Technically suboptimal study.   2. The left atrium is normal in size.   3. Normal wall motion. Left ventricular ejection fraction, by visual estimation, is 50 to 55%.   4. The right atrium is normal in size.   5. Normal right ventricular size and function.   6. NO significant valvular abnormality.   7. There is no evidence of pericardial effusion.  Eneida Hill MD, RPVI Electronically signed on 7/13/2023 at 12:47:30PM    CXR: 7/17/23:   IMPRESSION:  Mild congestion in latest image. No pneumothorax.  Thank you for the courtesy of this referral.  --- End of Report ---  RAVEN DELUNA MD; Attending Interventional Radiologist  This document has been electronically signed. Jul 18 2023  3:18PM

## 2023-07-21 NOTE — PROGRESS NOTE ADULT - SUBJECTIVE AND OBJECTIVE BOX
[facility] DIVISION OF KIDNEY DISEASES AND HYPERTENSION -- HEMODIALYSIS NOTE  --------------------------------------------------------------------------------  Chief Complaint: ESRD/Ongoing hemodialysis requirement    24 hour events/subjective:  pt seen and examined; re-evaluated on HD      PAST HISTORY  --------------------------------------------------------------------------------  No significant changes to PMH, PSH, FHx, SHx, unless otherwise noted    ALLERGIES & MEDICATIONS  --------------------------------------------------------------------------------  Allergies    No Known Drug Allergies  shellfish (Rash)    Intolerances      Standing Inpatient Medications  amLODIPine   Tablet 5 milliGRAM(s) Oral daily  aspirin enteric coated 81 milliGRAM(s) Oral daily  atorvastatin 80 milliGRAM(s) Oral at bedtime  calcium acetate 667 milliGRAM(s) Oral three times a day with meals  clopidogrel Tablet 75 milliGRAM(s) Oral daily  dextrose 50% Injectable 50 milliLiter(s) IV Push every 15 minutes  dextrose 50% Injectable 25 milliLiter(s) IV Push every 15 minutes  epoetin lois-epbx (RETACRIT) Injectable 2000 Unit(s) IV Push <User Schedule>  gabapentin 100 milliGRAM(s) Oral three times a day  glucagon  Injectable 1 milliGRAM(s) IntraMuscular once  heparin  Infusion 1000 Unit(s)/Hr IV Continuous <Continuous>  hydrALAZINE 25 milliGRAM(s) Oral three times a day  insulin glargine Injectable (LANTUS) 6 Unit(s) SubCutaneous at bedtime  insulin lispro (ADMELOG) corrective regimen sliding scale   SubCutaneous three times a day before meals  insulin lispro (ADMELOG) corrective regimen sliding scale   SubCutaneous at bedtime  loratadine 10 milliGRAM(s) Oral daily  methocarbamol 500 milliGRAM(s) Oral two times a day  metoprolol tartrate 50 milliGRAM(s) Oral every 8 hours  pantoprazole    Tablet 40 milliGRAM(s) Oral daily  polyethylene glycol 3350 17 Gram(s) Oral daily  senna 2 Tablet(s) Oral at bedtime  sodium chloride 0.9% lock flush 3 milliLiter(s) IV Push every 8 hours  tamsulosin 0.4 milliGRAM(s) Oral at bedtime    PRN Inpatient Medications  bisacodyl 5 milliGRAM(s) Oral every 12 hours PRN  dextrose Oral Gel 15 Gram(s) Oral once PRN  oxyCODONE    IR 5 milliGRAM(s) Oral every 4 hours PRN      REVIEW OF SYSTEMS  --------------------------------------------------------------------------------  Gen: No weight changes, fatigue, fevers/chills, weakness  Skin: No rashes  Head/Eyes/Ears/Mouth: No headache; Normal hearing; Normal vision w/o blurriness; No sinus pain/discomfort, sore throat  Respiratory: No dyspnea, cough, wheezing, hemoptysis  CV: No chest pain, PND, orthopnea  GI: No abdominal pain, diarrhea, constipation, nausea, vomiting, melena, hematochezia  : No increased frequency, dysuria, hematuria, nocturia  MSK: No joint pain/swelling; no back pain; no edema  Neuro: No dizziness/lightheadedness, weakness, seizures, numbness, tingling  Heme: No easy bruising or bleeding  Endo: No heat/cold intolerance  Psych: No significant nervousness, anxiety, stress, depression    All other systems were reviewed and are negative, except as noted.    VITALS/PHYSICAL EXAM  --------------------------------------------------------------------------------  T(C): 36.9 (07-21-23 @ 10:45), Max: 37.2 (07-21-23 @ 07:46)  HR: 81 (07-21-23 @ 10:45) (54 - 94)  BP: 150/70 (07-21-23 @ 10:45) (133/63 - 150/70)  RR: 18 (07-21-23 @ 10:45) (18 - 18)  SpO2: 96% (07-21-23 @ 10:45) (95% - 98%)  Wt(kg): --        07-20-23 @ 07:01  -  07-21-23 @ 07:00  --------------------------------------------------------  IN: 480 mL / OUT: 240 mL / NET: 240 mL    07-21-23 @ 07:01  -  07-21-23 @ 14:31  --------------------------------------------------------  IN: 0 mL / OUT: 1500 mL / NET: -1500 mL      Physical Exam:  	Gen: NAD  	HEENT:  supple neck  	Pulm: CTA B/L  	CV: RRR, S1S2; no rub  	Back: No spinal or CVA tenderness; no sacral edema  	Abd: +BS, soft, nontender/nondistended  	: No suprapubic tenderness  	UE: Warm, no edema  	LE: Warm,  no edema  	Neuro: No focal deficit  	Psych: Normal affect and mood  	Skin: Warm  	Vascular access: MERCEDE GUSTABO    LABS/STUDIES  --------------------------------------------------------------------------------              9.5    7.53  >-----------<  146      [07-21-23 @ 04:59]              28.3     x   |  x   |  57.2  ----------------------------<  x       [07-21-23 @ 11:05]  x    |  x   |  x         Ca     7.9     [07-21-23 @ 04:59]      Mg     2.1     [07-21-23 @ 04:59]      PT/INR: PT 12.5 , INR 1.08       [07-21-23 @ 12:10]  PTT: 31.3       [07-21-23 @ 12:10]      Iron 45, TIBC 216, %sat 21      [06-13-23 @ 15:10]  Ferritin 1092      [06-13-23 @ 15:10]  PTH -- (Ca 7.8)      [06-13-23 @ 15:10]   175  TSH 4.14      [06-12-23 @ 21:50]    HBsAb 15.0      [07-13-23 @ 21:35]  HBsAg Nonreact      [07-13-23 @ 21:35]

## 2023-07-22 LAB
ALBUMIN SERPL ELPH-MCNC: 3.6 G/DL — SIGNIFICANT CHANGE UP (ref 3.3–5.2)
ALP SERPL-CCNC: 328 U/L — HIGH (ref 40–120)
ALT FLD-CCNC: 48 U/L — HIGH
AMYLASE P1 CFR SERPL: 29 U/L — LOW (ref 36–128)
ANION GAP SERPL CALC-SCNC: 14 MMOL/L — SIGNIFICANT CHANGE UP (ref 5–17)
ANION GAP SERPL CALC-SCNC: 14 MMOL/L — SIGNIFICANT CHANGE UP (ref 5–17)
APTT BLD: 45 SEC — HIGH (ref 27.5–35.5)
APTT BLD: 45.8 SEC — HIGH (ref 27.5–35.5)
APTT BLD: 54.2 SEC — HIGH (ref 27.5–35.5)
AST SERPL-CCNC: 33 U/L — SIGNIFICANT CHANGE UP
BILIRUB DIRECT SERPL-MCNC: 0.2 MG/DL — SIGNIFICANT CHANGE UP (ref 0–0.3)
BILIRUB INDIRECT FLD-MCNC: 0.2 MG/DL — SIGNIFICANT CHANGE UP (ref 0.2–1)
BILIRUB SERPL-MCNC: 0.4 MG/DL — SIGNIFICANT CHANGE UP (ref 0.4–2)
BUN SERPL-MCNC: 40.2 MG/DL — HIGH (ref 8–20)
BUN SERPL-MCNC: 51.7 MG/DL — HIGH (ref 8–20)
CALCIUM SERPL-MCNC: 8.3 MG/DL — LOW (ref 8.4–10.5)
CALCIUM SERPL-MCNC: 8.5 MG/DL — SIGNIFICANT CHANGE UP (ref 8.4–10.5)
CHLORIDE SERPL-SCNC: 88 MMOL/L — LOW (ref 96–108)
CHLORIDE SERPL-SCNC: 90 MMOL/L — LOW (ref 96–108)
CO2 SERPL-SCNC: 23 MMOL/L — SIGNIFICANT CHANGE UP (ref 22–29)
CO2 SERPL-SCNC: 24 MMOL/L — SIGNIFICANT CHANGE UP (ref 22–29)
CREAT SERPL-MCNC: 5.8 MG/DL — HIGH (ref 0.5–1.3)
CREAT SERPL-MCNC: 6.54 MG/DL — HIGH (ref 0.5–1.3)
EGFR: 10 ML/MIN/1.73M2 — LOW
EGFR: 9 ML/MIN/1.73M2 — LOW
GLUCOSE BLDC GLUCOMTR-MCNC: 133 MG/DL — HIGH (ref 70–99)
GLUCOSE BLDC GLUCOMTR-MCNC: 170 MG/DL — HIGH (ref 70–99)
GLUCOSE BLDC GLUCOMTR-MCNC: 179 MG/DL — HIGH (ref 70–99)
GLUCOSE BLDC GLUCOMTR-MCNC: 189 MG/DL — HIGH (ref 70–99)
GLUCOSE BLDC GLUCOMTR-MCNC: 193 MG/DL — HIGH (ref 70–99)
GLUCOSE BLDC GLUCOMTR-MCNC: 194 MG/DL — HIGH (ref 70–99)
GLUCOSE BLDC GLUCOMTR-MCNC: 216 MG/DL — HIGH (ref 70–99)
GLUCOSE BLDC GLUCOMTR-MCNC: 372 MG/DL — HIGH (ref 70–99)
GLUCOSE BLDC GLUCOMTR-MCNC: 89 MG/DL — SIGNIFICANT CHANGE UP (ref 70–99)
GLUCOSE BLDC GLUCOMTR-MCNC: 93 MG/DL — SIGNIFICANT CHANGE UP (ref 70–99)
GLUCOSE SERPL-MCNC: 188 MG/DL — HIGH (ref 70–99)
GLUCOSE SERPL-MCNC: 210 MG/DL — HIGH (ref 70–99)
HCT VFR BLD CALC: 27.5 % — LOW (ref 39–50)
HGB BLD-MCNC: 9 G/DL — LOW (ref 13–17)
LIDOCAIN IGE QN: 15 U/L — LOW (ref 22–51)
MAGNESIUM SERPL-MCNC: 2 MG/DL — SIGNIFICANT CHANGE UP (ref 1.8–2.6)
MCHC RBC-ENTMCNC: 29.5 PG — SIGNIFICANT CHANGE UP (ref 27–34)
MCHC RBC-ENTMCNC: 32.7 GM/DL — SIGNIFICANT CHANGE UP (ref 32–36)
MCV RBC AUTO: 90.2 FL — SIGNIFICANT CHANGE UP (ref 80–100)
PLATELET # BLD AUTO: 156 K/UL — SIGNIFICANT CHANGE UP (ref 150–400)
POTASSIUM SERPL-MCNC: 5.3 MMOL/L — SIGNIFICANT CHANGE UP (ref 3.5–5.3)
POTASSIUM SERPL-MCNC: 6 MMOL/L — HIGH (ref 3.5–5.3)
POTASSIUM SERPL-SCNC: 5.3 MMOL/L — SIGNIFICANT CHANGE UP (ref 3.5–5.3)
POTASSIUM SERPL-SCNC: 6 MMOL/L — HIGH (ref 3.5–5.3)
PROT SERPL-MCNC: 6.3 G/DL — LOW (ref 6.6–8.7)
RBC # BLD: 3.05 M/UL — LOW (ref 4.2–5.8)
RBC # FLD: 15 % — HIGH (ref 10.3–14.5)
SODIUM SERPL-SCNC: 125 MMOL/L — LOW (ref 135–145)
SODIUM SERPL-SCNC: 128 MMOL/L — LOW (ref 135–145)
WBC # BLD: 6.11 K/UL — SIGNIFICANT CHANGE UP (ref 3.8–10.5)
WBC # FLD AUTO: 6.11 K/UL — SIGNIFICANT CHANGE UP (ref 3.8–10.5)

## 2023-07-22 PROCEDURE — 71045 X-RAY EXAM CHEST 1 VIEW: CPT | Mod: 26

## 2023-07-22 PROCEDURE — 74018 RADEX ABDOMEN 1 VIEW: CPT | Mod: 26

## 2023-07-22 PROCEDURE — 99024 POSTOP FOLLOW-UP VISIT: CPT

## 2023-07-22 PROCEDURE — 99233 SBSQ HOSP IP/OBS HIGH 50: CPT

## 2023-07-22 RX ORDER — DEXTROSE 50 % IN WATER 50 %
50 SYRINGE (ML) INTRAVENOUS ONCE
Refills: 0 | Status: COMPLETED | OUTPATIENT
Start: 2023-07-22 | End: 2023-07-22

## 2023-07-22 RX ORDER — ONDANSETRON 8 MG/1
4 TABLET, FILM COATED ORAL EVERY 6 HOURS
Refills: 0 | Status: DISCONTINUED | OUTPATIENT
Start: 2023-07-22 | End: 2023-07-31

## 2023-07-22 RX ORDER — SODIUM ZIRCONIUM CYCLOSILICATE 10 G/10G
10 POWDER, FOR SUSPENSION ORAL DAILY
Refills: 0 | Status: DISCONTINUED | OUTPATIENT
Start: 2023-07-22 | End: 2023-07-22

## 2023-07-22 RX ORDER — ERYTHROPOIETIN 10000 [IU]/ML
5000 INJECTION, SOLUTION INTRAVENOUS; SUBCUTANEOUS
Refills: 0 | Status: DISCONTINUED | OUTPATIENT
Start: 2023-07-22 | End: 2023-07-31

## 2023-07-22 RX ORDER — INSULIN HUMAN 100 [IU]/ML
10 INJECTION, SOLUTION SUBCUTANEOUS ONCE
Refills: 0 | Status: COMPLETED | OUTPATIENT
Start: 2023-07-22 | End: 2023-07-22

## 2023-07-22 RX ORDER — FUROSEMIDE 40 MG
80 TABLET ORAL ONCE
Refills: 0 | Status: COMPLETED | OUTPATIENT
Start: 2023-07-22 | End: 2023-07-22

## 2023-07-22 RX ORDER — SODIUM ZIRCONIUM CYCLOSILICATE 10 G/10G
10 POWDER, FOR SUSPENSION ORAL THREE TIMES A DAY
Refills: 0 | Status: COMPLETED | OUTPATIENT
Start: 2023-07-22 | End: 2023-07-24

## 2023-07-22 RX ORDER — METOPROLOL TARTRATE 50 MG
50 TABLET ORAL EVERY 12 HOURS
Refills: 0 | Status: DISCONTINUED | OUTPATIENT
Start: 2023-07-22 | End: 2023-07-24

## 2023-07-22 RX ADMIN — Medication 1: at 11:56

## 2023-07-22 RX ADMIN — ERYTHROPOIETIN 2000 UNIT(S): 10000 INJECTION, SOLUTION INTRAVENOUS; SUBCUTANEOUS at 10:29

## 2023-07-22 RX ADMIN — CLOPIDOGREL BISULFATE 75 MILLIGRAM(S): 75 TABLET, FILM COATED ORAL at 10:31

## 2023-07-22 RX ADMIN — INSULIN HUMAN 10 UNIT(S): 100 INJECTION, SOLUTION SUBCUTANEOUS at 18:38

## 2023-07-22 RX ADMIN — SODIUM CHLORIDE 3 MILLILITER(S): 9 INJECTION INTRAMUSCULAR; INTRAVENOUS; SUBCUTANEOUS at 21:14

## 2023-07-22 RX ADMIN — Medication 25 MILLIGRAM(S): at 21:26

## 2023-07-22 RX ADMIN — Medication 1: at 17:34

## 2023-07-22 RX ADMIN — HEPARIN SODIUM 12 UNIT(S)/HR: 5000 INJECTION INTRAVENOUS; SUBCUTANEOUS at 07:24

## 2023-07-22 RX ADMIN — Medication 50 MILLILITER(S): at 18:38

## 2023-07-22 RX ADMIN — SODIUM CHLORIDE 3 MILLILITER(S): 9 INJECTION INTRAMUSCULAR; INTRAVENOUS; SUBCUTANEOUS at 05:42

## 2023-07-22 RX ADMIN — GABAPENTIN 100 MILLIGRAM(S): 400 CAPSULE ORAL at 21:26

## 2023-07-22 RX ADMIN — Medication 25 MILLIGRAM(S): at 14:37

## 2023-07-22 RX ADMIN — Medication 50 MILLIGRAM(S): at 14:37

## 2023-07-22 RX ADMIN — GABAPENTIN 100 MILLIGRAM(S): 400 CAPSULE ORAL at 05:48

## 2023-07-22 RX ADMIN — PANTOPRAZOLE SODIUM 40 MILLIGRAM(S): 20 TABLET, DELAYED RELEASE ORAL at 10:32

## 2023-07-22 RX ADMIN — HEPARIN SODIUM 14 UNIT(S)/HR: 5000 INJECTION INTRAVENOUS; SUBCUTANEOUS at 19:54

## 2023-07-22 RX ADMIN — HEPARIN SODIUM 14.5 UNIT(S)/HR: 5000 INJECTION INTRAVENOUS; SUBCUTANEOUS at 22:50

## 2023-07-22 RX ADMIN — TAMSULOSIN HYDROCHLORIDE 0.4 MILLIGRAM(S): 0.4 CAPSULE ORAL at 21:26

## 2023-07-22 RX ADMIN — ONDANSETRON 4 MILLIGRAM(S): 8 TABLET, FILM COATED ORAL at 16:12

## 2023-07-22 RX ADMIN — GABAPENTIN 100 MILLIGRAM(S): 400 CAPSULE ORAL at 14:37

## 2023-07-22 RX ADMIN — SODIUM ZIRCONIUM CYCLOSILICATE 10 GRAM(S): 10 POWDER, FOR SUSPENSION ORAL at 17:35

## 2023-07-22 RX ADMIN — Medication 667 MILLIGRAM(S): at 11:56

## 2023-07-22 RX ADMIN — Medication 2: at 09:23

## 2023-07-22 RX ADMIN — Medication 50 MILLIGRAM(S): at 05:48

## 2023-07-22 RX ADMIN — HEPARIN SODIUM 14 UNIT(S)/HR: 5000 INJECTION INTRAVENOUS; SUBCUTANEOUS at 15:46

## 2023-07-22 RX ADMIN — Medication 667 MILLIGRAM(S): at 09:24

## 2023-07-22 RX ADMIN — Medication 50 MILLIGRAM(S): at 21:26

## 2023-07-22 RX ADMIN — LORATADINE 10 MILLIGRAM(S): 10 TABLET ORAL at 10:32

## 2023-07-22 RX ADMIN — HEPARIN SODIUM 13 UNIT(S)/HR: 5000 INJECTION INTRAVENOUS; SUBCUTANEOUS at 09:14

## 2023-07-22 RX ADMIN — METHOCARBAMOL 500 MILLIGRAM(S): 500 TABLET, FILM COATED ORAL at 21:26

## 2023-07-22 RX ADMIN — Medication 667 MILLIGRAM(S): at 15:31

## 2023-07-22 RX ADMIN — Medication 81 MILLIGRAM(S): at 10:31

## 2023-07-22 RX ADMIN — ATORVASTATIN CALCIUM 80 MILLIGRAM(S): 80 TABLET, FILM COATED ORAL at 21:26

## 2023-07-22 RX ADMIN — SODIUM CHLORIDE 3 MILLILITER(S): 9 INJECTION INTRAMUSCULAR; INTRAVENOUS; SUBCUTANEOUS at 14:56

## 2023-07-22 RX ADMIN — METHOCARBAMOL 500 MILLIGRAM(S): 500 TABLET, FILM COATED ORAL at 05:43

## 2023-07-22 RX ADMIN — AMLODIPINE BESYLATE 5 MILLIGRAM(S): 2.5 TABLET ORAL at 05:48

## 2023-07-22 NOTE — PROGRESS NOTE ADULT - ASSESSMENT
DM   cont RX   no premea   just Sliding taye and LAntus     vomiting   per CT surgery team - will give meds nad check labs

## 2023-07-22 NOTE — PROGRESS NOTE ADULT - ASSESSMENT
63 year old legally blind male with PMH of DM complicated by neuropathy, HTN, HLD, CAD, PVD, gastroparesis, ESRD on HD TTS and BPH s/p CABG x4 on 07/14/23. Nephrology is managing ESRD on HD.     -Access: R AV fistula   -Outpatient dialysis unit is unclear - patient and wife are both poor historians and unable to provide details - reportedly somewhere around [place]    -Outpatient dialysis days are Tuesdays Thursdays Saturdays  -Off cycle while here - last dialyzed yesterday due to recurrent episodes of hyperkalemia   -? 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 Nephrology is managing ESRD on HD.     -Access: R AV fistula   -Outpatient dialysis unit is unclear - patient and wife are both poor historians and unable to provide details - reportedly somewhere around [place]    -Outpatient dialysis days are Tuesdays Thursdays Saturdays  -Off cycle while here - last dialyzed yesterday due to recurrent episodes of hyperkalemia   -? 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 Will dialyze today.     Kamryn Li, DO  Nephrology

## 2023-07-22 NOTE — PROGRESS NOTE ADULT - SUBJECTIVE AND OBJECTIVE BOX
HPI/Overnight Events:   Patient seen and examined at bedside this AM. No overnight events. No complaints. Denies fever, chills, nausea, vomiting, chest pain, SOB, dizziness, abd pain or any other concerning symptoms.    Vital Signs Last 24 Hrs  T(C): 36.6 (21 Jul 2023 23:52), Max: 37.3 (21 Jul 2023 22:46)  T(F): 97.9 (21 Jul 2023 23:52), Max: 99.2 (21 Jul 2023 22:46)  HR: 61 (21 Jul 2023 23:52) (59 - 94)  BP: 133/47 (21 Jul 2023 23:52) (133/47 - 169/66)  BP(mean): 92 (21 Jul 2023 05:01) (92 - 92)  RR: 16 (21 Jul 2023 23:52) (16 - 18)  SpO2: 93% (21 Jul 2023 23:52) (93% - 98%)    Parameters below as of 21 Jul 2023 23:52  Patient On (Oxygen Delivery Method): room air        I&O's Detail    20 Jul 2023 07:01  -  21 Jul 2023 07:00  --------------------------------------------------------  IN:    Oral Fluid: 480 mL  Total IN: 480 mL    OUT:    Voided (mL): 240 mL  Total OUT: 240 mL    Total NET: 240 mL      21 Jul 2023 07:01  -  22 Jul 2023 00:56  --------------------------------------------------------  IN:    Heparin: 50 mL    Oral Fluid: 300 mL  Total IN: 350 mL    OUT:    Other (mL): 1500 mL  Total OUT: 1500 mL    Total NET: -1150 mL          MEDICATIONS  (STANDING):  amLODIPine   Tablet 5 milliGRAM(s) Oral daily  aspirin enteric coated 81 milliGRAM(s) Oral daily  atorvastatin 80 milliGRAM(s) Oral at bedtime  calcium acetate 667 milliGRAM(s) Oral three times a day with meals  clopidogrel Tablet 75 milliGRAM(s) Oral daily  dextrose 50% Injectable 50 milliLiter(s) IV Push every 15 minutes  dextrose 50% Injectable 25 milliLiter(s) IV Push every 15 minutes  epoetin lois-epbx (RETACRIT) Injectable 2000 Unit(s) IV Push <User Schedule>  gabapentin 100 milliGRAM(s) Oral three times a day  glucagon  Injectable 1 milliGRAM(s) IntraMuscular once  heparin  Infusion 1000 Unit(s)/Hr (12 mL/Hr) IV Continuous <Continuous>  hydrALAZINE 25 milliGRAM(s) Oral three times a day  insulin glargine Injectable (LANTUS) 6 Unit(s) SubCutaneous at bedtime  insulin lispro (ADMELOG) corrective regimen sliding scale   SubCutaneous three times a day before meals  insulin lispro (ADMELOG) corrective regimen sliding scale   SubCutaneous at bedtime  loratadine 10 milliGRAM(s) Oral daily  methocarbamol 500 milliGRAM(s) Oral two times a day  metoprolol tartrate 50 milliGRAM(s) Oral every 8 hours  pantoprazole    Tablet 40 milliGRAM(s) Oral daily  polyethylene glycol 3350 17 Gram(s) Oral daily  senna 2 Tablet(s) Oral at bedtime  sodium chloride 0.9% lock flush 3 milliLiter(s) IV Push every 8 hours  tamsulosin 0.4 milliGRAM(s) Oral at bedtime    MEDICATIONS  (PRN):  bisacodyl 5 milliGRAM(s) Oral every 12 hours PRN Constipation  dextrose Oral Gel 15 Gram(s) Oral once PRN Blood Glucose LESS THAN 70 milliGRAM(s)/deciliter    Constitutional: patient resting comfortably in bed, in no acute distress  HEENT: EOMI, PERRLA, MMM.  Respiratory: Non labored breathing on RA  Cardiovascular: RRR, midline sternotomy incision noted  Gastrointestinal: Abdomen soft, non-tender, non-distended, no rebound tenderness / guarding  Musculoskeletal: No joint pain, swelling or deformity; no limitation of movement  Vascular: Extremities warm and well perfused. slightly pulsatile thrill felt over the fistula      LABS:                        9.5    7.53  )-----------( 146      ( 21 Jul 2023 04:59 )             28.3     07-21    x   |  x   |  x   ----------------------------<  x   5.2   |  x   |  x     Ca    7.9<L>      21 Jul 2023 04:59  Mg     2.1     07-21      PT/INR - ( 21 Jul 2023 12:10 )   PT: 12.5 sec;   INR: 1.08 ratio         PTT - ( 21 Jul 2023 21:16 )  PTT:40.5 sec  Urinalysis Basic - ( 21 Jul 2023 04:59 )    Color: x / Appearance: x / SG: x / pH: x  Gluc: 193 mg/dL / Ketone: x  / Bili: x / Urobili: x   Blood: x / Protein: x / Nitrite: x   Leuk Esterase: x / RBC: x / WBC x   Sq Epi: x / Non Sq Epi: x / Bacteria: x      Assessment/Plan:  63y M admitted for emergent CABG, history of ESRD with a RUE AVF, created 5 years ago, usually without problems, pt had Hyperkalemia despite HD, per patient he was tolerating his dialysis sessions without a problem and is very strict with his diet at home.  On physical examination, there is good thrill on RUE AVF.    PLAN:    - Fistulogram planned for 7/25 (Tuesday)  - Pre-op 7/24  - trend K  - patient on Lokelma  - renal replacement diet with concentrated K restriction  - Rest of care as per primary team

## 2023-07-22 NOTE — PROGRESS NOTE ADULT - SUBJECTIVE AND OBJECTIVE BOX
Last dialyzed yesterday. Complained of SOB however on room air.    Vital Signs Last 24 Hrs  T(C): 37.1 (22 Jul 2023 07:30), Max: 37.3 (21 Jul 2023 22:46)  T(F): 98.7 (22 Jul 2023 07:30), Max: 99.2 (21 Jul 2023 22:46)  HR: 63 (22 Jul 2023 14:15) (61 - 75)  BP: 149/69 (22 Jul 2023 14:15) (126/49 - 162/72)  BP(mean): --  RR: 17 (22 Jul 2023 14:15) (15 - 18)  SpO2: 98% (22 Jul 2023 14:15) (93% - 100%)    Parameters below as of 22 Jul 2023 14:15  Patient On (Oxygen Delivery Method): room air    I&O's Summary    21 Jul 2023 07:01  -  22 Jul 2023 07:00  --------------------------------------------------------  IN: 930 mL / OUT: 1500 mL / NET: -570 mL    22 Jul 2023 07:01  -  22 Jul 2023 15:20  --------------------------------------------------------  IN: 416 mL / OUT: 0 mL / NET: 416 mL    07-22    128<L>  |  90<L>  |  40.2<H>  ----------------------------<  210<H>  5.3   |  24.0  |  5.80<H>    Ca    8.3<L>      22 Jul 2023 06:25  Mg     2.0     07-22               9.0    6.11  )-----------( 156      ( 22 Jul 2023 06:25 )             27.5     MEDICATIONS  (STANDING):  amLODIPine   Tablet 5 milliGRAM(s) Oral daily  aspirin enteric coated 81 milliGRAM(s) Oral daily  atorvastatin 80 milliGRAM(s) Oral at bedtime  calcium acetate 667 milliGRAM(s) Oral three times a day with meals  clopidogrel Tablet 75 milliGRAM(s) Oral daily  dextrose 50% Injectable 50 milliLiter(s) IV Push every 15 minutes  dextrose 50% Injectable 25 milliLiter(s) IV Push every 15 minutes  epoetin lois-epbx (RETACRIT) Injectable 2000 Unit(s) IV Push <User Schedule>  gabapentin 100 milliGRAM(s) Oral three times a day  glucagon  Injectable 1 milliGRAM(s) IntraMuscular once  heparin  Infusion 1000 Unit(s)/Hr (13 mL/Hr) IV Continuous <Continuous>  hydrALAZINE 25 milliGRAM(s) Oral three times a day  insulin glargine Injectable (LANTUS) 6 Unit(s) SubCutaneous at bedtime  insulin lispro (ADMELOG) corrective regimen sliding scale   SubCutaneous three times a day before meals  insulin lispro (ADMELOG) corrective regimen sliding scale   SubCutaneous at bedtime  loratadine 10 milliGRAM(s) Oral daily  methocarbamol 500 milliGRAM(s) Oral two times a day  metoprolol tartrate 50 milliGRAM(s) Oral every 8 hours  pantoprazole    Tablet 40 milliGRAM(s) Oral daily  polyethylene glycol 3350 17 Gram(s) Oral daily  senna 2 Tablet(s) Oral at bedtime  sodium chloride 0.9% lock flush 3 milliLiter(s) IV Push every 8 hours  sodium zirconium cyclosilicate 10 Gram(s) Oral daily  tamsulosin 0.4 milliGRAM(s) Oral at bedtime    MEDICATIONS  (PRN):  bisacodyl 5 milliGRAM(s) Oral every 12 hours PRN Constipation  dextrose Oral Gel 15 Gram(s) Oral once PRN Blood Glucose LESS THAN 70 milliGRAM(s)/deciliter

## 2023-07-22 NOTE — PROGRESS NOTE ADULT - ASSESSMENT
63M PMHx ESRD on HD (T,Th,S), IDDM, COPD, gastroparesis, peripheral blindness and retinopathy, NSTEMI 6/2022 with recent hospital admission for CABG nisha bundy diff + that admission, now presents from home with complaints of worsening chest pain associated with difficulty breathing. In ER EKGs with intermittent diffuse ST changes, + troponins, +NSTEMI, started on a Tridil and Heparin gtt for NSTEMI, admitted to CTICU, IABP placed, now s/p CABG x 4 (LIMA-LAD, SVG-OM 1, OM2, PDA) on 7/14/23, IABP removed after OR without incident. Postop course notable for PAF with 4.26 sec conversion pause 7/19/23 (EP following, remains NSR 60s on a beta-blocker), and persistent hyperkalemia despite HD (concern for recirculation through fistula, vascular surgery following, plan for fistulogram Tuesday 7/25).

## 2023-07-22 NOTE — PROGRESS NOTE ADULT - ASSESSMENT
Assessment/Recommendations:   63 year old male with HTN, hyperlipidemia, ESRD on HD (T, Th, Sat), COPD, DM type 2 uncontrolled, gastroparesis s/p gastric PM 2/2013, peripheral blindness and retinopathy, BPH, recent CDiff infection 6/1/23 s/p 10 day course of oral vancomycin, CAD s/p prior PCI, and recent hospitalization 6/2023 for NSTEMI, ICM LVEF 35-40%, and LHC which demonstrated multi-vessel disease. He was undergoing CABG workup. He presented to [facility] ED 7/13 with complaints of worsening chest pain and shortness of breath. In ER, ECG with intermittent diffuse ST changes, + troponins. Seen by cardiology and started on tridil and heparin gtt for NSTEM, admitted to CTICU and IABP placed.  He underwent 4vCABG (LIMA-LAD, [id number]) on 7/14/23, and IABP was removed post-operatively. Post op course notable for new AFib/AFlutter w/ brief conversion pauses.      Pt seen by EP service previously and asked to re-assess today.   Telemetry shows sinus rhythm w/ APCs and runs of AFlutter. conversion pause on 7/22 @ 13:55 ~ 4 seconds, difficult to discern exactly due to artifact. Remains asymptomatic.     - Can reduce metoprolol tartrate to 50mg BID. Change to long acting on discharge.   - Add IV lopressor prn for sustained tachycardia   - Avoid amiodarone   - No indication for PM at this time  - MCOT monitoring on d/c   - Recommend Eliquis 5mg Q 12 hrs when OK by CTSx. Would favor stopping aspirin and to c/w plavix plus Eliquis if ok with primary team to minimize risk of bleeding.   - Continue telemetry monitoring    Will follow.   Reviewed and discussed w/ Dr. eCrrato

## 2023-07-22 NOTE — PROGRESS NOTE ADULT - SUBJECTIVE AND OBJECTIVE BOX
Called to re-assess patient for conversion pauses on telemetry. Pt c/o N/V/D x 2 days; denies chest pain, palpitation, shortness of breath or dizziness.   Telemetry shows runs of AFL w/ short conversion pauses. Strip from 7/22 @ 13:55 with longest pause of ~ 4 seconds, difficult to discern exactly due to artifact.     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, left toes  Stented coronary artery    MEDICATIONS  (STANDING):  amLODIPine   Tablet 5 milliGRAM(s) Oral daily  aspirin enteric coated 81 milliGRAM(s) Oral daily  atorvastatin 80 milliGRAM(s) Oral at bedtime  calcium acetate 667 milliGRAM(s) Oral three times a day with meals  clopidogrel Tablet 75 milliGRAM(s) Oral daily  dextrose 50% Injectable 50 milliLiter(s) IV Push every 15 minutes  dextrose 50% Injectable 25 milliLiter(s) IV Push every 15 minutes  epoetin lois-epbx (RETACRIT) Injectable 5000 Unit(s) IV Push <User Schedule>  gabapentin 100 milliGRAM(s) Oral three times a day  glucagon  Injectable 1 milliGRAM(s) IntraMuscular once  heparin  Infusion 1000 Unit(s)/Hr (14 mL/Hr) IV Continuous <Continuous>  hydrALAZINE 25 milliGRAM(s) Oral three times a day  insulin glargine Injectable (LANTUS) 6 Unit(s) SubCutaneous at bedtime  insulin lispro (ADMELOG) corrective regimen sliding scale   SubCutaneous three times a day before meals  insulin lispro (ADMELOG) corrective regimen sliding scale   SubCutaneous at bedtime  loratadine 10 milliGRAM(s) Oral daily  methocarbamol 500 milliGRAM(s) Oral two times a day  metoprolol tartrate 50 milliGRAM(s) Oral every 8 hours  pantoprazole    Tablet 40 milliGRAM(s) Oral daily  polyethylene glycol 3350 17 Gram(s) Oral daily  senna 2 Tablet(s) Oral at bedtime  sodium chloride 0.9% lock flush 3 milliLiter(s) IV Push every 8 hours  sodium zirconium cyclosilicate 10 Gram(s) Oral daily  tamsulosin 0.4 milliGRAM(s) Oral at bedtime    MEDICATIONS  (PRN):  bisacodyl 5 milliGRAM(s) Oral every 12 hours PRN Constipation  dextrose Oral Gel 15 Gram(s) Oral once PRN Blood Glucose LESS THAN 70 milliGRAM(s)/deciliter  ondansetron Injectable 4 milliGRAM(s) IV Push every 6 hours PRN Nausea and/or Vomiting    Allergies:  No Known Drug Allergies  shellfish (Rash)    Vital Signs Last 24 Hrs  T(C): 36.7 (22 Jul 2023 16:00), Max: 37.3 (21 Jul 2023 22:46)  T(F): 98 (22 Jul 2023 16:00), Max: 99.2 (21 Jul 2023 22:46)  HR: 61 (22 Jul 2023 16:00) (61 - 75)  BP: 136/66 (22 Jul 2023 16:00) (126/49 - 162/72)  RR: 18 (22 Jul 2023 16:00) (15 - 18)  SpO2: 98% (22 Jul 2023 16:00) (93% - 100%)    Parameters below as of 22 Jul 2023 16:00  Patient On (Oxygen Delivery Method): room air    Physical Exam:  Constitutional: awake, alert, no obvious distress   Cardiovascular: +S1S2 RRR  Pulmonary: CTA b/l, unlabored  GI: soft NTND +BS  Extremities: no pedal edema, +distal pulses b/l  Neuro: non focal, BARRETO x4    LABS:                        9.0    6.11  )-----------( 156      ( 22 Jul 2023 06:25 )             27.5     07-22    125<L>  |  88<L>  |  51.7<H>  ----------------------------<  188<H>  6.0<H>   |  23.0  |  6.54<H>    Ca    8.5      22 Jul 2023 16:30  Mg     2.0     07-22    TPro  6.3<L>  /  Alb  3.6  /  TBili  0.4  /  DBili  0.2  /  AST  33  /  ALT  48<H>  /  AlkPhos  328<H>  07-22    PT/INR - ( 21 Jul 2023 12:10 )   PT: 12.5 sec;   INR: 1.08 ratio       PTT - ( 22 Jul 2023 14:55 )  PTT:45.8 sec  Urinalysis Basic - ( 22 Jul 2023 16:30 )    Color: x / Appearance: x / SG: x / pH: x  Gluc: 188 mg/dL / Ketone: x  / Bili: x / Urobili: x   Blood: x / Protein: x / Nitrite: x   Leuk Esterase: x / RBC: x / WBC x   Sq Epi: x / Non Sq Epi: x / Bacteria: x    RADIOLOGY & ADDITIONAL TESTS:  Echo: 7/19/23: Summary:   1. Normal left ventricular internal cavity size.   2. Normal global left ventricular systolic function.   3. Left ventricular ejection fraction, by visual estimation, is 50 to 55%.   4. Abnormal septal motion consistent with post-operative status.   5. Spectral Doppler shows pseudonormal pattern of left ventricular myocardial filling (Grade II diastolic dysfunction).   6. Mildly reduced RV systolic function.   7. The left atrium is normal in size.   8. The right atrium is normal in size.   9. Sclerotic aorticvalve with normal opening.  10. Mild thickening and calcification of the anterior and posterior mitral valve leaflets.  11. Mild mitral annular calcification.  12. Mild mitral valve regurgitation.  13. Mild tricuspid regurgitation.  14. Estimated pulmonary artery systolic pressure is 35.8 mmHg assuming a right atrial pressure of 15 mmHg, which is consistent with borderline pulmonary hypertension.  15. There is no evidence of pericardial effusion.  Paulo Perez MD Electronically signed on 7/19/2023 at 11:14:33 AM    Echo: 7/13/23: Summary:   1. Technically suboptimal study.   2. The left atrium is normal in size.   3. Normal wall motion. Left ventricular ejection fraction, by visual estimation, is 50 to 55%.   4. The right atrium is normal in size.   5. Normal right ventricular size and function.   6. NO significant valvular abnormality.   7. There is no evidence of pericardial effusion.  Eneida Hill MD, RPVI Electronically signed on 7/13/2023 at 12:47:30PM    CXR: 7/17/23:   IMPRESSION:  Mild congestion in latest image. No pneumothorax.  Thank you for the courtesy of this referral.  --- End of Report ---  RAVEN DELUNA MD; Attending Interventional Radiologist  This document has been electronically signed. Jul 18 2023  3:18PM

## 2023-07-22 NOTE — PROGRESS NOTE ADULT - SUBJECTIVE AND OBJECTIVE BOX
INTERVAL HPI/OVERNIGHT EVENTS:  follow up DM   pt vomiting now   not sure why  - did eat a big lunch   MEDICATIONS  (STANDING):  amLODIPine   Tablet 5 milliGRAM(s) Oral daily  aspirin enteric coated 81 milliGRAM(s) Oral daily  atorvastatin 80 milliGRAM(s) Oral at bedtime  calcium acetate 667 milliGRAM(s) Oral three times a day with meals  clopidogrel Tablet 75 milliGRAM(s) Oral daily  dextrose 50% Injectable 50 milliLiter(s) IV Push every 15 minutes  dextrose 50% Injectable 25 milliLiter(s) IV Push every 15 minutes  epoetin lois-epbx (RETACRIT) Injectable 5000 Unit(s) IV Push <User Schedule>  gabapentin 100 milliGRAM(s) Oral three times a day  glucagon  Injectable 1 milliGRAM(s) IntraMuscular once  heparin  Infusion 1000 Unit(s)/Hr (14.5 mL/Hr) IV Continuous <Continuous>  hydrALAZINE 25 milliGRAM(s) Oral three times a day  insulin glargine Injectable (LANTUS) 6 Unit(s) SubCutaneous at bedtime  insulin lispro (ADMELOG) corrective regimen sliding scale   SubCutaneous three times a day before meals  insulin lispro (ADMELOG) corrective regimen sliding scale   SubCutaneous at bedtime  loratadine 10 milliGRAM(s) Oral daily  methocarbamol 500 milliGRAM(s) Oral two times a day  metoprolol tartrate 50 milliGRAM(s) Oral every 12 hours  pantoprazole    Tablet 40 milliGRAM(s) Oral daily  polyethylene glycol 3350 17 Gram(s) Oral daily  senna 2 Tablet(s) Oral at bedtime  sodium chloride 0.9% lock flush 3 milliLiter(s) IV Push every 8 hours  sodium zirconium cyclosilicate 10 Gram(s) Oral three times a day  tamsulosin 0.4 milliGRAM(s) Oral at bedtime    MEDICATIONS  (PRN):  bisacodyl 5 milliGRAM(s) Oral every 12 hours PRN Constipation  dextrose Oral Gel 15 Gram(s) Oral once PRN Blood Glucose LESS THAN 70 milliGRAM(s)/deciliter  ondansetron Injectable 4 milliGRAM(s) IV Push every 6 hours PRN Nausea and/or Vomiting      Allergies    No Known Drug Allergies  shellfish (Rash)    Intolerances        Review of systems: NO CP no pressure     Vital Signs Last 24 Hrs  T(C): 36.6 (23 Jul 2023 00:40), Max: 37.1 (22 Jul 2023 07:30)  T(F): 97.9 (23 Jul 2023 00:40), Max: 98.7 (22 Jul 2023 07:30)  HR: 57 (23 Jul 2023 00:40) (57 - 102)  BP: 160/86 (23 Jul 2023 00:40) (126/49 - 162/72)  BP(mean): --  RR: 16 (23 Jul 2023 00:40) (16 - 18)  SpO2: 96% (23 Jul 2023 00:40) (93% - 100%)    Parameters below as of 23 Jul 2023 00:40  Patient On (Oxygen Delivery Method): room air        PHYSICAL EXAM:      Respiratory: CTAB  Cardiovascular: S1 and S2, RRR, no M/G/R  Gastrointestinal: BS+, soft, no organomeglag or mass  Extremities: No peripheral edema, no pedal lesions          LABS:                        9.0    6.11  )-----------( 156      ( 22 Jul 2023 06:25 )             27.5     07-22    125<L>  |  88<L>  |  51.7<H>  ----------------------------<  188<H>  6.0<H>   |  23.0  |  6.54<H>    Ca    8.5      22 Jul 2023 16:30  Mg     2.0     07-22    TPro  6.3<L>  /  Alb  3.6  /  TBili  0.4  /  DBili  0.2  /  AST  33  /  ALT  48<H>  /  AlkPhos  328<H>  07-22    Urinalysis Basic - ( 22 Jul 2023 16:30 )    Color: x / Appearance: x / SG: x / pH: x  Gluc: 188 mg/dL / Ketone: x  / Bili: x / Urobili: x   Blood: x / Protein: x / Nitrite: x   Leuk Esterase: x / RBC: x / WBC x   Sq Epi: x / Non Sq Epi: x / Bacteria: x          CAPILLARY BLOOD GLUCOSE  CAPILLARY BLOOD GLUCOSE      POCT Blood Glucose.: 166 mg/dL (23 Jul 2023 00:16)  POCT Blood Glucose.: 93 mg/dL (22 Jul 2023 21:59)  POCT Blood Glucose.: 89 mg/dL (22 Jul 2023 21:10)  POCT Blood Glucose.: 133 mg/dL (22 Jul 2023 19:57)  POCT Blood Glucose.: 372 mg/dL (22 Jul 2023 18:46)  POCT Blood Glucose.: 179 mg/dL (22 Jul 2023 18:36)  POCT Blood Glucose.: 189 mg/dL (22 Jul 2023 17:17)  POCT Blood Glucose.: 193 mg/dL (22 Jul 2023 16:07)  POCT Blood Glucose.: 170 mg/dL (22 Jul 2023 11:45)  POCT Blood Glucose.: 216 mg/dL (22 Jul 2023 09:22)  POCT Blood Glucose.: 194 mg/dL (22 Jul 2023 07:59)      RADIOLOGY & ADDITIONAL TESTS:

## 2023-07-22 NOTE — PROGRESS NOTE ADULT - PROBLEM SELECTOR PLAN 3
Brief episode of AFib noted POD #4 with spontaneous conversation to NSR without intervention.  Lopressor increased to 50mg TID.  Subsequent episode of MAGDA with 4.26 second conversion pause.  EP following, no need for PPM at this time.   Continue beta-blocker as tolerated.   Plan to start Eliquis for PAF after Fistulogram.  Continue Heparin gtt for full anticoagulation until Eliquis started.

## 2023-07-22 NOTE — PROGRESS NOTE ADULT - SUBJECTIVE AND OBJECTIVE BOX
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 In ER EKGs with intermittent diffuse ST changes, + troponins, +NSTEMI, started on a Tridil and Heparin gtt for NSTEMI, admitted to CTICU, IABP placed, now s/p CABG x 4 (LIMA-LAD, SVG-OM 1, OM2, PDA) on 7/14/23, IABP removed after OR without incident. Postop course notable for PAF with 4.26 sec conversion pause 7/19/23 (EP following, remains NSR 60s on a beta-blocker), and persistent hyperkalemia despite HD (concern for recirculation through fistula, vascular surgery following, plan for fistulogram Tuesday 7/25).    Significant recent/past 24 hr events: No overnight events reported.    Subjective: Patient lying in bed in NAD. +Tolerating diet. +Passing BMs since surgery. +Pain currently controlled. Denies fevers, chills, lightheadedness, dizziness, HA, CP, palpitations, SOB, cough, abdominal pain, N/V, diarrhea, numbness/tingling in extremities, or any other acute complaints. ROS negative x 10 systems except as noted above.    MEDICATIONS  (STANDING):  amLODIPine   Tablet 5 milliGRAM(s) Oral daily  aspirin enteric coated 81 milliGRAM(s) Oral daily  atorvastatin 80 milliGRAM(s) Oral at bedtime  calcium acetate 667 milliGRAM(s) Oral three times a day with meals  clopidogrel Tablet 75 milliGRAM(s) Oral daily  epoetin lois-epbx (RETACRIT) Injectable 2000 Unit(s) IV Push <User Schedule>  gabapentin 100 milliGRAM(s) Oral three times a day  heparin  Infusion 1000 Unit(s)/Hr (12 mL/Hr) IV Continuous   hydrALAZINE 25 milliGRAM(s) Oral three times a day  insulin glargine Injectable (LANTUS) 6 Unit(s) SubCutaneous at bedtime  insulin lispro (ADMELOG) corrective regimen sliding scale   SubCutaneous three times a day before meals  insulin lispro (ADMELOG) corrective regimen sliding scale   SubCutaneous at bedtime  loratadine 10 milliGRAM(s) Oral daily  methocarbamol 500 milliGRAM(s) Oral two times a day  metoprolol tartrate 50 milliGRAM(s) Oral every 8 hours  pantoprazole    Tablet 40 milliGRAM(s) Oral daily  polyethylene glycol 3350 17 Gram(s) Oral daily  senna 2 Tablet(s) Oral at bedtime  sodium chloride 0.9% lock flush 3 milliLiter(s) IV Push every 8 hours  tamsulosin 0.4 milliGRAM(s) Oral at bedtime    MEDICATIONS  (PRN):  bisacodyl 5 milliGRAM(s) Oral every 12 hours PRN Constipation  dextrose Oral Gel 15 Gram(s) Oral once PRN Blood Glucose LESS THAN 70 milliGRAM(s)/deciliter    Allergies:  No Known Drug Allergies  shellfish (Rash)    Vitals   T(C): 36.6 (21 Jul 2023 23:52), Max: 37.3 (21 Jul 2023 22:46)  T(F): 97.9 (21 Jul 2023 23:52), Max: 99.2 (21 Jul 2023 22:46)  HR: 64 (22 Jul 2023 00:08) (59 - 94)  BP: 130/56 (22 Jul 2023 00:08) (130/56 - 169/66)  BP(mean): 92 (21 Jul 2023 05:01) (92 - 92)  RR: 15 (22 Jul 2023 00:08) (15 - 18)  SpO2: 93% (22 Jul 2023 00:08) (93% - 98%)  O2 Parameters below as of 22 Jul 2023 00:08  Patient On (Oxygen Delivery Method): room air    I&O's Detail    20 Jul 2023 07:01  -  21 Jul 2023 07:00  --------------------------------------------------------  IN:    Oral Fluid: 480 mL  Total IN: 480 mL    OUT:    Voided (mL): 240 mL  Total OUT: 240 mL    Total NET: 240 mL      21 Jul 2023 07:01  -  22 Jul 2023 03:21  --------------------------------------------------------  IN:    Heparin: 142 mL    Oral Fluid: 520 mL  Total IN: 662 mL    OUT:    Other (mL): 1500 mL  Total OUT: 1500 mL    Total NET: -838 mL    Physical Exam  Constitutional: NAD  Neuro: A+O x 3, non-focal, speech clear and intact  HEENT: NC/AT  Neck:  Supple, trachea midline  Chest: +PW (settings: VVI, rate: 40, mA: 10, sensitivity: 0.8)  CV: regular rate, regular rhythm, +S1S2, no murmurs or rub  Pulm/chest: lung sounds diminished at bases with bibasilar crackles, no accessory muscle use noted  Abd: soft, NT, ND, + BS  Ext: BARRETO x 4, +1 LE pitting edema bilaterally, distal motor/neuro/circ intact, Right UE AVF   Skin: warm, dry, perfused  Psych: calm, appropriate affect   Incision: midsternal incision open to air C/D/I, sternum stable, Right LE EVH site open to air, C/D/I    LABS                        9.5    7.53  )-----------( 146      ( 21 Jul 2023 04:59 )             28.3     07-21    x   |  x   |  x   ----------------------------<  x   5.2   |  x   |  x     Ca    7.9<L>      21 Jul 2023 04:59  Mg     2.1     07-21    PT/INR - ( 21 Jul 2023 12:10 )   PT: 12.5 sec;   INR: 1.08 ratio       PTT - ( 21 Jul 2023 21:16 )  PTT:40.5 sec    Urinalysis Basic - ( 21 Jul 2023 04:59 )  Color: x / Appearance: x / SG: x / pH: x  Gluc: 193 mg/dL / Ketone: x  / Bili: x / Urobili: x   Blood: x / Protein: x / Nitrite: x   Leuk Esterase: x / RBC: x / WBC x   Sq Epi: x / Non Sq Epi: x / Bacteria: x    POCT Blood Glucose.: 185 mg/dL (07-21-23 @ 21:04)  POCT Blood Glucose.: 156 mg/dL (07-21-23 @ 17:03)  POCT Blood Glucose.: 88 mg/dL (07-21-23 @ 14:14)  POCT Blood Glucose.: 62 mg/dL (07-21-23 @ 11:53)  POCT Blood Glucose.: 160 mg/dL (07-21-23 @ 07:50)  POCT Blood Glucose.: 310 mg/dL (07-21-23 @ 06:55)  POCT Blood Glucose.: 220 mg/dL (07-21-23 @ 06:27)      Last CXR:  < from: Xray Chest 1 View- PORTABLE-Routine (Xray Chest 1 View- PORTABLE-Routine in AM.) (07.21.23 @ 05:30) >  FINDINGS:  CATHETERS AND TUBES: None  PULMONARY: Suspect LEFT retrocardiac airspace disease obscuring medial diaphragm contour.  Remaining visualized lung parenchyma clear.  No pneumothorax.  HEART/VASCULAR: The  heart is enlarged in transverse diameter. Status post coronary artery bypass graft procedure. .  BONES: Visualized osseous thorax intact.  IMPRESSION:   Suspect residual LEFT retrocardiac airspace disease.   Confirmatory lateral radiograph recommended.  < end of copied text >

## 2023-07-23 LAB
ANION GAP SERPL CALC-SCNC: 16 MMOL/L — SIGNIFICANT CHANGE UP (ref 5–17)
APTT BLD: 58.5 SEC — HIGH (ref 27.5–35.5)
APTT BLD: 58.7 SEC — HIGH (ref 27.5–35.5)
APTT BLD: 59.8 SEC — HIGH (ref 27.5–35.5)
BUN SERPL-MCNC: 59.3 MG/DL — HIGH (ref 8–20)
CALCIUM SERPL-MCNC: 8 MG/DL — LOW (ref 8.4–10.5)
CHLORIDE SERPL-SCNC: 88 MMOL/L — LOW (ref 96–108)
CO2 SERPL-SCNC: 23 MMOL/L — SIGNIFICANT CHANGE UP (ref 22–29)
CREAT SERPL-MCNC: 7.15 MG/DL — HIGH (ref 0.5–1.3)
EGFR: 8 ML/MIN/1.73M2 — LOW
GLUCOSE BLDC GLUCOMTR-MCNC: 153 MG/DL — HIGH (ref 70–99)
GLUCOSE BLDC GLUCOMTR-MCNC: 166 MG/DL — HIGH (ref 70–99)
GLUCOSE BLDC GLUCOMTR-MCNC: 234 MG/DL — HIGH (ref 70–99)
GLUCOSE BLDC GLUCOMTR-MCNC: 238 MG/DL — HIGH (ref 70–99)
GLUCOSE BLDC GLUCOMTR-MCNC: 257 MG/DL — HIGH (ref 70–99)
GLUCOSE SERPL-MCNC: 197 MG/DL — HIGH (ref 70–99)
HCT VFR BLD CALC: 24.7 % — LOW (ref 39–50)
HGB BLD-MCNC: 8.4 G/DL — LOW (ref 13–17)
MAGNESIUM SERPL-MCNC: 2.1 MG/DL — SIGNIFICANT CHANGE UP (ref 1.6–2.6)
MCHC RBC-ENTMCNC: 30 PG — SIGNIFICANT CHANGE UP (ref 27–34)
MCHC RBC-ENTMCNC: 34 GM/DL — SIGNIFICANT CHANGE UP (ref 32–36)
MCV RBC AUTO: 88.2 FL — SIGNIFICANT CHANGE UP (ref 80–100)
PHOSPHATE SERPL-MCNC: 4.4 MG/DL — SIGNIFICANT CHANGE UP (ref 2.4–4.7)
PLATELET # BLD AUTO: 156 K/UL — SIGNIFICANT CHANGE UP (ref 150–400)
POTASSIUM SERPL-MCNC: 5.4 MMOL/L — HIGH (ref 3.5–5.3)
POTASSIUM SERPL-SCNC: 5.4 MMOL/L — HIGH (ref 3.5–5.3)
RBC # BLD: 2.8 M/UL — LOW (ref 4.2–5.8)
RBC # FLD: 14.8 % — HIGH (ref 10.3–14.5)
SODIUM SERPL-SCNC: 127 MMOL/L — LOW (ref 135–145)
WBC # BLD: 6.67 K/UL — SIGNIFICANT CHANGE UP (ref 3.8–10.5)
WBC # FLD AUTO: 6.67 K/UL — SIGNIFICANT CHANGE UP (ref 3.8–10.5)

## 2023-07-23 PROCEDURE — 99233 SBSQ HOSP IP/OBS HIGH 50: CPT

## 2023-07-23 PROCEDURE — 99024 POSTOP FOLLOW-UP VISIT: CPT

## 2023-07-23 PROCEDURE — 71045 X-RAY EXAM CHEST 1 VIEW: CPT | Mod: 26

## 2023-07-23 RX ORDER — LIDOCAINE 4 G/100G
1 CREAM TOPICAL ONCE
Refills: 0 | Status: COMPLETED | OUTPATIENT
Start: 2023-07-23 | End: 2023-07-23

## 2023-07-23 RX ADMIN — HEPARIN SODIUM 16 UNIT(S)/HR: 5000 INJECTION INTRAVENOUS; SUBCUTANEOUS at 19:53

## 2023-07-23 RX ADMIN — Medication 2: at 08:19

## 2023-07-23 RX ADMIN — Medication 3: at 17:49

## 2023-07-23 RX ADMIN — Medication 667 MILLIGRAM(S): at 14:38

## 2023-07-23 RX ADMIN — GABAPENTIN 100 MILLIGRAM(S): 400 CAPSULE ORAL at 06:04

## 2023-07-23 RX ADMIN — HEPARIN SODIUM 15 UNIT(S)/HR: 5000 INJECTION INTRAVENOUS; SUBCUTANEOUS at 07:31

## 2023-07-23 RX ADMIN — Medication 50 MILLIGRAM(S): at 18:43

## 2023-07-23 RX ADMIN — LORATADINE 10 MILLIGRAM(S): 10 TABLET ORAL at 14:38

## 2023-07-23 RX ADMIN — SENNA PLUS 2 TABLET(S): 8.6 TABLET ORAL at 21:38

## 2023-07-23 RX ADMIN — Medication 25 MILLIGRAM(S): at 14:38

## 2023-07-23 RX ADMIN — Medication 1: at 12:48

## 2023-07-23 RX ADMIN — SODIUM CHLORIDE 3 MILLILITER(S): 9 INJECTION INTRAMUSCULAR; INTRAVENOUS; SUBCUTANEOUS at 21:06

## 2023-07-23 RX ADMIN — LIDOCAINE 1 PATCH: 4 CREAM TOPICAL at 19:25

## 2023-07-23 RX ADMIN — SODIUM ZIRCONIUM CYCLOSILICATE 10 GRAM(S): 10 POWDER, FOR SUSPENSION ORAL at 00:54

## 2023-07-23 RX ADMIN — SODIUM ZIRCONIUM CYCLOSILICATE 10 GRAM(S): 10 POWDER, FOR SUSPENSION ORAL at 16:46

## 2023-07-23 RX ADMIN — Medication 25 MILLIGRAM(S): at 21:37

## 2023-07-23 RX ADMIN — SODIUM ZIRCONIUM CYCLOSILICATE 10 GRAM(S): 10 POWDER, FOR SUSPENSION ORAL at 06:04

## 2023-07-23 RX ADMIN — Medication 81 MILLIGRAM(S): at 14:38

## 2023-07-23 RX ADMIN — LIDOCAINE 1 PATCH: 4 CREAM TOPICAL at 14:49

## 2023-07-23 RX ADMIN — Medication 1 TABLET(S): at 14:38

## 2023-07-23 RX ADMIN — Medication 667 MILLIGRAM(S): at 08:22

## 2023-07-23 RX ADMIN — TAMSULOSIN HYDROCHLORIDE 0.4 MILLIGRAM(S): 0.4 CAPSULE ORAL at 21:38

## 2023-07-23 RX ADMIN — GABAPENTIN 100 MILLIGRAM(S): 400 CAPSULE ORAL at 21:37

## 2023-07-23 RX ADMIN — INSULIN GLARGINE 6 UNIT(S): 100 INJECTION, SOLUTION SUBCUTANEOUS at 21:36

## 2023-07-23 RX ADMIN — AMLODIPINE BESYLATE 5 MILLIGRAM(S): 2.5 TABLET ORAL at 06:04

## 2023-07-23 RX ADMIN — POLYETHYLENE GLYCOL 3350 17 GRAM(S): 17 POWDER, FOR SOLUTION ORAL at 14:39

## 2023-07-23 RX ADMIN — METHOCARBAMOL 500 MILLIGRAM(S): 500 TABLET, FILM COATED ORAL at 06:04

## 2023-07-23 RX ADMIN — PANTOPRAZOLE SODIUM 40 MILLIGRAM(S): 20 TABLET, DELAYED RELEASE ORAL at 14:37

## 2023-07-23 RX ADMIN — CLOPIDOGREL BISULFATE 75 MILLIGRAM(S): 75 TABLET, FILM COATED ORAL at 14:38

## 2023-07-23 RX ADMIN — GABAPENTIN 100 MILLIGRAM(S): 400 CAPSULE ORAL at 14:48

## 2023-07-23 RX ADMIN — HEPARIN SODIUM 15 UNIT(S)/HR: 5000 INJECTION INTRAVENOUS; SUBCUTANEOUS at 05:38

## 2023-07-23 RX ADMIN — Medication 25 MILLIGRAM(S): at 06:03

## 2023-07-23 RX ADMIN — ATORVASTATIN CALCIUM 80 MILLIGRAM(S): 80 TABLET, FILM COATED ORAL at 21:40

## 2023-07-23 RX ADMIN — HEPARIN SODIUM 15 UNIT(S)/HR: 5000 INJECTION INTRAVENOUS; SUBCUTANEOUS at 12:05

## 2023-07-23 RX ADMIN — SODIUM CHLORIDE 3 MILLILITER(S): 9 INJECTION INTRAMUSCULAR; INTRAVENOUS; SUBCUTANEOUS at 14:55

## 2023-07-23 RX ADMIN — SODIUM CHLORIDE 3 MILLILITER(S): 9 INJECTION INTRAMUSCULAR; INTRAVENOUS; SUBCUTANEOUS at 05:59

## 2023-07-23 RX ADMIN — Medication 667 MILLIGRAM(S): at 18:43

## 2023-07-23 RX ADMIN — Medication 80 MILLIGRAM(S): at 00:54

## 2023-07-23 RX ADMIN — METHOCARBAMOL 500 MILLIGRAM(S): 500 TABLET, FILM COATED ORAL at 18:43

## 2023-07-23 NOTE — PROGRESS NOTE ADULT - PROBLEM SELECTOR PLAN 3
Patient with PAF with long conversion pauses (2-6.5 seconds).   EP following.   Lopressor decreased to 50mg BID.  Plan to start Eliquis for PAF after Fistulogram.  Continue Heparin gtt for full anticoagulation until Eliquis started.

## 2023-07-23 NOTE — CHART NOTE - NSCHARTNOTEFT_GEN_A_CORE
One episode of afib/flutter today with post-conversion pause of less than 1.5 seconds. Continue at current dose of metoprolol at 50 mg bid.

## 2023-07-23 NOTE — PROGRESS NOTE ADULT - SUBJECTIVE AND OBJECTIVE BOX
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 In ER EKGs with intermittent diffuse ST changes, + troponins, +NSTEMI, started on a Tridil and Heparin gtt for NSTEMI, admitted to CTICU, IABP placed, now s/p CABG x 4 (LIMA-LAD, SVG-OM 1, OM2, PDA) on 7/14/23, IABP removed after OR without incident. Postop course notable for PAF with long conversion pauses (2-6.5 seconds with EP following, beta-blocker dose lowered), and persistent hyperkalemia despite HD (concern for recirculation through fistula, vascular surgery following, plan for fistulogram Tuesday 7/25).    Significant recent/past 24 hr events: Plan was for HD last night, but cancelled as per Nephrology with Lokelma and IV Lasix given. Will discuss reason for cancellation and plan this AM.     Subjective: Patient lying in bed in NAD. +Tolerating diet. +Passing BMs since surgery. +Pain currently controlled. Denies fevers, chills, lightheadedness, dizziness, HA, CP, palpitations, SOB, cough, abdominal pain, N/V, diarrhea, numbness/tingling in extremities, or any other acute complaints. ROS negative x 10 systems except as noted above.    MEDICATIONS  (STANDING):  amLODIPine   Tablet 5 milliGRAM(s) Oral daily  aspirin enteric coated 81 milliGRAM(s) Oral daily  atorvastatin 80 milliGRAM(s) Oral at bedtime  calcium acetate 667 milliGRAM(s) Oral three times a day with meals  clopidogrel Tablet 75 milliGRAM(s) Oral daily  epoetin lois-epbx (RETACRIT) Injectable 5000 Unit(s) IV Push <User Schedule>  gabapentin 100 milliGRAM(s) Oral three times a day  heparin  Infusion 1000 Unit(s)/Hr (14.5 mL/Hr) IV Continuous  hydrALAZINE 25 milliGRAM(s) Oral three times a day  insulin glargine Injectable (LANTUS) 6 Unit(s) SubCutaneous at bedtime  insulin lispro (ADMELOG) corrective regimen sliding scale   SubCutaneous three times a day before meals  insulin lispro (ADMELOG) corrective regimen sliding scale   SubCutaneous at bedtime  loratadine 10 milliGRAM(s) Oral daily  methocarbamol 500 milliGRAM(s) Oral two times a day  metoprolol tartrate 50 milliGRAM(s) Oral every 12 hours  pantoprazole    Tablet 40 milliGRAM(s) Oral daily  polyethylene glycol 3350 17 Gram(s) Oral daily  senna 2 Tablet(s) Oral at bedtime  sodium chloride 0.9% lock flush 3 milliLiter(s) IV Push every 8 hours  sodium zirconium cyclosilicate 10 Gram(s) Oral three times a day  tamsulosin 0.4 milliGRAM(s) Oral at bedtime    MEDICATIONS  (PRN):  bisacodyl 5 milliGRAM(s) Oral every 12 hours PRN Constipation  dextrose Oral Gel 15 Gram(s) Oral once PRN Blood Glucose LESS THAN 70 milliGRAM(s)/deciliter  ondansetron Injectable 4 milliGRAM(s) IV Push every 6 hours PRN Nausea and/or Vomiting    Allergies:  No Known Drug Allergies  shellfish (Rash)    Vitals   T(C): 36.6 (23 Jul 2023 00:40), Max: 37.1 (22 Jul 2023 07:30)  T(F): 97.9 (23 Jul 2023 00:40), Max: 98.7 (22 Jul 2023 07:30)  HR: 57 (23 Jul 2023 00:40) (57 - 102)  BP: 160/86 (23 Jul 2023 00:40) (126/49 - 162/72)  RR: 16 (23 Jul 2023 00:40) (16 - 18)  SpO2: 96% (23 Jul 2023 00:40) (93% - 100%)  O2 Parameters below as of 23 Jul 2023 00:40  Patient On (Oxygen Delivery Method): room air    I&O's Detail    21 Jul 2023 07:01  -  22 Jul 2023 07:00  --------------------------------------------------------  IN:    Heparin: 190 mL    Oral Fluid: 740 mL  Total IN: 930 mL    OUT:    Other (mL): 1500 mL  Total OUT: 1500 mL    Total NET: -570 mL      22 Jul 2023 07:01  -  23 Jul 2023 03:52  --------------------------------------------------------  IN:    Heparin: 230.5 mL    Oral Fluid: 740 mL  Total IN: 970.5 mL    OUT:  Total OUT: 0 mL    Total NET: 970.5 mL    Physical Exam  Constitutional: NAD  Neuro: A+O x 3, non-focal, speech clear and intact  HEENT: NC/AT  Neck:  Supple, trachea midline  Chest: +PW (settings: VVI, rate: 40, mA: 10, sensitivity: 0.8)  CV: regular rate, regular rhythm, +S1S2, no murmurs or rub  Pulm/chest: lung sounds diminished at bases with bibasilar crackles, no accessory muscle use noted  Abd: soft, NT, ND, + BS  Ext: BARRETO x 4, +2 LE pitting edema bilaterally, distal motor/neuro/circ intact, Right UE AVF   Skin: warm, dry, perfused  Psych: calm, appropriate affect   Incision: midsternal incision open to air C/D/I, sternum stable, Right LE EVH site open to air, C/D/I    LABS                        8.4    6.67  )-----------( 156      ( 23 Jul 2023 03:22 )             24.7     07-22    125<L>  |  88<L>  |  51.7<H>  ----------------------------<  188<H>  6.0<H>   |  23.0  |  6.54<H>    Ca    8.5      22 Jul 2023 16:30  Mg     2.0     07-22    TPro  6.3<L>  /  Alb  3.6  /  TBili  0.4  /  DBili  0.2  /  AST  33  /  ALT  48<H>  /  AlkPhos  328<H>  07-22    PT/INR - ( 21 Jul 2023 12:10 )   PT: 12.5 sec;   INR: 1.08 ratio      PTT - ( 23 Jul 2023 03:22 )  PTT:58.5 sec    Urinalysis Basic - ( 22 Jul 2023 16:30 )  Color: x / Appearance: x / SG: x / pH: x  Gluc: 188 mg/dL / Ketone: x  / Bili: x / Urobili: x   Blood: x / Protein: x / Nitrite: x   Leuk Esterase: x / RBC: x / WBC x   Sq Epi: x / Non Sq Epi: x / Bacteria: x    POCT Blood Glucose.: 166 mg/dL (07-23-23 @ 00:16)  POCT Blood Glucose.: 93 mg/dL (07-22-23 @ 21:59)  POCT Blood Glucose.: 89 mg/dL (07-22-23 @ 21:10)  POCT Blood Glucose.: 133 mg/dL (07-22-23 @ 19:57)  POCT Blood Glucose.: 372 mg/dL (07-22-23 @ 18:46)  POCT Blood Glucose.: 179 mg/dL (07-22-23 @ 18:36)  POCT Blood Glucose.: 189 mg/dL (07-22-23 @ 17:17)  POCT Blood Glucose.: 193 mg/dL (07-22-23 @ 16:07)  POCT Blood Glucose.: 170 mg/dL (07-22-23 @ 11:45)  POCT Blood Glucose.: 216 mg/dL (07-22-23 @ 09:22)  POCT Blood Glucose.: 194 mg/dL (07-22-23 @ 07:59)    Last CXR:  < from: Xray Chest 1 View- PORTABLE-Routine (Xray Chest 1 View- PORTABLE-Routine in AM.) (07.22.23 @ 06:15) >  IMPRESSION:  HEART:  Enlarged  LUNGS: Interstitial edema, basilar atelectasis, small effusions greater the right. Likely failure.  BONES: sternotomy wires  < end of copied text >

## 2023-07-23 NOTE — PROGRESS NOTE ADULT - ASSESSMENT
63 year old legally blind male with PMH of DM complicated by neuropathy, HTN, HLD, CAD, PVD, gastroparesis, ESRD on HD TTS and BPH s/p CABG x4 on 07/14/23. Nephrology is managing ESRD on HD.     -Access: R AV fistula   -Outpatient dialysis unit is unclear - patient and wife are both poor historians and unable to provide details - reportedly somewhere around [place]    -Outpatient dialysis days are Tuesdays Thursdays Saturdays  -Persistent hyperkalemia - s/p lasix 80mg IV x 1; increased Lokelma to 15mg TID (for total of 4 doses) - will arrange for dialysis today   -Possible HD tomorrow pending a.m. labs   -? Recirculation - fistulogram planned for 07/25/23 as per Vascular Surgery   -BP acceptable   -Volume status - UF as tolerated during HD; continue 1.2L fluid restriction    -Anemia - KIMBERLY with HD   -Mineral Bone Disease in setting of CKD - continue oral phos binder    Kamryn Li, DO  Nephrology

## 2023-07-23 NOTE — PROGRESS NOTE ADULT - SUBJECTIVE AND OBJECTIVE BOX
Potassium 6.0 yesterday s/p medical management. Reports compliance with renal diet. Complained of b/l LE edema. Hemodialysis today.     Vital Signs Last 24 Hrs  T(C): 36.8 (23 Jul 2023 10:00), Max: 37.1 (22 Jul 2023 21:08)  T(F): 98.3 (23 Jul 2023 10:00), Max: 98.7 (22 Jul 2023 21:08)  HR: 64 (23 Jul 2023 12:51) (56 - 102)  BP: 166/68 (23 Jul 2023 12:51) (136/66 - 171/74)  BP(mean): --  RR: 17 (23 Jul 2023 11:53) (15 - 18)  SpO2: 96% (23 Jul 2023 11:53) (92% - 98%)    Parameters below as of 23 Jul 2023 11:53  Patient On (Oxygen Delivery Method): room air    I&O's Summary    22 Jul 2023 07:01  -  23 Jul 2023 07:00  --------------------------------------------------------  IN: 1430 mL / OUT: 0 mL / NET: 1430 mL    23 Jul 2023 07:01  -  23 Jul 2023 13:07  --------------------------------------------------------  IN: 0 mL / OUT: 2000 mL / NET: -2000 mL    Physical Exam  General: WDNW male in NAD  HEENT: Normocephalic  Cardiac: S1S2 RRR  Respiratory: CTAB  Abdomen: Soft, NT  Extremities: 2+ pitting edema of b/l LE  Neuro: AAOx3  Psych: Calm    07-23    127<L>  |  88<L>  |  59.3<H>  ----------------------------<  197<H>  5.4<H>   |  23.0  |  7.15<H>    Ca    8.0<L>      23 Jul 2023 03:22  Phos  4.4     07-23  Mg     2.1     07-23    TPro  6.3<L>  /  Alb  3.6  /  TBili  0.4  /  DBili  0.2  /  AST  33  /  ALT  48<H>  /  AlkPhos  328<H>  07-22               8.4    6.67  )-----------( 156      ( 23 Jul 2023 03:22 )             24.7     MEDICATIONS  (STANDING):  amLODIPine   Tablet 5 milliGRAM(s) Oral daily  aspirin enteric coated 81 milliGRAM(s) Oral daily  atorvastatin 80 milliGRAM(s) Oral at bedtime  calcium acetate 667 milliGRAM(s) Oral three times a day with meals  clopidogrel Tablet 75 milliGRAM(s) Oral daily  dextrose 50% Injectable 50 milliLiter(s) IV Push every 15 minutes  dextrose 50% Injectable 25 milliLiter(s) IV Push every 15 minutes  epoetin lois-epbx (RETACRIT) Injectable 5000 Unit(s) IV Push <User Schedule>  gabapentin 100 milliGRAM(s) Oral three times a day  glucagon  Injectable 1 milliGRAM(s) IntraMuscular once  heparin  Infusion 1000 Unit(s)/Hr (15 mL/Hr) IV Continuous <Continuous>  hydrALAZINE 25 milliGRAM(s) Oral three times a day  insulin glargine Injectable (LANTUS) 6 Unit(s) SubCutaneous at bedtime  insulin lispro (ADMELOG) corrective regimen sliding scale   SubCutaneous three times a day before meals  insulin lispro (ADMELOG) corrective regimen sliding scale   SubCutaneous at bedtime  lidocaine   4% Patch 1 Patch Transdermal once  loratadine 10 milliGRAM(s) Oral daily  methocarbamol 500 milliGRAM(s) Oral two times a day  metoprolol tartrate 50 milliGRAM(s) Oral every 12 hours  Nephro-becky 1 Tablet(s) Oral daily  pantoprazole    Tablet 40 milliGRAM(s) Oral daily  polyethylene glycol 3350 17 Gram(s) Oral daily  senna 2 Tablet(s) Oral at bedtime  sodium chloride 0.9% lock flush 3 milliLiter(s) IV Push every 8 hours  sodium zirconium cyclosilicate 10 Gram(s) Oral three times a day  tamsulosin 0.4 milliGRAM(s) Oral at bedtime    MEDICATIONS  (PRN):  bisacodyl 5 milliGRAM(s) Oral every 12 hours PRN Constipation  dextrose Oral Gel 15 Gram(s) Oral once PRN Blood Glucose LESS THAN 70 milliGRAM(s)/deciliter  ondansetron Injectable 4 milliGRAM(s) IV Push every 6 hours PRN Nausea and/or Vomiting

## 2023-07-23 NOTE — PROGRESS NOTE ADULT - ASSESSMENT
63M PMHx ESRD on HD (T,Th,S), IDDM, COPD, gastroparesis, peripheral blindness and retinopathy, NSTEMI 6/2022 with recent hospital admission for CABG nisha bundy diff + that admission, now presents from home with complaints of worsening chest pain associated with difficulty breathing. In ER EKGs with intermittent diffuse ST changes, + troponins, +NSTEMI, started on a Tridil and Heparin gtt for NSTEMI, admitted to CTICU, IABP placed, now s/p CABG x 4 (LIMA-LAD, SVG-OM 1, OM2, PDA) on 7/14/23, IABP removed after OR without incident. Postop course notable for PAF with long conversion pauses (2-6.5 seconds with EP following, beta-blocker dose lowered), and persistent hyperkalemia despite HD (concern for recirculation through fistula, vascular surgery following, plan for fistulogram Tuesday 7/25).

## 2023-07-24 LAB
ANION GAP SERPL CALC-SCNC: 17 MMOL/L — SIGNIFICANT CHANGE UP (ref 5–17)
APTT BLD: 59.3 SEC — HIGH (ref 27.5–35.5)
APTT BLD: 60.1 SEC — HIGH (ref 27.5–35.5)
BLD GP AB SCN SERPL QL: SIGNIFICANT CHANGE UP
BUN SERPL-MCNC: 40.2 MG/DL — HIGH (ref 8–20)
CALCIUM SERPL-MCNC: 7.9 MG/DL — LOW (ref 8.4–10.5)
CHLORIDE SERPL-SCNC: 89 MMOL/L — LOW (ref 96–108)
CO2 SERPL-SCNC: 24 MMOL/L — SIGNIFICANT CHANGE UP (ref 22–29)
CREAT SERPL-MCNC: 6.33 MG/DL — HIGH (ref 0.5–1.3)
EGFR: 9 ML/MIN/1.73M2 — LOW
GLUCOSE BLDC GLUCOMTR-MCNC: 157 MG/DL — HIGH (ref 70–99)
GLUCOSE BLDC GLUCOMTR-MCNC: 207 MG/DL — HIGH (ref 70–99)
GLUCOSE BLDC GLUCOMTR-MCNC: 241 MG/DL — HIGH (ref 70–99)
GLUCOSE BLDC GLUCOMTR-MCNC: 259 MG/DL — HIGH (ref 70–99)
GLUCOSE SERPL-MCNC: 214 MG/DL — HIGH (ref 70–99)
HCT VFR BLD CALC: 24.1 % — LOW (ref 39–50)
HGB BLD-MCNC: 8 G/DL — LOW (ref 13–17)
MAGNESIUM SERPL-MCNC: 2 MG/DL — SIGNIFICANT CHANGE UP (ref 1.6–2.6)
MCHC RBC-ENTMCNC: 29.7 PG — SIGNIFICANT CHANGE UP (ref 27–34)
MCHC RBC-ENTMCNC: 33.2 GM/DL — SIGNIFICANT CHANGE UP (ref 32–36)
MCV RBC AUTO: 89.6 FL — SIGNIFICANT CHANGE UP (ref 80–100)
PHOSPHATE SERPL-MCNC: 4.5 MG/DL — SIGNIFICANT CHANGE UP (ref 2.4–4.7)
PLATELET # BLD AUTO: 157 K/UL — SIGNIFICANT CHANGE UP (ref 150–400)
POTASSIUM SERPL-MCNC: 4 MMOL/L — SIGNIFICANT CHANGE UP (ref 3.5–5.3)
POTASSIUM SERPL-SCNC: 4 MMOL/L — SIGNIFICANT CHANGE UP (ref 3.5–5.3)
RBC # BLD: 2.69 M/UL — LOW (ref 4.2–5.8)
RBC # FLD: 15 % — HIGH (ref 10.3–14.5)
SODIUM SERPL-SCNC: 130 MMOL/L — LOW (ref 135–145)
WBC # BLD: 7.3 K/UL — SIGNIFICANT CHANGE UP (ref 3.8–10.5)
WBC # FLD AUTO: 7.3 K/UL — SIGNIFICANT CHANGE UP (ref 3.8–10.5)

## 2023-07-24 PROCEDURE — 90935 HEMODIALYSIS ONE EVALUATION: CPT

## 2023-07-24 PROCEDURE — 71045 X-RAY EXAM CHEST 1 VIEW: CPT | Mod: 26

## 2023-07-24 PROCEDURE — 99232 SBSQ HOSP IP/OBS MODERATE 35: CPT

## 2023-07-24 PROCEDURE — 99024 POSTOP FOLLOW-UP VISIT: CPT

## 2023-07-24 RX ORDER — METOPROLOL TARTRATE 50 MG
37.5 TABLET ORAL
Refills: 0 | Status: DISCONTINUED | OUTPATIENT
Start: 2023-07-24 | End: 2023-07-28

## 2023-07-24 RX ORDER — METOPROLOL TARTRATE 50 MG
37.55 TABLET ORAL
Refills: 0 | Status: DISCONTINUED | OUTPATIENT
Start: 2023-07-24 | End: 2023-07-24

## 2023-07-24 RX ORDER — LIDOCAINE 4 G/100G
1 CREAM TOPICAL DAILY
Refills: 0 | Status: DISCONTINUED | OUTPATIENT
Start: 2023-07-24 | End: 2023-07-31

## 2023-07-24 RX ORDER — IRON SUCROSE 20 MG/ML
100 INJECTION, SOLUTION INTRAVENOUS
Refills: 0 | Status: DISCONTINUED | OUTPATIENT
Start: 2023-07-24 | End: 2023-07-31

## 2023-07-24 RX ADMIN — AMLODIPINE BESYLATE 5 MILLIGRAM(S): 2.5 TABLET ORAL at 05:20

## 2023-07-24 RX ADMIN — Medication 667 MILLIGRAM(S): at 08:26

## 2023-07-24 RX ADMIN — SENNA PLUS 2 TABLET(S): 8.6 TABLET ORAL at 22:13

## 2023-07-24 RX ADMIN — LIDOCAINE 1 PATCH: 4 CREAM TOPICAL at 18:54

## 2023-07-24 RX ADMIN — Medication 3: at 17:18

## 2023-07-24 RX ADMIN — Medication 667 MILLIGRAM(S): at 18:08

## 2023-07-24 RX ADMIN — CLOPIDOGREL BISULFATE 75 MILLIGRAM(S): 75 TABLET, FILM COATED ORAL at 11:38

## 2023-07-24 RX ADMIN — Medication 1 TABLET(S): at 11:39

## 2023-07-24 RX ADMIN — LORATADINE 10 MILLIGRAM(S): 10 TABLET ORAL at 11:39

## 2023-07-24 RX ADMIN — Medication 81 MILLIGRAM(S): at 11:38

## 2023-07-24 RX ADMIN — LIDOCAINE 1 PATCH: 4 CREAM TOPICAL at 11:40

## 2023-07-24 RX ADMIN — SODIUM CHLORIDE 3 MILLILITER(S): 9 INJECTION INTRAMUSCULAR; INTRAVENOUS; SUBCUTANEOUS at 13:02

## 2023-07-24 RX ADMIN — Medication 2: at 08:43

## 2023-07-24 RX ADMIN — Medication 1: at 12:27

## 2023-07-24 RX ADMIN — Medication 37.5 MILLIGRAM(S): at 18:09

## 2023-07-24 RX ADMIN — LIDOCAINE 1 PATCH: 4 CREAM TOPICAL at 11:41

## 2023-07-24 RX ADMIN — Medication 50 MILLIGRAM(S): at 05:20

## 2023-07-24 RX ADMIN — Medication 25 MILLIGRAM(S): at 05:20

## 2023-07-24 RX ADMIN — LIDOCAINE 1 PATCH: 4 CREAM TOPICAL at 02:06

## 2023-07-24 RX ADMIN — ATORVASTATIN CALCIUM 80 MILLIGRAM(S): 80 TABLET, FILM COATED ORAL at 22:13

## 2023-07-24 RX ADMIN — TAMSULOSIN HYDROCHLORIDE 0.4 MILLIGRAM(S): 0.4 CAPSULE ORAL at 22:14

## 2023-07-24 RX ADMIN — INSULIN GLARGINE 6 UNIT(S): 100 INJECTION, SOLUTION SUBCUTANEOUS at 22:14

## 2023-07-24 RX ADMIN — HEPARIN SODIUM 16 UNIT(S)/HR: 5000 INJECTION INTRAVENOUS; SUBCUTANEOUS at 07:41

## 2023-07-24 RX ADMIN — PANTOPRAZOLE SODIUM 40 MILLIGRAM(S): 20 TABLET, DELAYED RELEASE ORAL at 11:38

## 2023-07-24 RX ADMIN — Medication 25 MILLIGRAM(S): at 14:17

## 2023-07-24 RX ADMIN — GABAPENTIN 100 MILLIGRAM(S): 400 CAPSULE ORAL at 05:20

## 2023-07-24 RX ADMIN — SODIUM ZIRCONIUM CYCLOSILICATE 10 GRAM(S): 10 POWDER, FOR SUSPENSION ORAL at 00:38

## 2023-07-24 RX ADMIN — HEPARIN SODIUM 16 UNIT(S)/HR: 5000 INJECTION INTRAVENOUS; SUBCUTANEOUS at 01:16

## 2023-07-24 RX ADMIN — SODIUM CHLORIDE 3 MILLILITER(S): 9 INJECTION INTRAMUSCULAR; INTRAVENOUS; SUBCUTANEOUS at 05:20

## 2023-07-24 RX ADMIN — Medication 667 MILLIGRAM(S): at 11:38

## 2023-07-24 RX ADMIN — HEPARIN SODIUM 16 UNIT(S)/HR: 5000 INJECTION INTRAVENOUS; SUBCUTANEOUS at 05:24

## 2023-07-24 RX ADMIN — METHOCARBAMOL 500 MILLIGRAM(S): 500 TABLET, FILM COATED ORAL at 18:08

## 2023-07-24 RX ADMIN — HEPARIN SODIUM 16 UNIT(S)/HR: 5000 INJECTION INTRAVENOUS; SUBCUTANEOUS at 19:09

## 2023-07-24 RX ADMIN — GABAPENTIN 100 MILLIGRAM(S): 400 CAPSULE ORAL at 22:14

## 2023-07-24 RX ADMIN — POLYETHYLENE GLYCOL 3350 17 GRAM(S): 17 POWDER, FOR SOLUTION ORAL at 11:39

## 2023-07-24 RX ADMIN — GABAPENTIN 100 MILLIGRAM(S): 400 CAPSULE ORAL at 14:17

## 2023-07-24 RX ADMIN — METHOCARBAMOL 500 MILLIGRAM(S): 500 TABLET, FILM COATED ORAL at 05:20

## 2023-07-24 RX ADMIN — SODIUM CHLORIDE 3 MILLILITER(S): 9 INJECTION INTRAMUSCULAR; INTRAVENOUS; SUBCUTANEOUS at 22:07

## 2023-07-24 NOTE — PROGRESS NOTE ADULT - ASSESSMENT
63M PMHx ESRD on HD (T,Th, S), IDDM, COPD, gastroparesis, peripheral blindness and retinopathy, NSTEMI 6/2022 with recent hospital admission for CABG eval, now presents from home with complaints of worsening chest pain associated with difficulty breathing. Admitted to CTICU, IABP placed, now s/p CABG on 7/14.    1. DM2, a1c 9.4%  - Continue lantus 6 units qhs  - Sliding scale coverage  - Mild hypoglycemia last week after hyperkalemia treatment (regular insulin 10u/D50)  - Will be NPO after midnight     2. CAD  - S/p CABG, care per CT surgery team    3. ESRD  - HD as per nephrology  - Possible fistulogram tomorrow 7/25    4. Afib/aflutter  - On metoprolol

## 2023-07-24 NOTE — PROGRESS NOTE ADULT - SUBJECTIVE AND OBJECTIVE BOX
POD 10 s/p C4L (LIMA-LAD, SVG-OM1, SVG-OM2, SVG-PDA)    Subjective: no complaints denies CP, palpitations, SOB, cough, fever, chills, itchiness/rash, diaphoresis, vision changes, HA, dizziness/lightheadedness, numbness/tingling, abd pain, N/V     T(C): 37 (07-24-23 @ 00:14), Max: 37.2 (07-23-23 @ 18:40)  HR: 62 (07-24-23 @ 00:14) (56 - 107)  BP: 138/68 (07-24-23 @ 00:14) (137/70 - 171/74)  RR: 16 (07-24-23 @ 00:14) (15 - 18)  SpO2: 91% (07-24-23 @ 00:14) (91% - 99%)    07-23    127<L>  |  88<L>  |  59.3<H>  ----------------------------<  197<H>  5.4<H>   |  23.0  |  7.15<H>    Ca    8.0<L>      23 Jul 2023 03:22  Phos  4.4     07-23  Mg     2.1     07-23    TPro  6.3<L>  /  Alb  3.6  /  TBili  0.4  /  DBili  0.2  /  AST  33  /  ALT  48<H>  /  AlkPhos  328<H>  07-22                               8.4    6.67  )-----------( 156      ( 23 Jul 2023 03:22 )             24.7        PTT - ( 24 Jul 2023 00:35 )  PTT:60.1 sec              CAPILLARY BLOOD GLUCOSE  POCT Blood Glucose.: 234 mg/dL (23 Jul 2023 21:35)  POCT Blood Glucose.: 257 mg/dL (23 Jul 2023 17:38)  POCT Blood Glucose.: 153 mg/dL (23 Jul 2023 12:10)  POCT Blood Glucose.: 238 mg/dL (23 Jul 2023 08:11)    I&O's Detail    22 Jul 2023 07:01  -  23 Jul 2023 07:00  --------------------------------------------------------  IN:    Heparin: 290 mL    Oral Fluid: 1140 mL  Total IN: 1430 mL    OUT:  Total OUT: 0 mL  Total NET: 1430 mL    23 Jul 2023 07:01  -  24 Jul 2023 01:59  --------------------------------------------------------  IN:    Heparin: 80 mL    Oral Fluid: 100 mL  Total IN: 180 mL    OUT:    Other (mL): 2000 mL    Voided (mL): 50 mL  Total OUT: 2050 mL  Total NET: -1870 mL    Drug Dosing Weight  Height (cm): 160 (13 Jul 2023 23:27)  Weight (kg): 68.4 (19 Jul 2023 04:34)  BMI (kg/m2): 26.7 (19 Jul 2023 04:34)  BSA (m2): 1.71 (19 Jul 2023 04:34)    MEDICATIONS  (STANDING):  amLODIPine   Tablet 5 milliGRAM(s) Oral daily  aspirin enteric coated 81 milliGRAM(s) Oral daily  atorvastatin 80 milliGRAM(s) Oral at bedtime  calcium acetate 667 milliGRAM(s) Oral three times a day with meals  clopidogrel Tablet 75 milliGRAM(s) Oral daily  dextrose 50% Injectable 50 milliLiter(s) IV Push every 15 minutes  dextrose 50% Injectable 25 milliLiter(s) IV Push every 15 minutes  epoetin lois-epbx (RETACRIT) Injectable 5000 Unit(s) IV Push <User Schedule>  gabapentin 100 milliGRAM(s) Oral three times a day  glucagon  Injectable 1 milliGRAM(s) IntraMuscular once  heparin  Infusion 1000 Unit(s)/Hr (16 mL/Hr) IV Continuous <Continuous>  hydrALAZINE 25 milliGRAM(s) Oral three times a day  insulin glargine Injectable (LANTUS) 6 Unit(s) SubCutaneous at bedtime  insulin lispro (ADMELOG) corrective regimen sliding scale   SubCutaneous three times a day before meals  insulin lispro (ADMELOG) corrective regimen sliding scale   SubCutaneous at bedtime  loratadine 10 milliGRAM(s) Oral daily  methocarbamol 500 milliGRAM(s) Oral two times a day  metoprolol tartrate 50 milliGRAM(s) Oral every 12 hours  Nephro-becky 1 Tablet(s) Oral daily  pantoprazole    Tablet 40 milliGRAM(s) Oral daily  polyethylene glycol 3350 17 Gram(s) Oral daily  senna 2 Tablet(s) Oral at bedtime  sodium chloride 0.9% lock flush 3 milliLiter(s) IV Push every 8 hours  tamsulosin 0.4 milliGRAM(s) Oral at bedtime    MEDICATIONS  (PRN):  bisacodyl 5 milliGRAM(s) Oral every 12 hours PRN Constipation  dextrose Oral Gel 15 Gram(s) Oral once PRN Blood Glucose LESS THAN 70 milliGRAM(s)/deciliter  ondansetron Injectable 4 milliGRAM(s) IV Push every 6 hours PRN Nausea and/or Vomiting    Physical Exam  Gen: NAD  Neuro: A&Ox3 non focal speech clear and intact  Pulm: decreased L base, clear on R, no wheezing  CV: S1S2 RRR   Abd: +BS soft NT ND  Extrem/MS: 1-2 + b/l LE edema,   Incision(s): mid sternal inc C/D/I no click, LLE EVH C/D/I  EPM VVI 40/10/2.0

## 2023-07-24 NOTE — PROGRESS NOTE ADULT - SUBJECTIVE AND OBJECTIVE BOX
Potassium 4.0 today s/p medical management. Reports compliance with renal diet. Complained of b/l LE edema. Hemodialysis tomorrow.     Physical Exam:  Gen: no acute distress  MS: alert, conversing normally  Eyes: EOMI, no icterus  HENT: NCAT, MMM  CV: rhythm reg reg, rate normal, no m/g/r, no LE edema  Chest: CTAB, no w/r/r,  Abd: soft, NT, ND  Neuro: moving all 4 limbs spontaneously, no tremor  MSK: normal bulk and tone, no joint swelling  Skin: dry, warm, no rash or jaundice        =======================================================  Vital Signs Last 24 Hrs  T(C): 36.9 (24 Jul 2023 08:03), Max: 37.2 (23 Jul 2023 18:40)  T(F): 98.5 (24 Jul 2023 08:03), Max: 98.9 (23 Jul 2023 18:40)  HR: 61 (24 Jul 2023 08:03) (61 - 107)  BP: 155/54 (24 Jul 2023 08:35) (138/68 - 171/64)  BP(mean): --  RR: 18 (24 Jul 2023 08:03) (16 - 18)  SpO2: 96% (24 Jul 2023 08:03) (91% - 99%)    Parameters below as of 24 Jul 2023 08:03  Patient On (Oxygen Delivery Method): room air      I&O's Summary    23 Jul 2023 07:01  -  24 Jul 2023 07:00  --------------------------------------------------------  IN: 476 mL / OUT: 2105 mL / NET: -1629 mL      =======================================================  Current Antibiotics:    Other medications:  amLODIPine   Tablet 5 milliGRAM(s) Oral daily  aspirin enteric coated 81 milliGRAM(s) Oral daily  atorvastatin 80 milliGRAM(s) Oral at bedtime  calcium acetate 667 milliGRAM(s) Oral three times a day with meals  clopidogrel Tablet 75 milliGRAM(s) Oral daily  dextrose 50% Injectable 50 milliLiter(s) IV Push every 15 minutes  dextrose 50% Injectable 25 milliLiter(s) IV Push every 15 minutes  epoetin lois-epbx (RETACRIT) Injectable 5000 Unit(s) IV Push <User Schedule>  gabapentin 100 milliGRAM(s) Oral three times a day  glucagon  Injectable 1 milliGRAM(s) IntraMuscular once  heparin  Infusion 1000 Unit(s)/Hr IV Continuous <Continuous>  hydrALAZINE 25 milliGRAM(s) Oral three times a day  insulin glargine Injectable (LANTUS) 6 Unit(s) SubCutaneous at bedtime  insulin lispro (ADMELOG) corrective regimen sliding scale   SubCutaneous three times a day before meals  insulin lispro (ADMELOG) corrective regimen sliding scale   SubCutaneous at bedtime  iron sucrose IVPB 100 milliGRAM(s) IV Intermittent every 48 hours  lidocaine   4% Patch 1 Patch Transdermal daily  lidocaine   4% Patch 1 Patch Transdermal daily  loratadine 10 milliGRAM(s) Oral daily  methocarbamol 500 milliGRAM(s) Oral two times a day  metoprolol tartrate 50 milliGRAM(s) Oral every 12 hours  Nephro-becky 1 Tablet(s) Oral daily  pantoprazole    Tablet 40 milliGRAM(s) Oral daily  polyethylene glycol 3350 17 Gram(s) Oral daily  senna 2 Tablet(s) Oral at bedtime  sodium chloride 0.9% lock flush 3 milliLiter(s) IV Push every 8 hours  tamsulosin 0.4 milliGRAM(s) Oral at bedtime    =======================================================  07-24    130<L>  |  89<L>  |  40.2<H>  ----------------------------<  214<H>  4.0   |  24.0  |  6.33<H>    Ca    7.9<L>      24 Jul 2023 04:50  Phos  4.5     07-24  Mg     2.0     07-24    TPro  6.3<L>  /  Alb  3.6  /  TBili  0.4  /  DBili  0.2  /  AST  33  /  ALT  48<H>  /  AlkPhos  328<H>  07-22    Creatinine: 6.33 mg/dL (07-24-23 @ 04:50)  Creatinine: 7.15 mg/dL (07-23-23 @ 03:22)  Creatinine: 6.54 mg/dL (07-22-23 @ 16:30)  Creatinine: 5.80 mg/dL (07-22-23 @ 06:25)  Creatinine: 7.20 mg/dL (07-21-23 @ 04:59)  Creatinine: 5.77 mg/dL (07-20-23 @ 05:00)    Urinalysis Basic - ( 24 Jul 2023 04:50 )    Color: x / Appearance: x / SG: x / pH: x  Gluc: 214 mg/dL / Ketone: x  / Bili: x / Urobili: x   Blood: x / Protein: x / Nitrite: x   Leuk Esterase: x / RBC: x / WBC x   Sq Epi: x / Non Sq Epi: x / Bacteria: x      =======================================================     Reason for visit: ESRD    HPI:" feeling much better today, no SOB sitting on chair    Physical Exam:  Gen: no acute distress  MS: alert, conversing normally  HENT: NCAT, MMM  CV: rhythm reg reg, rate normal, no m/g/r, no LE edema  Chest: CTAB, no w/r/r,  Abd: soft, NT, ND  Neuro: moving all 4 limbs spontaneously, no tremor  Skin: dry, warm, no rash or jaundice    =======================================================  Vital Signs Last 24 Hrs  T(C): 36.9 (24 Jul 2023 08:03), Max: 37.2 (23 Jul 2023 18:40)  T(F): 98.5 (24 Jul 2023 08:03), Max: 98.9 (23 Jul 2023 18:40)  HR: 61 (24 Jul 2023 08:03) (61 - 107)  BP: 155/54 (24 Jul 2023 08:35) (138/68 - 171/64)  BP(mean): --  RR: 18 (24 Jul 2023 08:03) (16 - 18)  SpO2: 96% (24 Jul 2023 08:03) (91% - 99%)    Parameters below as of 24 Jul 2023 08:03  Patient On (Oxygen Delivery Method): room air      I&O's Summary    23 Jul 2023 07:01  -  24 Jul 2023 07:00  --------------------------------------------------------  IN: 476 mL / OUT: 2105 mL / NET: -1629 mL      =======================================================  Current Antibiotics:    Other medications:  amLODIPine   Tablet 5 milliGRAM(s) Oral daily  aspirin enteric coated 81 milliGRAM(s) Oral daily  atorvastatin 80 milliGRAM(s) Oral at bedtime  calcium acetate 667 milliGRAM(s) Oral three times a day with meals  clopidogrel Tablet 75 milliGRAM(s) Oral daily  dextrose 50% Injectable 50 milliLiter(s) IV Push every 15 minutes  dextrose 50% Injectable 25 milliLiter(s) IV Push every 15 minutes  epoetin lois-epbx (RETACRIT) Injectable 5000 Unit(s) IV Push <User Schedule>  gabapentin 100 milliGRAM(s) Oral three times a day  glucagon  Injectable 1 milliGRAM(s) IntraMuscular once  heparin  Infusion 1000 Unit(s)/Hr IV Continuous <Continuous>  hydrALAZINE 25 milliGRAM(s) Oral three times a day  insulin glargine Injectable (LANTUS) 6 Unit(s) SubCutaneous at bedtime  insulin lispro (ADMELOG) corrective regimen sliding scale   SubCutaneous three times a day before meals  insulin lispro (ADMELOG) corrective regimen sliding scale   SubCutaneous at bedtime  iron sucrose IVPB 100 milliGRAM(s) IV Intermittent every 48 hours  lidocaine   4% Patch 1 Patch Transdermal daily  lidocaine   4% Patch 1 Patch Transdermal daily  loratadine 10 milliGRAM(s) Oral daily  methocarbamol 500 milliGRAM(s) Oral two times a day  metoprolol tartrate 50 milliGRAM(s) Oral every 12 hours  Nephro-becky 1 Tablet(s) Oral daily  pantoprazole    Tablet 40 milliGRAM(s) Oral daily  polyethylene glycol 3350 17 Gram(s) Oral daily  senna 2 Tablet(s) Oral at bedtime  sodium chloride 0.9% lock flush 3 milliLiter(s) IV Push every 8 hours  tamsulosin 0.4 milliGRAM(s) Oral at bedtime    =======================================================  07-24    130<L>  |  89<L>  |  40.2<H>  ----------------------------<  214<H>  4.0   |  24.0  |  6.33<H>    Ca    7.9<L>      24 Jul 2023 04:50  Phos  4.5     07-24  Mg     2.0     07-24    TPro  6.3<L>  /  Alb  3.6  /  TBili  0.4  /  DBili  0.2  /  AST  33  /  ALT  48<H>  /  AlkPhos  328<H>  07-22    Creatinine: 6.33 mg/dL (07-24-23 @ 04:50)  Creatinine: 7.15 mg/dL (07-23-23 @ 03:22)  Creatinine: 6.54 mg/dL (07-22-23 @ 16:30)  Creatinine: 5.80 mg/dL (07-22-23 @ 06:25)  Creatinine: 7.20 mg/dL (07-21-23 @ 04:59)  Creatinine: 5.77 mg/dL (07-20-23 @ 05:00)    Urinalysis Basic - ( 24 Jul 2023 04:50 )    Color: x / Appearance: x / SG: x / pH: x  Gluc: 214 mg/dL / Ketone: x  / Bili: x / Urobili: x   Blood: x / Protein: x / Nitrite: x   Leuk Esterase: x / RBC: x / WBC x   Sq Epi: x / Non Sq Epi: x / Bacteria: x      =======================================================

## 2023-07-24 NOTE — PROGRESS NOTE ADULT - ASSESSMENT
63 year old legally blind male with PMH of DM complicated by neuropathy, HTN, HLD, CAD, PVD, gastroparesis, ESRD on HD TTS and BPH s/p CABG x4 on 07/14/23. Nephrology is managing ESRD on HD.     -Access: R AV fistula   -Outpatient dialysis unit is unclear - patient and wife are both poor historians and unable to provide details - reportedly somewhere around [place]    -Outpatient dialysis days are Tuesdays Thursdays Saturdays  -Persistent hyperkalemia - s/p lasix 80mg IV x 1; increased Lokelma to 15mg TID (for total of 4 doses) - will arrange for dialysis today   -HD tomorro.  -? 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 Nephrology is managing ESRD on HD.     -Access: R AV fistula   -Outpatient dialysis unit is unclear - patient and wife are both poor historians and unable to provide details - reportedly somewhere around [place]    -Outpatient dialysis days are Tuesdays Thursdays Saturdays  -Persistent hyperkalemia - s/p lasix 80mg IV x 1; increased Lokelma to 15mg TID (for total of 4 doses) - will arrange for dialysis tomorrow.  -HD tomorro.  -? 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 Nephrology is managing ESRD on HD.     -Access: R AV fistula   -Outpatient dialysis unit is unclear - patient and wife are both poor historians and unable to provide details - reportedly somewhere around [place]    -Outpatient dialysis days are Tuesdays Thursdays Saturdays  -Persistent hyperkalemia - s/p lasix 80mg IV x 1; can hold Lokelma now  -HD tomorrow, 1st shift.  -? 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

## 2023-07-24 NOTE — PROGRESS NOTE ADULT - ASSESSMENT
63 year old male with HTN, hyperlipidemia, ESRD on HD (T, Th, Sat), COPD, DM type 2 uncontrolled, gastroparesis s/p gastric PM 2/2013, peripheral blindness and retinopathy, BPH, recent CDiff infection 6/1/23 s/p 10 day course of oral vancomycin, CAD s/p prior PCI, and recent hospitalization 6/2023 for NSTEMI, ICM LVEF 35-40%, and LHC which demonstrated multi-vessel disease. He was undergoing CABG workup. He presented to [facility] ED 7/13 with complaints of worsening chest pain and shortness of breath. In ER, ECG with intermittent diffuse ST changes, + troponins. Seen by cardiology and started on tridil and heparin gtt for NSTEM, admitted to CTICU and IABP placed.  He underwent 4vCABG (LIMA-LAD, [id number]) on 7/14/23, and IABP was removed post-operatively. Post op course notable for new AFib/AFlutter w/ brief conversion pauses.      Continues to have atrial flutter with conversion pauses. Dose of metoprolol reduced to 50 mg bid after a 6 second pause on Saturday. Noted to have a 5 second conversion pause on telemtry today.     Recommendations:  - Reduce metoprolol to 37.5 mg bid  - Continue therapeutic anticoagulation; currently on IV heparin and expected to start eliquis after fistulogram  - If rate control is not adequate and conversion pauses are > 5 seconds may consider inpatient ablation for typical flutter prior to discharge.    Will follow

## 2023-07-24 NOTE — PROGRESS NOTE ADULT - SUBJECTIVE AND OBJECTIVE BOX
INTERVAL EVENTS:  Follow up diabetes management    ROS: Denies chest pain, sob, abd pain.    MEDICATIONS  (STANDING):  amLODIPine   Tablet 5 milliGRAM(s) Oral daily  aspirin enteric coated 81 milliGRAM(s) Oral daily  atorvastatin 80 milliGRAM(s) Oral at bedtime  calcium acetate 667 milliGRAM(s) Oral three times a day with meals  clopidogrel Tablet 75 milliGRAM(s) Oral daily  dextrose 50% Injectable 50 milliLiter(s) IV Push every 15 minutes  dextrose 50% Injectable 25 milliLiter(s) IV Push every 15 minutes  epoetin lois-epbx (RETACRIT) Injectable 5000 Unit(s) IV Push <User Schedule>  gabapentin 100 milliGRAM(s) Oral three times a day  glucagon  Injectable 1 milliGRAM(s) IntraMuscular once  heparin  Infusion 1000 Unit(s)/Hr (16 mL/Hr) IV Continuous <Continuous>  hydrALAZINE 25 milliGRAM(s) Oral three times a day  insulin glargine Injectable (LANTUS) 6 Unit(s) SubCutaneous at bedtime  insulin lispro (ADMELOG) corrective regimen sliding scale   SubCutaneous three times a day before meals  insulin lispro (ADMELOG) corrective regimen sliding scale   SubCutaneous at bedtime  iron sucrose IVPB 100 milliGRAM(s) IV Intermittent every 48 hours  lidocaine   4% Patch 1 Patch Transdermal daily  lidocaine   4% Patch 1 Patch Transdermal daily  loratadine 10 milliGRAM(s) Oral daily  methocarbamol 500 milliGRAM(s) Oral two times a day  metoprolol tartrate 50 milliGRAM(s) Oral every 12 hours  Nephro-becky 1 Tablet(s) Oral daily  pantoprazole    Tablet 40 milliGRAM(s) Oral daily  polyethylene glycol 3350 17 Gram(s) Oral daily  senna 2 Tablet(s) Oral at bedtime  sodium chloride 0.9% lock flush 3 milliLiter(s) IV Push every 8 hours  tamsulosin 0.4 milliGRAM(s) Oral at bedtime    MEDICATIONS  (PRN):  bisacodyl 5 milliGRAM(s) Oral every 12 hours PRN Constipation  dextrose Oral Gel 15 Gram(s) Oral once PRN Blood Glucose LESS THAN 70 milliGRAM(s)/deciliter  ondansetron Injectable 4 milliGRAM(s) IV Push every 6 hours PRN Nausea and/or Vomiting    Allergies  No Known Drug Allergies  shellfish (Rash)    Vital Signs Last 24 Hrs  T(C): 36.9 (24 Jul 2023 08:03), Max: 37.2 (23 Jul 2023 18:40)  T(F): 98.5 (24 Jul 2023 08:03), Max: 98.9 (23 Jul 2023 18:40)  HR: 61 (24 Jul 2023 08:03) (61 - 102)  BP: 155/54 (24 Jul 2023 08:35) (138/68 - 171/64)  BP(mean): --  RR: 18 (24 Jul 2023 08:03) (16 - 18)  SpO2: 96% (24 Jul 2023 08:03) (91% - 99%)    Parameters below as of 24 Jul 2023 08:03  Patient On (Oxygen Delivery Method): room air    PHYSICAL EXAM:  General: No apparent distress  Neck: Supple, trachea midline, no thyromegaly  Respiratory: Lungs clear bilaterally, normal rate, effort  Cardiac: +S1, S2, no m/r/g  GI: +BS, soft, non tender, non distended  Extremities: Mild LE edema  Neuro: A+O X3, no tremor    LABS:                        8.0    7.30  )-----------( 157      ( 24 Jul 2023 04:50 )             24.1     07-24    130<L>  |  89<L>  |  40.2<H>  ----------------------------<  214<H>  4.0   |  24.0  |  6.33<H>    Ca    7.9<L>      24 Jul 2023 04:50  Phos  4.5     07-24  Mg     2.0     07-24    TPro  6.3<L>  /  Alb  3.6  /  TBili  0.4  /  DBili  0.2  /  AST  33  /  ALT  48<H>  /  AlkPhos  328<H>  07-22    Urinalysis Basic - ( 24 Jul 2023 04:50 )    Color: x / Appearance: x / SG: x / pH: x  Gluc: 214 mg/dL / Ketone: x  / Bili: x / Urobili: x   Blood: x / Protein: x / Nitrite: x   Leuk Esterase: x / RBC: x / WBC x   Sq Epi: x / Non Sq Epi: x / Bacteria: x    POCT Blood Glucose.: 157 mg/dL (07-24-23 @ 12:02)  POCT Blood Glucose.: 241 mg/dL (07-24-23 @ 08:08)  POCT Blood Glucose.: 234 mg/dL (07-23-23 @ 21:35)  POCT Blood Glucose.: 257 mg/dL (07-23-23 @ 17:38)

## 2023-07-24 NOTE — PROGRESS NOTE ADULT - SUBJECTIVE AND OBJECTIVE BOX
Subjective: Feels well      EKG:  TELE: sinus rhythm and paroxysmal aflutter. 5.0 second pause today.     MEDICATIONS  (STANDING):  amLODIPine   Tablet 5 milliGRAM(s) Oral daily  aspirin enteric coated 81 milliGRAM(s) Oral daily  atorvastatin 80 milliGRAM(s) Oral at bedtime  calcium acetate 667 milliGRAM(s) Oral three times a day with meals  clopidogrel Tablet 75 milliGRAM(s) Oral daily  dextrose 50% Injectable 50 milliLiter(s) IV Push every 15 minutes  dextrose 50% Injectable 25 milliLiter(s) IV Push every 15 minutes  epoetin lois-epbx (RETACRIT) Injectable 5000 Unit(s) IV Push <User Schedule>  gabapentin 100 milliGRAM(s) Oral three times a day  glucagon  Injectable 1 milliGRAM(s) IntraMuscular once  heparin  Infusion 1000 Unit(s)/Hr (16 mL/Hr) IV Continuous <Continuous>  hydrALAZINE 25 milliGRAM(s) Oral three times a day  insulin glargine Injectable (LANTUS) 6 Unit(s) SubCutaneous at bedtime  insulin lispro (ADMELOG) corrective regimen sliding scale   SubCutaneous three times a day before meals  insulin lispro (ADMELOG) corrective regimen sliding scale   SubCutaneous at bedtime  iron sucrose IVPB 100 milliGRAM(s) IV Intermittent every 48 hours  lidocaine   4% Patch 1 Patch Transdermal daily  lidocaine   4% Patch 1 Patch Transdermal daily  loratadine 10 milliGRAM(s) Oral daily  methocarbamol 500 milliGRAM(s) Oral two times a day  metoprolol tartrate 37.5 milliGRAM(s) Oral two times a day  Nephro-becky 1 Tablet(s) Oral daily  pantoprazole    Tablet 40 milliGRAM(s) Oral daily  polyethylene glycol 3350 17 Gram(s) Oral daily  senna 2 Tablet(s) Oral at bedtime  sodium chloride 0.9% lock flush 3 milliLiter(s) IV Push every 8 hours  tamsulosin 0.4 milliGRAM(s) Oral at bedtime    MEDICATIONS  (PRN):  bisacodyl 5 milliGRAM(s) Oral every 12 hours PRN Constipation  dextrose Oral Gel 15 Gram(s) Oral once PRN Blood Glucose LESS THAN 70 milliGRAM(s)/deciliter  ondansetron Injectable 4 milliGRAM(s) IV Push every 6 hours PRN Nausea and/or Vomiting      Allergies    No Known Drug Allergies  shellfish (Rash)    Intolerances        Vital Signs Last 24 Hrs  T(C): 36.8 (24 Jul 2023 13:30), Max: 37.2 (23 Jul 2023 18:40)  T(F): 98.2 (24 Jul 2023 13:30), Max: 98.9 (23 Jul 2023 18:40)  HR: 57 (24 Jul 2023 13:30) (57 - 73)  BP: 117/58 (24 Jul 2023 13:30) (117/58 - 171/64)  BP(mean): --  RR: 18 (24 Jul 2023 13:30) (16 - 18)  SpO2: 97% (24 Jul 2023 13:30) (91% - 99%)    Parameters below as of 24 Jul 2023 13:30  Patient On (Oxygen Delivery Method): room air        Physical Exam:  Constitutional: NAD, AAOx3  Cardiovascular: +S1S2 RRR  Pulmonary: CTA b/l, unlabored  GI: soft NTND +BS  Extremities: no pedal edema, +distal pulses b/l  Neuro: non focal, BARRETO x4    LABS:                        8.0    7.30  )-----------( 157      ( 24 Jul 2023 04:50 )             24.1     07-24    130<L>  |  89<L>  |  40.2<H>  ----------------------------<  214<H>  4.0   |  24.0  |  6.33<H>    Ca    7.9<L>      24 Jul 2023 04:50  Phos  4.5     07-24  Mg     2.0     07-24    TPro  6.3<L>  /  Alb  3.6  /  TBili  0.4  /  DBili  0.2  /  AST  33  /  ALT  48<H>  /  AlkPhos  328<H>  07-22    PTT - ( 24 Jul 2023 04:50 )  PTT:59.3 sec  Urinalysis Basic - ( 24 Jul 2023 04:50 )    Color: x / Appearance: x / SG: x / pH: x  Gluc: 214 mg/dL / Ketone: x  / Bili: x / Urobili: x   Blood: x / Protein: x / Nitrite: x   Leuk Esterase: x / RBC: x / WBC x   Sq Epi: x / Non Sq Epi: x / Bacteria: x        RADIOLOGY & ADDITIONAL TESTS:

## 2023-07-24 NOTE — PROGRESS NOTE ADULT - SUBJECTIVE AND OBJECTIVE BOX
HPI/Overnight Events:   Patient seen and examined at bedside this AM. No overnight events. No complaints. Denies fever, chills, nausea, vomiting, chest pain, SOB, dizziness, abd pain or any other concerning symptoms.    Vital Signs Last 24 Hrs  T(C): 37 (24 Jul 2023 04:18), Max: 37.2 (23 Jul 2023 18:40)  T(F): 98.6 (24 Jul 2023 04:18), Max: 98.9 (23 Jul 2023 18:40)  HR: 65 (24 Jul 2023 04:18) (59 - 107)  BP: 142/62 (24 Jul 2023 04:18) (138/68 - 171/74)  BP(mean): --  RR: 16 (24 Jul 2023 04:18) (16 - 18)  SpO2: 99% (24 Jul 2023 04:18) (91% - 99%)    Parameters below as of 24 Jul 2023 04:18  Patient On (Oxygen Delivery Method): room air        I&O's Detail    23 Jul 2023 07:01  -  24 Jul 2023 07:00  --------------------------------------------------------  IN:    Heparin: 176 mL    Oral Fluid: 300 mL  Total IN: 476 mL    OUT:    Other (mL): 2000 mL    Voided (mL): 105 mL  Total OUT: 2105 mL    Total NET: -1629 mL          MEDICATIONS  (STANDING):  amLODIPine   Tablet 5 milliGRAM(s) Oral daily  aspirin enteric coated 81 milliGRAM(s) Oral daily  atorvastatin 80 milliGRAM(s) Oral at bedtime  calcium acetate 667 milliGRAM(s) Oral three times a day with meals  clopidogrel Tablet 75 milliGRAM(s) Oral daily  dextrose 50% Injectable 50 milliLiter(s) IV Push every 15 minutes  dextrose 50% Injectable 25 milliLiter(s) IV Push every 15 minutes  epoetin lois-epbx (RETACRIT) Injectable 5000 Unit(s) IV Push <User Schedule>  gabapentin 100 milliGRAM(s) Oral three times a day  glucagon  Injectable 1 milliGRAM(s) IntraMuscular once  heparin  Infusion 1000 Unit(s)/Hr (16 mL/Hr) IV Continuous <Continuous>  hydrALAZINE 25 milliGRAM(s) Oral three times a day  insulin glargine Injectable (LANTUS) 6 Unit(s) SubCutaneous at bedtime  insulin lispro (ADMELOG) corrective regimen sliding scale   SubCutaneous three times a day before meals  insulin lispro (ADMELOG) corrective regimen sliding scale   SubCutaneous at bedtime  loratadine 10 milliGRAM(s) Oral daily  methocarbamol 500 milliGRAM(s) Oral two times a day  metoprolol tartrate 50 milliGRAM(s) Oral every 12 hours  Nephro-becky 1 Tablet(s) Oral daily  pantoprazole    Tablet 40 milliGRAM(s) Oral daily  polyethylene glycol 3350 17 Gram(s) Oral daily  senna 2 Tablet(s) Oral at bedtime  sodium chloride 0.9% lock flush 3 milliLiter(s) IV Push every 8 hours  tamsulosin 0.4 milliGRAM(s) Oral at bedtime    MEDICATIONS  (PRN):  bisacodyl 5 milliGRAM(s) Oral every 12 hours PRN Constipation  dextrose Oral Gel 15 Gram(s) Oral once PRN Blood Glucose LESS THAN 70 milliGRAM(s)/deciliter  ondansetron Injectable 4 milliGRAM(s) IV Push every 6 hours PRN Nausea and/or Vomiting      Constitutional: patient resting comfortably in bed, in no acute distress  HEENT: EOMI, PERRLA, MMM.  Respiratory: Non labored breathing on RA  Cardiovascular: RRR, midline sternotomy incision noted  Gastrointestinal: Abdomen soft, non-tender, non-distended, no rebound tenderness / guarding  Musculoskeletal: No joint pain, swelling or deformity; no limitation of movement  Vascular: Extremities warm and well perfused. slightly pulsatile thrill felt over the fistula      LABS:                        8.0    7.30  )-----------( 157      ( 24 Jul 2023 04:50 )             24.1     07-24    130<L>  |  89<L>  |  40.2<H>  ----------------------------<  214<H>  4.0   |  24.0  |  6.33<H>    Ca    7.9<L>      24 Jul 2023 04:50  Phos  4.5     07-24  Mg     2.0     07-24    TPro  6.3<L>  /  Alb  3.6  /  TBili  0.4  /  DBili  0.2  /  AST  33  /  ALT  48<H>  /  AlkPhos  328<H>  07-22    PTT - ( 24 Jul 2023 04:50 )  PTT:59.3 sec  Urinalysis Basic - ( 24 Jul 2023 04:50 )    Color: x / Appearance: x / SG: x / pH: x  Gluc: 214 mg/dL / Ketone: x  / Bili: x / Urobili: x   Blood: x / Protein: x / Nitrite: x   Leuk Esterase: x / RBC: x / WBC x   Sq Epi: x / Non Sq Epi: x / Bacteria: x        STUDIES:   EKG, CXR, U/S, CT, MRI     MICRO:   Cultures     Assessment/Plan:  63y M admitted for emergent CABG, history of ESRD with a RUE AVF, created 5 years ago, usually without problems, pt had Hyperkalemia despite HD, per patient he was tolerating his dialysis sessions without a problem and is very strict with his diet at home.  On physical examination, there is good thrill on RUE AVF.    PLAN:    - Fistulogram planned for tomorrow, 7/25, with Dr. Angel  - Pre-op 7/24  - NPO after midnight  - AM labs  - Clearance from Nephrology  - Rest of care as per primary team      JENIFFER Boykin MD

## 2023-07-25 ENCOUNTER — TRANSCRIPTION ENCOUNTER (OUTPATIENT)
Age: 63
End: 2023-07-25

## 2023-07-25 LAB
ANION GAP SERPL CALC-SCNC: 16 MMOL/L — SIGNIFICANT CHANGE UP (ref 5–17)
ANION GAP SERPL CALC-SCNC: 19 MMOL/L — HIGH (ref 5–17)
APTT BLD: 61.6 SEC — HIGH (ref 27.5–35.5)
BUN SERPL-MCNC: 27.5 MG/DL — HIGH (ref 8–20)
BUN SERPL-MCNC: 49.8 MG/DL — HIGH (ref 8–20)
CALCIUM SERPL-MCNC: 7.8 MG/DL — LOW (ref 8.4–10.5)
CALCIUM SERPL-MCNC: 8.2 MG/DL — LOW (ref 8.4–10.5)
CHLORIDE SERPL-SCNC: 85 MMOL/L — LOW (ref 96–108)
CHLORIDE SERPL-SCNC: 91 MMOL/L — LOW (ref 96–108)
CO2 SERPL-SCNC: 23 MMOL/L — SIGNIFICANT CHANGE UP (ref 22–29)
CO2 SERPL-SCNC: 25 MMOL/L — SIGNIFICANT CHANGE UP (ref 22–29)
CREAT SERPL-MCNC: 5.19 MG/DL — HIGH (ref 0.5–1.3)
CREAT SERPL-MCNC: 7.91 MG/DL — HIGH (ref 0.5–1.3)
EGFR: 12 ML/MIN/1.73M2 — LOW
EGFR: 7 ML/MIN/1.73M2 — LOW
GLUCOSE BLDC GLUCOMTR-MCNC: 116 MG/DL — HIGH (ref 70–99)
GLUCOSE BLDC GLUCOMTR-MCNC: 135 MG/DL — HIGH (ref 70–99)
GLUCOSE BLDC GLUCOMTR-MCNC: 217 MG/DL — HIGH (ref 70–99)
GLUCOSE BLDC GLUCOMTR-MCNC: 260 MG/DL — HIGH (ref 70–99)
GLUCOSE SERPL-MCNC: 214 MG/DL — HIGH (ref 70–99)
GLUCOSE SERPL-MCNC: 237 MG/DL — HIGH (ref 70–99)
HCT VFR BLD CALC: 23.2 % — LOW (ref 39–50)
HCT VFR BLD CALC: 27.6 % — LOW (ref 39–50)
HGB BLD-MCNC: 7.7 G/DL — LOW (ref 13–17)
HGB BLD-MCNC: 9.3 G/DL — LOW (ref 13–17)
INR BLD: 1.21 RATIO — HIGH (ref 0.88–1.16)
MAGNESIUM SERPL-MCNC: 1.8 MG/DL — SIGNIFICANT CHANGE UP (ref 1.6–2.6)
MAGNESIUM SERPL-MCNC: 2.1 MG/DL — SIGNIFICANT CHANGE UP (ref 1.6–2.6)
MCHC RBC-ENTMCNC: 29.4 PG — SIGNIFICANT CHANGE UP (ref 27–34)
MCHC RBC-ENTMCNC: 29.8 PG — SIGNIFICANT CHANGE UP (ref 27–34)
MCHC RBC-ENTMCNC: 33.2 GM/DL — SIGNIFICANT CHANGE UP (ref 32–36)
MCHC RBC-ENTMCNC: 33.7 GM/DL — SIGNIFICANT CHANGE UP (ref 32–36)
MCV RBC AUTO: 88.5 FL — SIGNIFICANT CHANGE UP (ref 80–100)
MCV RBC AUTO: 88.5 FL — SIGNIFICANT CHANGE UP (ref 80–100)
PHOSPHATE SERPL-MCNC: 4.8 MG/DL — HIGH (ref 2.4–4.7)
PLATELET # BLD AUTO: 155 K/UL — SIGNIFICANT CHANGE UP (ref 150–400)
PLATELET # BLD AUTO: 180 K/UL — SIGNIFICANT CHANGE UP (ref 150–400)
POTASSIUM SERPL-MCNC: 4 MMOL/L — SIGNIFICANT CHANGE UP (ref 3.5–5.3)
POTASSIUM SERPL-MCNC: 4.1 MMOL/L — SIGNIFICANT CHANGE UP (ref 3.5–5.3)
POTASSIUM SERPL-SCNC: 4 MMOL/L — SIGNIFICANT CHANGE UP (ref 3.5–5.3)
POTASSIUM SERPL-SCNC: 4.1 MMOL/L — SIGNIFICANT CHANGE UP (ref 3.5–5.3)
PROTHROM AB SERPL-ACNC: 14.1 SEC — HIGH (ref 10.5–13.4)
RBC # BLD: 2.62 M/UL — LOW (ref 4.2–5.8)
RBC # BLD: 3.12 M/UL — LOW (ref 4.2–5.8)
RBC # FLD: 15.1 % — HIGH (ref 10.3–14.5)
RBC # FLD: 15.2 % — HIGH (ref 10.3–14.5)
SODIUM SERPL-SCNC: 126 MMOL/L — LOW (ref 135–145)
SODIUM SERPL-SCNC: 132 MMOL/L — LOW (ref 135–145)
WBC # BLD: 10.27 K/UL — SIGNIFICANT CHANGE UP (ref 3.8–10.5)
WBC # BLD: 10.5 K/UL — SIGNIFICANT CHANGE UP (ref 3.8–10.5)
WBC # FLD AUTO: 10.27 K/UL — SIGNIFICANT CHANGE UP (ref 3.8–10.5)
WBC # FLD AUTO: 10.5 K/UL — SIGNIFICANT CHANGE UP (ref 3.8–10.5)

## 2023-07-25 PROCEDURE — 99231 SBSQ HOSP IP/OBS SF/LOW 25: CPT

## 2023-07-25 PROCEDURE — 71045 X-RAY EXAM CHEST 1 VIEW: CPT | Mod: 26

## 2023-07-25 PROCEDURE — 90935 HEMODIALYSIS ONE EVALUATION: CPT

## 2023-07-25 PROCEDURE — 36902 INTRO CATH DIALYSIS CIRCUIT: CPT | Mod: GC

## 2023-07-25 PROCEDURE — 99024 POSTOP FOLLOW-UP VISIT: CPT

## 2023-07-25 PROCEDURE — 36907 BALO ANGIOP CTR DIALYSIS SEG: CPT | Mod: GC

## 2023-07-25 RX ORDER — APIXABAN 2.5 MG/1
2.5 TABLET, FILM COATED ORAL
Refills: 0 | Status: DISCONTINUED | OUTPATIENT
Start: 2023-07-25 | End: 2023-07-27

## 2023-07-25 RX ORDER — ACETAMINOPHEN 500 MG
1000 TABLET ORAL ONCE
Refills: 0 | Status: COMPLETED | OUTPATIENT
Start: 2023-07-25 | End: 2023-07-25

## 2023-07-25 RX ADMIN — TAMSULOSIN HYDROCHLORIDE 0.4 MILLIGRAM(S): 0.4 CAPSULE ORAL at 21:22

## 2023-07-25 RX ADMIN — Medication 37.5 MILLIGRAM(S): at 05:39

## 2023-07-25 RX ADMIN — METHOCARBAMOL 500 MILLIGRAM(S): 500 TABLET, FILM COATED ORAL at 17:52

## 2023-07-25 RX ADMIN — Medication 1: at 21:26

## 2023-07-25 RX ADMIN — LIDOCAINE 1 PATCH: 4 CREAM TOPICAL at 00:00

## 2023-07-25 RX ADMIN — Medication 400 MILLIGRAM(S): at 23:00

## 2023-07-25 RX ADMIN — IRON SUCROSE 105 MILLIGRAM(S): 20 INJECTION, SOLUTION INTRAVENOUS at 10:18

## 2023-07-25 RX ADMIN — Medication 1000 MILLIGRAM(S): at 23:58

## 2023-07-25 RX ADMIN — GABAPENTIN 100 MILLIGRAM(S): 400 CAPSULE ORAL at 21:22

## 2023-07-25 RX ADMIN — SODIUM CHLORIDE 3 MILLILITER(S): 9 INJECTION INTRAMUSCULAR; INTRAVENOUS; SUBCUTANEOUS at 13:30

## 2023-07-25 RX ADMIN — Medication 25 MILLIGRAM(S): at 05:37

## 2023-07-25 RX ADMIN — HEPARIN SODIUM 16 UNIT(S)/HR: 5000 INJECTION INTRAVENOUS; SUBCUTANEOUS at 07:26

## 2023-07-25 RX ADMIN — CLOPIDOGREL BISULFATE 75 MILLIGRAM(S): 75 TABLET, FILM COATED ORAL at 13:09

## 2023-07-25 RX ADMIN — Medication 81 MILLIGRAM(S): at 13:09

## 2023-07-25 RX ADMIN — GABAPENTIN 100 MILLIGRAM(S): 400 CAPSULE ORAL at 05:36

## 2023-07-25 RX ADMIN — Medication 2: at 08:58

## 2023-07-25 RX ADMIN — INSULIN GLARGINE 6 UNIT(S): 100 INJECTION, SOLUTION SUBCUTANEOUS at 21:28

## 2023-07-25 RX ADMIN — LIDOCAINE 1 PATCH: 4 CREAM TOPICAL at 18:05

## 2023-07-25 RX ADMIN — Medication 1 TABLET(S): at 13:09

## 2023-07-25 RX ADMIN — GABAPENTIN 100 MILLIGRAM(S): 400 CAPSULE ORAL at 13:08

## 2023-07-25 RX ADMIN — Medication 25 MILLIGRAM(S): at 13:09

## 2023-07-25 RX ADMIN — PANTOPRAZOLE SODIUM 40 MILLIGRAM(S): 20 TABLET, DELAYED RELEASE ORAL at 13:08

## 2023-07-25 RX ADMIN — ATORVASTATIN CALCIUM 80 MILLIGRAM(S): 80 TABLET, FILM COATED ORAL at 21:22

## 2023-07-25 RX ADMIN — METHOCARBAMOL 500 MILLIGRAM(S): 500 TABLET, FILM COATED ORAL at 05:38

## 2023-07-25 RX ADMIN — LIDOCAINE 1 PATCH: 4 CREAM TOPICAL at 13:10

## 2023-07-25 RX ADMIN — Medication 667 MILLIGRAM(S): at 13:08

## 2023-07-25 RX ADMIN — Medication 37.5 MILLIGRAM(S): at 17:52

## 2023-07-25 RX ADMIN — AMLODIPINE BESYLATE 5 MILLIGRAM(S): 2.5 TABLET ORAL at 05:36

## 2023-07-25 RX ADMIN — Medication 667 MILLIGRAM(S): at 17:52

## 2023-07-25 RX ADMIN — SODIUM CHLORIDE 3 MILLILITER(S): 9 INJECTION INTRAMUSCULAR; INTRAVENOUS; SUBCUTANEOUS at 21:54

## 2023-07-25 RX ADMIN — LORATADINE 10 MILLIGRAM(S): 10 TABLET ORAL at 13:09

## 2023-07-25 RX ADMIN — ERYTHROPOIETIN 5000 UNIT(S): 10000 INJECTION, SOLUTION INTRAVENOUS; SUBCUTANEOUS at 09:45

## 2023-07-25 RX ADMIN — APIXABAN 2.5 MILLIGRAM(S): 2.5 TABLET, FILM COATED ORAL at 22:10

## 2023-07-25 RX ADMIN — SODIUM CHLORIDE 3 MILLILITER(S): 9 INJECTION INTRAMUSCULAR; INTRAVENOUS; SUBCUTANEOUS at 05:42

## 2023-07-25 RX ADMIN — LIDOCAINE 1 PATCH: 4 CREAM TOPICAL at 13:11

## 2023-07-25 NOTE — PROGRESS NOTE ADULT - ASSESSMENT
63M PMHx ESRD on HD (T,Th, S), IDDM, COPD, gastroparesis, peripheral blindness and retinopathy, NSTEMI 6/2022 with recent hospital admission for CABG eval, now presents from home with complaints of worsening chest pain associated with difficulty breathing. Admitted to CTICU, IABP placed, now s/p CABG on 7/14.    1. DM2, a1c 9.4%  - Continue lantus 6 units qhs  - Sliding scale coverage  - Mild hypoglycemia last week after hyperkalemia treatment (regular insulin 10u/D50)    2. CAD  - S/p CABG, care per CT surgery team    3. ESRD  - HD as per nephrology  - Plan for fistulogram today    4. Afib/aflutter  - On metoprolol

## 2023-07-25 NOTE — PROGRESS NOTE ADULT - SUBJECTIVE AND OBJECTIVE BOX
Subjective: No acute events documented overnight. Pt receiving dialysis at time of assessment. Currently w/o complaints. Denies chest pain, SOB, palpitations, dizziness.     TELE: Atrial flutter with controlled ventricular rates. Conversion pauses of approximately 2 seconds requiring backup pacing @ VVI 30. Rates controlled while in AFL (90-100s)    MEDICATIONS  (STANDING):  amLODIPine   Tablet 5 milliGRAM(s) Oral daily  aspirin enteric coated 81 milliGRAM(s) Oral daily  atorvastatin 80 milliGRAM(s) Oral at bedtime  calcium acetate 667 milliGRAM(s) Oral three times a day with meals  clopidogrel Tablet 75 milliGRAM(s) Oral daily  dextrose 50% Injectable 50 milliLiter(s) IV Push every 15 minutes  dextrose 50% Injectable 25 milliLiter(s) IV Push every 15 minutes  epoetin lois-epbx (RETACRIT) Injectable 5000 Unit(s) IV Push <User Schedule>  gabapentin 100 milliGRAM(s) Oral three times a day  glucagon  Injectable 1 milliGRAM(s) IntraMuscular once  heparin  Infusion 1000 Unit(s)/Hr (16 mL/Hr) IV Continuous <Continuous>  hydrALAZINE 25 milliGRAM(s) Oral three times a day  insulin glargine Injectable (LANTUS) 6 Unit(s) SubCutaneous at bedtime  insulin lispro (ADMELOG) corrective regimen sliding scale   SubCutaneous three times a day before meals  insulin lispro (ADMELOG) corrective regimen sliding scale   SubCutaneous at bedtime  iron sucrose IVPB 100 milliGRAM(s) IV Intermittent every 48 hours  lidocaine   4% Patch 1 Patch Transdermal daily  lidocaine   4% Patch 1 Patch Transdermal daily  loratadine 10 milliGRAM(s) Oral daily  methocarbamol 500 milliGRAM(s) Oral two times a day  metoprolol tartrate 37.5 milliGRAM(s) Oral two times a day  Nephro-becky 1 Tablet(s) Oral daily  pantoprazole    Tablet 40 milliGRAM(s) Oral daily  polyethylene glycol 3350 17 Gram(s) Oral daily  senna 2 Tablet(s) Oral at bedtime  sodium chloride 0.9% lock flush 3 milliLiter(s) IV Push every 8 hours  tamsulosin 0.4 milliGRAM(s) Oral at bedtime    MEDICATIONS  (PRN):  bisacodyl 5 milliGRAM(s) Oral every 12 hours PRN Constipation  dextrose Oral Gel 15 Gram(s) Oral once PRN Blood Glucose LESS THAN 70 milliGRAM(s)/deciliter  ondansetron Injectable 4 milliGRAM(s) IV Push every 6 hours PRN Nausea and/or Vomiting      Allergies    No Known Drug Allergies  shellfish (Rash)    Intolerances        Vital Signs Last 24 Hrs  T(C): 36.9 (25 Jul 2023 08:55), Max: 37.7 (25 Jul 2023 05:02)  T(F): 98.4 (25 Jul 2023 08:55), Max: 99.9 (25 Jul 2023 05:02)  HR: 60 (25 Jul 2023 08:55) (60 - 100)  BP: 127/35 (25 Jul 2023 08:55) (117/50 - 172/79)  BP(mean): --  RR: 18 (25 Jul 2023 08:55) (15 - 18)  SpO2: 93% (25 Jul 2023 08:55) (92% - 98%)    Parameters below as of 25 Jul 2023 08:55  Patient On (Oxygen Delivery Method): room air        Physical Exam:  Constitutional: NAD, AAOx3  Cardiovascular: +S1S2 RRR  Pulmonary: CTA b/l, unlabored  GI: soft NTND +BS  Extremities: no pedal edema, +distal pulses b/l  Neuro: non focal, BARRETO x4    LABS:                        7.7    10.27 )-----------( 155      ( 25 Jul 2023 04:30 )             23.2     07-25    126<L>  |  85<L>  |  49.8<H>  ----------------------------<  214<H>  4.1   |  23.0  |  7.91<H>    Ca    7.8<L>      25 Jul 2023 04:30  Phos  4.8     07-25  Mg     2.1     07-25      PT/INR - ( 25 Jul 2023 04:30 )   PT: 14.1 sec;   INR: 1.21 ratio         PTT - ( 25 Jul 2023 04:30 )  PTT:61.6 sec  Urinalysis Basic - ( 25 Jul 2023 04:30 )    Color: x / Appearance: x / SG: x / pH: x  Gluc: 214 mg/dL / Ketone: x  / Bili: x / Urobili: x   Blood: x / Protein: x / Nitrite: x   Leuk Esterase: x / RBC: x / WBC x   Sq Epi: x / Non Sq Epi: x / Bacteria: x        RADIOLOGY & ADDITIONAL TESTS:      < from: TTE Echo Complete w/ Contrast w/ Doppler (07.19.23 @ 09:41) >  Summary:   1. Normal left ventricular internal cavity size.   2. Normal global left ventricular systolic function.   3. Left ventricular ejection fraction, by visual estimation, is 50 to   55%.   4. Abnormal septal motion consistent with post-operative status.   5. Spectral Doppler shows pseudonormal pattern of left ventricular   myocardial filling (Grade II diastolic dysfunction).   6. Mildly reduced RV systolic function.   7. The left atrium is normal in size.   8. The right atrium is normal in size.   9. Sclerotic aorticvalve with normal opening.  10. Mild thickening and calcification of the anterior and posterior   mitral valve leaflets.  11. Mild mitral annular calcification.  12. Mild mitral valve regurgitation.  13. Mild tricuspid regurgitation.  14. Estimated pulmonary artery systolic pressure is 35.8 mmHg assuming a   right atrial pressure of 15 mmHg, which is consistent with borderline   pulmonary hypertension.  15. There is no evidence of pericardial effusion.        < from: Xray Chest 1 View- PORTABLE-Routine (Xray Chest 1 View- PORTABLE-Routine in AM.) (07.24.23 @ 04:48) >  IMPRESSION:  Stable pulmonary vascular congestion and small bilateral pleural   effusions.          Assessment:    63 year old male with PMHx significant for HTN, hyperlipidemia, ESRD on HD (T, Th, Sat), COPD, DM type 2 uncontrolled, gastroparesis s/p gastric PM 2/2013, peripheral blindness and retinopathy, BPH, recent CDiff infection 6/1/23 s/p 10 day course of oral vancomycin, CAD s/p prior PCI, and recent hospitalization 6/2023 for NSTEMI, ICM LVEF 35-40%, and LHC which demonstrated multi-vessel disease.  Pt presented to [facility] ED 7/13 with complaints of worsening chest pain and shortness of breath and was noted with intermittent diffuse ST changes, + tronopin.  Pt was admitted to CTICU and IABP placed. He underwent 4vCABG (LIMA-LAD, [id number]) on 7/14/23, and IABP was removed post-operatively. Post op course notable for new AFib/AFlutter with conversion pauses.      Pt continued to have conversion pauses upwards of 5 seconds and epicardial pacing wires set to VVI 30. Overnight pt with several conversion pauses which intermittently triggered backup pacing @ VVI 30. Rates have been mostly controlled while in AFL (90-100s). Pt currently receiving dialysis at time of assessment and is NPO pending fistulogram Currently w/o complaints. Denies chest pain, SOB, palpitations, dizziness.       Recommendations:  - c/w Metoprolol  37.5 mg   - Continue therapeutic anticoagulation; currently on IV heparin with therapeutic ptt.  - Plan to transition to Eliquis after fistulogram  - Pt agreeable for AFL ablation. Will plan for this Friday 7/28/23  - Maintain NPO status after midnight on 7/27/23  - Maintain telemetry monitoring   - Ep to follow    Discussed with Dr. Cerrato, further recommendations to follow

## 2023-07-25 NOTE — PROGRESS NOTE ADULT - ASSESSMENT
63M PMHx ESRD on HD (T,Th,S), IDDM, COPD, gastroparesis, peripheral blindness and retinopathy, NSTEMI 6/2022 with recent hospital admission for CABG nisha bundy diff + that admission, now presents from home with complaints of worsening chest pain associated with difficulty breathing. In ER EKGs with intermittent diffuse ST changes, + troponins, +NSTEMI, started on a Tridil and Heparin gtt for NSTEMI, admitted to CTICU, IABP placed, now s/p CABG x 4 (LIMA-LAD, SVG-OM 1, OM2, PDA) on 7/14/23, IABP removed after OR without incident. Postop course notable for PAF with long conversion pauses (2-6.5 seconds with EP following, beta-blocker dose lowered), and persistent hyperkalemia despite HD (concern for recirculation through fistula, vascular surgery following, plan for fistulogram today.

## 2023-07-25 NOTE — PROGRESS NOTE ADULT - ASSESSMENT
63 year old legally blind male with PMH of DM complicated by neuropathy, HTN, HLD, CAD, PVD, gastroparesis, ESRD on HD TTS and BPH s/p CABG x4 on 07/14/23. Nephrology is managing ESRD on HD.     -Access: R AV fistula   -Outpatient dialysis unit is unclear - patient and wife are both poor historians and unable to provide details - reportedly somewhere around [place]    -Outpatient dialysis days are Tuesdays Thursdays Saturdays  -Persistent hyperkalemia - s/p lasix 80mg IV x 1; can hold Lokelma now  -HD today.  -? Recirculation - fistulogram planned for 07/25/23 as per Vascular Surgery   -BP acceptable   -Volume status - UF as tolerated during HD; continue 1.2L fluid restriction    -Anemia - KIMBERLY with HD   -Mineral Bone Disease in setting of CKD - continue oral phos binder             63M with PMH of DM complicated by neuropathy, HTN, HLD, CAD, PVD, gastroparesis, ESRD on HD TTS and BPH s/p CABG x4 on 07/14/23. Nephrology is managing ESRD on HD.     -Access: R AV fistula   -Outpatient dialysis unit is unclear - patient and wife are both poor historians and unable to provide details - reportedly somewhere around [place]    -Outpatient dialysis days are Tuesdays Thursdays Saturdays  -Seen on HD.  Qd, Qb, UF rate reviewed.  Vitals stable.  Access working well.  Patient tolerating HD well.    -? 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

## 2023-07-25 NOTE — CHART NOTE - NSCHARTNOTEFT_GEN_A_CORE
Post-op check:    Pt is a 62 y/o s/p fistulogram with POBA.    Vitals:    Patient resting in bed by chair Pt denies pain at this time. Denies nausea, vomiting, SOB, chest pain. Patient has not passed gas but had a bowel movement. Reports that he voided a little bit but not much.     Physical exam:  General: alert, patient resting in bed comfortably, in NAD  Resp: equal chest rise bilaterally, nonlabored breathing  Cardio: RRR  Abdomen: soft, NT, ND  Extremities: 2+ pulses bilateral upper extremites. Strength    Plan:   -continue diet as tolerated  -pain control PRN  -encourage OOB, ambulation  -DVT ppx: SCDs Post-op check:    Pt is a 64 y/o s/p fistulogram with percutaneous balloon angioplasty.     Vitals:    Patient resting in bed by chair Pt denies pain at this time. Denies nausea, vomiting, SOB, chest pain. Patient has not passed gas but had a bowel movement. Reports that he voided a little bit but not much. States that he has paresthesia prior to surgery in bilateral UE and LE.     Physical exam:  General: alert, patient resting in bed comfortably, in NAD  Resp: equal chest rise bilaterally, nonlabored breathing  Cardio: RRR  Abdomen: soft, NT, ND  Extremities: 2+ pulses bilateral upper extremities. Strength 5/5 bilateral upper extremities. Sensation diminished in bilateral upper extremities. Dressing on R wrist, clean, dry, and intact. Palpable thrill on RUE.     Plan:   -continue Plavix and Eliquis  -continue renal diet  -continue diet as tolerated  -pain control PRN  -encourage OOB, ambulation  -DVT ppx: SCDs

## 2023-07-25 NOTE — PROGRESS NOTE ADULT - ASSESSMENT
Now s/p fistulogram with balloon via RRA with Dr. Stanley Kingsley tolerated procedure well. Pt arrived to recovery in NAD and HDS, RRA access site stable, no bleed/hematoma, distal pulse +, radial band in place        Plan:  -Formal cath report pending  -Post procedure management/monitoring per protocol  -Access site precautions  -Radial compression band removal at 1800  -Bedrest while band in place post procedure   -Repeat ECG if any clinical indication or change on tele  -Maintain temporary pacemaker  -Continue current medical therapy  -Dual anti platelet therapy with asprin/plavix   -Cont BB with metoprolol tartrate 37.5 mg po daily   -Educated regarding strict adherence with DAPT   -Educated regarding post procedure management and care  -Discussed the importance of RF modification  -Cardiac rehab info provided/referral and communication to cardiac rehab completed  -Resume Heparin as per Vascular Team

## 2023-07-25 NOTE — PROGRESS NOTE ADULT - SUBJECTIVE AND OBJECTIVE BOX
HPI/Overnight Events:  Patient seen and assessed this morning, patient is resting in bed comfortably, NAD noted. No acute events overnight. 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)    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)    S/P amputation    Stented coronary artery          MEDICATIONS:  amLODIPine   Tablet 5 milliGRAM(s) Oral daily  aspirin enteric coated 81 milliGRAM(s) Oral daily  atorvastatin 80 milliGRAM(s) Oral at bedtime  bisacodyl 5 milliGRAM(s) Oral every 12 hours PRN  calcium acetate 667 milliGRAM(s) Oral three times a day with meals  clopidogrel Tablet 75 milliGRAM(s) Oral daily  dextrose 50% Injectable 50 milliLiter(s) IV Push every 15 minutes  dextrose 50% Injectable 25 milliLiter(s) IV Push every 15 minutes  dextrose Oral Gel 15 Gram(s) Oral once PRN  epoetin lois-epbx (RETACRIT) Injectable 5000 Unit(s) IV Push <User Schedule>  gabapentin 100 milliGRAM(s) Oral three times a day  glucagon  Injectable 1 milliGRAM(s) IntraMuscular once  heparin  Infusion 1000 Unit(s)/Hr IV Continuous <Continuous>  hydrALAZINE 25 milliGRAM(s) Oral three times a day  insulin glargine Injectable (LANTUS) 6 Unit(s) SubCutaneous at bedtime  insulin lispro (ADMELOG) corrective regimen sliding scale   SubCutaneous three times a day before meals  insulin lispro (ADMELOG) corrective regimen sliding scale   SubCutaneous at bedtime  iron sucrose IVPB 100 milliGRAM(s) IV Intermittent every 48 hours  lidocaine   4% Patch 1 Patch Transdermal daily  lidocaine   4% Patch 1 Patch Transdermal daily  loratadine 10 milliGRAM(s) Oral daily  methocarbamol 500 milliGRAM(s) Oral two times a day  metoprolol tartrate 37.5 milliGRAM(s) Oral two times a day  Nephro-becky 1 Tablet(s) Oral daily  ondansetron Injectable 4 milliGRAM(s) IV Push every 6 hours PRN  pantoprazole    Tablet 40 milliGRAM(s) Oral daily  polyethylene glycol 3350 17 Gram(s) Oral daily  senna 2 Tablet(s) Oral at bedtime  sodium chloride 0.9% lock flush 3 milliLiter(s) IV Push every 8 hours  tamsulosin 0.4 milliGRAM(s) Oral at bedtime      ALLERGIES:  No Known Drug Allergies  shellfish (Rash)        VASCULAR IMAGING:        PASCUAL (Normal/Borderline/Abnormal/Noncompressable):        Arterial Dopplers:        CTA/MRA:        Prior Peripheral Angiograms/ Interventions : (Date/ Findings)       VITALS & I/Os:  Vital Signs Last 24 Hrs  T(C): 37.7 (25 Jul 2023 05:02), Max: 37.7 (25 Jul 2023 05:02)  T(F): 99.9 (25 Jul 2023 05:02), Max: 99.9 (25 Jul 2023 05:02)  HR: 60 (25 Jul 2023 05:02) (60 - 96)  BP: 154/79 (25 Jul 2023 05:02) (117/50 - 155/54)  BP(mean): --  RR: 15 (25 Jul 2023 05:02) (15 - 18)  SpO2: 93% (25 Jul 2023 05:02) (93% - 98%)    Parameters below as of 25 Jul 2023 05:02  Patient On (Oxygen Delivery Method): room air        I&O's Summary    24 Jul 2023 07:01  -  25 Jul 2023 07:00  --------------------------------------------------------  IN: 1224 mL / OUT: 0 mL / NET: 1224 mL          PHYSICAL EXAM:  Constitutional: no acute distress  HEENT: EOMI, no active drainage or redness, no scleral icterus   Neck: Full ROM without pain  Respiratory: respirations are unlabored, no accessory muscle use, no conversational dyspnea  Cardiovascular: regular rate & rhythm  Gastrointestinal: Abdomen soft, non-tender, non-distended, No rebound or guarding. No organomegaly, no palpable mass.  Neurological: A&O x 3; no gross sensory / motor / coordination deficits  Musculoskeletal: BARRETO  Vascular: Extremities warm and well perfused     Objective Findings:        Lower Extremity Pulses:        Nonhealing Lower Extremity Wound/ Ulcers:        Lower Extremity Gangrene:        Vascular Bruit:        Other Suggestive Lower Extremity Findings (elevation pallor, dependent rubor):         LABS:                        7.7    10.27 )-----------( 155      ( 25 Jul 2023 04:30 )             23.2     07-25    126<L>  |  85<L>  |  49.8<H>  ----------------------------<  214<H>  4.1   |  23.0  |  7.91<H>    Ca    7.8<L>      25 Jul 2023 04:30  Phos  4.8     07-25  Mg     2.1     07-25      Lactate:    PT/INR - ( 25 Jul 2023 04:30 )   PT: 14.1 sec;   INR: 1.21 ratio         PTT - ( 25 Jul 2023 04:30 )  PTT:61.6 sec          Urinalysis Basic - ( 25 Jul 2023 04:30 )    Color: x / Appearance: x / SG: x / pH: x  Gluc: 214 mg/dL / Ketone: x  / Bili: x / Urobili: x   Blood: x / Protein: x / Nitrite: x   Leuk Esterase: x / RBC: x / WBC x   Sq Epi: x / Non Sq Epi: x / Bacteria: x           HPI/Overnight Events:  Patient seen and assessed this morning, patient is resting in the chair, NAD noted. No acute events overnight. 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)  S/P amputation  Stented coronary artery      MEDICATIONS:  amLODIPine   Tablet 5 milliGRAM(s) Oral daily  aspirin enteric coated 81 milliGRAM(s) Oral daily  atorvastatin 80 milliGRAM(s) Oral at bedtime  bisacodyl 5 milliGRAM(s) Oral every 12 hours PRN  calcium acetate 667 milliGRAM(s) Oral three times a day with meals  clopidogrel Tablet 75 milliGRAM(s) Oral daily  dextrose 50% Injectable 50 milliLiter(s) IV Push every 15 minutes  dextrose 50% Injectable 25 milliLiter(s) IV Push every 15 minutes  dextrose Oral Gel 15 Gram(s) Oral once PRN  epoetin lois-epbx (RETACRIT) Injectable 5000 Unit(s) IV Push <User Schedule>  gabapentin 100 milliGRAM(s) Oral three times a day  glucagon  Injectable 1 milliGRAM(s) IntraMuscular once  heparin  Infusion 1000 Unit(s)/Hr IV Continuous <Continuous>  hydrALAZINE 25 milliGRAM(s) Oral three times a day  insulin glargine Injectable (LANTUS) 6 Unit(s) SubCutaneous at bedtime  insulin lispro (ADMELOG) corrective regimen sliding scale   SubCutaneous three times a day before meals  insulin lispro (ADMELOG) corrective regimen sliding scale   SubCutaneous at bedtime  iron sucrose IVPB 100 milliGRAM(s) IV Intermittent every 48 hours  lidocaine   4% Patch 1 Patch Transdermal daily  lidocaine   4% Patch 1 Patch Transdermal daily  loratadine 10 milliGRAM(s) Oral daily  methocarbamol 500 milliGRAM(s) Oral two times a day  metoprolol tartrate 37.5 milliGRAM(s) Oral two times a day  Nephro-becky 1 Tablet(s) Oral daily  ondansetron Injectable 4 milliGRAM(s) IV Push every 6 hours PRN  pantoprazole    Tablet 40 milliGRAM(s) Oral daily  polyethylene glycol 3350 17 Gram(s) Oral daily  senna 2 Tablet(s) Oral at bedtime  sodium chloride 0.9% lock flush 3 milliLiter(s) IV Push every 8 hours  tamsulosin 0.4 milliGRAM(s) Oral at bedtime      ALLERGIES:  No Known Drug Allergies  shellfish (Rash)        VASCULAR IMAGING:   < from: US Duplex Hemodialysis Access (07.20.23 @ 20:56) >  Vessel caliber, peak velocity and volume flow are as follows:    Brachial artery inflow     0.28 cm      265 cm/s      335 mL/m    AV fistula                       0.58 cm      465 cm/s 2237 mL/m    Cephalic outflow vein  Proximal                        0.88 cm      238 cm/s     2461 mL/m  Mid                                0.51 cm        96 cm/s       272 mL/m  Distal                             0.54 cm        27 cm/s       116 mL/m    IMPRESSION: Patency of right upper extremity brachiocephalic AV fistula   with hemodynamics as above.    --- End of Report ---      RHINA HURD MD; Attending Radiologist  This document has been electronically signed. Jul 21 2023  9:04AM    < end of copied text >        VITALS & I/Os:  Vital Signs Last 24 Hrs  T(C): 37.7 (25 Jul 2023 05:02), Max: 37.7 (25 Jul 2023 05:02)  T(F): 99.9 (25 Jul 2023 05:02), Max: 99.9 (25 Jul 2023 05:02)  HR: 60 (25 Jul 2023 05:02) (60 - 96)  BP: 154/79 (25 Jul 2023 05:02) (117/50 - 155/54)  BP(mean): --  RR: 15 (25 Jul 2023 05:02) (15 - 18)  SpO2: 93% (25 Jul 2023 05:02) (93% - 98%)    Parameters below as of 25 Jul 2023 05:02  Patient On (Oxygen Delivery Method): room air        I&O's Summary    24 Jul 2023 07:01  -  25 Jul 2023 07:00  --------------------------------------------------------  IN: 1224 mL / OUT: 0 mL / NET: 1224 mL          PHYSICAL EXAM:  Constitutional: no acute distress  HEENT: EOMI, no active drainage or redness, no scleral icterus   Neck: Full ROM without pain  Respiratory: respirations are unlabored, no accessory muscle use, no conversational dyspnea  Cardiovascular: regular rate & rhythm  Gastrointestinal: Abdomen soft, non-tender, non-distended  Neurological: A&O x 2; no gross sensory / motor / coordination deficits  Musculoskeletal: BARRETO  Vascular: Extremities warm and well perfused, AVF with thrill but also noted slightly pulsatile         LABS:                        7.7    10.27 )-----------( 155      ( 25 Jul 2023 04:30 )             23.2     07-25    126<L>  |  85<L>  |  49.8<H>  ----------------------------<  214<H>  4.1   |  23.0  |  7.91<H>    Ca    7.8<L>      25 Jul 2023 04:30  Phos  4.8     07-25  Mg     2.1     07-25      Lactate:    PT/INR - ( 25 Jul 2023 04:30 )   PT: 14.1 sec;   INR: 1.21 ratio         PTT - ( 25 Jul 2023 04:30 )  PTT:61.6 sec          Urinalysis Basic - ( 25 Jul 2023 04:30 )    Color: x / Appearance: x / SG: x / pH: x  Gluc: 214 mg/dL / Ketone: x  / Bili: x / Urobili: x   Blood: x / Protein: x / Nitrite: x   Leuk Esterase: x / RBC: x / WBC x   Sq Epi: x / Non Sq Epi: x / Bacteria: x

## 2023-07-25 NOTE — BRIEF OPERATIVE NOTE - OPERATION/FINDINGS
Plain balloon angioplasty of RUE brachiocephalic AVF for stenosis. Patient had episodes of bradycardia, asymptomatic, that resolved on their own; otherwise tolerated the procedure well. AVF with good thrill, less pulsatile, at the end of the case.
1. Severe Native Coronary Artery Disease with atherosclerosis in situ vein.  2. Preoperative IABP.

## 2023-07-25 NOTE — BRIEF OPERATIVE NOTE - NSICDXBRIEFPREOP_GEN_ALL_CORE_FT
PRE-OP DIAGNOSIS:  CAD (coronary atherosclerotic disease) 14-Jul-2023 13:47:12  Tyson Camarillo  
PRE-OP DIAGNOSIS:  Stenosis of arteriovenous dialysis fistula 25-Jul-2023 16:13:31  Tabatha Boykin

## 2023-07-25 NOTE — PROGRESS NOTE ADULT - PROBLEM SELECTOR PLAN 3
Patient with PAF withconversion pauses  EP following rec flutter ablation  no per Dr. Enriquez  overnight 2 paced beats during conversion (EPM VVI 30)--> on back up per Dr. Enriquez  f/u additional recs today  on heparin gtt for AC due to fistulogram  plan to start eliquis after if no further scheduled procedures Patient with PAF withconversion pauses  EP following rec flutter ablation  no per Dr. Enriquez  overnight 2 paced beats during conversion (EPM VVI 30)--> on back up per Dr. Enriquez  f/u additional recs today  on heparin gtt for AC due to fistulogram  plan to start eliquis after if no further scheduled procedures  needs pre d/c TTE for AC

## 2023-07-25 NOTE — PROGRESS NOTE ADULT - SUBJECTIVE AND OBJECTIVE BOX
Reason for visit: ESRD    HPI:" feeling much better today, no SOB, laying in bed    Physical Exam:  Gen: no acute distress  MS: alert  HENT: NCAT, MMM  CV: rhythm reg reg, rate normal, no m/g/r, no LE edema  Chest: CTAB, no w/r/r,  Abd: soft, NT, ND  Neuro: no tremor  Skin: dry, warm, no rash or jaundice        =======================================================  Vital Signs Last 24 Hrs  T(C): 36.9 (25 Jul 2023 08:55), Max: 37.7 (25 Jul 2023 05:02)  T(F): 98.4 (25 Jul 2023 08:55), Max: 99.9 (25 Jul 2023 05:02)  HR: 60 (25 Jul 2023 08:55) (60 - 100)  BP: 127/35 (25 Jul 2023 08:55) (117/50 - 172/79)  BP(mean): --  RR: 18 (25 Jul 2023 08:55) (15 - 18)  SpO2: 93% (25 Jul 2023 08:55) (92% - 98%)    Parameters below as of 25 Jul 2023 08:55  Patient On (Oxygen Delivery Method): room air      I&O's Summary    24 Jul 2023 07:01  -  25 Jul 2023 07:00  --------------------------------------------------------  IN: 1224 mL / OUT: 0 mL / NET: 1224 mL      =======================================================  Current Antibiotics:    Other medications:  amLODIPine   Tablet 5 milliGRAM(s) Oral daily  aspirin enteric coated 81 milliGRAM(s) Oral daily  atorvastatin 80 milliGRAM(s) Oral at bedtime  calcium acetate 667 milliGRAM(s) Oral three times a day with meals  clopidogrel Tablet 75 milliGRAM(s) Oral daily  dextrose 50% Injectable 50 milliLiter(s) IV Push every 15 minutes  dextrose 50% Injectable 25 milliLiter(s) IV Push every 15 minutes  epoetin lois-epbx (RETACRIT) Injectable 5000 Unit(s) IV Push <User Schedule>  gabapentin 100 milliGRAM(s) Oral three times a day  glucagon  Injectable 1 milliGRAM(s) IntraMuscular once  heparin  Infusion 1000 Unit(s)/Hr IV Continuous <Continuous>  hydrALAZINE 25 milliGRAM(s) Oral three times a day  insulin glargine Injectable (LANTUS) 6 Unit(s) SubCutaneous at bedtime  insulin lispro (ADMELOG) corrective regimen sliding scale   SubCutaneous three times a day before meals  insulin lispro (ADMELOG) corrective regimen sliding scale   SubCutaneous at bedtime  iron sucrose IVPB 100 milliGRAM(s) IV Intermittent every 48 hours  lidocaine   4% Patch 1 Patch Transdermal daily  lidocaine   4% Patch 1 Patch Transdermal daily  loratadine 10 milliGRAM(s) Oral daily  methocarbamol 500 milliGRAM(s) Oral two times a day  metoprolol tartrate 37.5 milliGRAM(s) Oral two times a day  Nephro-becky 1 Tablet(s) Oral daily  pantoprazole    Tablet 40 milliGRAM(s) Oral daily  polyethylene glycol 3350 17 Gram(s) Oral daily  senna 2 Tablet(s) Oral at bedtime  sodium chloride 0.9% lock flush 3 milliLiter(s) IV Push every 8 hours  tamsulosin 0.4 milliGRAM(s) Oral at bedtime    =======================================================  07-25    126<L>  |  85<L>  |  49.8<H>  ----------------------------<  214<H>  4.1   |  23.0  |  7.91<H>    Ca    7.8<L>      25 Jul 2023 04:30  Phos  4.8     07-25  Mg     2.1     07-25      Creatinine: 7.91 mg/dL (07-25-23 @ 04:30)  Creatinine: 6.33 mg/dL (07-24-23 @ 04:50)  Creatinine: 7.15 mg/dL (07-23-23 @ 03:22)  Creatinine: 6.54 mg/dL (07-22-23 @ 16:30)  Creatinine: 5.80 mg/dL (07-22-23 @ 06:25)  Creatinine: 7.20 mg/dL (07-21-23 @ 04:59)    Urinalysis Basic - ( 25 Jul 2023 04:30 )    Color: x / Appearance: x / SG: x / pH: x  Gluc: 214 mg/dL / Ketone: x  / Bili: x / Urobili: x   Blood: x / Protein: x / Nitrite: x   Leuk Esterase: x / RBC: x / WBC x   Sq Epi: x / Non Sq Epi: x / Bacteria: x      ======================================================= Reason for visit: ESRD    HPI: Seen on HD, denies any complains. No CP. No fever.    Physical Exam:  Gen: no acute distress  MS: alert  HENT: NCAT, MMM  CV: rhythm reg reg, rate normal, no LE edema  Chest: CTAB, no w/r/r,  Abd: soft, NT, ND  Neuro: no tremor  Skin: dry, warm, no rash or jaundice  Access: [facility]  =======================================================  Vital Signs Last 24 Hrs  T(C): 36.9 (25 Jul 2023 08:55), Max: 37.7 (25 Jul 2023 05:02)  T(F): 98.4 (25 Jul 2023 08:55), Max: 99.9 (25 Jul 2023 05:02)  HR: 60 (25 Jul 2023 08:55) (60 - 100)  BP: 127/35 (25 Jul 2023 08:55) (117/50 - 172/79)  RR: 18 (25 Jul 2023 08:55) (15 - 18)  SpO2: 93% (25 Jul 2023 08:55) (92% - 98%)  Parameters below as of 25 Jul 2023 08:55  Patient On (Oxygen Delivery Method): room air    I&O's Summary    24 Jul 2023 07:01  -  25 Jul 2023 07:00  --------------------------------------------------------  IN: 1224 mL / OUT: 0 mL / NET: 1224 mL      =======================================================  Current Antibiotics:    Other medications:  amLODIPine   Tablet 5 milliGRAM(s) Oral daily  aspirin enteric coated 81 milliGRAM(s) Oral daily  atorvastatin 80 milliGRAM(s) Oral at bedtime  calcium acetate 667 milliGRAM(s) Oral three times a day with meals  clopidogrel Tablet 75 milliGRAM(s) Oral daily  dextrose 50% Injectable 50 milliLiter(s) IV Push every 15 minutes  dextrose 50% Injectable 25 milliLiter(s) IV Push every 15 minutes  epoetin lois-epbx (RETACRIT) Injectable 5000 Unit(s) IV Push <User Schedule>  gabapentin 100 milliGRAM(s) Oral three times a day  glucagon  Injectable 1 milliGRAM(s) IntraMuscular once  heparin  Infusion 1000 Unit(s)/Hr IV Continuous <Continuous>  hydrALAZINE 25 milliGRAM(s) Oral three times a day  insulin glargine Injectable (LANTUS) 6 Unit(s) SubCutaneous at bedtime  insulin lispro (ADMELOG) corrective regimen sliding scale   SubCutaneous three times a day before meals  insulin lispro (ADMELOG) corrective regimen sliding scale   SubCutaneous at bedtime  iron sucrose IVPB 100 milliGRAM(s) IV Intermittent every 48 hours  lidocaine   4% Patch 1 Patch Transdermal daily  lidocaine   4% Patch 1 Patch Transdermal daily  loratadine 10 milliGRAM(s) Oral daily  methocarbamol 500 milliGRAM(s) Oral two times a day  metoprolol tartrate 37.5 milliGRAM(s) Oral two times a day  Nephro-becky 1 Tablet(s) Oral daily  pantoprazole    Tablet 40 milliGRAM(s) Oral daily  polyethylene glycol 3350 17 Gram(s) Oral daily  senna 2 Tablet(s) Oral at bedtime  sodium chloride 0.9% lock flush 3 milliLiter(s) IV Push every 8 hours  tamsulosin 0.4 milliGRAM(s) Oral at bedtime    =======================================================  07-25    126<L>  |  85<L>  |  49.8<H>  ----------------------------<  214<H>  4.1   |  23.0  |  7.91<H>    Ca    7.8<L>      25 Jul 2023 04:30  Phos  4.8     07-25  Mg     2.1     07-25      Creatinine: 7.91 mg/dL (07-25-23 @ 04:30)  Creatinine: 6.33 mg/dL (07-24-23 @ 04:50)  Creatinine: 7.15 mg/dL (07-23-23 @ 03:22)  Creatinine: 6.54 mg/dL (07-22-23 @ 16:30)  Creatinine: 5.80 mg/dL (07-22-23 @ 06:25)  Creatinine: 7.20 mg/dL (07-21-23 @ 04:59)    Urinalysis Basic - ( 25 Jul 2023 04:30 )    Color: x / Appearance: x / SG: x / pH: x  Gluc: 214 mg/dL / Ketone: x  / Bili: x / Urobili: x   Blood: x / Protein: x / Nitrite: x   Leuk Esterase: x / RBC: x / WBC x   Sq Epi: x / Non Sq Epi: x / Bacteria: x      ======================================================= Reason for visit: ESRD    HPI: Seen on HD, denies any complains. No CP. No fever.    Physical Exam:  Gen: no acute distress  MS: alert  HENT: NCAT, MMM  CV: rhythm reg reg, rate normal, no LE edema  Chest: CTAB, no w/r/r,  Abd: soft, NT, ND  Neuro: no tremor  Skin: dry, warm, no rash or jaundice  Access: [facility]    =======================================================  Vital Signs Last 24 Hrs  T(C): 36.9 (25 Jul 2023 08:55), Max: 37.7 (25 Jul 2023 05:02)  T(F): 98.4 (25 Jul 2023 08:55), Max: 99.9 (25 Jul 2023 05:02)  HR: 60 (25 Jul 2023 08:55) (60 - 100)  BP: 127/35 (25 Jul 2023 08:55) (117/50 - 172/79)  RR: 18 (25 Jul 2023 08:55) (15 - 18)  SpO2: 93% (25 Jul 2023 08:55) (92% - 98%)  Parameters below as of 25 Jul 2023 08:55  Patient On (Oxygen Delivery Method): room air    I&O's Summary    24 Jul 2023 07:01  -  25 Jul 2023 07:00  --------------------------------------------------------  IN: 1224 mL / OUT: 0 mL / NET: 1224 mL      =======================================================  Current Antibiotics:    Other medications:  amLODIPine   Tablet 5 milliGRAM(s) Oral daily  aspirin enteric coated 81 milliGRAM(s) Oral daily  atorvastatin 80 milliGRAM(s) Oral at bedtime  calcium acetate 667 milliGRAM(s) Oral three times a day with meals  clopidogrel Tablet 75 milliGRAM(s) Oral daily  dextrose 50% Injectable 50 milliLiter(s) IV Push every 15 minutes  dextrose 50% Injectable 25 milliLiter(s) IV Push every 15 minutes  epoetin lois-epbx (RETACRIT) Injectable 5000 Unit(s) IV Push <User Schedule>  gabapentin 100 milliGRAM(s) Oral three times a day  glucagon  Injectable 1 milliGRAM(s) IntraMuscular once  heparin  Infusion 1000 Unit(s)/Hr IV Continuous <Continuous>  hydrALAZINE 25 milliGRAM(s) Oral three times a day  insulin glargine Injectable (LANTUS) 6 Unit(s) SubCutaneous at bedtime  insulin lispro (ADMELOG) corrective regimen sliding scale   SubCutaneous three times a day before meals  insulin lispro (ADMELOG) corrective regimen sliding scale   SubCutaneous at bedtime  iron sucrose IVPB 100 milliGRAM(s) IV Intermittent every 48 hours  lidocaine   4% Patch 1 Patch Transdermal daily  lidocaine   4% Patch 1 Patch Transdermal daily  loratadine 10 milliGRAM(s) Oral daily  methocarbamol 500 milliGRAM(s) Oral two times a day  metoprolol tartrate 37.5 milliGRAM(s) Oral two times a day  Nephro-becky 1 Tablet(s) Oral daily  pantoprazole    Tablet 40 milliGRAM(s) Oral daily  polyethylene glycol 3350 17 Gram(s) Oral daily  senna 2 Tablet(s) Oral at bedtime  sodium chloride 0.9% lock flush 3 milliLiter(s) IV Push every 8 hours  tamsulosin 0.4 milliGRAM(s) Oral at bedtime    =======================================================  07-25    126<L>  |  85<L>  |  49.8<H>  ----------------------------<  214<H>  4.1   |  23.0  |  7.91<H>    Ca    7.8<L>      25 Jul 2023 04:30  Phos  4.8     07-25  Mg     2.1     07-25      Creatinine: 7.91 mg/dL (07-25-23 @ 04:30)  Creatinine: 6.33 mg/dL (07-24-23 @ 04:50)  Creatinine: 7.15 mg/dL (07-23-23 @ 03:22)  Creatinine: 6.54 mg/dL (07-22-23 @ 16:30)  Creatinine: 5.80 mg/dL (07-22-23 @ 06:25)  Creatinine: 7.20 mg/dL (07-21-23 @ 04:59)    Urinalysis Basic - ( 25 Jul 2023 04:30 )    Color: x / Appearance: x / SG: x / pH: x  Gluc: 214 mg/dL / Ketone: x  / Bili: x / Urobili: x   Blood: x / Protein: x / Nitrite: x   Leuk Esterase: x / RBC: x / WBC x   Sq Epi: x / Non Sq Epi: x / Bacteria: x      =======================================================

## 2023-07-25 NOTE — BRIEF OPERATIVE NOTE - NSICDXBRIEFPROCEDURE_GEN_ALL_CORE_FT
PROCEDURES:  CABG, 4 or more vessels 14-Jul-2023 13:45:59 CABG x 4 (LIMA-LAD, [id number]-PDA) Tyson Camarillo  
PROCEDURES:  Angiography of arteriovenous fistula or graft 25-Jul-2023 16:12:16  Tabatha Boykin  Angioplasty, AV fistula 25-Jul-2023 16:12:58  Tabatha Boykin

## 2023-07-25 NOTE — PROGRESS NOTE ADULT - SUBJECTIVE AND OBJECTIVE BOX
INTERVAL EVENTS:  Follow up diabetes management  HD at bedside    ROS: Denies chest pain, sob, abd pain.     MEDICATIONS  (STANDING):  amLODIPine   Tablet 5 milliGRAM(s) Oral daily  aspirin enteric coated 81 milliGRAM(s) Oral daily  atorvastatin 80 milliGRAM(s) Oral at bedtime  calcium acetate 667 milliGRAM(s) Oral three times a day with meals  clopidogrel Tablet 75 milliGRAM(s) Oral daily  dextrose 50% Injectable 50 milliLiter(s) IV Push every 15 minutes  dextrose 50% Injectable 25 milliLiter(s) IV Push every 15 minutes  epoetin lois-epbx (RETACRIT) Injectable 5000 Unit(s) IV Push <User Schedule>  gabapentin 100 milliGRAM(s) Oral three times a day  glucagon  Injectable 1 milliGRAM(s) IntraMuscular once  heparin  Infusion 1000 Unit(s)/Hr (16 mL/Hr) IV Continuous <Continuous>  hydrALAZINE 25 milliGRAM(s) Oral three times a day  insulin glargine Injectable (LANTUS) 6 Unit(s) SubCutaneous at bedtime  insulin lispro (ADMELOG) corrective regimen sliding scale   SubCutaneous three times a day before meals  insulin lispro (ADMELOG) corrective regimen sliding scale   SubCutaneous at bedtime  iron sucrose IVPB 100 milliGRAM(s) IV Intermittent every 48 hours  lidocaine   4% Patch 1 Patch Transdermal daily  lidocaine   4% Patch 1 Patch Transdermal daily  loratadine 10 milliGRAM(s) Oral daily  methocarbamol 500 milliGRAM(s) Oral two times a day  metoprolol tartrate 37.5 milliGRAM(s) Oral two times a day  Nephro-becky 1 Tablet(s) Oral daily  pantoprazole    Tablet 40 milliGRAM(s) Oral daily  polyethylene glycol 3350 17 Gram(s) Oral daily  senna 2 Tablet(s) Oral at bedtime  sodium chloride 0.9% lock flush 3 milliLiter(s) IV Push every 8 hours  tamsulosin 0.4 milliGRAM(s) Oral at bedtime    MEDICATIONS  (PRN):  bisacodyl 5 milliGRAM(s) Oral every 12 hours PRN Constipation  dextrose Oral Gel 15 Gram(s) Oral once PRN Blood Glucose LESS THAN 70 milliGRAM(s)/deciliter  ondansetron Injectable 4 milliGRAM(s) IV Push every 6 hours PRN Nausea and/or Vomiting    Allergies  No Known Drug Allergies  shellfish (Rash)    Vital Signs Last 24 Hrs  T(C): 36.9 (25 Jul 2023 08:55), Max: 37.7 (25 Jul 2023 05:02)  T(F): 98.4 (25 Jul 2023 08:55), Max: 99.9 (25 Jul 2023 05:02)  HR: 60 (25 Jul 2023 08:55) (60 - 100)  BP: 127/35 (25 Jul 2023 08:55) (117/50 - 172/79)  BP(mean): --  RR: 18 (25 Jul 2023 08:55) (15 - 18)  SpO2: 93% (25 Jul 2023 08:55) (92% - 98%)    Parameters below as of 25 Jul 2023 08:55  Patient On (Oxygen Delivery Method): room air    PHYSICAL EXAM:  General: No apparent distress  Neck: Supple, trachea midline, no thyromegaly  Respiratory: Lungs clear bilaterally, normal rate, effort  Cardiac: +S1, S2, no m/r/g  GI: +BS, soft, non tender, non distended  Extremities: No peripheral edema, no pedal lesions  Neuro: A+O X3, no tremor    LABS:                        7.7    10.27 )-----------( 155      ( 25 Jul 2023 04:30 )             23.2     07-25    126<L>  |  85<L>  |  49.8<H>  ----------------------------<  214<H>  4.1   |  23.0  |  7.91<H>    Ca    7.8<L>      25 Jul 2023 04:30  Phos  4.8     07-25  Mg     2.1     07-25      Urinalysis Basic - ( 25 Jul 2023 04:30 )    Color: x / Appearance: x / SG: x / pH: x  Gluc: 214 mg/dL / Ketone: x  / Bili: x / Urobili: x   Blood: x / Protein: x / Nitrite: x   Leuk Esterase: x / RBC: x / WBC x   Sq Epi: x / Non Sq Epi: x / Bacteria: x    POCT Blood Glucose.: 116 mg/dL (07-25-23 @ 11:54)  POCT Blood Glucose.: 217 mg/dL (07-25-23 @ 08:03)  POCT Blood Glucose.: 207 mg/dL (07-24-23 @ 22:04)  POCT Blood Glucose.: 259 mg/dL (07-24-23 @ 17:06)

## 2023-07-25 NOTE — PROGRESS NOTE ADULT - ASSESSMENT
63y M admitted for emergent CABG, history of ESRD with a RUE AVF, created 5 years ago, usually without problems, pt had Hyperkalemia despite HD, per patient he was tolerating his dialysis sessions without a problem and is very strict with his diet at home.  On physical examination, AVF with thrill but also noted slightly pulsatile     PLAN:    - Fistulogram planned for today, 7/25, with Dr. Angel  - Keep patient NPO   - Plan for HD today   - Rest of care as per primary team

## 2023-07-25 NOTE — PROGRESS NOTE ADULT - SUBJECTIVE AND OBJECTIVE BOX
POD 11 s/p C4L (LIMA-LAD, SVG-OM1, SVG-OM2, SVG-PDA)    Subjective:    T(C): 36.1 (07-25-23 @ 00:37), Max: 37 (07-24-23 @ 04:18)  HR: 61 (07-25-23 @ 00:37) (61 - 96)  BP: 144/66 (07-25-23 @ 00:37) (117/50 - 155/54)  RR: 16 (07-25-23 @ 00:37) (16 - 18)  SpO2: 93% (07-25-23 @ 00:37) (93% - 99%)    07-24    130<L>  |  89<L>  |  40.2<H>  ----------------------------<  214<H>  4.0   |  24.0  |  6.33<H>    Ca    7.9<L>      24 Jul 2023 04:50  Phos  4.5     07-24  Mg     2.0     07-24                                 8.0    7.30  )-----------( 157      ( 24 Jul 2023 04:50 )             24.1        PTT - ( 24 Jul 2023 04:50 )  PTT:59.3 sec              CAPILLARY BLOOD GLUCOSE  POCT Blood Glucose.: 207 mg/dL (24 Jul 2023 22:04)  POCT Blood Glucose.: 259 mg/dL (24 Jul 2023 17:06)  POCT Blood Glucose.: 157 mg/dL (24 Jul 2023 12:02)  POCT Blood Glucose.: 241 mg/dL (24 Jul 2023 08:08)  I&O's Detail    23 Jul 2023 07:01  -  24 Jul 2023 07:00  --------------------------------------------------------  IN:    Heparin: 176 mL    Oral Fluid: 300 mL  Total IN: 476 mL    OUT:    Other (mL): 2000 mL    Voided (mL): 105 mL  Total OUT: 2105 mL  Total NET: -1629 mL    24 Jul 2023 07:01  -  25 Jul 2023 01:47  --------------------------------------------------------  IN:    Heparin: 192 mL    Oral Fluid: 920 mL  Total IN: 1112 mL    OUT:    Voided (mL): 0 mL  Total OUT: 0 mL  Total NET: 1112 mL    Drug Dosing Weight  Height (cm): 160 (13 Jul 2023 23:27)  Weight (kg): 68.4 (19 Jul 2023 04:34)  BMI (kg/m2): 26.7 (19 Jul 2023 04:34)  BSA (m2): 1.71 (19 Jul 2023 04:34)    MEDICATIONS  (STANDING):  amLODIPine   Tablet 5 milliGRAM(s) Oral daily  aspirin enteric coated 81 milliGRAM(s) Oral daily  atorvastatin 80 milliGRAM(s) Oral at bedtime  calcium acetate 667 milliGRAM(s) Oral three times a day with meals  clopidogrel Tablet 75 milliGRAM(s) Oral daily  dextrose 50% Injectable 50 milliLiter(s) IV Push every 15 minutes  dextrose 50% Injectable 25 milliLiter(s) IV Push every 15 minutes  epoetin lois-epbx (RETACRIT) Injectable 5000 Unit(s) IV Push <User Schedule>  gabapentin 100 milliGRAM(s) Oral three times a day  glucagon  Injectable 1 milliGRAM(s) IntraMuscular once  heparin  Infusion 1000 Unit(s)/Hr (16 mL/Hr) IV Continuous <Continuous>  hydrALAZINE 25 milliGRAM(s) Oral three times a day  insulin glargine Injectable (LANTUS) 6 Unit(s) SubCutaneous at bedtime  insulin lispro (ADMELOG) corrective regimen sliding scale   SubCutaneous at bedtime  insulin lispro (ADMELOG) corrective regimen sliding scale   SubCutaneous three times a day before meals  iron sucrose IVPB 100 milliGRAM(s) IV Intermittent every 48 hours  lidocaine   4% Patch 1 Patch Transdermal daily  lidocaine   4% Patch 1 Patch Transdermal daily  loratadine 10 milliGRAM(s) Oral daily  methocarbamol 500 milliGRAM(s) Oral two times a day  metoprolol tartrate 37.5 milliGRAM(s) Oral two times a day  Nephro-becky 1 Tablet(s) Oral daily  pantoprazole    Tablet 40 milliGRAM(s) Oral daily  polyethylene glycol 3350 17 Gram(s) Oral daily  senna 2 Tablet(s) Oral at bedtime  sodium chloride 0.9% lock flush 3 milliLiter(s) IV Push every 8 hours  tamsulosin 0.4 milliGRAM(s) Oral at bedtime    MEDICATIONS  (PRN):  bisacodyl 5 milliGRAM(s) Oral every 12 hours PRN Constipation  dextrose Oral Gel 15 Gram(s) Oral once PRN Blood Glucose LESS THAN 70 milliGRAM(s)/deciliter  ondansetron Injectable 4 milliGRAM(s) IV Push every 6 hours PRN Nausea and/or Vomiting      Physical Exam  Gen: NAD  Neuro: A&Ox3 non focal speech clear and intact  Pulm: crackles b/l, no wheezing  CV: S1S2 irregular  Abd: +BS soft NT ND  Extrem/MS: 1-2 + b/l LE edema, RUE AVF +thrill   Incision(s): mid sternal inc C/D/I no click, LLE EVH C/D/I  EPM VVI 30/10/0.8 POD 11 s/p C4L (LIMA-LAD, SVG-OM1, SVG-OM2, SVG-PDA)    Subjective: no complaints denies CP, palpitations, SOB, cough, fever, chills, itchiness/rash, diaphoresis, vision changes, HA, dizziness/lightheadedness, numbness/tingling, abd pain, N/V     T(C): 36.1 (07-25-23 @ 00:37), Max: 37 (07-24-23 @ 04:18)  HR: 61 (07-25-23 @ 00:37) (61 - 96)  BP: 144/66 (07-25-23 @ 00:37) (117/50 - 155/54)  RR: 16 (07-25-23 @ 00:37) (16 - 18)  SpO2: 93% (07-25-23 @ 00:37) (93% - 99%)    07-24    130<L>  |  89<L>  |  40.2<H>  ----------------------------<  214<H>  4.0   |  24.0  |  6.33<H>    Ca    7.9<L>      24 Jul 2023 04:50  Phos  4.5     07-24  Mg     2.0     07-24                                 8.0    7.30  )-----------( 157      ( 24 Jul 2023 04:50 )             24.1        PTT - ( 24 Jul 2023 04:50 )  PTT:59.3 sec              CAPILLARY BLOOD GLUCOSE  POCT Blood Glucose.: 207 mg/dL (24 Jul 2023 22:04)  POCT Blood Glucose.: 259 mg/dL (24 Jul 2023 17:06)  POCT Blood Glucose.: 157 mg/dL (24 Jul 2023 12:02)  POCT Blood Glucose.: 241 mg/dL (24 Jul 2023 08:08)  I&O's Detail    23 Jul 2023 07:01  -  24 Jul 2023 07:00  --------------------------------------------------------  IN:    Heparin: 176 mL    Oral Fluid: 300 mL  Total IN: 476 mL    OUT:    Other (mL): 2000 mL    Voided (mL): 105 mL  Total OUT: 2105 mL  Total NET: -1629 mL    24 Jul 2023 07:01  -  25 Jul 2023 01:47  --------------------------------------------------------  IN:    Heparin: 192 mL    Oral Fluid: 920 mL  Total IN: 1112 mL    OUT:    Voided (mL): 0 mL  Total OUT: 0 mL  Total NET: 1112 mL    Drug Dosing Weight  Height (cm): 160 (13 Jul 2023 23:27)  Weight (kg): 68.4 (19 Jul 2023 04:34)  BMI (kg/m2): 26.7 (19 Jul 2023 04:34)  BSA (m2): 1.71 (19 Jul 2023 04:34)    MEDICATIONS  (STANDING):  amLODIPine   Tablet 5 milliGRAM(s) Oral daily  aspirin enteric coated 81 milliGRAM(s) Oral daily  atorvastatin 80 milliGRAM(s) Oral at bedtime  calcium acetate 667 milliGRAM(s) Oral three times a day with meals  clopidogrel Tablet 75 milliGRAM(s) Oral daily  dextrose 50% Injectable 50 milliLiter(s) IV Push every 15 minutes  dextrose 50% Injectable 25 milliLiter(s) IV Push every 15 minutes  epoetin lois-epbx (RETACRIT) Injectable 5000 Unit(s) IV Push <User Schedule>  gabapentin 100 milliGRAM(s) Oral three times a day  glucagon  Injectable 1 milliGRAM(s) IntraMuscular once  heparin  Infusion 1000 Unit(s)/Hr (16 mL/Hr) IV Continuous <Continuous>  hydrALAZINE 25 milliGRAM(s) Oral three times a day  insulin glargine Injectable (LANTUS) 6 Unit(s) SubCutaneous at bedtime  insulin lispro (ADMELOG) corrective regimen sliding scale   SubCutaneous at bedtime  insulin lispro (ADMELOG) corrective regimen sliding scale   SubCutaneous three times a day before meals  iron sucrose IVPB 100 milliGRAM(s) IV Intermittent every 48 hours  lidocaine   4% Patch 1 Patch Transdermal daily  lidocaine   4% Patch 1 Patch Transdermal daily  loratadine 10 milliGRAM(s) Oral daily  methocarbamol 500 milliGRAM(s) Oral two times a day  metoprolol tartrate 37.5 milliGRAM(s) Oral two times a day  Nephro-becky 1 Tablet(s) Oral daily  pantoprazole    Tablet 40 milliGRAM(s) Oral daily  polyethylene glycol 3350 17 Gram(s) Oral daily  senna 2 Tablet(s) Oral at bedtime  sodium chloride 0.9% lock flush 3 milliLiter(s) IV Push every 8 hours  tamsulosin 0.4 milliGRAM(s) Oral at bedtime    MEDICATIONS  (PRN):  bisacodyl 5 milliGRAM(s) Oral every 12 hours PRN Constipation  dextrose Oral Gel 15 Gram(s) Oral once PRN Blood Glucose LESS THAN 70 milliGRAM(s)/deciliter  ondansetron Injectable 4 milliGRAM(s) IV Push every 6 hours PRN Nausea and/or Vomiting      Physical Exam  Gen: NAD  Neuro: A&Ox3 non focal speech clear and intact  Pulm: crackles b/l, no wheezing  CV: S1S2 irregular  Abd: +BS soft NT ND  Extrem/MS: 1-2 + b/l LE edema, RUE AVF +thrill   Incision(s): mid sternal inc C/D/I no click, LLE EVH C/D/I  EPM VVI 30/10/0.8

## 2023-07-25 NOTE — BRIEF OPERATIVE NOTE - NSICDXBRIEFPOSTOP_GEN_ALL_CORE_FT
POST-OP DIAGNOSIS:  CAD (coronary artery disease), native coronary artery 14-Jul-2023 13:47:30  Tyson Camarillo  
POST-OP DIAGNOSIS:  Stenosis of arteriovenous dialysis fistula 25-Jul-2023 16:13:37  Tabatha Boykin

## 2023-07-25 NOTE — PROGRESS NOTE ADULT - SUBJECTIVE AND OBJECTIVE BOX
Department of Cardiology                                                                  [facility]/[place] [address]                                                            Telephone: [phone]. Fax:[phone]                                                                             Pre- Procedure Progress Note      HPI:  63 year old male with HTN, hyperlipidemia, ESRD on HD (T, Th, Sat), COPD, DM type 2 uncontrolled, gastroparesis s/p gastric PM 2013, peripheral blindness and retinopathy, BPH, recent CDiff infection 23 s/p 10 day course of oral vancomycin, CAD s/p prior PCI, and recent hospitalization 2023 for NSTEMI, ICM LVEF 35-40%, and LHC which demonstrated multi-vessel disease. He was undergoing CABG workup. He presented to [facility] ED  with complaints of worsening chest pain and shortness of breath. In ER, ECG with intermittent diffuse ST changes, + troponins. Seen by cardiology and started on tridil and heparin gtt for NSTEM, admitted to CTICU and IABP placed.  He underwent 4vCABG (LIMA-LAD, [id number]-PDA) on 23, and IABP was removed post-operatively. Post op course notable for new AFib/AFlutter for which EP cpnsulted. Denies prior history of atrial arrhythmia. Denies chest pain, palpitation, shortness of breath, or dizziness at this time. Here for fistulogram.        Associated Risk Factors:            Cerebrovascular Disease: yes       Chronic Lung Disease: N/A       Peripheral Arterial Disease: N/A       Chronic Kidney Disease (if yes, what is GFR): yes ESRD       Uncontrolled Diabetes (if yes, what is HgbA1C or FBS): N/A       Poorly Controlled Hypertension (if yes, what is SBP): N/A       Morbid Obesity (if yes, what is BMI): N/A       History of Recent Ventricular Arrhythmia: N/A       Inability to Ambulate Safely: N/A       Need for Therapeutic Anticoagulation: AF/flutter       Antiplatelet or Contrast Allergy: N/A    Vascular Testing:        PASCUAL (Normal/Borderline/Abnormal/Noncompressable):        Arterial Dopplers:        CTA/MRA:     Medical Management:       Antiplatelets: ASA and Plavix       Cilostazol:        Statin:     	  MEDICATIONS:  amLODIPine   Tablet 5 milliGRAM(s) Oral daily  hydrALAZINE 25 milliGRAM(s) Oral three times a day  metoprolol tartrate 37.5 milliGRAM(s) Oral two times a day  loratadine 10 milliGRAM(s) Oral daily  gabapentin 100 milliGRAM(s) Oral three times a day  methocarbamol 500 milliGRAM(s) Oral two times a day  ondansetron Injectable 4 milliGRAM(s) IV Push every 6 hours PRN  bisacodyl 5 milliGRAM(s) Oral every 12 hours PRN  pantoprazole    Tablet 40 milliGRAM(s) Oral daily  polyethylene glycol 3350 17 Gram(s) Oral daily  senna 2 Tablet(s) Oral at bedtime  atorvastatin 80 milliGRAM(s) Oral at bedtime  dextrose 50% Injectable 50 milliLiter(s) IV Push every 15 minutes  dextrose 50% Injectable 25 milliLiter(s) IV Push every 15 minutes  dextrose Oral Gel 15 Gram(s) Oral once PRN  glucagon  Injectable 1 milliGRAM(s) IntraMuscular once  insulin glargine Injectable (LANTUS) 6 Unit(s) SubCutaneous at bedtime  insulin lispro (ADMELOG) corrective regimen sliding scale   SubCutaneous three times a day before meals  insulin lispro (ADMELOG) corrective regimen sliding scale   SubCutaneous at bedtime  aspirin enteric coated 81 milliGRAM(s) Oral daily  calcium acetate 667 milliGRAM(s) Oral three times a day with meals  clopidogrel Tablet 75 milliGRAM(s) Oral daily  epoetin lois-epbx (RETACRIT) Injectable 5000 Unit(s) IV Push <User Schedule>  heparin  Infusion 1000 Unit(s)/Hr IV Continuous <Continuous>  iron sucrose IVPB 100 milliGRAM(s) IV Intermittent every 48 hours  lidocaine   4% Patch 1 Patch Transdermal daily  lidocaine   4% Patch 1 Patch Transdermal daily  Nephro-becky 1 Tablet(s) Oral daily  sodium chloride 0.9% lock flush 3 milliLiter(s) IV Push every 8 hours  tamsulosin 0.4 milliGRAM(s) Oral at bedtime        ROS: as stated above, otherwise negative    PHYSICAL EXAM:  Constitutional: A & O x 3  HEENT:   Normal oral mucosa, PERRL, EOMI	  Cardiovascular: Normal S1 S2, No JVD, No murmurs  Respiratory: Lungs clear to auscultation	  Gastrointestinal:  Soft, Non-tender, + BS	  Skin: No rashes, No ecchymoses, No cyanosis  Neurologic: Non-focal  Extremities: Normal range of motion, no edema  Vascular: Peripheral pulses palpable + bilaterally      T(C): 36.9 (23 @ 13:00), Max: 37.7 (23 @ 05:02)  HR: 64 (23 @ 13:00) (60 - 100)  BP: 159/50 (23 @ 13:00) (117/50 - 172/79)  RR: 18 (23 @ 13:00) (15 - 18)  SpO2: 96% (23 @ 13:00) (92% - 98%)  Wt(kg): --      I&O's Summary    2023 07:01  -  2023 07:00  --------------------------------------------------------  IN: 1224 mL / OUT: 0 mL / NET: 1224 mL    2023 07:01  -  2023 14:04  --------------------------------------------------------  IN: 0 mL / OUT: 1500 mL / NET: -1500 mL        Daily     Daily Weight in k.3 (2023 12:35)    TELEMETRY: 	      ECG:  	    LABS:	 	                              7.7    10.27 )-----------( 155      ( 2023 04:30 )             23.2     07-25    126<L>  |  85<L>  |  49.8<H>  ----------------------------<  214<H>  4.1   |  23.0  |  7.91<H>    Ca    7.8<L>      2023 04:30  Phos  4.8     07-25  Mg     2.1     07-25          Lipid Profile:   TC  TG  LDL  HDL    HgA1c: 9.4      Impression:      Plan:  -plan for *** via RA vs FA  -patient seen and examined  -confirmed appropriate NPO duration  -ECG and Labs reviewed  -Aspirin 81mg po pre-cath  -NS 250mL bolus pre-cath******  -procedure discussed with patient; risks and benefits explained, questions answered  -consent obtained by attending                                                                                Department of Cardiology                                                                  [facility]/[place] [address] [address] [facility]                                                            Telephone: [phone]. Fax:[phone]                                                                             Pre- Procedure Progress Note      HPI:  63 year old male with HTN, hyperlipidemia, ESRD on HD (T, Th, Sat), COPD, DM type 2 uncontrolled, gastroparesis s/p gastric PM 2013, peripheral blindness and retinopathy, BPH, recent CDiff infection 23 s/p 10 day course of oral vancomycin, CAD s/p prior PCI, and recent hospitalization 2023 for NSTEMI, ICM LVEF 35-40%, and LHC which demonstrated multi-vessel disease. He was undergoing CABG workup. He presented to [facility] ED  with complaints of worsening chest pain and shortness of breath. In ER, ECG with intermittent diffuse ST changes, + troponins. Seen by cardiology and started on tridil and heparin gtt for NSTEM, admitted to CTICU and IABP placed.  He underwent 4vCABG (LIMA-LAD, [id number]-PDA) on 23, and IABP was removed post-operatively. Post op course notable for new AFib/AFlutter for which EP cpnsulted. Denies prior history of atrial arrhythmia. Denies chest pain, palpitation, shortness of breath, or dizziness at this time. Here for fistulogram.        Associated Risk Factors:            Cerebrovascular Disease: yes       Chronic Lung Disease: N/A       Peripheral Arterial Disease: N/A       Chronic Kidney Disease (if yes, what is GFR): yes ESRD       Uncontrolled Diabetes (if yes, what is HgbA1C or FBS): N/A       Poorly Controlled Hypertension (if yes, what is SBP): N/A       Morbid Obesity (if yes, what is BMI): N/A       History of Recent Ventricular Arrhythmia: N/A       Inability to Ambulate Safely: N/A       Need for Therapeutic Anticoagulation: AF/flutter       Antiplatelet or Contrast Allergy: N/A    Vascular Testing:        PASCUAL (Normal/Borderline/Abnormal/Noncompressable):        Arterial Dopplers:        CTA/MRA:     Medical Management:       Antiplatelets: ASA and Plavix       Cilostazol:        Statin:     	  MEDICATIONS:  amLODIPine   Tablet 5 milliGRAM(s) Oral daily  hydrALAZINE 25 milliGRAM(s) Oral three times a day  metoprolol tartrate 37.5 milliGRAM(s) Oral two times a day  loratadine 10 milliGRAM(s) Oral daily  gabapentin 100 milliGRAM(s) Oral three times a day  methocarbamol 500 milliGRAM(s) Oral two times a day  ondansetron Injectable 4 milliGRAM(s) IV Push every 6 hours PRN  bisacodyl 5 milliGRAM(s) Oral every 12 hours PRN  pantoprazole    Tablet 40 milliGRAM(s) Oral daily  polyethylene glycol 3350 17 Gram(s) Oral daily  senna 2 Tablet(s) Oral at bedtime  atorvastatin 80 milliGRAM(s) Oral at bedtime  dextrose 50% Injectable 50 milliLiter(s) IV Push every 15 minutes  dextrose 50% Injectable 25 milliLiter(s) IV Push every 15 minutes  dextrose Oral Gel 15 Gram(s) Oral once PRN  glucagon  Injectable 1 milliGRAM(s) IntraMuscular once  insulin glargine Injectable (LANTUS) 6 Unit(s) SubCutaneous at bedtime  insulin lispro (ADMELOG) corrective regimen sliding scale   SubCutaneous three times a day before meals  insulin lispro (ADMELOG) corrective regimen sliding scale   SubCutaneous at bedtime  aspirin enteric coated 81 milliGRAM(s) Oral daily  calcium acetate 667 milliGRAM(s) Oral three times a day with meals  clopidogrel Tablet 75 milliGRAM(s) Oral daily  epoetin lois-epbx (RETACRIT) Injectable 5000 Unit(s) IV Push <User Schedule>  heparin  Infusion 1000 Unit(s)/Hr IV Continuous <Continuous>  iron sucrose IVPB 100 milliGRAM(s) IV Intermittent every 48 hours  lidocaine   4% Patch 1 Patch Transdermal daily  lidocaine   4% Patch 1 Patch Transdermal daily  Nephro-becky 1 Tablet(s) Oral daily  sodium chloride 0.9% lock flush 3 milliLiter(s) IV Push every 8 hours  tamsulosin 0.4 milliGRAM(s) Oral at bedtime        ROS: as stated above, otherwise negative    PHYSICAL EXAM:  Constitutional: A & O x 3  HEENT:   Normal oral mucosa, PERRL, EOMI	  Cardiovascular: Normal S1 S2, No JVD, No murmurs  Healing chest incision  Respiratory: Lungs clear to auscultation	  Gastrointestinal:  Soft, Non-tender, + BS	  Skin: No rashes, No ecchymoses, No cyanosis  Neurologic: Non-focal  Extremities: Normal range of motion, mild edema  Vascular: Peripheral pulses palpable + bilaterally      T(C): 36.9 (23 @ 13:00), Max: 37.7 (23 @ 05:02)  HR: 64 (23 @ 13:00) (60 - 100)  BP: 159/50 (23 @ 13:00) (117/50 - 172/79)  RR: 18 (23 @ 13:00) (15 - 18)  SpO2: 96% (23 @ 13:00) (92% - 98%)  Wt(kg): --      I&O's Summary    2023 07:  -  2023 07:00  --------------------------------------------------------  IN: 1224 mL / OUT: 0 mL / NET: 1224 mL    2023 07:01  -  2023 14:04  --------------------------------------------------------  IN: 0 mL / OUT: 1500 mL / NET: -1500 mL        Daily     Daily Weight in k.3 (2023 12:35)    TELEMETRY: SB Temporary PPM	          LABS:	 	                              7.7    10.27 )-----------( 155      ( 2023 04:30 )             23.2     07-25    126<L>  |  85<L>  |  49.8<H>  ----------------------------<  214<H>  4.1   |  23.0  |  7.91<H>    Ca    7.8<L>      2023 04:30  Phos  4.8     07-25  Mg     2.1     07-25          Lipid Profile:   TC  TG  LDL  HDL    HgA1c: 9.4      Impression:  64 yo male ESRD on HD here for fistulogram.    Plan:  -plan for fistulogram  -patient seen and examined  -confirmed appropriate NPO duration  -ECG and Labs reviewed  -procedure discussed with patient; risks and benefits explained, questions answered  -consent obtained by attending

## 2023-07-26 LAB
ANION GAP SERPL CALC-SCNC: 15 MMOL/L — SIGNIFICANT CHANGE UP (ref 5–17)
BUN SERPL-MCNC: 31.3 MG/DL — HIGH (ref 8–20)
CALCIUM SERPL-MCNC: 8.1 MG/DL — LOW (ref 8.4–10.5)
CHLORIDE SERPL-SCNC: 93 MMOL/L — LOW (ref 96–108)
CO2 SERPL-SCNC: 25 MMOL/L — SIGNIFICANT CHANGE UP (ref 22–29)
CREAT SERPL-MCNC: 6.26 MG/DL — HIGH (ref 0.5–1.3)
EGFR: 9 ML/MIN/1.73M2 — LOW
GLUCOSE BLDC GLUCOMTR-MCNC: 137 MG/DL — HIGH (ref 70–99)
GLUCOSE BLDC GLUCOMTR-MCNC: 161 MG/DL — HIGH (ref 70–99)
GLUCOSE BLDC GLUCOMTR-MCNC: 218 MG/DL — HIGH (ref 70–99)
GLUCOSE BLDC GLUCOMTR-MCNC: 221 MG/DL — HIGH (ref 70–99)
GLUCOSE SERPL-MCNC: 130 MG/DL — HIGH (ref 70–99)
HCT VFR BLD CALC: 28 % — LOW (ref 39–50)
HGB BLD-MCNC: 9.4 G/DL — LOW (ref 13–17)
MAGNESIUM SERPL-MCNC: 2 MG/DL — SIGNIFICANT CHANGE UP (ref 1.6–2.6)
MCHC RBC-ENTMCNC: 30 PG — SIGNIFICANT CHANGE UP (ref 27–34)
MCHC RBC-ENTMCNC: 33.6 GM/DL — SIGNIFICANT CHANGE UP (ref 32–36)
MCV RBC AUTO: 89.5 FL — SIGNIFICANT CHANGE UP (ref 80–100)
PHOSPHATE SERPL-MCNC: 3.8 MG/DL — SIGNIFICANT CHANGE UP (ref 2.4–4.7)
PLATELET # BLD AUTO: 169 K/UL — SIGNIFICANT CHANGE UP (ref 150–400)
POTASSIUM SERPL-MCNC: 3.8 MMOL/L — SIGNIFICANT CHANGE UP (ref 3.5–5.3)
POTASSIUM SERPL-SCNC: 3.8 MMOL/L — SIGNIFICANT CHANGE UP (ref 3.5–5.3)
RBC # BLD: 3.13 M/UL — LOW (ref 4.2–5.8)
RBC # FLD: 15.5 % — HIGH (ref 10.3–14.5)
SODIUM SERPL-SCNC: 133 MMOL/L — LOW (ref 135–145)
WBC # BLD: 8.92 K/UL — SIGNIFICANT CHANGE UP (ref 3.8–10.5)
WBC # FLD AUTO: 8.92 K/UL — SIGNIFICANT CHANGE UP (ref 3.8–10.5)

## 2023-07-26 PROCEDURE — 99233 SBSQ HOSP IP/OBS HIGH 50: CPT

## 2023-07-26 PROCEDURE — 99231 SBSQ HOSP IP/OBS SF/LOW 25: CPT

## 2023-07-26 PROCEDURE — 99232 SBSQ HOSP IP/OBS MODERATE 35: CPT | Mod: GC

## 2023-07-26 PROCEDURE — 71045 X-RAY EXAM CHEST 1 VIEW: CPT | Mod: 26

## 2023-07-26 PROCEDURE — 99232 SBSQ HOSP IP/OBS MODERATE 35: CPT

## 2023-07-26 PROCEDURE — 99024 POSTOP FOLLOW-UP VISIT: CPT

## 2023-07-26 RX ORDER — OXYCODONE HYDROCHLORIDE 5 MG/1
5 TABLET ORAL EVERY 6 HOURS
Refills: 0 | Status: DISCONTINUED | OUTPATIENT
Start: 2023-07-26 | End: 2023-07-31

## 2023-07-26 RX ADMIN — Medication 25 MILLIGRAM(S): at 05:12

## 2023-07-26 RX ADMIN — OXYCODONE HYDROCHLORIDE 5 MILLIGRAM(S): 5 TABLET ORAL at 14:24

## 2023-07-26 RX ADMIN — PANTOPRAZOLE SODIUM 40 MILLIGRAM(S): 20 TABLET, DELAYED RELEASE ORAL at 08:17

## 2023-07-26 RX ADMIN — INSULIN GLARGINE 6 UNIT(S): 100 INJECTION, SOLUTION SUBCUTANEOUS at 21:15

## 2023-07-26 RX ADMIN — Medication 2: at 17:04

## 2023-07-26 RX ADMIN — Medication 37.5 MILLIGRAM(S): at 17:03

## 2023-07-26 RX ADMIN — LIDOCAINE 1 PATCH: 4 CREAM TOPICAL at 19:00

## 2023-07-26 RX ADMIN — TAMSULOSIN HYDROCHLORIDE 0.4 MILLIGRAM(S): 0.4 CAPSULE ORAL at 21:12

## 2023-07-26 RX ADMIN — Medication 1: at 08:18

## 2023-07-26 RX ADMIN — Medication 667 MILLIGRAM(S): at 17:02

## 2023-07-26 RX ADMIN — OXYCODONE HYDROCHLORIDE 5 MILLIGRAM(S): 5 TABLET ORAL at 13:24

## 2023-07-26 RX ADMIN — APIXABAN 2.5 MILLIGRAM(S): 2.5 TABLET, FILM COATED ORAL at 17:02

## 2023-07-26 RX ADMIN — Medication 25 MILLIGRAM(S): at 21:07

## 2023-07-26 RX ADMIN — APIXABAN 2.5 MILLIGRAM(S): 2.5 TABLET, FILM COATED ORAL at 05:12

## 2023-07-26 RX ADMIN — LIDOCAINE 1 PATCH: 4 CREAM TOPICAL at 08:19

## 2023-07-26 RX ADMIN — ATORVASTATIN CALCIUM 80 MILLIGRAM(S): 80 TABLET, FILM COATED ORAL at 21:07

## 2023-07-26 RX ADMIN — Medication 1 TABLET(S): at 08:17

## 2023-07-26 RX ADMIN — LIDOCAINE 1 PATCH: 4 CREAM TOPICAL at 01:00

## 2023-07-26 RX ADMIN — LIDOCAINE 1 PATCH: 4 CREAM TOPICAL at 20:00

## 2023-07-26 RX ADMIN — METHOCARBAMOL 500 MILLIGRAM(S): 500 TABLET, FILM COATED ORAL at 05:12

## 2023-07-26 RX ADMIN — Medication 25 MILLIGRAM(S): at 13:25

## 2023-07-26 RX ADMIN — GABAPENTIN 100 MILLIGRAM(S): 400 CAPSULE ORAL at 21:06

## 2023-07-26 RX ADMIN — CLOPIDOGREL BISULFATE 75 MILLIGRAM(S): 75 TABLET, FILM COATED ORAL at 08:17

## 2023-07-26 RX ADMIN — Medication 667 MILLIGRAM(S): at 08:17

## 2023-07-26 RX ADMIN — AMLODIPINE BESYLATE 5 MILLIGRAM(S): 2.5 TABLET ORAL at 05:13

## 2023-07-26 RX ADMIN — Medication 667 MILLIGRAM(S): at 12:29

## 2023-07-26 RX ADMIN — GABAPENTIN 100 MILLIGRAM(S): 400 CAPSULE ORAL at 05:12

## 2023-07-26 RX ADMIN — SODIUM CHLORIDE 3 MILLILITER(S): 9 INJECTION INTRAMUSCULAR; INTRAVENOUS; SUBCUTANEOUS at 13:26

## 2023-07-26 RX ADMIN — METHOCARBAMOL 500 MILLIGRAM(S): 500 TABLET, FILM COATED ORAL at 17:03

## 2023-07-26 RX ADMIN — SODIUM CHLORIDE 3 MILLILITER(S): 9 INJECTION INTRAMUSCULAR; INTRAVENOUS; SUBCUTANEOUS at 21:13

## 2023-07-26 RX ADMIN — SODIUM CHLORIDE 3 MILLILITER(S): 9 INJECTION INTRAMUSCULAR; INTRAVENOUS; SUBCUTANEOUS at 05:21

## 2023-07-26 RX ADMIN — Medication 37.5 MILLIGRAM(S): at 05:11

## 2023-07-26 RX ADMIN — LORATADINE 10 MILLIGRAM(S): 10 TABLET ORAL at 08:17

## 2023-07-26 RX ADMIN — GABAPENTIN 100 MILLIGRAM(S): 400 CAPSULE ORAL at 13:24

## 2023-07-26 NOTE — PROGRESS NOTE ADULT - ASSESSMENT
63M PMHx ESRD on HD (T,Th,S), IDDM, COPD, gastroparesis, peripheral blindness and retinopathy, NSTEMI 6/2022 with recent hospital admission for CABG nisha bundy diff + that admission, now presents from home with complaints of worsening chest pain associated with difficulty breathing. In ER EKGs with intermittent diffuse ST changes, + troponins, +NSTEMI, started on a Tridil and Heparin gtt for NSTEMI, admitted to CTICU, IABP placed, now s/p CABG x 4 (LIMA-LAD, SVG-OM 1, OM2, PDA) on 7/14/23, IABP removed after OR without incident. Postop course notable for PAF with long conversion pauses (2-6.5 seconds with EP following, beta-blocker dose lowered), and persistent hyperkalemia despite HD (concern for recirculation through fistula, vascular surgery following, pt now s/p fistulogram with percutaneous balloon angioplasty 7/25.  As per brief op note AVF with good thrill, less pulsatile, at the end of the case.

## 2023-07-26 NOTE — PROGRESS NOTE ADULT - SUBJECTIVE AND OBJECTIVE BOX
INTERVAL EVENTS:  Follow up diabetes management    ROS: Denies chest pain, sob. Poor appetite    MEDICATIONS  (STANDING):  amLODIPine   Tablet 5 milliGRAM(s) Oral daily  apixaban 2.5 milliGRAM(s) Oral two times a day  atorvastatin 80 milliGRAM(s) Oral at bedtime  calcium acetate 667 milliGRAM(s) Oral three times a day with meals  clopidogrel Tablet 75 milliGRAM(s) Oral daily  dextrose 50% Injectable 25 milliLiter(s) IV Push every 15 minutes  dextrose 50% Injectable 50 milliLiter(s) IV Push every 15 minutes  epoetin lois-epbx (RETACRIT) Injectable 5000 Unit(s) IV Push <User Schedule>  gabapentin 100 milliGRAM(s) Oral three times a day  glucagon  Injectable 1 milliGRAM(s) IntraMuscular once  hydrALAZINE 25 milliGRAM(s) Oral three times a day  insulin glargine Injectable (LANTUS) 6 Unit(s) SubCutaneous at bedtime  insulin lispro (ADMELOG) corrective regimen sliding scale   SubCutaneous three times a day before meals  insulin lispro (ADMELOG) corrective regimen sliding scale   SubCutaneous at bedtime  iron sucrose IVPB 100 milliGRAM(s) IV Intermittent every 48 hours  lidocaine   4% Patch 1 Patch Transdermal daily  lidocaine   4% Patch 1 Patch Transdermal daily  loratadine 10 milliGRAM(s) Oral daily  methocarbamol 500 milliGRAM(s) Oral two times a day  metoprolol tartrate 37.5 milliGRAM(s) Oral two times a day  Nephro-becky 1 Tablet(s) Oral daily  pantoprazole    Tablet 40 milliGRAM(s) Oral daily  sodium chloride 0.9% lock flush 3 milliLiter(s) IV Push every 8 hours  tamsulosin 0.4 milliGRAM(s) Oral at bedtime    MEDICATIONS  (PRN):  dextrose Oral Gel 15 Gram(s) Oral once PRN Blood Glucose LESS THAN 70 milliGRAM(s)/deciliter  ondansetron Injectable 4 milliGRAM(s) IV Push every 6 hours PRN Nausea and/or Vomiting    Allergies  No Known Drug Allergies  shellfish (Rash)    Vital Signs Last 24 Hrs  T(C): 36.9 (26 Jul 2023 12:00), Max: 36.9 (25 Jul 2023 13:00)  T(F): 98.4 (26 Jul 2023 12:00), Max: 98.5 (26 Jul 2023 07:50)  HR: 62 (26 Jul 2023 12:00) (35 - 100)  BP: 168/67 (26 Jul 2023 12:00) (111/61 - 197/78)  BP(mean): --  RR: 17 (26 Jul 2023 12:00) (14 - 18)  SpO2: 97% (26 Jul 2023 12:00) (92% - 98%)    Parameters below as of 26 Jul 2023 12:00  Patient On (Oxygen Delivery Method): room air    PHYSICAL EXAM:  General: No apparent distress  Neck: Supple, trachea midline, no thyromegaly  Respiratory: Lungs clear bilaterally, normal rate, effort  Cardiac: +S1, S2, no m/r/g  GI: +BS, soft, non tender, non distended  Extremities: No peripheral edema, no pedal lesions  Neuro: A+O X3, no tremor    LABS:                        9.4    8.92  )-----------( 169      ( 26 Jul 2023 05:02 )             28.0     07-26    133<L>  |  93<L>  |  31.3<H>  ----------------------------<  130<H>  3.8   |  25.0  |  6.26<H>    Ca    8.1<L>      26 Jul 2023 05:02  Phos  3.8     07-26  Mg     2.0     07-26      Urinalysis Basic - ( 26 Jul 2023 05:02 )    Color: x / Appearance: x / SG: x / pH: x  Gluc: 130 mg/dL / Ketone: x  / Bili: x / Urobili: x   Blood: x / Protein: x / Nitrite: x   Leuk Esterase: x / RBC: x / WBC x   Sq Epi: x / Non Sq Epi: x / Bacteria: x    POCT Blood Glucose.: 137 mg/dL (07-26-23 @ 12:00)  POCT Blood Glucose.: 161 mg/dL (07-26-23 @ 08:08)  POCT Blood Glucose.: 260 mg/dL (07-25-23 @ 21:25)  POCT Blood Glucose.: 135 mg/dL (07-25-23 @ 16:37)

## 2023-07-26 NOTE — PROGRESS NOTE ADULT - SUBJECTIVE AND OBJECTIVE BOX
Brief summary:  63yMale POD# 12 C$ with Dr. Newsome     SUBJECTIVE:  Pt OOB to the chair, Pt states, "I am Ok"  Pt c/o incisional chest pain, pt denies palpitations, lightheadedness and or dizziness    Overnight events:  pt in and out of Afib with conversion pauses     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      S/P amputation  left toes      Stented coronary artery          MEDICATIONS  amLODIPine   Tablet 5 milliGRAM(s) Oral daily  apixaban 2.5 milliGRAM(s) Oral two times a day  atorvastatin 80 milliGRAM(s) Oral at bedtime  calcium acetate 667 milliGRAM(s) Oral three times a day with meals  clopidogrel Tablet 75 milliGRAM(s) Oral daily  dextrose 50% Injectable 50 milliLiter(s) IV Push every 15 minutes  dextrose 50% Injectable 25 milliLiter(s) IV Push every 15 minutes  dextrose Oral Gel 15 Gram(s) Oral once PRN  epoetin lois-epbx (RETACRIT) Injectable 5000 Unit(s) IV Push <User Schedule>  gabapentin 100 milliGRAM(s) Oral three times a day  glucagon  Injectable 1 milliGRAM(s) IntraMuscular once  hydrALAZINE 25 milliGRAM(s) Oral three times a day  insulin glargine Injectable (LANTUS) 6 Unit(s) SubCutaneous at bedtime  insulin lispro (ADMELOG) corrective regimen sliding scale   SubCutaneous three times a day before meals  insulin lispro (ADMELOG) corrective regimen sliding scale   SubCutaneous at bedtime  iron sucrose IVPB 100 milliGRAM(s) IV Intermittent every 48 hours  lidocaine   4% Patch 1 Patch Transdermal daily  lidocaine   4% Patch 1 Patch Transdermal daily  loratadine 10 milliGRAM(s) Oral daily  methocarbamol 500 milliGRAM(s) Oral two times a day  metoprolol tartrate 37.5 milliGRAM(s) Oral two times a day  Nephro-becky 1 Tablet(s) Oral daily  ondansetron Injectable 4 milliGRAM(s) IV Push every 6 hours PRN  pantoprazole    Tablet 40 milliGRAM(s) Oral daily  sodium chloride 0.9% lock flush 3 milliLiter(s) IV Push every 8 hours  tamsulosin 0.4 milliGRAM(s) Oral at bedtime  MEDICATIONS  (PRN):  dextrose Oral Gel 15 Gram(s) Oral once PRN Blood Glucose LESS THAN 70 milliGRAM(s)/deciliter  ondansetron Injectable 4 milliGRAM(s) IV Push every 6 hours PRN Nausea and/or Vomiting      Daily     Daily Weight in k.3 (2023 12:35)                              9.3    10.50 )-----------( 180      ( 2023 20:42 )             27.6   07-25    132<L>  |  91<L>  |  27.5<H>  ----------------------------<  237<H>  4.0   |  25.0  |  5.19<H>    Ca    8.2<L>      2023 20:42  Phos  4.8       Mg     1.8             PT/INR - ( 2023 04:30 )   PT: 14.1 sec;   INR: 1.21 ratio         PTT - ( 2023 04:30 )  PTT:61.6 sec      Objective:  T(C): 36.6 (23 @ 00:30), Max: 37.7 (23 @ 05:02)  HR: 57 (23 @ 00:30) (57 - 100)  BP: 111/61 (23 @ 00:30) (111/61 - 197/78)  RR: 16 (23 @ 00:30) (14 - 18)  SpO2: 95% (23 @ 00:30) (92% - 98%)  Wt(kg): --CAPILLARY BLOOD GLUCOSE      POCT Blood Glucose.: 260 mg/dL (2023 21:25)  POCT Blood Glucose.: 135 mg/dL (2023 16:37)  POCT Blood Glucose.: 116 mg/dL (2023 11:54)  POCT Blood Glucose.: 217 mg/dL (2023 08:03)  I&O's Summary    2023 07:01  -  2023 07:00  --------------------------------------------------------  IN: 1224 mL / OUT: 0 mL / NET: 1224 mL    2023 07:01  -  2023 01:50  --------------------------------------------------------  IN: 556 mL / OUT: 1500 mL / NET: -944 mL        Physical Exam  Neuro: A+O x 3, non-focal, speech clear and intact  Pulm: diminished b/l at the bases, no accessory muscles utilized   CV: irregularly irregular, +S1S2  Abd: soft, NT, ND, +BS  Ext: +DP Pulses b/l, +PT pulses, +1-2 pitting edema L>R   RUE AVF +thrill   Incision(s): mid sternal inc C/D/I no click, LLE EVH C/D/I  +PW 30/18/0.8 threshold 8     Imaging:  CXR:  < from: Xray Chest 1 View- PORTABLE-Routine (Xray Chest 1 View- PORTABLE-Routine in AM.) (23 @ 05:18) >    ACC: [id number] EXAM:  XR CHEST PORTABLE ROUTINE 1V   ORDERED BY: RENE GUZMAN     PROCEDURE DATE:  2023          INTERPRETATION:  Portable chest radiograph    CLINICAL INFORMATION:  Postoperative  Cardiac surgery.    TECHNIQUE:  Portable  AP view of the chest was obtained.    FINDINGS:   2023 chest x-ray available for review.    Residual bilateral effusions and/or airspace disease obscuring LEFT   diaphragm contour and RIGHT  peripheral diaphragm contour.  Upper zones clear.. No pneumothorax.    The heart and mediastinum are within normal limits following cardiac    surgery.    Visualized osseous structures are intact.    IMPRESSION:  Status post cardiac surgery.  Residual bilateral effusions and/or airspace disease obscuring LEFT   diaphragm contour and RIGHT  peripheral diaphragm contour    --- End of Report ---            YANE ATKINSON MD; Attending Radiologist  This document has been electronically signed. 2023  2:20PM    < end of copied text >

## 2023-07-26 NOTE — PROGRESS NOTE ADULT - PROBLEM SELECTOR PLAN 3
Patient with PAF with conversion pauses  EP following rec flutter ablation  no amio per Dr. Enriquez  overnight 2 paced beats during conversion (EPM VVI 30)--> on back up per Dr. Enriquez  f/u additional recs today  Eliquis initiated today   needs pre d/c TTE for AC

## 2023-07-26 NOTE — PROGRESS NOTE ADULT - ASSESSMENT
63M with PMH of DM complicated by neuropathy, HTN, HLD, CAD, PVD, gastroparesis, ESRD on HD TTS and BPH s/p CABG x4 on 07/14/23. Nephrology is managing ESRD on HD.     -Access: R AV fistula   -Outpatient dialysis unit is unclear - patient and wife are both poor historians and unable to provide details - reportedly somewhere around [place]    -Outpatient dialysis days are Tuesdays Thursdays Saturdays  -Seen on HD.  Qd, Qb, UF rate reviewed.  Vitals stable.  Access working well.  Patient tolerating HD well.    -? 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            63M with PMH of DM complicated by neuropathy, HTN, HLD, CAD, PVD, gastroparesis, ESRD on HD TTS and BPH s/p CABG x4 on 07/14/23. Nephrology is managing ESRD on HD.     -Access: R AV fistula   -Outpatient dialysis unit is unclear - patient and wife are both poor historians and unable to provide details - reportedly somewhere around [place]    -Outpatient dialysis days are Tuesdays Thursdays Saturdays  -Last HD yesterday, next planned for tomorrow    -? Recirculation - s/p fistulogram and angioplasty on 07/25/23 by Vascular Surgery   -BP acceptable   -Volume status - UF as tolerated during HD; continue 1.2L fluid restriction    -Anemia - KIMBERLY with HD   -Mineral Bone Disease in setting of CKD - WNL, will follow.

## 2023-07-26 NOTE — PROGRESS NOTE ADULT - SUBJECTIVE AND OBJECTIVE BOX
Reason for visit: ESRD    HPI: Seen on HD, denies any complains. No CP. No fever.    Physical Exam:  Gen: no acute distress  MS: alert  HENT: NCAT, MMM  CV: rhythm reg reg, rate normal, no LE edema  Chest: CTAB, no w/r/r,  Abd: soft, NT, ND  Neuro: no tremor  Skin: dry, warm, no rash or jaundice  Access: LUEAVF        =======================================================  Vital Signs Last 24 Hrs  T(C): 36.9 (26 Jul 2023 07:50), Max: 36.9 (25 Jul 2023 08:55)  T(F): 98.5 (26 Jul 2023 07:50), Max: 98.5 (26 Jul 2023 07:50)  HR: 35 (26 Jul 2023 08:00) (35 - 100)  BP: 119/62 (26 Jul 2023 08:00) (111/61 - 197/78)  BP(mean): --  RR: 17 (26 Jul 2023 07:50) (14 - 18)  SpO2: 92% (26 Jul 2023 07:50) (92% - 98%)    Parameters below as of 26 Jul 2023 07:50  Patient On (Oxygen Delivery Method): room air      I&O's Summary    25 Jul 2023 07:01  -  26 Jul 2023 07:00  --------------------------------------------------------  IN: 676 mL / OUT: 1500 mL / NET: -824 mL      =======================================================  Current Antibiotics:    Other medications:  amLODIPine   Tablet 5 milliGRAM(s) Oral daily  apixaban 2.5 milliGRAM(s) Oral two times a day  atorvastatin 80 milliGRAM(s) Oral at bedtime  calcium acetate 667 milliGRAM(s) Oral three times a day with meals  clopidogrel Tablet 75 milliGRAM(s) Oral daily  dextrose 50% Injectable 50 milliLiter(s) IV Push every 15 minutes  dextrose 50% Injectable 25 milliLiter(s) IV Push every 15 minutes  epoetin lois-epbx (RETACRIT) Injectable 5000 Unit(s) IV Push <User Schedule>  gabapentin 100 milliGRAM(s) Oral three times a day  glucagon  Injectable 1 milliGRAM(s) IntraMuscular once  hydrALAZINE 25 milliGRAM(s) Oral three times a day  insulin glargine Injectable (LANTUS) 6 Unit(s) SubCutaneous at bedtime  insulin lispro (ADMELOG) corrective regimen sliding scale   SubCutaneous three times a day before meals  insulin lispro (ADMELOG) corrective regimen sliding scale   SubCutaneous at bedtime  iron sucrose IVPB 100 milliGRAM(s) IV Intermittent every 48 hours  lidocaine   4% Patch 1 Patch Transdermal daily  lidocaine   4% Patch 1 Patch Transdermal daily  loratadine 10 milliGRAM(s) Oral daily  methocarbamol 500 milliGRAM(s) Oral two times a day  metoprolol tartrate 37.5 milliGRAM(s) Oral two times a day  Nephro-becky 1 Tablet(s) Oral daily  pantoprazole    Tablet 40 milliGRAM(s) Oral daily  sodium chloride 0.9% lock flush 3 milliLiter(s) IV Push every 8 hours  tamsulosin 0.4 milliGRAM(s) Oral at bedtime    =======================================================  07-26    133<L>  |  93<L>  |  31.3<H>  ----------------------------<  130<H>  3.8   |  25.0  |  6.26<H>    Ca    8.1<L>      26 Jul 2023 05:02  Phos  3.8     07-26  Mg     2.0     07-26      Creatinine: 6.26 mg/dL (07-26-23 @ 05:02)  Creatinine: 5.19 mg/dL (07-25-23 @ 20:42)  Creatinine: 7.91 mg/dL (07-25-23 @ 04:30)  Creatinine: 6.33 mg/dL (07-24-23 @ 04:50)  Creatinine: 7.15 mg/dL (07-23-23 @ 03:22)  Creatinine: 6.54 mg/dL (07-22-23 @ 16:30)  Creatinine: 5.80 mg/dL (07-22-23 @ 06:25)    Urinalysis Basic - ( 26 Jul 2023 05:02 )    Color: x / Appearance: x / SG: x / pH: x  Gluc: 130 mg/dL / Ketone: x  / Bili: x / Urobili: x   Blood: x / Protein: x / Nitrite: x   Leuk Esterase: x / RBC: x / WBC x   Sq Epi: x / Non Sq Epi: x / Bacteria: x      ======================================================= Reason for visit: ESRD    HPI: Patient feels fine. No CP. No fever.    Physical Exam:  Gen: no acute distress  MS: alert  HENT: NCAT, MMM  CV: rhythm reg reg, rate normal, no LE edema  Chest: CTAB, no w/r/r,  Abd: soft, NT, ND  Neuro: no tremor  Skin: dry, warm, no rash or jaundice  Access: LUEAVF, thrill+    =======================================================  Vital Signs Last 24 Hrs  T(C): 36.9 (26 Jul 2023 07:50), Max: 36.9 (25 Jul 2023 08:55)  T(F): 98.5 (26 Jul 2023 07:50), Max: 98.5 (26 Jul 2023 07:50)  HR: 35 (26 Jul 2023 08:00) (35 - 100)  BP: 119/62 (26 Jul 2023 08:00) (111/61 - 197/78)  BP(mean): --  RR: 17 (26 Jul 2023 07:50) (14 - 18)  SpO2: 92% (26 Jul 2023 07:50) (92% - 98%)    Parameters below as of 26 Jul 2023 07:50  Patient On (Oxygen Delivery Method): room air      I&O's Summary    25 Jul 2023 07:01  -  26 Jul 2023 07:00  --------------------------------------------------------  IN: 676 mL / OUT: 1500 mL / NET: -824 mL      =======================================================  Current Antibiotics:    Other medications:  amLODIPine   Tablet 5 milliGRAM(s) Oral daily  apixaban 2.5 milliGRAM(s) Oral two times a day  atorvastatin 80 milliGRAM(s) Oral at bedtime  calcium acetate 667 milliGRAM(s) Oral three times a day with meals  clopidogrel Tablet 75 milliGRAM(s) Oral daily  dextrose 50% Injectable 50 milliLiter(s) IV Push every 15 minutes  dextrose 50% Injectable 25 milliLiter(s) IV Push every 15 minutes  epoetin lois-epbx (RETACRIT) Injectable 5000 Unit(s) IV Push <User Schedule>  gabapentin 100 milliGRAM(s) Oral three times a day  glucagon  Injectable 1 milliGRAM(s) IntraMuscular once  hydrALAZINE 25 milliGRAM(s) Oral three times a day  insulin glargine Injectable (LANTUS) 6 Unit(s) SubCutaneous at bedtime  insulin lispro (ADMELOG) corrective regimen sliding scale   SubCutaneous three times a day before meals  insulin lispro (ADMELOG) corrective regimen sliding scale   SubCutaneous at bedtime  iron sucrose IVPB 100 milliGRAM(s) IV Intermittent every 48 hours  lidocaine   4% Patch 1 Patch Transdermal daily  lidocaine   4% Patch 1 Patch Transdermal daily  loratadine 10 milliGRAM(s) Oral daily  methocarbamol 500 milliGRAM(s) Oral two times a day  metoprolol tartrate 37.5 milliGRAM(s) Oral two times a day  Nephro-becky 1 Tablet(s) Oral daily  pantoprazole    Tablet 40 milliGRAM(s) Oral daily  sodium chloride 0.9% lock flush 3 milliLiter(s) IV Push every 8 hours  tamsulosin 0.4 milliGRAM(s) Oral at bedtime    =======================================================  07-26    133<L>  |  93<L>  |  31.3<H>  ----------------------------<  130<H>  3.8   |  25.0  |  6.26<H>    Ca    8.1<L>      26 Jul 2023 05:02  Phos  3.8     07-26  Mg     2.0     07-26      Creatinine: 6.26 mg/dL (07-26-23 @ 05:02)  Creatinine: 5.19 mg/dL (07-25-23 @ 20:42)  Creatinine: 7.91 mg/dL (07-25-23 @ 04:30)  Creatinine: 6.33 mg/dL (07-24-23 @ 04:50)  Creatinine: 7.15 mg/dL (07-23-23 @ 03:22)  Creatinine: 6.54 mg/dL (07-22-23 @ 16:30)  Creatinine: 5.80 mg/dL (07-22-23 @ 06:25)    Urinalysis Basic - ( 26 Jul 2023 05:02 )    Color: x / Appearance: x / SG: x / pH: x  Gluc: 130 mg/dL / Ketone: x  / Bili: x / Urobili: x   Blood: x / Protein: x / Nitrite: x   Leuk Esterase: x / RBC: x / WBC x   Sq Epi: x / Non Sq Epi: x / Bacteria: x      =======================================================

## 2023-07-26 NOTE — PROGRESS NOTE ADULT - SUBJECTIVE AND OBJECTIVE BOX
Patient seen today sitting up in chair OOB. No overnight complaints. Walking around unit earlier in AM. Telemetry reviewed.     TELE: Periods of atrial flutter/fibrillation with rates in the 140s with short offset pauses to sinus bradycardia. Some offset pauses interrupted by epicardial pacing set at VVI 30.     MEDICATIONS  (STANDING):  amLODIPine   Tablet 5 milliGRAM(s) Oral daily  apixaban 2.5 milliGRAM(s) Oral two times a day  atorvastatin 80 milliGRAM(s) Oral at bedtime  calcium acetate 667 milliGRAM(s) Oral three times a day with meals  clopidogrel Tablet 75 milliGRAM(s) Oral daily  dextrose 50% Injectable 25 milliLiter(s) IV Push every 15 minutes  dextrose 50% Injectable 50 milliLiter(s) IV Push every 15 minutes  epoetin lois-epbx (RETACRIT) Injectable 5000 Unit(s) IV Push <User Schedule>  gabapentin 100 milliGRAM(s) Oral three times a day  glucagon  Injectable 1 milliGRAM(s) IntraMuscular once  hydrALAZINE 25 milliGRAM(s) Oral three times a day  insulin glargine Injectable (LANTUS) 6 Unit(s) SubCutaneous at bedtime  insulin lispro (ADMELOG) corrective regimen sliding scale   SubCutaneous three times a day before meals  insulin lispro (ADMELOG) corrective regimen sliding scale   SubCutaneous at bedtime  iron sucrose IVPB 100 milliGRAM(s) IV Intermittent every 48 hours  lidocaine   4% Patch 1 Patch Transdermal daily  lidocaine   4% Patch 1 Patch Transdermal daily  loratadine 10 milliGRAM(s) Oral daily  methocarbamol 500 milliGRAM(s) Oral two times a day  metoprolol tartrate 37.5 milliGRAM(s) Oral two times a day  Nephro-becky 1 Tablet(s) Oral daily  pantoprazole    Tablet 40 milliGRAM(s) Oral daily  sodium chloride 0.9% lock flush 3 milliLiter(s) IV Push every 8 hours  tamsulosin 0.4 milliGRAM(s) Oral at bedtime    MEDICATIONS  (PRN):  dextrose Oral Gel 15 Gram(s) Oral once PRN Blood Glucose LESS THAN 70 milliGRAM(s)/deciliter  ondansetron Injectable 4 milliGRAM(s) IV Push every 6 hours PRN Nausea and/or Vomiting    Allergies  No Known Drug Allergies  shellfish (Rash)    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  S/P amputation  left toes  Stented coronary artery    Vital Signs Last 24 Hrs  T(C): 36.9 (26 Jul 2023 12:00), Max: 36.9 (25 Jul 2023 13:00)  T(F): 98.4 (26 Jul 2023 12:00), Max: 98.5 (26 Jul 2023 07:50)  HR: 62 (26 Jul 2023 12:00) (35 - 100)  BP: 168/67 (26 Jul 2023 12:00) (111/61 - 197/78)  RR: 17 (26 Jul 2023 12:00) (14 - 18)  SpO2: 97% (26 Jul 2023 12:00) (92% - 98%)    Physical Exam:  Constitutional: NAD, AAOx3  Cardiovascular: +S1S2 RRR  Pulmonary: CTA b/l, unlabored  GI: soft NTND +BS  Extremities: no pedal edema, +distal pulses b/l  Neuro: non focal, BARRETO x4    LABS:                        9.4    8.92  )-----------( 169      ( 26 Jul 2023 05:02 )             28.0     133<L>  |  93<L>  |  31.3<H>  ----------------------------<  130<H>  3.8   |  25.0  |  6.26<H>  Ca    8.1<L>      26 Jul 2023 05:02  Phos  3.8     07-26  Mg     2.0     07-26  PT/INR - ( 25 Jul 2023 04:30 )   PT: 14.1 sec;   INR: 1.21 ratio    PTT - ( 25 Jul 2023 04:30 )  PTT:61.6 sec    Urinalysis Basic - ( 26 Jul 2023 05:02 )  Color: x / Appearance: x / SG: x / pH: x  Gluc: 130 mg/dL / Ketone: x  / Bili: x / Urobili: x   Blood: x / Protein: x / Nitrite: x   Leuk Esterase: x / RBC: x / WBC x   Sq Epi: x / Non Sq Epi: x / Bacteria: x    A/P  63 year old male with a history of HTN, hyperlipidemia, ESRD on HD (T, Th, Sat), COPD, DM type 2 uncontrolled, gastroparesis s/p gastric PM 2/2013, peripheral blindness and retinopathy, BPH, recent CDiff infection 6/1/23 s/p 10 day course of oral vancomycin, CAD s/p prior PCI, and recent hospitalization 6/2023 for NSTEMI, ICM LVEF 35-40%, and LHC which demonstrated multi-vessel disease who underwent 4vCABG (LIMA-LAD, [id number]) on 7/14/23. EP consulted regarding AFib/AFlutter with conversion pauses.      Continues to have episodes of tachycardia on telemetry with rates up to the 140s. Freedom events recorded with junctional rates in the mid 30s. Occasional pacing at VVI 30.     - Currently on Eliquis 2.5mg PO BID  - Tolerating Lopressor 37.5mg PO BID  - Will tentatively plan for ablation of atrial flutter on Friday 7/28/23 with Cardiac Anesthesia  - Full recommendations to follow below.       Patient seen today sitting up in chair OOB. No overnight complaints. Walking around unit earlier in AM. Telemetry reviewed.     TELE: Periods of atrial flutter/fibrillation with rates in the 140s with short offset pauses to sinus bradycardia. Some offset pauses interrupted by epicardial pacing set at VVI 30.     MEDICATIONS  (STANDING):  amLODIPine   Tablet 5 milliGRAM(s) Oral daily  apixaban 2.5 milliGRAM(s) Oral two times a day  atorvastatin 80 milliGRAM(s) Oral at bedtime  calcium acetate 667 milliGRAM(s) Oral three times a day with meals  clopidogrel Tablet 75 milliGRAM(s) Oral daily  dextrose 50% Injectable 25 milliLiter(s) IV Push every 15 minutes  dextrose 50% Injectable 50 milliLiter(s) IV Push every 15 minutes  epoetin lois-epbx (RETACRIT) Injectable 5000 Unit(s) IV Push <User Schedule>  gabapentin 100 milliGRAM(s) Oral three times a day  glucagon  Injectable 1 milliGRAM(s) IntraMuscular once  hydrALAZINE 25 milliGRAM(s) Oral three times a day  insulin glargine Injectable (LANTUS) 6 Unit(s) SubCutaneous at bedtime  insulin lispro (ADMELOG) corrective regimen sliding scale   SubCutaneous three times a day before meals  insulin lispro (ADMELOG) corrective regimen sliding scale   SubCutaneous at bedtime  iron sucrose IVPB 100 milliGRAM(s) IV Intermittent every 48 hours  lidocaine   4% Patch 1 Patch Transdermal daily  lidocaine   4% Patch 1 Patch Transdermal daily  loratadine 10 milliGRAM(s) Oral daily  methocarbamol 500 milliGRAM(s) Oral two times a day  metoprolol tartrate 37.5 milliGRAM(s) Oral two times a day  Nephro-becky 1 Tablet(s) Oral daily  pantoprazole    Tablet 40 milliGRAM(s) Oral daily  sodium chloride 0.9% lock flush 3 milliLiter(s) IV Push every 8 hours  tamsulosin 0.4 milliGRAM(s) Oral at bedtime    MEDICATIONS  (PRN):  dextrose Oral Gel 15 Gram(s) Oral once PRN Blood Glucose LESS THAN 70 milliGRAM(s)/deciliter  ondansetron Injectable 4 milliGRAM(s) IV Push every 6 hours PRN Nausea and/or Vomiting    Allergies  No Known Drug Allergies  shellfish (Rash)    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  S/P amputation  left toes  Stented coronary artery    Vital Signs Last 24 Hrs  T(C): 36.9 (26 Jul 2023 12:00), Max: 36.9 (25 Jul 2023 13:00)  T(F): 98.4 (26 Jul 2023 12:00), Max: 98.5 (26 Jul 2023 07:50)  HR: 62 (26 Jul 2023 12:00) (35 - 100)  BP: 168/67 (26 Jul 2023 12:00) (111/61 - 197/78)  RR: 17 (26 Jul 2023 12:00) (14 - 18)  SpO2: 97% (26 Jul 2023 12:00) (92% - 98%)    Physical Exam:  Constitutional: NAD, AAOx3  Cardiovascular: +S1S2 IRIR AFlutter at time of exam  Sternotomy CDI  Pulmonary: Coarse breath sounds but unlabored  GI: soft NTND +BS  Extremities: L>R trace LE edema  Neuro: non focal, BARRETO x4    LABS:                        9.4    8.92  )-----------( 169      ( 26 Jul 2023 05:02 )             28.0     133<L>  |  93<L>  |  31.3<H>  ----------------------------<  130<H>  3.8   |  25.0  |  6.26<H>  Ca    8.1<L>      26 Jul 2023 05:02  Phos  3.8     07-26  Mg     2.0     07-26  PT/INR - ( 25 Jul 2023 04:30 )   PT: 14.1 sec;   INR: 1.21 ratio    PTT - ( 25 Jul 2023 04:30 )  PTT:61.6 sec    Urinalysis Basic - ( 26 Jul 2023 05:02 )  Color: x / Appearance: x / SG: x / pH: x  Gluc: 130 mg/dL / Ketone: x  / Bili: x / Urobili: x   Blood: x / Protein: x / Nitrite: x   Leuk Esterase: x / RBC: x / WBC x   Sq Epi: x / Non Sq Epi: x / Bacteria: x    A/P  63 year old male with a history of HTN, hyperlipidemia, ESRD on HD (T, Th, Sat), COPD, DM type 2 uncontrolled, gastroparesis s/p gastric PM 2/2013, peripheral blindness and retinopathy, BPH, recent CDiff infection 6/1/23 s/p 10 day course of oral vancomycin, CAD s/p prior PCI, and recent hospitalization 6/2023 for NSTEMI, ICM LVEF 35-40%, and LHC which demonstrated multi-vessel disease who underwent 4vCABG (LIMA-LAD, [id number]) on 7/14/23. EP consulted regarding AFib/AFlutter with conversion pauses.      Continues to have episodes of tachycardia on telemetry with rates up to the 140s. Freedom events recorded with junctional rates in the mid 30s. Occasional pacing at VVI 30.     - Currently on Eliquis 2.5mg PO BID  - Tolerating Lopressor 37.5mg PO BID  - Will tentatively plan for ablation of atrial flutter on Friday 7/28/23 with Cardiac Anesthesia  - Full recommendations to follow below.

## 2023-07-26 NOTE — PROGRESS NOTE ADULT - ASSESSMENT
63M PMHx ESRD on HD (T,Th, S), IDDM, COPD, gastroparesis, peripheral blindness and retinopathy, NSTEMI 6/2022 with recent hospital admission for CABG eval, now presents from home with complaints of worsening chest pain associated with difficulty breathing. Admitted to CTICU, IABP placed, now s/p CABG on 7/14.    1. DM2, a1c 9.4% - glucoses currently controlled   - Continue lantus 6 units qhs  - Sliding scale coverage  - Mild hypoglycemia last week after hyperkalemia treatment (regular insulin 10u/D50)    2. CAD  - S/p CABG, care per CT surgery team    3. ESRD  - HD as per nephrology  - S/p fistulogram and angioplasty on 07/25/23 by Vascular Surgery     4. Afib/aflutter  - On metoprolol

## 2023-07-26 NOTE — PROGRESS NOTE ADULT - ATTENDING COMMENTS
s/p fistulogram, with balloon angioplasty or axillary vein and cephalic veins, POD #1  Doing well  No complaints  Patient will be dialyzed via AVF to assess function

## 2023-07-26 NOTE — PROGRESS NOTE ADULT - SUBJECTIVE AND OBJECTIVE BOX
HPI/Overnight Events:   Patient seen and examined at bedside this AM. No overnight events. No complaints. Denies fever, chills, nausea, vomiting, chest pain, SOB, dizziness, abd pain or any other concerning symptoms. Doing well.     Vital Signs Last 24 Hrs  T(C): 36.8 (26 Jul 2023 05:05), Max: 36.9 (25 Jul 2023 08:55)  T(F): 98.2 (26 Jul 2023 05:05), Max: 98.4 (25 Jul 2023 08:55)  HR: 35 (26 Jul 2023 08:00) (35 - 100)  BP: 119/62 (26 Jul 2023 08:00) (111/61 - 197/78)  BP(mean): --  RR: 16 (26 Jul 2023 05:05) (14 - 18)  SpO2: 92% (26 Jul 2023 05:05) (92% - 98%)    Parameters below as of 26 Jul 2023 05:05  Patient On (Oxygen Delivery Method): room air        I&O's Detail    25 Jul 2023 07:01  -  26 Jul 2023 07:00  --------------------------------------------------------  IN:    Heparin: 96 mL    IV PiggyBack: 100 mL    Oral Fluid: 480 mL  Total IN: 676 mL    OUT:    Other (mL): 1500 mL    Voided (mL): 0 mL  Total OUT: 1500 mL    Total NET: -824 mL      MEDICATIONS  (STANDING):  amLODIPine   Tablet 5 milliGRAM(s) Oral daily  apixaban 2.5 milliGRAM(s) Oral two times a day  atorvastatin 80 milliGRAM(s) Oral at bedtime  calcium acetate 667 milliGRAM(s) Oral three times a day with meals  clopidogrel Tablet 75 milliGRAM(s) Oral daily  dextrose 50% Injectable 25 milliLiter(s) IV Push every 15 minutes  dextrose 50% Injectable 50 milliLiter(s) IV Push every 15 minutes  epoetin lois-epbx (RETACRIT) Injectable 5000 Unit(s) IV Push <User Schedule>  gabapentin 100 milliGRAM(s) Oral three times a day  glucagon  Injectable 1 milliGRAM(s) IntraMuscular once  hydrALAZINE 25 milliGRAM(s) Oral three times a day  insulin glargine Injectable (LANTUS) 6 Unit(s) SubCutaneous at bedtime  insulin lispro (ADMELOG) corrective regimen sliding scale   SubCutaneous three times a day before meals  insulin lispro (ADMELOG) corrective regimen sliding scale   SubCutaneous at bedtime  iron sucrose IVPB 100 milliGRAM(s) IV Intermittent every 48 hours  lidocaine   4% Patch 1 Patch Transdermal daily  lidocaine   4% Patch 1 Patch Transdermal daily  loratadine 10 milliGRAM(s) Oral daily  methocarbamol 500 milliGRAM(s) Oral two times a day  metoprolol tartrate 37.5 milliGRAM(s) Oral two times a day  Nephro-becky 1 Tablet(s) Oral daily  pantoprazole    Tablet 40 milliGRAM(s) Oral daily  sodium chloride 0.9% lock flush 3 milliLiter(s) IV Push every 8 hours  tamsulosin 0.4 milliGRAM(s) Oral at bedtime    MEDICATIONS  (PRN):  dextrose Oral Gel 15 Gram(s) Oral once PRN Blood Glucose LESS THAN 70 milliGRAM(s)/deciliter  ondansetron Injectable 4 milliGRAM(s) IV Push every 6 hours PRN Nausea and/or Vomiting    PHYSICAL EXAM:  Constitutional: no acute distress  HEENT: EOMI, no active drainage or redness, no scleral icterus   Neck: Full ROM without pain  Respiratory: respirations are unlabored, no accessory muscle use, no conversational dyspnea  Cardiovascular: regular rate & rhythm as assessed by radial palpation  Gastrointestinal: Abdomen soft, non-tender, non-distended  Neurological: A&Ox3; no gross sensory / motor / coordination deficits.  Musculoskeletal: BARRETO  Vascular: Extremities warm and well perfused, AVF with thrill but also noted slightly pulsatile, improved from yesterday morning. Radial palpable.    LABS:                        9.4    8.92  )-----------( 169      ( 26 Jul 2023 05:02 )             28.0     07-26    133<L>  |  93<L>  |  31.3<H>  ----------------------------<  130<H>  3.8   |  25.0  |  6.26<H>    Ca    8.1<L>      26 Jul 2023 05:02  Phos  3.8     07-26  Mg     2.0     07-26      PT/INR - ( 25 Jul 2023 04:30 )   PT: 14.1 sec;   INR: 1.21 ratio         PTT - ( 25 Jul 2023 04:30 )  PTT:61.6 sec      Assessment/Plan:  63y M admitted for emergent CABG, history of ESRD with a RUE AVF, created 5 years ago, usually without problems, pt had Hyperkalemia despite HD, per patient he was tolerating his dialysis sessions without a problem and is very strict with his diet at home.    POD1 s/p POBA of the fistula. Doing well, labs stable, radial palpable, thrill appreciable over the fistula.    Care as per primary team.    Please call vascular surgery with any further questions.    JENIFFER Boykin MD

## 2023-07-27 LAB
ANION GAP SERPL CALC-SCNC: 18 MMOL/L — HIGH (ref 5–17)
BLD GP AB SCN SERPL QL: SIGNIFICANT CHANGE UP
BUN SERPL-MCNC: 44.6 MG/DL — HIGH (ref 8–20)
CALCIUM SERPL-MCNC: 8.1 MG/DL — LOW (ref 8.4–10.5)
CHLORIDE SERPL-SCNC: 92 MMOL/L — LOW (ref 96–108)
CO2 SERPL-SCNC: 23 MMOL/L — SIGNIFICANT CHANGE UP (ref 22–29)
CREAT SERPL-MCNC: 8.27 MG/DL — HIGH (ref 0.5–1.3)
EGFR: 7 ML/MIN/1.73M2 — LOW
GLUCOSE BLDC GLUCOMTR-MCNC: 104 MG/DL — HIGH (ref 70–99)
GLUCOSE BLDC GLUCOMTR-MCNC: 188 MG/DL — HIGH (ref 70–99)
GLUCOSE BLDC GLUCOMTR-MCNC: 188 MG/DL — HIGH (ref 70–99)
GLUCOSE BLDC GLUCOMTR-MCNC: 218 MG/DL — HIGH (ref 70–99)
GLUCOSE BLDC GLUCOMTR-MCNC: 225 MG/DL — HIGH (ref 70–99)
GLUCOSE SERPL-MCNC: 207 MG/DL — HIGH (ref 70–99)
HCT VFR BLD CALC: 27.1 % — LOW (ref 39–50)
HGB BLD-MCNC: 8.9 G/DL — LOW (ref 13–17)
MAGNESIUM SERPL-MCNC: 2.1 MG/DL — SIGNIFICANT CHANGE UP (ref 1.6–2.6)
MCHC RBC-ENTMCNC: 29.6 PG — SIGNIFICANT CHANGE UP (ref 27–34)
MCHC RBC-ENTMCNC: 32.8 GM/DL — SIGNIFICANT CHANGE UP (ref 32–36)
MCV RBC AUTO: 90 FL — SIGNIFICANT CHANGE UP (ref 80–100)
PHOSPHATE SERPL-MCNC: 4.3 MG/DL — SIGNIFICANT CHANGE UP (ref 2.4–4.7)
PLATELET # BLD AUTO: 162 K/UL — SIGNIFICANT CHANGE UP (ref 150–400)
POTASSIUM SERPL-MCNC: 4.2 MMOL/L — SIGNIFICANT CHANGE UP (ref 3.5–5.3)
POTASSIUM SERPL-SCNC: 4.2 MMOL/L — SIGNIFICANT CHANGE UP (ref 3.5–5.3)
RBC # BLD: 3.01 M/UL — LOW (ref 4.2–5.8)
RBC # FLD: 15.4 % — HIGH (ref 10.3–14.5)
SODIUM SERPL-SCNC: 132 MMOL/L — LOW (ref 135–145)
WBC # BLD: 9.58 K/UL — SIGNIFICANT CHANGE UP (ref 3.8–10.5)
WBC # FLD AUTO: 9.58 K/UL — SIGNIFICANT CHANGE UP (ref 3.8–10.5)

## 2023-07-27 PROCEDURE — 71045 X-RAY EXAM CHEST 1 VIEW: CPT | Mod: 26

## 2023-07-27 PROCEDURE — 99233 SBSQ HOSP IP/OBS HIGH 50: CPT

## 2023-07-27 PROCEDURE — 93010 ELECTROCARDIOGRAM REPORT: CPT

## 2023-07-27 PROCEDURE — 90935 HEMODIALYSIS ONE EVALUATION: CPT

## 2023-07-27 RX ORDER — INSULIN GLARGINE 100 [IU]/ML
7 INJECTION, SOLUTION SUBCUTANEOUS AT BEDTIME
Refills: 0 | Status: DISCONTINUED | OUTPATIENT
Start: 2023-07-27 | End: 2023-07-31

## 2023-07-27 RX ADMIN — Medication 667 MILLIGRAM(S): at 08:29

## 2023-07-27 RX ADMIN — METHOCARBAMOL 500 MILLIGRAM(S): 500 TABLET, FILM COATED ORAL at 05:45

## 2023-07-27 RX ADMIN — PANTOPRAZOLE SODIUM 40 MILLIGRAM(S): 20 TABLET, DELAYED RELEASE ORAL at 13:52

## 2023-07-27 RX ADMIN — Medication 37.5 MILLIGRAM(S): at 05:58

## 2023-07-27 RX ADMIN — Medication 667 MILLIGRAM(S): at 13:44

## 2023-07-27 RX ADMIN — GABAPENTIN 100 MILLIGRAM(S): 400 CAPSULE ORAL at 05:07

## 2023-07-27 RX ADMIN — SODIUM CHLORIDE 3 MILLILITER(S): 9 INJECTION INTRAMUSCULAR; INTRAVENOUS; SUBCUTANEOUS at 05:29

## 2023-07-27 RX ADMIN — LIDOCAINE 1 PATCH: 4 CREAM TOPICAL at 19:31

## 2023-07-27 RX ADMIN — ATORVASTATIN CALCIUM 80 MILLIGRAM(S): 80 TABLET, FILM COATED ORAL at 21:44

## 2023-07-27 RX ADMIN — Medication 667 MILLIGRAM(S): at 18:34

## 2023-07-27 RX ADMIN — LIDOCAINE 1 PATCH: 4 CREAM TOPICAL at 19:30

## 2023-07-27 RX ADMIN — Medication 2: at 08:26

## 2023-07-27 RX ADMIN — SODIUM CHLORIDE 3 MILLILITER(S): 9 INJECTION INTRAMUSCULAR; INTRAVENOUS; SUBCUTANEOUS at 13:46

## 2023-07-27 RX ADMIN — APIXABAN 2.5 MILLIGRAM(S): 2.5 TABLET, FILM COATED ORAL at 18:34

## 2023-07-27 RX ADMIN — Medication 25 MILLIGRAM(S): at 21:44

## 2023-07-27 RX ADMIN — Medication 1 TABLET(S): at 13:52

## 2023-07-27 RX ADMIN — OXYCODONE HYDROCHLORIDE 5 MILLIGRAM(S): 5 TABLET ORAL at 22:45

## 2023-07-27 RX ADMIN — IRON SUCROSE 105 MILLIGRAM(S): 20 INJECTION, SOLUTION INTRAVENOUS at 10:42

## 2023-07-27 RX ADMIN — LIDOCAINE 1 PATCH: 4 CREAM TOPICAL at 05:29

## 2023-07-27 RX ADMIN — LIDOCAINE 1 PATCH: 4 CREAM TOPICAL at 13:53

## 2023-07-27 RX ADMIN — APIXABAN 2.5 MILLIGRAM(S): 2.5 TABLET, FILM COATED ORAL at 05:07

## 2023-07-27 RX ADMIN — SODIUM CHLORIDE 3 MILLILITER(S): 9 INJECTION INTRAMUSCULAR; INTRAVENOUS; SUBCUTANEOUS at 20:56

## 2023-07-27 RX ADMIN — GABAPENTIN 100 MILLIGRAM(S): 400 CAPSULE ORAL at 13:44

## 2023-07-27 RX ADMIN — Medication 25 MILLIGRAM(S): at 05:49

## 2023-07-27 RX ADMIN — LORATADINE 10 MILLIGRAM(S): 10 TABLET ORAL at 13:52

## 2023-07-27 RX ADMIN — METHOCARBAMOL 500 MILLIGRAM(S): 500 TABLET, FILM COATED ORAL at 18:34

## 2023-07-27 RX ADMIN — Medication 2: at 18:26

## 2023-07-27 RX ADMIN — Medication 25 MILLIGRAM(S): at 13:44

## 2023-07-27 RX ADMIN — ERYTHROPOIETIN 5000 UNIT(S): 10000 INJECTION, SOLUTION INTRAVENOUS; SUBCUTANEOUS at 10:43

## 2023-07-27 RX ADMIN — TAMSULOSIN HYDROCHLORIDE 0.4 MILLIGRAM(S): 0.4 CAPSULE ORAL at 21:44

## 2023-07-27 RX ADMIN — INSULIN GLARGINE 7 UNIT(S): 100 INJECTION, SOLUTION SUBCUTANEOUS at 21:44

## 2023-07-27 RX ADMIN — Medication 37.5 MILLIGRAM(S): at 18:35

## 2023-07-27 RX ADMIN — GABAPENTIN 100 MILLIGRAM(S): 400 CAPSULE ORAL at 21:44

## 2023-07-27 RX ADMIN — LIDOCAINE 1 PATCH: 4 CREAM TOPICAL at 13:52

## 2023-07-27 RX ADMIN — OXYCODONE HYDROCHLORIDE 5 MILLIGRAM(S): 5 TABLET ORAL at 21:45

## 2023-07-27 RX ADMIN — CLOPIDOGREL BISULFATE 75 MILLIGRAM(S): 75 TABLET, FILM COATED ORAL at 08:30

## 2023-07-27 RX ADMIN — AMLODIPINE BESYLATE 5 MILLIGRAM(S): 2.5 TABLET ORAL at 05:07

## 2023-07-27 NOTE — PROGRESS NOTE ADULT - ASSESSMENT
63M with PMH of DM complicated by neuropathy, HTN, HLD, CAD, PVD, gastroparesis, ESRD on HD TTS and BPH s/p CABG x4 on 07/14/23. Nephrology is managing ESRD on HD.     -Access: R AV fistula   -Outpatient dialysis unit is unclear   -Outpatient dialysis days are Tuesdays Thursdays Saturdays  -HD today    -? Recirculation - s/p fistulogram and angioplasty on 07/25/23 by Vascular Surgery  Monitor Scr and K+ between HD treatments     -BP acceptable   -Volume status - UF as tolerated during HD  -Anemia - KIMBERLY with HD   -Mineral Bone Disease in setting of CKD - WNL, will follow.

## 2023-07-27 NOTE — PROGRESS NOTE ADULT - SUBJECTIVE AND OBJECTIVE BOX
Brief summary:  63yMale POD#     Overnight events:  63yMale POD# 13 C4L with Dr. Newsome     SUBJECTIVE:   Pt oob to the chair sleeping, pt arousable, pt c/o of insicional chest pain across chest and right wrist pain from right angioplasty performed   pt denies lightheadedness, dizziness, palpitations, SOB, nausea, and vomiting       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      S/P amputation  left toes      Stented coronary artery          MEDICATIONS    amLODIPine   Tablet 5 milliGRAM(s) Oral daily  apixaban 2.5 milliGRAM(s) Oral two times a day  atorvastatin 80 milliGRAM(s) Oral at bedtime  calcium acetate 667 milliGRAM(s) Oral three times a day with meals  clopidogrel Tablet 75 milliGRAM(s) Oral daily  epoetin lois-epbx (RETACRIT) Injectable 5000 Unit(s) IV Push <User Schedule>  gabapentin 100 milliGRAM(s) Oral three times a day  glucagon  Injectable 1 milliGRAM(s) IntraMuscular once  hydrALAZINE 25 milliGRAM(s) Oral three times a day  insulin glargine Injectable (LANTUS) 6 Unit(s) SubCutaneous at bedtime  insulin lispro (ADMELOG) corrective regimen sliding scale   SubCutaneous three times a day before meals  insulin lispro (ADMELOG) corrective regimen sliding scale   SubCutaneous at bedtime  iron sucrose IVPB 100 milliGRAM(s) IV Intermittent every 48 hours  lidocaine   4% Patch 1 Patch Transdermal daily  lidocaine   4% Patch 1 Patch Transdermal daily  loratadine 10 milliGRAM(s) Oral daily  methocarbamol 500 milliGRAM(s) Oral two times a day  metoprolol tartrate 37.5 milliGRAM(s) Oral two times a day  Nephro-becky 1 Tablet(s) Oral daily  ondansetron Injectable 4 milliGRAM(s) IV Push every 6 hours PRN  oxyCODONE    IR 5 milliGRAM(s) Oral every 6 hours PRN  pantoprazole    Tablet 40 milliGRAM(s) Oral daily  sodium chloride 0.9% lock flush 3 milliLiter(s) IV Push every 8 hours  tamsulosin 0.4 milliGRAM(s) Oral at bedtime  MEDICATIONS  (PRN):  dextrose Oral Gel 15 Gram(s) Oral once PRN Blood Glucose LESS THAN 70 milliGRAM(s)/deciliter  ondansetron Injectable 4 milliGRAM(s) IV Push every 6 hours PRN Nausea and/or Vomiting  oxyCODONE    IR 5 milliGRAM(s) Oral every 6 hours PRN Severe Pain (7 - 10)      Daily     Daily Weight in k.3 (2023 04:00)                              9.4    8.92  )-----------( 169      ( 2023 05:02 )             28.0   07-    133<L>  |  93<L>  |  31.3<H>  ----------------------------<  130<H>  3.8   |  25.0  |  6.26<H>    Ca    8.1<L>      2023 05:02  Phos  3.8       Mg     2.0             PT/INR - ( 2023 04:30 )   PT: 14.1 sec;   INR: 1.21 ratio         PTT - ( 2023 04:30 )  PTT:61.6 sec      Objective:  T(C): 37.3 (23 @ 23:30), Max: 37.3 (23 @ 23:30)  HR: 93 (23 @ 23:30) (35 - 95)  BP: 159/53 (23 @ 23:30) (119/62 - 168/67)  RR: 18 (23 @ 23:30) (16 - 18)  SpO2: 92% (23 @ 23:30) (92% - 97%)  Wt(kg): --CAPILLARY BLOOD GLUCOSE      POCT Blood Glucose.: 218 mg/dL (2023 21:09)  POCT Blood Glucose.: 221 mg/dL (2023 16:58)  POCT Blood Glucose.: 137 mg/dL (2023 12:00)  POCT Blood Glucose.: 161 mg/dL (2023 08:08)  I&O's Summary    2023 07:01  -  2023 07:00  --------------------------------------------------------  IN: 676 mL / OUT: 1500 mL / NET: -824 mL    2023 07:01  -  2023 02:23  --------------------------------------------------------  IN: 240 mL / OUT: 0 mL / NET: 240 mL        Physical Exam  Neuro: A+O x 3, non-focal, speech clear and intact  Pulm: diminished b/l at the bases, no accessory muscles utilized   CV: irregularly irregular, +S1S2  Abd: soft, NT, ND, +BS  Ext: +DP Pulses b/l, +PT pulses, +1-2 pitting edema L>R   RUE AVF palpable thrill, audible bruit    Incision(s): mid sternal inc C/D/I no click, LLE EVH C/D/I  +PW 30/18/0.8 threshold 8     Imaging:  CXR:    < from: Xray Chest 1 View- PORTABLE-Routine (Xray Chest 1 View- PORTABLE-Routine in AM.) (23 @ 06:10) >    ACC: [id number] EXAM:  XR CHEST PORTABLE ROUTINE 1V   ORDERED BY: RENE GUZMAN     PROCEDURE DATE:  2023          INTERPRETATION:  Portable chest radiograph    CLINICAL INFORMATION: Postoperative heart surgery chest   radiograph/follow-up.    TECHNIQUE:  Portable  AP chest radiograph.    COMPARISON: 2023 chest x-ray .    FINDINGS:  CATHETERS AND TUBES: None    PULMONARY: LEFT greater than RIGHT pleural effusions and/or LEFT basilar   airspace consolidation obscuring LEFT diaphragm contour.  Upper zones clear.  No pneumothorax.    HEART/VASCULAR: The  heart is enlarged in transverse diameter. Status   post median sternotomy.    BONES: Visualized osseous thorax intact.    IMPRESSION:   LEFT greater than RIGHT pleural effusionsand/or LEFT   basilar airspace consolidation obscuring LEFT diaphragm contour.    --- End of Report ---            YANE ATKINSON MD; Attending Radiologist    < end of copied text >

## 2023-07-27 NOTE — PROGRESS NOTE ADULT - ASSESSMENT
63M PMHx ESRD on HD (T,Th, S), IDDM, COPD, gastroparesis, peripheral blindness and retinopathy, NSTEMI 6/2022 with recent hospital admission for CABG eval, now presents from home with complaints of worsening chest pain associated with difficulty breathing. Admitted to CTICU, IABP placed, now s/p CABG on 7/14.    1. DM2, a1c 9.4% - mild hyperglycemia  - Increase lantus to 7 units qhs  - Sliding scale coverage  - Mild hypoglycemia last week after hyperkalemia treatment (regular insulin 10u/D50)    2. CAD  - S/p CABG, care per CT surgery team    3. ESRD  - HD as per nephrology  - S/p fistulogram and angioplasty on 07/25/23 by Vascular Surgery     4. Afib/aflutter  - On metoprolol  - Plan for EP ablation

## 2023-07-27 NOTE — PROGRESS NOTE ADULT - PROBLEM SELECTOR PLAN 3
Patient with PAF with conversion pauses  EP following rec flutter ablation  no amio per Dr. Enriquez  overnight 2 paced beats during conversion (EPM VVI 30)--> on back up per Dr. Zoe Gee initiated 7/25  needs pre d/c TTE for AC  Ablation with EP 7/28

## 2023-07-27 NOTE — PROGRESS NOTE ADULT - SUBJECTIVE AND OBJECTIVE BOX
INTERVAL EVENTS:  Follow up diabetes management  HD at bedside    ROS: Complains of mild chest incisional pain    MEDICATIONS  (STANDING):  amLODIPine   Tablet 5 milliGRAM(s) Oral daily  apixaban 2.5 milliGRAM(s) Oral two times a day  atorvastatin 80 milliGRAM(s) Oral at bedtime  calcium acetate 667 milliGRAM(s) Oral three times a day with meals  clopidogrel Tablet 75 milliGRAM(s) Oral daily  dextrose 50% Injectable 50 milliLiter(s) IV Push every 15 minutes  dextrose 50% Injectable 25 milliLiter(s) IV Push every 15 minutes  epoetin lois-epbx (RETACRIT) Injectable 5000 Unit(s) IV Push <User Schedule>  gabapentin 100 milliGRAM(s) Oral three times a day  glucagon  Injectable 1 milliGRAM(s) IntraMuscular once  hydrALAZINE 25 milliGRAM(s) Oral three times a day  insulin glargine Injectable (LANTUS) 6 Unit(s) SubCutaneous at bedtime  insulin lispro (ADMELOG) corrective regimen sliding scale   SubCutaneous three times a day before meals  insulin lispro (ADMELOG) corrective regimen sliding scale   SubCutaneous at bedtime  iron sucrose IVPB 100 milliGRAM(s) IV Intermittent every 48 hours  lidocaine   4% Patch 1 Patch Transdermal daily  lidocaine   4% Patch 1 Patch Transdermal daily  loratadine 10 milliGRAM(s) Oral daily  methocarbamol 500 milliGRAM(s) Oral two times a day  metoprolol tartrate 37.5 milliGRAM(s) Oral two times a day  Nephro-becky 1 Tablet(s) Oral daily  pantoprazole    Tablet 40 milliGRAM(s) Oral daily  sodium chloride 0.9% lock flush 3 milliLiter(s) IV Push every 8 hours  tamsulosin 0.4 milliGRAM(s) Oral at bedtime    MEDICATIONS  (PRN):  dextrose Oral Gel 15 Gram(s) Oral once PRN Blood Glucose LESS THAN 70 milliGRAM(s)/deciliter  ondansetron Injectable 4 milliGRAM(s) IV Push every 6 hours PRN Nausea and/or Vomiting  oxyCODONE    IR 5 milliGRAM(s) Oral every 6 hours PRN Severe Pain (7 - 10)    Allergies  No Known Drug Allergies  shellfish (Rash)    Vital Signs Last 24 Hrs  T(C): 37.3 (27 Jul 2023 07:50), Max: 37.3 (26 Jul 2023 23:30)  T(F): 99.1 (27 Jul 2023 07:50), Max: 99.2 (26 Jul 2023 23:30)  HR: 84 (27 Jul 2023 09:30) (62 - 93)  BP: 127/30 (27 Jul 2023 09:30) (127/30 - 164/65)  BP(mean): --  RR: 18 (27 Jul 2023 09:30) (16 - 18)  SpO2: 94% (27 Jul 2023 09:30) (92% - 97%)    Parameters below as of 27 Jul 2023 09:30  Patient On (Oxygen Delivery Method): room air    PHYSICAL EXAM:  General: No apparent distress  Neck: Supple, trachea midline, no thyromegaly  Respiratory: Lungs clear bilaterally, normal rate, effort  Cardiac: +S1, S2, no m/r/g  GI: +BS, soft, non tender, non distended  Extremities: No peripheral edema, no pedal lesions  Neuro: A+O X3, no tremor    LABS:                        8.9    9.58  )-----------( 162      ( 27 Jul 2023 04:50 )             27.1     07-27    132<L>  |  92<L>  |  44.6<H>  ----------------------------<  207<H>  4.2   |  23.0  |  8.27<H>    Ca    8.1<L>      27 Jul 2023 04:50  Phos  4.3     07-27  Mg     2.1     07-27      Urinalysis Basic - ( 27 Jul 2023 04:50 )    Color: x / Appearance: x / SG: x / pH: x  Gluc: 207 mg/dL / Ketone: x  / Bili: x / Urobili: x   Blood: x / Protein: x / Nitrite: x   Leuk Esterase: x / RBC: x / WBC x   Sq Epi: x / Non Sq Epi: x / Bacteria: x    POCT Blood Glucose.: 225 mg/dL (07-27-23 @ 08:05)  POCT Blood Glucose.: 218 mg/dL (07-26-23 @ 21:09)  POCT Blood Glucose.: 221 mg/dL (07-26-23 @ 16:58)

## 2023-07-27 NOTE — PROGRESS NOTE ADULT - SUBJECTIVE AND OBJECTIVE BOX
Patient seen and examined today in bed receiving HD. At times reports dizziness. No other complaints    TELE: Atrial flutter with rates in the 140s with offset pauses interrupted by VVI pacing     MEDICATIONS  (STANDING):  amLODIPine   Tablet 5 milliGRAM(s) Oral daily  apixaban 2.5 milliGRAM(s) Oral two times a day  atorvastatin 80 milliGRAM(s) Oral at bedtime  calcium acetate 667 milliGRAM(s) Oral three times a day with meals  clopidogrel Tablet 75 milliGRAM(s) Oral daily  dextrose 50% Injectable 50 milliLiter(s) IV Push every 15 minutes  dextrose 50% Injectable 25 milliLiter(s) IV Push every 15 minutes  epoetin lois-epbx (RETACRIT) Injectable 5000 Unit(s) IV Push <User Schedule>  gabapentin 100 milliGRAM(s) Oral three times a day  glucagon  Injectable 1 milliGRAM(s) IntraMuscular once  hydrALAZINE 25 milliGRAM(s) Oral three times a day  insulin glargine Injectable (LANTUS) 6 Unit(s) SubCutaneous at bedtime  insulin lispro (ADMELOG) corrective regimen sliding scale   SubCutaneous three times a day before meals  insulin lispro (ADMELOG) corrective regimen sliding scale   SubCutaneous at bedtime  iron sucrose IVPB 100 milliGRAM(s) IV Intermittent every 48 hours  lidocaine   4% Patch 1 Patch Transdermal daily  lidocaine   4% Patch 1 Patch Transdermal daily  loratadine 10 milliGRAM(s) Oral daily  methocarbamol 500 milliGRAM(s) Oral two times a day  metoprolol tartrate 37.5 milliGRAM(s) Oral two times a day  Nephro-becky 1 Tablet(s) Oral daily  pantoprazole    Tablet 40 milliGRAM(s) Oral daily  sodium chloride 0.9% lock flush 3 milliLiter(s) IV Push every 8 hours  tamsulosin 0.4 milliGRAM(s) Oral at bedtime    MEDICATIONS  (PRN):  dextrose Oral Gel 15 Gram(s) Oral once PRN Blood Glucose LESS THAN 70 milliGRAM(s)/deciliter  ondansetron Injectable 4 milliGRAM(s) IV Push every 6 hours PRN Nausea and/or Vomiting  oxyCODONE    IR 5 milliGRAM(s) Oral every 6 hours PRN Severe Pain (7 - 10)    Allergies  No Known Drug Allergies  shellfish (Rash)    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  S/P amputation  left toes  Stented coronary artery    Vital Signs Last 24 Hrs  T(C): 37.3 (27 Jul 2023 07:50), Max: 37.3 (26 Jul 2023 23:30)  T(F): 99.1 (27 Jul 2023 07:50), Max: 99.2 (26 Jul 2023 23:30)  HR: 84 (27 Jul 2023 09:30) (62 - 93)  BP: 127/30 (27 Jul 2023 09:30) (127/30 - 164/65)  RR: 18 (27 Jul 2023 09:30) (16 - 18)  SpO2: 94% (27 Jul 2023 09:30) (92% - 97%)    Physical Exam:  Constitutional: NAD, AAOx3  Cardiovascular: +S1S2 IRIR AFlutter at time of exam  Sternotomy CDI  Pulmonary: Coarse breath sounds but unlabored  GI: soft NTND +BS  Extremities: L>R trace LE edema  Neuro: non focal, BARRETO x4    LABS:                        8.9    9.58  )-----------( 162      ( 27 Jul 2023 04:50 )             27.1     132<L>  |  92<L>  |  44.6<H>  ----------------------------<  207<H>  4.2   |  23.0  |  8.27<H>  Ca    8.1<L>      27 Jul 2023 04:50  Phos  4.3     07-27  Mg     2.1     07-27    Urinalysis Basic - ( 27 Jul 2023 04:50 )  Color: x / Appearance: x / SG: x / pH: x  Gluc: 207 mg/dL / Ketone: x  / Bili: x / Urobili: x   Blood: x / Protein: x / Nitrite: x   Leuk Esterase: x / RBC: x / WBC x   Sq Epi: x / Non Sq Epi: x / Bacteria: x    A/P  63 year old male with a history of HTN, hyperlipidemia, ESRD on HD (T, Th, Sat), COPD, DM type 2 uncontrolled, gastroparesis s/p gastric PM 2/2013, peripheral blindness and retinopathy, BPH, recent CDiff infection 6/1/23 s/p 10 day course of oral vancomycin, CAD s/p prior PCI, and recent hospitalization 6/2023 for NSTEMI, ICM LVEF 35-40%, and [facility] which demonstrated multi-vessel disease who underwent 4vCABG (LIMA-LAD, [id number]) on 7/14/23. EP consulted regarding AFib/AFlutter with conversion pauses.      Continues to have episodes of tachycardia on telemetry with rates up to the 140s and offset pauses interrupted by VVI pacing at 30ppm  No EKGs of typical atrial flutter available to review    - Plan for leadless pacemaker Friday with Cardiac Anesthesia  - NPO post midnight  - Type and Screen. 2 units on hold  - Stop Eliquis starting with this evenings dose  - Discussed with CTS ACPs.

## 2023-07-27 NOTE — PROGRESS NOTE ADULT - SUBJECTIVE AND OBJECTIVE BOX
[facility] DIVISION OF KIDNEY DISEASES AND HYPERTENSION -- HEMODIALYSIS NOTE  --------------------------------------------------------------------------------  Chief Complaint: ESRD/Ongoing hemodialysis requirement    24 hour events/subjective:  pt seen and examined on HD  tolerating well      PAST HISTORY  --------------------------------------------------------------------------------  No significant changes to PMH, PSH, FHx, SHx, unless otherwise noted    ALLERGIES & MEDICATIONS  --------------------------------------------------------------------------------  Allergies    No Known Drug Allergies  shellfish (Rash)    Intolerances      Standing Inpatient Medications  amLODIPine   Tablet 5 milliGRAM(s) Oral daily  apixaban 2.5 milliGRAM(s) Oral two times a day  atorvastatin 80 milliGRAM(s) Oral at bedtime  calcium acetate 667 milliGRAM(s) Oral three times a day with meals  clopidogrel Tablet 75 milliGRAM(s) Oral daily  dextrose 50% Injectable 25 milliLiter(s) IV Push every 15 minutes  dextrose 50% Injectable 50 milliLiter(s) IV Push every 15 minutes  epoetin lois-epbx (RETACRIT) Injectable 5000 Unit(s) IV Push <User Schedule>  gabapentin 100 milliGRAM(s) Oral three times a day  glucagon  Injectable 1 milliGRAM(s) IntraMuscular once  hydrALAZINE 25 milliGRAM(s) Oral three times a day  insulin glargine Injectable (LANTUS) 7 Unit(s) SubCutaneous at bedtime  insulin lispro (ADMELOG) corrective regimen sliding scale   SubCutaneous three times a day before meals  insulin lispro (ADMELOG) corrective regimen sliding scale   SubCutaneous at bedtime  iron sucrose IVPB 100 milliGRAM(s) IV Intermittent every 48 hours  lidocaine   4% Patch 1 Patch Transdermal daily  lidocaine   4% Patch 1 Patch Transdermal daily  loratadine 10 milliGRAM(s) Oral daily  methocarbamol 500 milliGRAM(s) Oral two times a day  metoprolol tartrate 37.5 milliGRAM(s) Oral two times a day  Nephro-becky 1 Tablet(s) Oral daily  pantoprazole    Tablet 40 milliGRAM(s) Oral daily  sodium chloride 0.9% lock flush 3 milliLiter(s) IV Push every 8 hours  tamsulosin 0.4 milliGRAM(s) Oral at bedtime    PRN Inpatient Medications  dextrose Oral Gel 15 Gram(s) Oral once PRN  ondansetron Injectable 4 milliGRAM(s) IV Push every 6 hours PRN  oxyCODONE    IR 5 milliGRAM(s) Oral every 6 hours PRN      REVIEW OF SYSTEMS  --------------------------------------------------------------------------------  Gen: No weight changes, fatigue, fevers/chills, weakness  Skin: No rashes  Head/Eyes/Ears/Mouth: No headache  Respiratory: No dyspnea, cough,  CV: No chest pain, orthopnea  GI: No abdominal pain, diarrhea, constipation, nausea, vomiting,  MSK: No joint pain  Neuro: No dizziness/lightheadedness, weakness  Heme: No bleeding  Psych: No significant nervousness, anxiety, stress, depression    All other systems were reviewed and are negative, except as noted.    VITALS/PHYSICAL EXAM  --------------------------------------------------------------------------------  T(C): 37 (07-27-23 @ 12:50), Max: 37.3 (07-26-23 @ 23:30)  HR: 83 (07-27-23 @ 13:42) (62 - 112)  BP: 173/71 (07-27-23 @ 13:42) (127/30 - 176/40)  RR: 18 (07-27-23 @ 13:42) (16 - 18)  SpO2: 95% (07-27-23 @ 13:42) (91% - 97%)  Wt(kg): --        07-26-23 @ 07:01  -  07-27-23 @ 07:00  --------------------------------------------------------  IN: 360 mL / OUT: 0 mL / NET: 360 mL    07-27-23 @ 07:01  -  07-27-23 @ 15:14  --------------------------------------------------------  IN: 120 mL / OUT: 1500 mL / NET: -1380 mL      Physical Exam:  	Gen: NAD, well-appearing  	HEENT: PERRL, supple neck,  	Pulm: CTA B/L  	CV: RRR, S1S2; no rub  	Abd: +BS, soft, nontender  	UE: Warm, intact strength; no asterixis  	LE: Warm, no edema  	Neuro: No focal deficits  	Psych: Normal affect and mood  	Skin: Warm, without rashes  	Vascular access: HUAN AV    LABS/STUDIES  --------------------------------------------------------------------------------              8.9    9.58  >-----------<  162      [07-27-23 @ 04:50]              27.1     132  |  92  |  44.6  ----------------------------<  207      [07-27-23 @ 04:50]  4.2   |  23.0  |  8.27        Ca     8.1     [07-27-23 @ 04:50]      Mg     2.1     [07-27-23 @ 04:50]      Phos  4.3     [07-27-23 @ 04:50]            Iron 45, TIBC 216, %sat 21      [06-13-23 @ 15:10]  Ferritin 1092      [06-13-23 @ 15:10]  PTH -- (Ca 7.8)      [06-13-23 @ 15:10]   175  TSH 4.14      [06-12-23 @ 21:50]    HBsAb 15.0      [07-13-23 @ 21:35]  HBsAg Nonreact      [07-13-23 @ 21:35]

## 2023-07-28 LAB
ANION GAP SERPL CALC-SCNC: 14 MMOL/L — SIGNIFICANT CHANGE UP (ref 5–17)
BUN SERPL-MCNC: 25.3 MG/DL — HIGH (ref 8–20)
CALCIUM SERPL-MCNC: 8.4 MG/DL — SIGNIFICANT CHANGE UP (ref 8.4–10.5)
CHLORIDE SERPL-SCNC: 95 MMOL/L — LOW (ref 96–108)
CO2 SERPL-SCNC: 26 MMOL/L — SIGNIFICANT CHANGE UP (ref 22–29)
CREAT SERPL-MCNC: 5.98 MG/DL — HIGH (ref 0.5–1.3)
EGFR: 10 ML/MIN/1.73M2 — LOW
GLUCOSE BLDC GLUCOMTR-MCNC: 115 MG/DL — HIGH (ref 70–99)
GLUCOSE BLDC GLUCOMTR-MCNC: 146 MG/DL — HIGH (ref 70–99)
GLUCOSE BLDC GLUCOMTR-MCNC: 167 MG/DL — HIGH (ref 70–99)
GLUCOSE SERPL-MCNC: 158 MG/DL — HIGH (ref 70–99)
HCT VFR BLD CALC: 26.8 % — LOW (ref 39–50)
HGB BLD-MCNC: 8.8 G/DL — LOW (ref 13–17)
MAGNESIUM SERPL-MCNC: 2 MG/DL — SIGNIFICANT CHANGE UP (ref 1.6–2.6)
MCHC RBC-ENTMCNC: 29.7 PG — SIGNIFICANT CHANGE UP (ref 27–34)
MCHC RBC-ENTMCNC: 32.8 GM/DL — SIGNIFICANT CHANGE UP (ref 32–36)
MCV RBC AUTO: 90.5 FL — SIGNIFICANT CHANGE UP (ref 80–100)
PHOSPHATE SERPL-MCNC: 3.1 MG/DL — SIGNIFICANT CHANGE UP (ref 2.4–4.7)
PLATELET # BLD AUTO: 165 K/UL — SIGNIFICANT CHANGE UP (ref 150–400)
POTASSIUM SERPL-MCNC: 4.1 MMOL/L — SIGNIFICANT CHANGE UP (ref 3.5–5.3)
POTASSIUM SERPL-SCNC: 4.1 MMOL/L — SIGNIFICANT CHANGE UP (ref 3.5–5.3)
RBC # BLD: 2.96 M/UL — LOW (ref 4.2–5.8)
RBC # FLD: 15.7 % — HIGH (ref 10.3–14.5)
SODIUM SERPL-SCNC: 135 MMOL/L — SIGNIFICANT CHANGE UP (ref 135–145)
WBC # BLD: 8.33 K/UL — SIGNIFICANT CHANGE UP (ref 3.8–10.5)
WBC # FLD AUTO: 8.33 K/UL — SIGNIFICANT CHANGE UP (ref 3.8–10.5)

## 2023-07-28 PROCEDURE — 71045 X-RAY EXAM CHEST 1 VIEW: CPT | Mod: 26

## 2023-07-28 PROCEDURE — 33285 INSJ SUBQ CAR RHYTHM MNTR: CPT

## 2023-07-28 PROCEDURE — 99233 SBSQ HOSP IP/OBS HIGH 50: CPT

## 2023-07-28 PROCEDURE — 99231 SBSQ HOSP IP/OBS SF/LOW 25: CPT

## 2023-07-28 PROCEDURE — 32556 INSERT CATH PLEURA W/O IMAGE: CPT | Mod: 58

## 2023-07-28 PROCEDURE — 99024 POSTOP FOLLOW-UP VISIT: CPT

## 2023-07-28 PROCEDURE — 33274 TCAT INSJ/RPL PERM LDLS PM: CPT

## 2023-07-28 PROCEDURE — 99233 SBSQ HOSP IP/OBS HIGH 50: CPT | Mod: 25

## 2023-07-28 PROCEDURE — 93010 ELECTROCARDIOGRAM REPORT: CPT

## 2023-07-28 RX ORDER — METOPROLOL TARTRATE 50 MG
37.5 TABLET ORAL EVERY 8 HOURS
Refills: 0 | Status: DISCONTINUED | OUTPATIENT
Start: 2023-07-28 | End: 2023-07-31

## 2023-07-28 RX ORDER — CEFAZOLIN SODIUM 1 G
2000 VIAL (EA) INJECTION EVERY 8 HOURS
Refills: 0 | Status: DISCONTINUED | OUTPATIENT
Start: 2023-07-28 | End: 2023-07-28

## 2023-07-28 RX ADMIN — LIDOCAINE 1 PATCH: 4 CREAM TOPICAL at 00:36

## 2023-07-28 RX ADMIN — OXYCODONE HYDROCHLORIDE 5 MILLIGRAM(S): 5 TABLET ORAL at 21:38

## 2023-07-28 RX ADMIN — SODIUM CHLORIDE 3 MILLILITER(S): 9 INJECTION INTRAMUSCULAR; INTRAVENOUS; SUBCUTANEOUS at 19:24

## 2023-07-28 RX ADMIN — OXYCODONE HYDROCHLORIDE 5 MILLIGRAM(S): 5 TABLET ORAL at 22:38

## 2023-07-28 RX ADMIN — GABAPENTIN 100 MILLIGRAM(S): 400 CAPSULE ORAL at 04:44

## 2023-07-28 RX ADMIN — GABAPENTIN 100 MILLIGRAM(S): 400 CAPSULE ORAL at 21:38

## 2023-07-28 RX ADMIN — Medication 37.5 MILLIGRAM(S): at 21:37

## 2023-07-28 RX ADMIN — LIDOCAINE 1 PATCH: 4 CREAM TOPICAL at 00:35

## 2023-07-28 RX ADMIN — INSULIN GLARGINE 7 UNIT(S): 100 INJECTION, SOLUTION SUBCUTANEOUS at 21:38

## 2023-07-28 RX ADMIN — METHOCARBAMOL 500 MILLIGRAM(S): 500 TABLET, FILM COATED ORAL at 04:45

## 2023-07-28 RX ADMIN — Medication 37.5 MILLIGRAM(S): at 04:44

## 2023-07-28 RX ADMIN — METHOCARBAMOL 500 MILLIGRAM(S): 500 TABLET, FILM COATED ORAL at 17:18

## 2023-07-28 RX ADMIN — Medication 667 MILLIGRAM(S): at 17:18

## 2023-07-28 RX ADMIN — GABAPENTIN 100 MILLIGRAM(S): 400 CAPSULE ORAL at 15:18

## 2023-07-28 RX ADMIN — Medication 37.5 MILLIGRAM(S): at 15:18

## 2023-07-28 RX ADMIN — TAMSULOSIN HYDROCHLORIDE 0.4 MILLIGRAM(S): 0.4 CAPSULE ORAL at 21:37

## 2023-07-28 RX ADMIN — CLOPIDOGREL BISULFATE 75 MILLIGRAM(S): 75 TABLET, FILM COATED ORAL at 17:18

## 2023-07-28 RX ADMIN — ATORVASTATIN CALCIUM 80 MILLIGRAM(S): 80 TABLET, FILM COATED ORAL at 22:05

## 2023-07-28 RX ADMIN — SODIUM CHLORIDE 3 MILLILITER(S): 9 INJECTION INTRAMUSCULAR; INTRAVENOUS; SUBCUTANEOUS at 04:23

## 2023-07-28 RX ADMIN — Medication 25 MILLIGRAM(S): at 21:38

## 2023-07-28 RX ADMIN — PANTOPRAZOLE SODIUM 40 MILLIGRAM(S): 20 TABLET, DELAYED RELEASE ORAL at 15:17

## 2023-07-28 NOTE — PROGRESS NOTE ADULT - PROBLEM SELECTOR PLAN 3
Patient with PAF with long conversion pauses requiring backup pacing.   Continue lopressor as tolerated by HR and BP.   No amio per Dr. Enriquez.  EP following.  Eliquis initiated 7/25, now held for procedure later today.   Plan for Micra PPM placement later today with EP 7/28.

## 2023-07-28 NOTE — PROGRESS NOTE ADULT - ASSESSMENT
63M PMHx ESRD on HD (T,Th,S), IDDM, COPD, gastroparesis, peripheral blindness and retinopathy, NSTEMI 6/2022 with recent hospital admission for CABG nisha bundy diff + that admission, now presents from home with complaints of worsening chest pain associated with difficulty breathing. In ER EKGs with intermittent diffuse ST changes, + troponins, +NSTEMI, started on a Tridil and Heparin gtt for NSTEMI, admitted to CTICU, IABP placed, now s/p CABG x 4 (LIMA-LAD, SVG-OM 1, OM2, PDA) on 7/14/23, IABP removed after OR without incident. Postop course notable for PAF with long conversion pauses (2-6.5 seconds with EP following, beta-blocker dose lowered, plan for Micra placement later today 7/28), and persistent hyperkalemia despite HD (concern for recirculation through fistula, vascular surgery following, pt now s/p fistulogram with percutaneous balloon angioplasty 7/25/23, with hyperkalemia resolved).

## 2023-07-28 NOTE — PROGRESS NOTE ADULT - SUBJECTIVE AND OBJECTIVE BOX
INTERVAL EVENTS:  Follow up diabetes management  Seen in cath lab, sleeping after procedure    MEDICATIONS  (STANDING):  amLODIPine   Tablet 5 milliGRAM(s) Oral daily  atorvastatin 80 milliGRAM(s) Oral at bedtime  calcium acetate 667 milliGRAM(s) Oral three times a day with meals  clopidogrel Tablet 75 milliGRAM(s) Oral daily  dextrose 50% Injectable 50 milliLiter(s) IV Push every 15 minutes  dextrose 50% Injectable 25 milliLiter(s) IV Push every 15 minutes  epoetin lois-epbx (RETACRIT) Injectable 5000 Unit(s) IV Push <User Schedule>  gabapentin 100 milliGRAM(s) Oral three times a day  glucagon  Injectable 1 milliGRAM(s) IntraMuscular once  hydrALAZINE 25 milliGRAM(s) Oral three times a day  insulin glargine Injectable (LANTUS) 7 Unit(s) SubCutaneous at bedtime  insulin lispro (ADMELOG) corrective regimen sliding scale   SubCutaneous three times a day before meals  insulin lispro (ADMELOG) corrective regimen sliding scale   SubCutaneous at bedtime  iron sucrose IVPB 100 milliGRAM(s) IV Intermittent every 48 hours  lidocaine   4% Patch 1 Patch Transdermal daily  lidocaine   4% Patch 1 Patch Transdermal daily  loratadine 10 milliGRAM(s) Oral daily  methocarbamol 500 milliGRAM(s) Oral two times a day  metoprolol tartrate 37.5 milliGRAM(s) Oral every 8 hours  Nephro-becky 1 Tablet(s) Oral daily  pantoprazole    Tablet 40 milliGRAM(s) Oral daily  sodium chloride 0.9% lock flush 3 milliLiter(s) IV Push every 8 hours  tamsulosin 0.4 milliGRAM(s) Oral at bedtime    MEDICATIONS  (PRN):  dextrose Oral Gel 15 Gram(s) Oral once PRN Blood Glucose LESS THAN 70 milliGRAM(s)/deciliter  ondansetron Injectable 4 milliGRAM(s) IV Push every 6 hours PRN Nausea and/or Vomiting  oxyCODONE    IR 5 milliGRAM(s) Oral every 6 hours PRN Severe Pain (7 - 10)    Allergies  No Known Drug Allergies    Vital Signs Last 24 Hrs  T(C): 36.9 (28 Jul 2023 12:45), Max: 37.6 (27 Jul 2023 21:00)  T(F): 98.5 (28 Jul 2023 12:45), Max: 99.7 (27 Jul 2023 21:00)  HR: 111 (28 Jul 2023 12:45) (57 - 112)  BP: 136/64 (28 Jul 2023 12:15) (114/54 - 173/71)  BP(mean): --  RR: 16 (28 Jul 2023 12:45) (15 - 18)  SpO2: 96% (28 Jul 2023 12:45) (91% - 98%)    Parameters below as of 28 Jul 2023 12:45    O2 Flow (L/min): 3    PHYSICAL EXAM:  General: No apparent distress  Neck: Supple, trachea midline, no thyromegaly  Respiratory: Lungs clear bilaterally, normal rate, effort  Cardiac: +S1, S2, no m/r/g  GI: +BS, soft, non tender, non distended  Extremities: No peripheral edema, no pedal lesions  Neuro: A+O X3, no tremor      LABS:                        8.8    8.33  )-----------( 165      ( 28 Jul 2023 04:35 )             26.8     07-28    135  |  95<L>  |  25.3<H>  ----------------------------<  158<H>  4.1   |  26.0  |  5.98<H>    Ca    8.4      28 Jul 2023 04:35  Phos  3.1     07-28  Mg     2.0     07-28      Urinalysis Basic - ( 28 Jul 2023 04:35 )    Color: x / Appearance: x / SG: x / pH: x  Gluc: 158 mg/dL / Ketone: x  / Bili: x / Urobili: x   Blood: x / Protein: x / Nitrite: x   Leuk Esterase: x / RBC: x / WBC x   Sq Epi: x / Non Sq Epi: x / Bacteria: x    POCT Blood Glucose.: 188 mg/dL (07-27-23 @ 21:15)  POCT Blood Glucose.: 218 mg/dL (07-27-23 @ 18:25)  POCT Blood Glucose.: 188 mg/dL (07-27-23 @ 17:06)

## 2023-07-28 NOTE — PROGRESS NOTE ADULT - SUBJECTIVE AND OBJECTIVE BOX
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 In ER EKGs with intermittent diffuse ST changes, + troponins, +NSTEMI, started on a Tridil and Heparin gtt for NSTEMI, admitted to CTICU, IABP placed, now s/p CABG x 4 (LIMA-LAD, SVG-OM 1, OM2, PDA) on 7/14/23, IABP removed after OR without incident. Postop course notable for PAF with long conversion pauses (2-6.5 seconds with EP following, beta-blocker dose lowered, plan for Micra placement later today 7/28), and persistent hyperkalemia despite HD (concern for recirculation through fistula, vascular surgery following, pt now s/p fistulogram with percutaneous balloon angioplasty 7/25/23, with hyperkalemia resolved).      Significant recent/past 24 hr events: Remain PAF with conversion pauses on telemetry.     Subjective: Patient sitting in chair at bedside in NAD. Remains NPO for procedure later today. +Passing BMs since surgery. +Pain currently controlled. Denies fevers, chills, lightheadedness, dizziness, HA, CP, palpitations, SOB, cough, abdominal pain, N/V, diarrhea, numbness/tingling in extremities, or any other acute complaints. ROS negative x 10 systems except as noted above.    MEDICATIONS  (STANDING):  amLODIPine   Tablet 5 milliGRAM(s) Oral daily  atorvastatin 80 milliGRAM(s) Oral at bedtime  calcium acetate 667 milliGRAM(s) Oral three times a day with meals  clopidogrel Tablet 75 milliGRAM(s) Oral daily  epoetin lois-epbx (RETACRIT) Injectable 5000 Unit(s) IV Push <User Schedule>  gabapentin 100 milliGRAM(s) Oral three times a day  hydrALAZINE 25 milliGRAM(s) Oral three times a day  insulin glargine Injectable (LANTUS) 7 Unit(s) SubCutaneous at bedtime  insulin lispro (ADMELOG) corrective regimen sliding scale   SubCutaneous three times a day before meals  insulin lispro (ADMELOG) corrective regimen sliding scale   SubCutaneous at bedtime  iron sucrose IVPB 100 milliGRAM(s) IV Intermittent every 48 hours  lidocaine   4% Patch 1 Patch Transdermal daily  loratadine 10 milliGRAM(s) Oral daily  methocarbamol 500 milliGRAM(s) Oral two times a day  metoprolol tartrate 37.5 milliGRAM(s) Oral two times a day  Nephro-becky 1 Tablet(s) Oral daily  pantoprazole    Tablet 40 milliGRAM(s) Oral daily  sodium chloride 0.9% lock flush 3 milliLiter(s) IV Push every 8 hours  tamsulosin 0.4 milliGRAM(s) Oral at bedtime    MEDICATIONS  (PRN):  dextrose Oral Gel 15 Gram(s) Oral once PRN Blood Glucose LESS THAN 70 milliGRAM(s)/deciliter  ondansetron Injectable 4 milliGRAM(s) IV Push every 6 hours PRN Nausea and/or Vomiting  oxyCODONE    IR 5 milliGRAM(s) Oral every 6 hours PRN Severe Pain (7 - 10)    Allergies:  No Known Drug Allergies  shellfish (Rash)    Vitals   T(C): 36.6 (27 Jul 2023 23:56), Max: 37.6 (27 Jul 2023 21:00)  T(F): 97.9 (27 Jul 2023 23:56), Max: 99.7 (27 Jul 2023 21:00)  HR: 61 (27 Jul 2023 23:56) (61 - 112)  BP: 138/69 (27 Jul 2023 23:56) (127/30 - 176/40)  RR: 18 (27 Jul 2023 23:56) (16 - 18)  SpO2: 95% (27 Jul 2023 23:56) (91% - 97%)  O2 Parameters below as of 27 Jul 2023 23:56  Patient On (Oxygen Delivery Method): room air    I&O's Detail    26 Jul 2023 07:01  -  27 Jul 2023 07:00  --------------------------------------------------------  IN:    Oral Fluid: 360 mL  Total IN: 360 mL    OUT:    Voided (mL): 0 mL  Total OUT: 0 mL    Total NET: 360 mL      27 Jul 2023 07:01  -  28 Jul 2023 03:33  --------------------------------------------------------  IN:    Oral Fluid: 760 mL  Total IN: 760 mL    OUT:    Other (mL): 1500 mL  Total OUT: 1500 mL    Total NET: -740 mL    Physical Exam  Constitutional: NAD  Neuro: A+O x 3, non-focal, speech clear and intact  HEENT: NC/AT  Neck:  Supple, trachea midline  Chest: +PW (settings: VVI, rate: 30, mA: 10, sensitivity: 0.8)  CV: regular rate, regular rhythm, +S1S2, no murmurs or rub  Pulm/chest: lung sounds diminished at bases with bibasilar crackles, no accessory muscle use noted  Abd: soft, NT, ND, + BS  Ext: BARRETO x 4, +1-2 LE pitting edema bilaterally, distal motor/neuro/circ intact, Right UE AVF   Skin: warm, dry, perfused  Psych: calm, appropriate affect   Incision: midsternal incision open to air C/D/I, sternum stable, Right LE EVH site open to air, C/D/I    LABS                        8.9    9.58  )-----------( 162      ( 27 Jul 2023 04:50 )             27.1     07-27    132<L>  |  92<L>  |  44.6<H>  ----------------------------<  207<H>  4.2   |  23.0  |  8.27<H>    Ca    8.1<L>      27 Jul 2023 04:50  Phos  4.3     07-27  Mg     2.1     07-27    Urinalysis Basic - ( 27 Jul 2023 04:50 )  Color: x / Appearance: x / SG: x / pH: x  Gluc: 207 mg/dL / Ketone: x  / Bili: x / Urobili: x   Blood: x / Protein: x / Nitrite: x   Leuk Esterase: x / RBC: x / WBC x   Sq Epi: x / Non Sq Epi: x / Bacteria: x    POCT Blood Glucose.: 188 mg/dL (07-27-23 @ 21:15)  POCT Blood Glucose.: 218 mg/dL (07-27-23 @ 18:25)  POCT Blood Glucose.: 188 mg/dL (07-27-23 @ 17:06)  POCT Blood Glucose.: 104 mg/dL (07-27-23 @ 12:06)  POCT Blood Glucose.: 225 mg/dL (07-27-23 @ 08:05)      Last CXR:  < from: Xray Chest 1 View- PORTABLE-Routine (Xray Chest 1 View- PORTABLE-Routine in AM.) (07.27.23 @ 05:04) >  IMPRESSION:  Small left pleural effusion, grossly unchanged.  Clearing of pulmonary venous congestion and right pleural effusion.  < end of copied text >

## 2023-07-28 NOTE — PROGRESS NOTE ADULT - ASSESSMENT
63M PMHx ESRD on HD (T,Th, S), IDDM, COPD, gastroparesis, peripheral blindness and retinopathy, NSTEMI 6/2022 with recent hospital admission for CABG eval, now presents from home with complaints of worsening chest pain associated with difficulty breathing. Admitted to CTICU, IABP placed, now s/p CABG on 7/14.    1. DM2, a1c 9.4%  - Continue lantus 7 units qhs  - Sliding scale coverage  - Mild hypoglycemia last week after hyperkalemia treatment (regular insulin 10u/D50)    2. CAD  - S/p CABG, care per CT surgery team    3. ESRD  - HD as per nephrology  - S/p fistulogram and angioplasty on 07/25/23 by Vascular Surgery     4. Afib/aflutter  - On metoprolol  - Plan for EP ablation

## 2023-07-28 NOTE — PROGRESS NOTE ADULT - NS ATTEND OPT1A GEN_ALL_CORE
Exam/Medical decision making
Medical decision making
Medical decision making
History/Exam/Medical decision making
Medical decision making
History/Exam/Medical decision making
Medical decision making
Medical decision making

## 2023-07-28 NOTE — PROGRESS NOTE ADULT - SUBJECTIVE AND OBJECTIVE BOX
PROCEDURE(S): Leadless Permanent Pacemaker and Loop Recorder Implant     ELECTROPHYSIOLOGIST(S): Namrata Gao MD         COMPLICATIONS:  none        DISPOSITION:  observation     CONDITION: stable  EBL: <15mL    Pt doing well s/p Medtronic loop recorder and leadless Micra implant (1 deployment) via RFV access. Trace effusion noted preprocedure and remained unchanged post procedure. Denies complaint.     Device settings: VVI 40bpm      MEDICATIONS  (STANDING):  amLODIPine   Tablet 5 milliGRAM(s) Oral daily  atorvastatin 80 milliGRAM(s) Oral at bedtime  calcium acetate 667 milliGRAM(s) Oral three times a day with meals  ceFAZolin   IVPB 2000 milliGRAM(s) IV Intermittent every 8 hours  clopidogrel Tablet 75 milliGRAM(s) Oral daily  dextrose 50% Injectable 50 milliLiter(s) IV Push every 15 minutes  dextrose 50% Injectable 25 milliLiter(s) IV Push every 15 minutes  epoetin lois-epbx (RETACRIT) Injectable 5000 Unit(s) IV Push <User Schedule>  gabapentin 100 milliGRAM(s) Oral three times a day  glucagon  Injectable 1 milliGRAM(s) IntraMuscular once  hydrALAZINE 25 milliGRAM(s) Oral three times a day  insulin glargine Injectable (LANTUS) 7 Unit(s) SubCutaneous at bedtime  insulin lispro (ADMELOG) corrective regimen sliding scale   SubCutaneous three times a day before meals  insulin lispro (ADMELOG) corrective regimen sliding scale   SubCutaneous at bedtime  iron sucrose IVPB 100 milliGRAM(s) IV Intermittent every 48 hours  lidocaine   4% Patch 1 Patch Transdermal daily  lidocaine   4% Patch 1 Patch Transdermal daily  loratadine 10 milliGRAM(s) Oral daily  methocarbamol 500 milliGRAM(s) Oral two times a day  metoprolol tartrate 37.5 milliGRAM(s) Oral every 8 hours  Nephro-becky 1 Tablet(s) Oral daily  pantoprazole    Tablet 40 milliGRAM(s) Oral daily  sodium chloride 0.9% lock flush 3 milliLiter(s) IV Push every 8 hours  tamsulosin 0.4 milliGRAM(s) Oral at bedtime    MEDICATIONS  (PRN):  dextrose Oral Gel 15 Gram(s) Oral once PRN Blood Glucose LESS THAN 70 milliGRAM(s)/deciliter  ondansetron Injectable 4 milliGRAM(s) IV Push every 6 hours PRN Nausea and/or Vomiting  oxyCODONE    IR 5 milliGRAM(s) Oral every 6 hours PRN Severe Pain (7 - 10)      Allergies  No Known Drug Allergies  shellfish (Rash)    Exam:   HR:   BP:   RR:   SpO2:     Gen: VSS, NAD, Awake and alert  Card: + sternal incision   Incision: left parasternal ILR site with Dermabond in place; site D/C/I. No swelling, bleeding, or edema   Resp: lungs CTA b/l  Abd: S/NT/ND  Groin (right): hemostatic suture in place; site C/D/I b/l; no bleeding, hematoma, erythema, exudate or edema  Ext: no edema; distal pulses intact  Neuro: CN II-XII grossly intact, BARRETO x4 with strength and sensation intact and equal bilaterally    I/Os: net +     ECG:    Assessment:   63 year old male with a history of HTN, hyperlipidemia, ESRD on HD (T, Th, Sat), COPD, DM type 2 uncontrolled, gastroparesis s/p gastric PM 2/2013, peripheral blindness and retinopathy, BPH, recent CDiff infection 6/1/23 s/p 10 day course of oral vancomycin, CAD s/p prior PCI, and recent hospitalization 6/2023 for NSTEMI, ICM LVEF 35-40%, and C which demonstrated multi-vessel disease who underwent 4vCABG (LIMA-LAD,[id number]) on 7/14/23. EP consulted regarding AFib/AFlutter with conversion pauses. Now status post uncomplicated MDT leadless Micra implant via RFV access and loop recorder implant for continued monitoring.       Plan:   Bedrest x 6 hours post implant, then OOB w/ assist & progress as tolerated.    Continued observation on telemetry overnight.   Cont Ancef 2gm IV q 8 hours x 2 additional doses to complete 24 hour course.   Pain control with PO analgesia PRN.   Groin sutures to be removed by EP service in AM.   NO HEPARIN OR LOVENOX, INCLUDING PROPHYLACTIC/SUBCUT DOSING, UNTIL OTHERWISE ADVISED BY EP.   Hold Eliquis x 48 hours.   Increase Metoprolol to 37.5mg every 8 hours and titrate up as needed.   PA/Lat CXR and device check in AM.   Strict I/Os.  Outpt f/up with Dr. Dietrich in 1-2 weeks.   PROCEDURE(S): Leadless Permanent Pacemaker and Loop Recorder Implant     ELECTROPHYSIOLOGIST(S): Namrata Gao MD         COMPLICATIONS:  none        DISPOSITION:  observation     CONDITION: stable  EBL: <15mL    Pt doing well s/p Medtronic loop recorder and leadless Micra implant (1 deployment) via RFV access. Trace effusion noted preprocedure and remained unchanged post procedure. Denies complaint.     Device settings: VVI 40bpm      MEDICATIONS  (STANDING):  amLODIPine   Tablet 5 milliGRAM(s) Oral daily  atorvastatin 80 milliGRAM(s) Oral at bedtime  calcium acetate 667 milliGRAM(s) Oral three times a day with meals  ceFAZolin   IVPB 2000 milliGRAM(s) IV Intermittent every 8 hours  clopidogrel Tablet 75 milliGRAM(s) Oral daily  dextrose 50% Injectable 50 milliLiter(s) IV Push every 15 minutes  dextrose 50% Injectable 25 milliLiter(s) IV Push every 15 minutes  epoetin lois-epbx (RETACRIT) Injectable 5000 Unit(s) IV Push <User Schedule>  gabapentin 100 milliGRAM(s) Oral three times a day  glucagon  Injectable 1 milliGRAM(s) IntraMuscular once  hydrALAZINE 25 milliGRAM(s) Oral three times a day  insulin glargine Injectable (LANTUS) 7 Unit(s) SubCutaneous at bedtime  insulin lispro (ADMELOG) corrective regimen sliding scale   SubCutaneous three times a day before meals  insulin lispro (ADMELOG) corrective regimen sliding scale   SubCutaneous at bedtime  iron sucrose IVPB 100 milliGRAM(s) IV Intermittent every 48 hours  lidocaine   4% Patch 1 Patch Transdermal daily  lidocaine   4% Patch 1 Patch Transdermal daily  loratadine 10 milliGRAM(s) Oral daily  methocarbamol 500 milliGRAM(s) Oral two times a day  metoprolol tartrate 37.5 milliGRAM(s) Oral every 8 hours  Nephro-becky 1 Tablet(s) Oral daily  pantoprazole    Tablet 40 milliGRAM(s) Oral daily  sodium chloride 0.9% lock flush 3 milliLiter(s) IV Push every 8 hours  tamsulosin 0.4 milliGRAM(s) Oral at bedtime    MEDICATIONS  (PRN):  dextrose Oral Gel 15 Gram(s) Oral once PRN Blood Glucose LESS THAN 70 milliGRAM(s)/deciliter  ondansetron Injectable 4 milliGRAM(s) IV Push every 6 hours PRN Nausea and/or Vomiting  oxyCODONE    IR 5 milliGRAM(s) Oral every 6 hours PRN Severe Pain (7 - 10)      Allergies  No Known Drug Allergies  shellfish (Rash)    Exam:   HR: 110  BP: 140/66  RR: 14  SpO2: 97% on NC    Gen: VSS, NAD, Awake and alert  Card: + sternal incision. Tachycardic, regular. No murmur appreciated  Incision: left parasternal ILR site with Dermabond in place; site D/C/I. No swelling, bleeding, or edema   Resp: lungs CTA b/l  Abd: S/NT/ND  Groin (right): hemostatic suture in place; site C/D/I b/l; no bleeding, hematoma, erythema, exudate or edema  Ext: no edema; distal pulses intact  Neuro: CN II-XII grossly intact, BARRETO x4 with strength and sensation intact and equal bilaterally    I/Os: net + 400    EC  Assessment:   63 year old male with a history of HTN, hyperlipidemia, ESRD on HD (T, Th, Sat), COPD, DM type 2 uncontrolled, gastroparesis s/p gastric PM 2/2013, peripheral blindness and retinopathy, BPH, recent CDiff infection 6/1/23 s/p 10 day course of oral vancomycin, CAD s/p prior PCI, and recent hospitalization 6/2023 for NSTEMI, ICM LVEF 35-40%, and C which demonstrated multi-vessel disease who underwent 4vCABG (LIMA-LAD,[id number]) on 7/14/23. EP consulted regarding AFib/AFlutter with conversion pauses. Now status post uncomplicated MDT leadless Micra implant via RFV access and loop recorder implant for continued monitoring.       Plan:   Bedrest x 6 hours post implant, then OOB w/ assist & progress as tolerated.    Continued observation on telemetry overnight.   Another Ancef 2gm IV after dialysis.  Pain control with PO analgesia PRN.   Groin sutures to be removed by EP service in AM.   NO HEPARIN OR LOVENOX, INCLUDING PROPHYLACTIC/SUBCUT DOSING, UNTIL OTHERWISE ADVISED BY EP.   Hold Eliquis x 48 hours.   Increase Metoprolol to 37.5mg every 8 hours and titrate up as needed.   PA/Lat CXR and device check in AM.   Strict I/Os.  Outpt f/up with Dr. Dietrich in 1-2 weeks.   PROCEDURE(S): Leadless Permanent Pacemaker and Loop Recorder Implant     ELECTROPHYSIOLOGIST(S): Namrata Gao MD         COMPLICATIONS:  none        DISPOSITION:  observation     CONDITION: stable  EBL: <15mL    Pt doing well s/p Medtronic loop recorder and leadless Micra implant (1 deployment) via RFV access. Trace effusion noted preprocedure and remained unchanged post procedure. Denies complaint.     Device settings: VVI 40bpm      MEDICATIONS  (STANDING):  amLODIPine   Tablet 5 milliGRAM(s) Oral daily  atorvastatin 80 milliGRAM(s) Oral at bedtime  calcium acetate 667 milliGRAM(s) Oral three times a day with meals  ceFAZolin   IVPB 2000 milliGRAM(s) IV Intermittent every 8 hours  clopidogrel Tablet 75 milliGRAM(s) Oral daily  dextrose 50% Injectable 50 milliLiter(s) IV Push every 15 minutes  dextrose 50% Injectable 25 milliLiter(s) IV Push every 15 minutes  epoetin lois-epbx (RETACRIT) Injectable 5000 Unit(s) IV Push <User Schedule>  gabapentin 100 milliGRAM(s) Oral three times a day  glucagon  Injectable 1 milliGRAM(s) IntraMuscular once  hydrALAZINE 25 milliGRAM(s) Oral three times a day  insulin glargine Injectable (LANTUS) 7 Unit(s) SubCutaneous at bedtime  insulin lispro (ADMELOG) corrective regimen sliding scale   SubCutaneous three times a day before meals  insulin lispro (ADMELOG) corrective regimen sliding scale   SubCutaneous at bedtime  iron sucrose IVPB 100 milliGRAM(s) IV Intermittent every 48 hours  lidocaine   4% Patch 1 Patch Transdermal daily  lidocaine   4% Patch 1 Patch Transdermal daily  loratadine 10 milliGRAM(s) Oral daily  methocarbamol 500 milliGRAM(s) Oral two times a day  metoprolol tartrate 37.5 milliGRAM(s) Oral every 8 hours  Nephro-becky 1 Tablet(s) Oral daily  pantoprazole    Tablet 40 milliGRAM(s) Oral daily  sodium chloride 0.9% lock flush 3 milliLiter(s) IV Push every 8 hours  tamsulosin 0.4 milliGRAM(s) Oral at bedtime    MEDICATIONS  (PRN):  dextrose Oral Gel 15 Gram(s) Oral once PRN Blood Glucose LESS THAN 70 milliGRAM(s)/deciliter  ondansetron Injectable 4 milliGRAM(s) IV Push every 6 hours PRN Nausea and/or Vomiting  oxyCODONE    IR 5 milliGRAM(s) Oral every 6 hours PRN Severe Pain (7 - 10)      Allergies  No Known Drug Allergies  shellfish (Rash)    Exam:   HR: 110  BP: 140/66  RR: 14  SpO2: 97% on NC    Gen: VSS, NAD, Awake and alert  Card: + sternal incision. Tachycardic, regular. No murmur appreciated  Incision: left parasternal ILR site with Dermabond in place; site D/C/I. No swelling, bleeding, or edema   Resp: lungs CTA b/l  Abd: S/NT/ND  Groin (right): hemostatic suture in place; site C/D/I b/l; no bleeding, hematoma, erythema, exudate or edema  Ext: no edema; distal pulses intact  Neuro: CN II-XII grossly intact, BARRETO x4 with strength and sensation intact and equal bilaterally    I/Os: net + 400    EC  Assessment:   63 year old male with a history of HTN, hyperlipidemia, ESRD on HD (T, Th, Sat), COPD, DM type 2 uncontrolled, gastroparesis s/p gastric PM 2/2013, peripheral blindness and retinopathy, BPH, recent CDiff infection 6/1/23 s/p 10 day course of oral vancomycin, CAD s/p prior PCI, and recent hospitalization 6/2023 for NSTEMI, ICM LVEF 35-40%, and C which demonstrated multi-vessel disease who underwent 4vCABG (LIMA-LAD,[id number]) on 7/14/23. EP consulted regarding AFib/AFlutter with conversion pauses. Now status post uncomplicated MDT leadless Micra implant via RFV access and loop recorder implant for continued monitoring.       Plan:   Bedrest x 6 hours post implant, then OOB w/ assist & progress as tolerated.    Continued observation on telemetry overnight.   Another dose of Ancef 2gm IV after dialysis.  Pain control with PO analgesia PRN.   Groin sutures to be removed by EP service in AM.   NO HEPARIN OR LOVENOX, INCLUDING PROPHYLACTIC/SUBCUT DOSING, UNTIL OTHERWISE ADVISED BY EP.   Hold Eliquis x 48 hours.   Increase Metoprolol to 37.5mg every 8 hours and titrate up as needed.   PA/Lat CXR and device check in AM.   Strict I/Os.  Outpt f/up with Dr. Dietrich in 1-2 weeks.    ****Discharge instruction***** Please include in discharge summary  - Bruising at the groin, sometimes extending down the leg, and/or a small lump under the skin at the groin access site is normal and will resolve within 2 – 3 weeks.   - You may walk and take stairs at a regular pace.   - Do not perform any exercise more strenuous than walking for 1 week.   - Do not strain or lift heavy objects for 1 week.  - You may shower the day after the procedure.  - Do not soak in water (such as tub baths, hot tubs, swimming, etc.) for 1 week.   - You may resume all other activities the day after the procedure.  Call your doctor if:   - you notice bleeding, redness, drainage, swelling, increased tenderness or a hot sensation around the catheter insertion site.   - your temperature is greater than 100 degrees F for more than 24 hours.  - you have any questions or concerns regarding the procedure.  If significant bleeding and/or a large lump (the size of a golf ball or bigger) occurs:  - Lie flat and apply continuous direct pressure just above the puncture site for at least 10 minutes  - If the issue resolves, notify your physician immediately.    - If the bleeding cannot be controlled, please seek immediate medical attention.  If you experience increased difficulty breathing or chest pain, or if you faint or have dizzy spells, please seek immediate medical attention.   PROCEDURE(S): Leadless Permanent Pacemaker and Loop Recorder Implant     ELECTROPHYSIOLOGIST(S): Namrata Gao MD         COMPLICATIONS:  none        DISPOSITION:  observation     CONDITION: stable  EBL: <15mL    Pt doing well s/p Medtronic loop recorder and leadless Micra implant (1 deployment) via RFV access. Trace effusion noted preprocedure and remained unchanged post procedure. Denies complaint.     Device settings: VVI 40bpm  Contrast: 10cc     MEDICATIONS  (STANDING):  amLODIPine   Tablet 5 milliGRAM(s) Oral daily  atorvastatin 80 milliGRAM(s) Oral at bedtime  calcium acetate 667 milliGRAM(s) Oral three times a day with meals  ceFAZolin   IVPB 2000 milliGRAM(s) IV Intermittent every 8 hours  clopidogrel Tablet 75 milliGRAM(s) Oral daily  dextrose 50% Injectable 50 milliLiter(s) IV Push every 15 minutes  dextrose 50% Injectable 25 milliLiter(s) IV Push every 15 minutes  epoetin lois-epbx (RETACRIT) Injectable 5000 Unit(s) IV Push <User Schedule>  gabapentin 100 milliGRAM(s) Oral three times a day  glucagon  Injectable 1 milliGRAM(s) IntraMuscular once  hydrALAZINE 25 milliGRAM(s) Oral three times a day  insulin glargine Injectable (LANTUS) 7 Unit(s) SubCutaneous at bedtime  insulin lispro (ADMELOG) corrective regimen sliding scale   SubCutaneous three times a day before meals  insulin lispro (ADMELOG) corrective regimen sliding scale   SubCutaneous at bedtime  iron sucrose IVPB 100 milliGRAM(s) IV Intermittent every 48 hours  lidocaine   4% Patch 1 Patch Transdermal daily  lidocaine   4% Patch 1 Patch Transdermal daily  loratadine 10 milliGRAM(s) Oral daily  methocarbamol 500 milliGRAM(s) Oral two times a day  metoprolol tartrate 37.5 milliGRAM(s) Oral every 8 hours  Nephro-becky 1 Tablet(s) Oral daily  pantoprazole    Tablet 40 milliGRAM(s) Oral daily  sodium chloride 0.9% lock flush 3 milliLiter(s) IV Push every 8 hours  tamsulosin 0.4 milliGRAM(s) Oral at bedtime    MEDICATIONS  (PRN):  dextrose Oral Gel 15 Gram(s) Oral once PRN Blood Glucose LESS THAN 70 milliGRAM(s)/deciliter  ondansetron Injectable 4 milliGRAM(s) IV Push every 6 hours PRN Nausea and/or Vomiting  oxyCODONE    IR 5 milliGRAM(s) Oral every 6 hours PRN Severe Pain (7 - 10)      Allergies  No Known Drug Allergies  shellfish (Rash)    Exam:   HR: 110  BP: 140/66  RR: 14  SpO2: 97% on NC    Gen: VSS, NAD, Awake and alert  Card: + sternal incision. Tachycardic, regular. No murmur appreciated  Incision: left parasternal ILR site with Dermabond in place; site D/C/I. No swelling, bleeding, or edema   Resp: lungs CTA b/l  Abd: S/NT/ND  Groin (right): hemostatic suture in place; site C/D/I b/l; no bleeding, hematoma, erythema, exudate or edema  Ext: no edema; distal pulses intact  Neuro: CN II-XII grossly intact, BARRETO x4 with strength and sensation intact and equal bilaterally    I/Os: net + 400    EC  Assessment:   63 year old male with a history of HTN, hyperlipidemia, ESRD on HD (T, Th, Sat), COPD, DM type 2 uncontrolled, gastroparesis s/p gastric PM 2/2013, peripheral blindness and retinopathy, BPH, recent CDiff infection 6/1/23 s/p 10 day course of oral vancomycin, CAD s/p prior PCI, and recent hospitalization 6/2023 for NSTEMI, ICM LVEF 35-40%, and C which demonstrated multi-vessel disease who underwent 4vCABG (LIMA-LAD,[id number]) on 7/14/23. EP consulted regarding AFib/AFlutter with conversion pauses. Now status post uncomplicated MDT leadless Micra implant via RFV access and loop recorder implant for continued monitoring.       Plan:   Bedrest x 6 hours post implant, then OOB w/ assist & progress as tolerated.    Continued observation on telemetry overnight.   Another dose of Ancef 2gm IV after dialysis.  Pain control with PO analgesia PRN.   Groin sutures to be removed by EP service in AM.   NO HEPARIN OR LOVENOX, INCLUDING PROPHYLACTIC/SUBCUT DOSING, UNTIL OTHERWISE ADVISED BY EP.   Hold Eliquis x 48 hours.   Increase Metoprolol to 37.5mg every 8 hours and titrate up as needed.   PA/Lat CXR and device check in AM.   Strict I/Os.  Outpt f/up with Dr. Dietrich in 1-2 weeks.    ****Discharge instruction***** Please include in discharge summary  - Bruising at the groin, sometimes extending down the leg, and/or a small lump under the skin at the groin access site is normal and will resolve within 2 – 3 weeks.   - You may walk and take stairs at a regular pace.   - Do not perform any exercise more strenuous than walking for 1 week.   - Do not strain or lift heavy objects for 1 week.  - You may shower the day after the procedure.  - Do not soak in water (such as tub baths, hot tubs, swimming, etc.) for 1 week.   - You may resume all other activities the day after the procedure.  Call your doctor if:   - you notice bleeding, redness, drainage, swelling, increased tenderness or a hot sensation around the catheter insertion site.   - your temperature is greater than 100 degrees F for more than 24 hours.  - you have any questions or concerns regarding the procedure.  If significant bleeding and/or a large lump (the size of a golf ball or bigger) occurs:  - Lie flat and apply continuous direct pressure just above the puncture site for at least 10 minutes  - If the issue resolves, notify your physician immediately.    - If the bleeding cannot be controlled, please seek immediate medical attention.  If you experience increased difficulty breathing or chest pain, or if you faint or have dizzy spells, please seek immediate medical attention.   PROCEDURE(S): Leadless Permanent Pacemaker and Loop Recorder Implant     ELECTROPHYSIOLOGIST(S): Namrata Gao MD         COMPLICATIONS:  none        DISPOSITION:  observation     CONDITION: stable  EBL: <15mL    Pt doing well s/p Medtronic loop recorder and leadless Micra implant (1 deployment) via RFV access. Trace effusion noted preprocedure and remained unchanged post procedure. Denies complaint.     Device settings: VVI 40bpm  Contrast: 10cc     MEDICATIONS  (STANDING):  amLODIPine   Tablet 5 milliGRAM(s) Oral daily  atorvastatin 80 milliGRAM(s) Oral at bedtime  calcium acetate 667 milliGRAM(s) Oral three times a day with meals  ceFAZolin   IVPB 2000 milliGRAM(s) IV Intermittent every 8 hours  clopidogrel Tablet 75 milliGRAM(s) Oral daily  dextrose 50% Injectable 50 milliLiter(s) IV Push every 15 minutes  dextrose 50% Injectable 25 milliLiter(s) IV Push every 15 minutes  epoetin lois-epbx (RETACRIT) Injectable 5000 Unit(s) IV Push <User Schedule>  gabapentin 100 milliGRAM(s) Oral three times a day  glucagon  Injectable 1 milliGRAM(s) IntraMuscular once  hydrALAZINE 25 milliGRAM(s) Oral three times a day  insulin glargine Injectable (LANTUS) 7 Unit(s) SubCutaneous at bedtime  insulin lispro (ADMELOG) corrective regimen sliding scale   SubCutaneous three times a day before meals  insulin lispro (ADMELOG) corrective regimen sliding scale   SubCutaneous at bedtime  iron sucrose IVPB 100 milliGRAM(s) IV Intermittent every 48 hours  lidocaine   4% Patch 1 Patch Transdermal daily  lidocaine   4% Patch 1 Patch Transdermal daily  loratadine 10 milliGRAM(s) Oral daily  methocarbamol 500 milliGRAM(s) Oral two times a day  metoprolol tartrate 37.5 milliGRAM(s) Oral every 8 hours  Nephro-becky 1 Tablet(s) Oral daily  pantoprazole    Tablet 40 milliGRAM(s) Oral daily  sodium chloride 0.9% lock flush 3 milliLiter(s) IV Push every 8 hours  tamsulosin 0.4 milliGRAM(s) Oral at bedtime    MEDICATIONS  (PRN):  dextrose Oral Gel 15 Gram(s) Oral once PRN Blood Glucose LESS THAN 70 milliGRAM(s)/deciliter  ondansetron Injectable 4 milliGRAM(s) IV Push every 6 hours PRN Nausea and/or Vomiting  oxyCODONE    IR 5 milliGRAM(s) Oral every 6 hours PRN Severe Pain (7 - 10)      Allergies  No Known Drug Allergies  shellfish (Rash)    Exam:   HR: 110  BP: 140/66  RR: 14  SpO2: 97% on NC    Gen: VSS, NAD, Awake and alert  Card: + sternal incision. Tachycardic, regular. No murmur appreciated  Incision: left parasternal ILR site with Dermabond in place; site D/C/I. No swelling, bleeding, or edema   Resp: lungs CTA b/l  Abd: S/NT/ND  Groin (right): hemostatic suture in place; site C/D/I b/l; no bleeding, hematoma, erythema, exudate or edema  Ext: no edema; distal pulses intact  Neuro: CN II-XII grossly intact, BARRETO x4 with strength and sensation intact and equal bilaterally    I/Os: net + 400    EC:1 flutter at 110bpm, narrow QRSD    Assessment:   63 year old male with a history of HTN, hyperlipidemia, ESRD on HD (T, Th, Sat), COPD, DM type 2 uncontrolled, gastroparesis s/p gastric PM 2013, peripheral blindness and retinopathy, BPH, recent CDiff infection 23 s/p 10 day course of oral vancomycin, CAD s/p prior PCI, and recent hospitalization 2023 for NSTEMI, ICM LVEF 35-40%, and LHC which demonstrated multi-vessel disease who underwent 4vCABG (LIMA-LAD,[id number]) on 23. EP consulted regarding AFib/AFlutter with conversion pauses. Now status post uncomplicated MDT leadless Micra implant via RFV access and loop recorder implant for continued monitoring.       Plan:   Bedrest x 6 hours post implant, then OOB w/ assist & progress as tolerated.    Continued observation on telemetry overnight.   Another dose of Ancef 2gm IV after dialysis.  Pain control with PO analgesia PRN.   Groin sutures to be removed by EP service in AM.   NO HEPARIN OR LOVENOX, INCLUDING PROPHYLACTIC/SUBCUT DOSING, UNTIL OTHERWISE ADVISED BY EP.   Hold Eliquis x 48 hours.   Increase Metoprolol to 37.5mg every 8 hours and titrate up as needed.   PA/Lat CXR and device check in AM.   Strict I/Os.  Outpt f/up with Dr. Dietrich in 1-2 weeks.    ****Discharge instruction***** Please include in discharge summary  - Bruising at the groin, sometimes extending down the leg, and/or a small lump under the skin at the groin access site is normal and will resolve within 2 – 3 weeks.   - You may walk and take stairs at a regular pace.   - Do not perform any exercise more strenuous than walking for 1 week.   - Do not strain or lift heavy objects for 1 week.  - You may shower the day after the procedure.  - Do not soak in water (such as tub baths, hot tubs, swimming, etc.) for 1 week.   - You may resume all other activities the day after the procedure.  Call your doctor if:   - you notice bleeding, redness, drainage, swelling, increased tenderness or a hot sensation around the catheter insertion site.   - your temperature is greater than 100 degrees F for more than 24 hours.  - you have any questions or concerns regarding the procedure.  If significant bleeding and/or a large lump (the size of a golf ball or bigger) occurs:  - Lie flat and apply continuous direct pressure just above the puncture site for at least 10 minutes  - If the issue resolves, notify your physician immediately.    - If the bleeding cannot be controlled, please seek immediate medical attention.  If you experience increased difficulty breathing or chest pain, or if you faint or have dizzy spells, please seek immediate medical attention.

## 2023-07-28 NOTE — PROCEDURE NOTE - NSPROCDETAILS_GEN_ALL_CORE
Seldinger technique/secured in place/sterile dressing applied/percutaneous/ultrasound assessment of fluid (location)

## 2023-07-28 NOTE — CHART NOTE - NSCHARTNOTEFT_GEN_A_CORE
pt with TBS, here for pacing support. Given normal EF, high infection risk and anticipated low pacing burden leadless PPM deemed more reasonable. All BRA discussed. Risks explained and include but not limited to vascular complication, stroke, heart attack, cardiac perforation, dislodgment and death. He provided informed consent.

## 2023-07-29 DIAGNOSIS — J90 PLEURAL EFFUSION, NOT ELSEWHERE CLASSIFIED: ICD-10-CM

## 2023-07-29 LAB
ANION GAP SERPL CALC-SCNC: 16 MMOL/L — SIGNIFICANT CHANGE UP (ref 5–17)
BUN SERPL-MCNC: 38.5 MG/DL — HIGH (ref 8–20)
CALCIUM SERPL-MCNC: 8 MG/DL — LOW (ref 8.4–10.5)
CHLORIDE SERPL-SCNC: 93 MMOL/L — LOW (ref 96–108)
CO2 SERPL-SCNC: 23 MMOL/L — SIGNIFICANT CHANGE UP (ref 22–29)
CREAT SERPL-MCNC: 7.87 MG/DL — HIGH (ref 0.5–1.3)
EGFR: 7 ML/MIN/1.73M2 — LOW
GLUCOSE BLDC GLUCOMTR-MCNC: 103 MG/DL — HIGH (ref 70–99)
GLUCOSE BLDC GLUCOMTR-MCNC: 119 MG/DL — HIGH (ref 70–99)
GLUCOSE BLDC GLUCOMTR-MCNC: 189 MG/DL — HIGH (ref 70–99)
GLUCOSE BLDC GLUCOMTR-MCNC: 220 MG/DL — HIGH (ref 70–99)
GLUCOSE SERPL-MCNC: 140 MG/DL — HIGH (ref 70–99)
HCT VFR BLD CALC: 26.5 % — LOW (ref 39–50)
HGB BLD-MCNC: 8.7 G/DL — LOW (ref 13–17)
MAGNESIUM SERPL-MCNC: 2 MG/DL — SIGNIFICANT CHANGE UP (ref 1.6–2.6)
MCHC RBC-ENTMCNC: 30 PG — SIGNIFICANT CHANGE UP (ref 27–34)
MCHC RBC-ENTMCNC: 32.8 GM/DL — SIGNIFICANT CHANGE UP (ref 32–36)
MCV RBC AUTO: 91.4 FL — SIGNIFICANT CHANGE UP (ref 80–100)
PLATELET # BLD AUTO: 167 K/UL — SIGNIFICANT CHANGE UP (ref 150–400)
POTASSIUM SERPL-MCNC: 4.7 MMOL/L — SIGNIFICANT CHANGE UP (ref 3.5–5.3)
POTASSIUM SERPL-SCNC: 4.7 MMOL/L — SIGNIFICANT CHANGE UP (ref 3.5–5.3)
RBC # BLD: 2.9 M/UL — LOW (ref 4.2–5.8)
RBC # FLD: 15.7 % — HIGH (ref 10.3–14.5)
SODIUM SERPL-SCNC: 132 MMOL/L — LOW (ref 135–145)
WBC # BLD: 9.53 K/UL — SIGNIFICANT CHANGE UP (ref 3.8–10.5)
WBC # FLD AUTO: 9.53 K/UL — SIGNIFICANT CHANGE UP (ref 3.8–10.5)

## 2023-07-29 PROCEDURE — 71045 X-RAY EXAM CHEST 1 VIEW: CPT | Mod: 26

## 2023-07-29 PROCEDURE — 99222 1ST HOSP IP/OBS MODERATE 55: CPT | Mod: 24

## 2023-07-29 PROCEDURE — 90937 HEMODIALYSIS REPEATED EVAL: CPT

## 2023-07-29 PROCEDURE — 71046 X-RAY EXAM CHEST 2 VIEWS: CPT | Mod: 26

## 2023-07-29 PROCEDURE — 99024 POSTOP FOLLOW-UP VISIT: CPT

## 2023-07-29 PROCEDURE — 93010 ELECTROCARDIOGRAM REPORT: CPT

## 2023-07-29 RX ADMIN — CLOPIDOGREL BISULFATE 75 MILLIGRAM(S): 75 TABLET, FILM COATED ORAL at 13:01

## 2023-07-29 RX ADMIN — Medication 25 MILLIGRAM(S): at 05:28

## 2023-07-29 RX ADMIN — LORATADINE 10 MILLIGRAM(S): 10 TABLET ORAL at 13:01

## 2023-07-29 RX ADMIN — Medication 1: at 17:31

## 2023-07-29 RX ADMIN — INSULIN GLARGINE 7 UNIT(S): 100 INJECTION, SOLUTION SUBCUTANEOUS at 21:45

## 2023-07-29 RX ADMIN — Medication 667 MILLIGRAM(S): at 08:24

## 2023-07-29 RX ADMIN — METHOCARBAMOL 500 MILLIGRAM(S): 500 TABLET, FILM COATED ORAL at 17:30

## 2023-07-29 RX ADMIN — GABAPENTIN 100 MILLIGRAM(S): 400 CAPSULE ORAL at 13:01

## 2023-07-29 RX ADMIN — GABAPENTIN 100 MILLIGRAM(S): 400 CAPSULE ORAL at 05:28

## 2023-07-29 RX ADMIN — Medication 37.5 MILLIGRAM(S): at 21:43

## 2023-07-29 RX ADMIN — Medication 37.5 MILLIGRAM(S): at 05:27

## 2023-07-29 RX ADMIN — Medication 1 TABLET(S): at 13:01

## 2023-07-29 RX ADMIN — SODIUM CHLORIDE 3 MILLILITER(S): 9 INJECTION INTRAMUSCULAR; INTRAVENOUS; SUBCUTANEOUS at 21:41

## 2023-07-29 RX ADMIN — GABAPENTIN 100 MILLIGRAM(S): 400 CAPSULE ORAL at 21:43

## 2023-07-29 RX ADMIN — LIDOCAINE 1 PATCH: 4 CREAM TOPICAL at 18:50

## 2023-07-29 RX ADMIN — IRON SUCROSE 105 MILLIGRAM(S): 20 INJECTION, SOLUTION INTRAVENOUS at 09:49

## 2023-07-29 RX ADMIN — LIDOCAINE 1 PATCH: 4 CREAM TOPICAL at 08:40

## 2023-07-29 RX ADMIN — LIDOCAINE 1 PATCH: 4 CREAM TOPICAL at 03:08

## 2023-07-29 RX ADMIN — PANTOPRAZOLE SODIUM 40 MILLIGRAM(S): 20 TABLET, DELAYED RELEASE ORAL at 13:02

## 2023-07-29 RX ADMIN — SODIUM CHLORIDE 3 MILLILITER(S): 9 INJECTION INTRAMUSCULAR; INTRAVENOUS; SUBCUTANEOUS at 13:15

## 2023-07-29 RX ADMIN — OXYCODONE HYDROCHLORIDE 5 MILLIGRAM(S): 5 TABLET ORAL at 03:07

## 2023-07-29 RX ADMIN — Medication 25 MILLIGRAM(S): at 21:44

## 2023-07-29 RX ADMIN — AMLODIPINE BESYLATE 5 MILLIGRAM(S): 2.5 TABLET ORAL at 05:28

## 2023-07-29 RX ADMIN — TAMSULOSIN HYDROCHLORIDE 0.4 MILLIGRAM(S): 0.4 CAPSULE ORAL at 21:44

## 2023-07-29 RX ADMIN — OXYCODONE HYDROCHLORIDE 5 MILLIGRAM(S): 5 TABLET ORAL at 04:07

## 2023-07-29 RX ADMIN — ATORVASTATIN CALCIUM 80 MILLIGRAM(S): 80 TABLET, FILM COATED ORAL at 21:44

## 2023-07-29 RX ADMIN — Medication 37.5 MILLIGRAM(S): at 13:02

## 2023-07-29 RX ADMIN — METHOCARBAMOL 500 MILLIGRAM(S): 500 TABLET, FILM COATED ORAL at 05:28

## 2023-07-29 RX ADMIN — Medication 667 MILLIGRAM(S): at 17:29

## 2023-07-29 RX ADMIN — SODIUM CHLORIDE 3 MILLILITER(S): 9 INJECTION INTRAMUSCULAR; INTRAVENOUS; SUBCUTANEOUS at 06:08

## 2023-07-29 RX ADMIN — Medication 25 MILLIGRAM(S): at 14:18

## 2023-07-29 RX ADMIN — Medication 667 MILLIGRAM(S): at 13:01

## 2023-07-29 RX ADMIN — ERYTHROPOIETIN 5000 UNIT(S): 10000 INJECTION, SOLUTION INTRAVENOUS; SUBCUTANEOUS at 10:54

## 2023-07-29 RX ADMIN — LIDOCAINE 1 PATCH: 4 CREAM TOPICAL at 03:07

## 2023-07-29 NOTE — PROGRESS NOTE ADULT - PROBLEM SELECTOR PLAN 3
Patient with PAF with long conversion pauses requiring backup pacing.   EP following.  Eliquis initiated 7/25, since held for procedure with EP 7/28.   S/p Medtronic loop recorder and leadless Micra implant 7/28/23.  Plan to restart Eliquis 48hrs post procedure.   Follow up AM PA/Lat CXR.   Continue lopressor as tolerated by HR and BP.

## 2023-07-29 NOTE — PROGRESS NOTE ADULT - SUBJECTIVE AND OBJECTIVE BOX
Pt doing well POD #1 s/p Medtronic loop recorder and leadless Micra implant (1 deployment) via RFV access. Pt seen and examined, reports feeling well, denies any complaints. Morning labs and telemetry reviewed. Right groin suture removed without incident.      EKG: SR at 59bpm, QRSD 78ms  TELE: SR, intermittent V pacing   Device interrogation: Measurements appropriate and stable; parameters confirmed  CXR: Pending    MEDICATIONS  (STANDING):  amLODIPine   Tablet 5 milliGRAM(s) Oral daily  atorvastatin 80 milliGRAM(s) Oral at bedtime  calcium acetate 667 milliGRAM(s) Oral three times a day with meals  clopidogrel Tablet 75 milliGRAM(s) Oral daily  dextrose 50% Injectable 50 milliLiter(s) IV Push every 15 minutes  dextrose 50% Injectable 25 milliLiter(s) IV Push every 15 minutes  epoetin lois-epbx (RETACRIT) Injectable 5000 Unit(s) IV Push <User Schedule>  gabapentin 100 milliGRAM(s) Oral three times a day  glucagon  Injectable 1 milliGRAM(s) IntraMuscular once  hydrALAZINE 25 milliGRAM(s) Oral three times a day  insulin glargine Injectable (LANTUS) 7 Unit(s) SubCutaneous at bedtime  insulin lispro (ADMELOG) corrective regimen sliding scale   SubCutaneous three times a day before meals  insulin lispro (ADMELOG) corrective regimen sliding scale   SubCutaneous at bedtime  iron sucrose IVPB 100 milliGRAM(s) IV Intermittent every 48 hours  lidocaine   4% Patch 1 Patch Transdermal daily  lidocaine   4% Patch 1 Patch Transdermal daily  loratadine 10 milliGRAM(s) Oral daily  methocarbamol 500 milliGRAM(s) Oral two times a day  metoprolol tartrate 37.5 milliGRAM(s) Oral every 8 hours  Nephro-becky 1 Tablet(s) Oral daily  pantoprazole    Tablet 40 milliGRAM(s) Oral daily  sodium chloride 0.9% lock flush 3 milliLiter(s) IV Push every 8 hours  tamsulosin 0.4 milliGRAM(s) Oral at bedtime    MEDICATIONS  (PRN):  dextrose Oral Gel 15 Gram(s) Oral once PRN Blood Glucose LESS THAN 70 milliGRAM(s)/deciliter  ondansetron Injectable 4 milliGRAM(s) IV Push every 6 hours PRN Nausea and/or Vomiting  oxyCODONE    IR 5 milliGRAM(s) Oral every 6 hours PRN Severe Pain (7 - 10)      Allergies  No Known Drug Allergies  shellfish (Rash)      PAST MEDICAL & SURGICAL HISTORY:  HTN (Hypertension) (ICD9 401.9)  Hypercholesterolemia (ICD9 272.0)  Diabetes Mellitus with Neuropathy (ICD9 250.60)  BPH (Benign Prostatic Hypertrophy) (ICD9 600.00)  Glaucoma  CAD (coronary artery disease)  CKD (chronic kidney disease)  Osteomyelitis  Gastroparesis  PVD (peripheral vascular disease)  Legally blind  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 No swelling, erythema, or edema  Pulmonary: CTA b/l, unlabored. Left sided CT in place.   Abd: soft NTND   Groin(right): C/D/I; no bleeding, hematoma, edema  Extremities: no pedal edema, +distal pulse      LABS:                        8.7    9.53  )-----------( 167      ( 29 Jul 2023 05:15 )             26.5     07-29    132<L>  |  93<L>  |  38.5<H>  ----------------------------<  140<H>  4.7   |  23.0  |  7.87<H>    Ca    8.0<L>      29 Jul 2023 05:15  Phos  3.1     07-28  Mg     2.0     07-29        Urinalysis Basic - ( 29 Jul 2023 05:15 )    Color: x / Appearance: x / SG: x / pH: x  Gluc: 140 mg/dL / Ketone: x  / Bili: x / Urobili: x   Blood: x / Protein: x / Nitrite: x   Leuk Esterase: x / RBC: x / WBC x   Sq Epi: x / Non Sq Epi: x / Bacteria: x        Assessment:   63 year old male with a history of HTN, hyperlipidemia, ESRD on HD (T, Th, Sat), COPD, DM type 2 uncontrolled, gastroparesis s/p gastric PM 2/2013, peripheral blindness and retinopathy, BPH, recent CDiff infection 6/1/23 s/p 10 day course of oral vancomycin, CAD s/p prior PCI, and recent hospitalization 6/2023 for NSTEMI, ICM LVEF 35-40%, and LHC which demonstrated multi-vessel disease who underwent 4vCABG (LIMA-LAD,[id number]-PDA) on 7/14/23. EP consulted regarding AFib/AFlutter with conversion pauses. Now POD #1 status post uncomplicated MDT leadless Micra implant via RFV access and loop recorder implant for continued monitoring.       Plan:   May resume Eliquis on 7/30 in the evening   Continue Metoprolol to 37.5mg every 8 hours and titrate up as needed.   PA/Lat CXR and device check in AM.   Pt instructed as activity limitations - no lifting/pushing/pulling >10 lbs or strenuous exercise x 1 week.   Pt instructed as to access site care and f/up - written instructions included in d/c documents.  Outpt f/up with Dr. Dietrich in 1-2 weeks.    ****Discharge instruction***** Please include in discharge summary  - Bruising at the groin, sometimes extending down the leg, and/or a small lump under the skin at the groin access site is normal and will resolve within 2 – 3 weeks.   - You may walk and take stairs at a regular pace.   - Do not perform any exercise more strenuous than walking for 1 week.   - Do not strain or lift heavy objects for 1 week.  - You may shower the day after the procedure.  - Do not soak in water (such as tub baths, hot tubs, swimming, etc.) for 1 week.   - You may resume all other activities the day after the procedure.  Call your doctor if:   - you notice bleeding, redness, drainage, swelling, increased tenderness or a hot sensation around the catheter insertion site.   - your temperature is greater than 100 degrees F for more than 24 hours.  - you have any questions or concerns regarding the procedure.  If significant bleeding and/or a large lump (the size of a golf ball or bigger) occurs:  - Lie flat and apply continuous direct pressure just above the puncture site for at least 10 minutes  - If the issue resolves, notify your physician immediately.    - If the bleeding cannot be controlled, please seek immediate medical attention.  If you experience increased difficulty breathing or chest pain, or if you faint or have dizzy spells, please seek immediate medical attention.           Pt doing well POD #1 s/p Medtronic loop recorder and leadless Micra implant (1 deployment) via RFV access. Pt seen and examined, reports feeling well, denies any complaints. Morning labs and telemetry reviewed. Right groin suture removed without incident.      EKG: SR at 59bpm, QRSD 78ms  TELE: SR, intermittent V pacing   Device interrogation: Measurements appropriate and stable; parameters confirmed  CXR: Pending 2view     MEDICATIONS  (STANDING):  amLODIPine   Tablet 5 milliGRAM(s) Oral daily  atorvastatin 80 milliGRAM(s) Oral at bedtime  calcium acetate 667 milliGRAM(s) Oral three times a day with meals  clopidogrel Tablet 75 milliGRAM(s) Oral daily  dextrose 50% Injectable 50 milliLiter(s) IV Push every 15 minutes  dextrose 50% Injectable 25 milliLiter(s) IV Push every 15 minutes  epoetin lois-epbx (RETACRIT) Injectable 5000 Unit(s) IV Push <User Schedule>  gabapentin 100 milliGRAM(s) Oral three times a day  glucagon  Injectable 1 milliGRAM(s) IntraMuscular once  hydrALAZINE 25 milliGRAM(s) Oral three times a day  insulin glargine Injectable (LANTUS) 7 Unit(s) SubCutaneous at bedtime  insulin lispro (ADMELOG) corrective regimen sliding scale   SubCutaneous three times a day before meals  insulin lispro (ADMELOG) corrective regimen sliding scale   SubCutaneous at bedtime  iron sucrose IVPB 100 milliGRAM(s) IV Intermittent every 48 hours  lidocaine   4% Patch 1 Patch Transdermal daily  lidocaine   4% Patch 1 Patch Transdermal daily  loratadine 10 milliGRAM(s) Oral daily  methocarbamol 500 milliGRAM(s) Oral two times a day  metoprolol tartrate 37.5 milliGRAM(s) Oral every 8 hours  Nephro-becky 1 Tablet(s) Oral daily  pantoprazole    Tablet 40 milliGRAM(s) Oral daily  sodium chloride 0.9% lock flush 3 milliLiter(s) IV Push every 8 hours  tamsulosin 0.4 milliGRAM(s) Oral at bedtime    MEDICATIONS  (PRN):  dextrose Oral Gel 15 Gram(s) Oral once PRN Blood Glucose LESS THAN 70 milliGRAM(s)/deciliter  ondansetron Injectable 4 milliGRAM(s) IV Push every 6 hours PRN Nausea and/or Vomiting  oxyCODONE    IR 5 milliGRAM(s) Oral every 6 hours PRN Severe Pain (7 - 10)      Allergies  No Known Drug Allergies  shellfish (Rash)      PAST MEDICAL & SURGICAL HISTORY:  HTN (Hypertension) (ICD9 401.9)  Hypercholesterolemia (ICD9 272.0)  Diabetes Mellitus with Neuropathy (ICD9 250.60)  BPH (Benign Prostatic Hypertrophy) (ICD9 600.00)  Glaucoma  CAD (coronary artery disease)  CKD (chronic kidney disease)  Osteomyelitis  Gastroparesis  PVD (peripheral vascular disease)  Legally blind  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 No swelling, erythema, or edema  Pulmonary: CTA b/l, unlabored. Left sided CT in place.   Abd: soft NTND   Groin(right): C/D/I; no bleeding, hematoma, edema  Extremities: no pedal edema, +distal pulse      LABS:                        8.7    9.53  )-----------( 167      ( 29 Jul 2023 05:15 )             26.5     07-29    132<L>  |  93<L>  |  38.5<H>  ----------------------------<  140<H>  4.7   |  23.0  |  7.87<H>    Ca    8.0<L>      29 Jul 2023 05:15  Phos  3.1     07-28  Mg     2.0     07-29        Urinalysis Basic - ( 29 Jul 2023 05:15 )    Color: x / Appearance: x / SG: x / pH: x  Gluc: 140 mg/dL / Ketone: x  / Bili: x / Urobili: x   Blood: x / Protein: x / Nitrite: x   Leuk Esterase: x / RBC: x / WBC x   Sq Epi: x / Non Sq Epi: x / Bacteria: x        Assessment:   63 year old male with a history of HTN, hyperlipidemia, ESRD on HD (T, Th, Sat), COPD, DM type 2 uncontrolled, gastroparesis s/p gastric PM 2/2013, peripheral blindness and retinopathy, BPH, recent CDiff infection 6/1/23 s/p 10 day course of oral vancomycin, CAD s/p prior PCI, and recent hospitalization 6/2023 for NSTEMI, ICM LVEF 35-40%, and LHC which demonstrated multi-vessel disease who underwent 4vCABG (LIMA-LAD,[id number]-PDA) on 7/14/23. EP consulted regarding AFib/AFlutter with conversion pauses. Now POD #1 status post uncomplicated MDT leadless Micra implant via RFV access and loop recorder implant for continued monitoring.       Plan:   May resume Eliquis on 7/30 in the evening   Continue Metoprolol to 37.5mg every 8 hours and titrate up as needed.   Will need 2 view CXR to evaluation Micra position- Pending  Pt instructed as activity limitations - no lifting/pushing/pulling >10 lbs or strenuous exercise x 1 week.   Pt instructed as to access site care and f/up - written instructions included in d/c documents.  Outpt f/up with Dr. Dietrich in 1-2 weeks.    ****Discharge instruction***** Please include in discharge summary  - Bruising at the groin, sometimes extending down the leg, and/or a small lump under the skin at the groin access site is normal and will resolve within 2 – 3 weeks.   - You may walk and take stairs at a regular pace.   - Do not perform any exercise more strenuous than walking for 1 week.   - Do not strain or lift heavy objects for 1 week.  - You may shower the day after the procedure.  - Do not soak in water (such as tub baths, hot tubs, swimming, etc.) for 1 week.   - You may resume all other activities the day after the procedure.  Call your doctor if:   - you notice bleeding, redness, drainage, swelling, increased tenderness or a hot sensation around the catheter insertion site.   - your temperature is greater than 100 degrees F for more than 24 hours.  - you have any questions or concerns regarding the procedure.  If significant bleeding and/or a large lump (the size of a golf ball or bigger) occurs:  - Lie flat and apply continuous direct pressure just above the puncture site for at least 10 minutes  - If the issue resolves, notify your physician immediately.    - If the bleeding cannot be controlled, please seek immediate medical attention.  If you experience increased difficulty breathing or chest pain, or if you faint or have dizzy spells, please seek immediate medical attention.

## 2023-07-29 NOTE — PROGRESS NOTE ADULT - ASSESSMENT
63M with PMH of DM complicated by neuropathy, HTN, HLD, CAD, PVD, gastroparesis, ESRD on HD TTS and BPH s/p CABG x4 on 07/14/23. Nephrology is managing ESRD on HD.     -Access: R AV fistula   -Outpatient dialysis unit is unclear   -Outpatient dialysis days are Tuesdays Thursdays Saturdays  -HD today  Access working well  previously with Recirculation - s/p fistulogram and angioplasty on 07/25/23 by Vascular Surgery     -BP acceptable   - left large pleural effusion- s/p pigtail catheter placement by CTS  -UF as tolerated during HD  -Anemia - KIMBERLY with HD   -Mineral Bone Disease in setting of CKD - WNL, will follow.

## 2023-07-29 NOTE — PROGRESS NOTE ADULT - SUBJECTIVE AND OBJECTIVE BOX
[facility] DIVISION OF KIDNEY DISEASES AND HYPERTENSION -- HEMODIALYSIS NOTE  --------------------------------------------------------------------------------  Chief Complaint: ESRD/Ongoing hemodialysis requirement    24 hour events/subjective:  S/p Medtronic loop recorder and leadless Micra implant 7/28/23.  s/p PUF session yesterday  also had left chest tube placed by CTS        PAST HISTORY  --------------------------------------------------------------------------------  No significant changes to PMH, PSH, FHx, SHx, unless otherwise noted    ALLERGIES & MEDICATIONS  --------------------------------------------------------------------------------  Allergies    No Known Drug Allergies  shellfish (Rash)    Intolerances      Standing Inpatient Medications  amLODIPine   Tablet 5 milliGRAM(s) Oral daily  atorvastatin 80 milliGRAM(s) Oral at bedtime  calcium acetate 667 milliGRAM(s) Oral three times a day with meals  clopidogrel Tablet 75 milliGRAM(s) Oral daily  dextrose 50% Injectable 50 milliLiter(s) IV Push every 15 minutes  dextrose 50% Injectable 25 milliLiter(s) IV Push every 15 minutes  epoetin lois-epbx (RETACRIT) Injectable 5000 Unit(s) IV Push <User Schedule>  gabapentin 100 milliGRAM(s) Oral three times a day  glucagon  Injectable 1 milliGRAM(s) IntraMuscular once  hydrALAZINE 25 milliGRAM(s) Oral three times a day  insulin glargine Injectable (LANTUS) 7 Unit(s) SubCutaneous at bedtime  insulin lispro (ADMELOG) corrective regimen sliding scale   SubCutaneous three times a day before meals  insulin lispro (ADMELOG) corrective regimen sliding scale   SubCutaneous at bedtime  iron sucrose IVPB 100 milliGRAM(s) IV Intermittent every 48 hours  lidocaine   4% Patch 1 Patch Transdermal daily  lidocaine   4% Patch 1 Patch Transdermal daily  loratadine 10 milliGRAM(s) Oral daily  methocarbamol 500 milliGRAM(s) Oral two times a day  metoprolol tartrate 37.5 milliGRAM(s) Oral every 8 hours  Nephro-becky 1 Tablet(s) Oral daily  pantoprazole    Tablet 40 milliGRAM(s) Oral daily  sodium chloride 0.9% lock flush 3 milliLiter(s) IV Push every 8 hours  tamsulosin 0.4 milliGRAM(s) Oral at bedtime    PRN Inpatient Medications  dextrose Oral Gel 15 Gram(s) Oral once PRN  ondansetron Injectable 4 milliGRAM(s) IV Push every 6 hours PRN  oxyCODONE    IR 5 milliGRAM(s) Oral every 6 hours PRN      REVIEW OF SYSTEMS  --------------------------------------------------------------------------------  Gen: No weight changes, fatigue, fevers/chills, weakness  Skin: No rashes  Head/Eyes/Ears/Mouth: No headache  Respiratory: No dyspnea, cough,  CV: No chest pain, orthopnea  GI: No abdominal pain, diarrhea, constipation, nausea, vomiting,  MSK: No joint pain  Neuro: No dizziness/lightheadedness, weakness  Heme: No bleeding  Psych: No significant nervousness, anxiety, stress, depression    All other systems were reviewed and are negative, except as noted.    VITALS/PHYSICAL EXAM  --------------------------------------------------------------------------------  T(C): 37.1 (07-29-23 @ 12:45), Max: 37.3 (07-28-23 @ 21:00)  HR: 64 (07-29-23 @ 14:19) (57 - 70)  BP: 137/62 (07-29-23 @ 14:19) (94/39 - 170/74)  RR: 16 (07-29-23 @ 14:19) (16 - 18)  SpO2: 95% (07-29-23 @ 14:19) (92% - 98%)  Wt(kg): --        07-28-23 @ 07:01  -  07-29-23 @ 07:00  --------------------------------------------------------  IN: 400 mL / OUT: 3080 mL / NET: -2680 mL    07-29-23 @ 07:01  -  07-29-23 @ 16:02  --------------------------------------------------------  IN: 0 mL / OUT: 1500 mL / NET: -1500 mL      Physical Exam:  	Gen: NAD, well-appearing  	HEENT: PERRL, supple neck,  	Pulm: left chest tube  	CV: RRR, S1S2; no rub  	Abd: +BS, soft, nontender  	UE: Warm, intact strength; no asterixis  	LE: Warm, + edema  	Neuro: No focal deficits  	Psych: Normal affect and mood  	Skin: Warm, without rashes  	Vascular access: SANTOSH MONTEJO    LABS/STUDIES  --------------------------------------------------------------------------------              8.7    9.53  >-----------<  167      [07-29-23 @ 05:15]              26.5     132  |  93  |  38.5  ----------------------------<  140      [07-29-23 @ 05:15]  4.7   |  23.0  |  7.87        Ca     8.0     [07-29-23 @ 05:15]      Mg     2.0     [07-29-23 @ 05:15]      Phos  3.1     [07-28-23 @ 04:35]            Iron 45, TIBC 216, %sat 21      [06-13-23 @ 15:10]  Ferritin 1092      [06-13-23 @ 15:10]  PTH -- (Ca 7.8)      [06-13-23 @ 15:10]   175  TSH 4.14      [06-12-23 @ 21:50]    HBsAb 15.0      [07-13-23 @ 21:35]  HBsAg Nonreact      [07-13-23 @ 21:35]

## 2023-07-29 NOTE — PROGRESS NOTE ADULT - SUBJECTIVE AND OBJECTIVE BOX
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 In ER EKGs with intermittent diffuse ST changes, + troponins, +NSTEMI, started on a Tridil and Heparin gtt for NSTEMI, admitted to CTICU, IABP placed, now s/p CABG x 4 (LIMA-LAD, SVG-OM 1, OM2, PDA) on 7/14/23, IABP removed after OR without incident. Postop course notable for PAF with long conversion pauses of 2-6.5 seconds (EP following, s/p Medtronic loop recorder and leadless Micra implant 7/28/23), Left pleural effusion (s/p pigtail catheter placement 7/28/23), and persistent hyperkalemia despite HD (concern for recirculation through fistula, vascular surgery following, pt now s/p fistulogram with percutaneous balloon angioplasty 7/25/23, with hyperkalemia resolved).      Significant recent/past 24 hr events: Remain NSR 60s/PAF with pacing with HR 40s intermittently.     Subjective: Patient sitting in chair at bedside in NAD. +Tolerating diet. +Passing BMs since surgery. +Pain currently controlled. Denies fevers, chills, lightheadedness, dizziness, HA, CP, palpitations, SOB, cough, abdominal pain, N/V, diarrhea, numbness/tingling in extremities, or any other acute complaints. ROS negative x 10 systems except as noted above.    MEDICATIONS  (STANDING):  amLODIPine   Tablet 5 milliGRAM(s) Oral daily  atorvastatin 80 milliGRAM(s) Oral at bedtime  calcium acetate 667 milliGRAM(s) Oral three times a day with meals  clopidogrel Tablet 75 milliGRAM(s) Oral daily  epoetin lois-epbx (RETACRIT) Injectable 5000 Unit(s) IV Push <User Schedule>  gabapentin 100 milliGRAM(s) Oral three times a day  hydrALAZINE 25 milliGRAM(s) Oral three times a day  insulin glargine Injectable (LANTUS) 7 Unit(s) SubCutaneous at bedtime  insulin lispro (ADMELOG) corrective regimen sliding scale   SubCutaneous at bedtime  insulin lispro (ADMELOG) corrective regimen sliding scale   SubCutaneous three times a day before meals  iron sucrose IVPB 100 milliGRAM(s) IV Intermittent every 48 hours  lidocaine   4% Patch 1 Patch Transdermal daily  lidocaine   4% Patch 1 Patch Transdermal daily  loratadine 10 milliGRAM(s) Oral daily  methocarbamol 500 milliGRAM(s) Oral two times a day  metoprolol tartrate 37.5 milliGRAM(s) Oral every 8 hours  Nephro-becky 1 Tablet(s) Oral daily  pantoprazole    Tablet 40 milliGRAM(s) Oral daily  sodium chloride 0.9% lock flush 3 milliLiter(s) IV Push every 8 hours  tamsulosin 0.4 milliGRAM(s) Oral at bedtime    MEDICATIONS  (PRN):  dextrose Oral Gel 15 Gram(s) Oral once PRN Blood Glucose LESS THAN 70 milliGRAM(s)/deciliter  ondansetron Injectable 4 milliGRAM(s) IV Push every 6 hours PRN Nausea and/or Vomiting  oxyCODONE    IR 5 milliGRAM(s) Oral every 6 hours PRN Severe Pain (7 - 10)    Allergies:  No Known Drug Allergies  shellfish (Rash)    Vitals   T(C): 36.6 (29 Jul 2023 00:00), Max: 37.5 (28 Jul 2023 04:29)  T(F): 97.9 (29 Jul 2023 00:00), Max: 99.5 (28 Jul 2023 04:29)  HR: 65 (29 Jul 2023 00:00) (57 - 112)  BP: 145/56 (29 Jul 2023 00:00) (94/39 - 170/74)  RR: 18 (29 Jul 2023 00:00) (15 - 18)  SpO2: 93% (29 Jul 2023 00:00) (92% - 99%)  O2 Parameters below as of 29 Jul 2023 00:00  Patient On (Oxygen Delivery Method): room air    I&O's Detail    27 Jul 2023 07:01  -  28 Jul 2023 07:00  --------------------------------------------------------  IN:    Oral Fluid: 760 mL  Total IN: 760 mL    OUT:    Other (mL): 1500 mL  Total OUT: 1500 mL    Total NET: -740 mL      28 Jul 2023 07:01  -  29 Jul 2023 03:07  --------------------------------------------------------  IN:    Oral Fluid: 200 mL  Total IN: 200 mL    OUT:    Chest Tube (mL): 1520 mL    Other (mL): 1000 mL  Total OUT: 2520 mL    Total NET: -2320 mL    Physical Exam  Constitutional: NAD  Neuro: A+O x 3, non-focal, speech clear and intact  HEENT: NC/AT  Neck:  Supple, trachea midline  Chest: Left pleural pigtail with dressing C/D/I, connected to water seal, no air leak noted, no subcutaneous emphysema noted  CV: regular rate, regular rhythm, +S1S2, no murmurs or rub  Pulm/chest: lung sounds CTA b/l, no accessory muscle use noted  Abd: soft, NT, ND, + BS  Ext: BARRETO x 4, trace/+1 LE pitting edema bilaterally, distal motor/neuro/circ intact, +Right UE AVF   +Groin suture, groins soft b/l, no hematoma appreciated  Skin: warm, dry, perfused  Psych: calm, appropriate affect   Incision: midsternal incision open to air C/D/I, sternum stable, Right LE EVH site open to air, C/D/I      LABS                        8.8    8.33  )-----------( 165      ( 28 Jul 2023 04:35 )             26.8     07-28    135  |  95<L>  |  25.3<H>  ----------------------------<  158<H>  4.1   |  26.0  |  5.98<H>    Ca    8.4      28 Jul 2023 04:35  Phos  3.1     07-28  Mg     2.0     07-28    Urinalysis Basic - ( 28 Jul 2023 04:35 )  Color: x / Appearance: x / SG: x / pH: x  Gluc: 158 mg/dL / Ketone: x  / Bili: x / Urobili: x   Blood: x / Protein: x / Nitrite: x   Leuk Esterase: x / RBC: x / WBC x   Sq Epi: x / Non Sq Epi: x / Bacteria: x    POCT Blood Glucose.: 167 mg/dL (07-28-23 @ 21:07)  POCT Blood Glucose.: 146 mg/dL (07-28-23 @ 17:27)  POCT Blood Glucose.: 115 mg/dL (07-28-23 @ 12:40)      Last CXR:  < from: Xray Chest 1 View- PORTABLE-Routine (Xray Chest 1 View- PORTABLE-Routine in AM.) (07.28.23 @ 05:05) >  Frontal expiratory view of the chest shows the heart to be similar in size. Sternal wires are again noted.  The lungs show mild pulmonary congestion with similar left pleural effusion and there is no evidence of pneumothorax nor definite right pleural effusion.  IMPRESSION:  Mild congestive changes.  < end of copied text >

## 2023-07-29 NOTE — PROGRESS NOTE ADULT - ASSESSMENT
63M PMHx ESRD on HD (T,Th,S), IDDM, COPD, gastroparesis, peripheral blindness and retinopathy, NSTEMI 6/2022 with recent hospital admission for CABG gregor, nisha diff + that admission, now presents from home with complaints of worsening chest pain associated with difficulty breathing. In ER EKGs with intermittent diffuse ST changes, + troponins, +NSTEMI, started on a Tridil and Heparin gtt for NSTEMI, admitted to CTICU, IABP placed, now s/p CABG x 4 (LIMA-LAD, SVG-OM 1, OM2, PDA) on 7/14/23, IABP removed after OR without incident. Postop course notable for PAF with long conversion pauses of 2-6.5 seconds (EP following, s/p Medtronic loop recorder and leadless Micra implant 7/28/23), Left pleural effusion (s/p pigtail catheter placement 7/28/23), and persistent hyperkalemia despite HD (concern for recirculation through fistula, vascular surgery following, pt now s/p fistulogram with percutaneous balloon angioplasty 7/25/23, with hyperkalemia resolved).

## 2023-07-30 LAB
ANION GAP SERPL CALC-SCNC: 15 MMOL/L — SIGNIFICANT CHANGE UP (ref 5–17)
BUN SERPL-MCNC: 26.4 MG/DL — HIGH (ref 8–20)
CALCIUM SERPL-MCNC: 7.8 MG/DL — LOW (ref 8.4–10.5)
CHLORIDE SERPL-SCNC: 96 MMOL/L — SIGNIFICANT CHANGE UP (ref 96–108)
CO2 SERPL-SCNC: 25 MMOL/L — SIGNIFICANT CHANGE UP (ref 22–29)
CREAT SERPL-MCNC: 5.76 MG/DL — HIGH (ref 0.5–1.3)
EGFR: 10 ML/MIN/1.73M2 — LOW
GLUCOSE BLDC GLUCOMTR-MCNC: 158 MG/DL — HIGH (ref 70–99)
GLUCOSE BLDC GLUCOMTR-MCNC: 160 MG/DL — HIGH (ref 70–99)
GLUCOSE BLDC GLUCOMTR-MCNC: 189 MG/DL — HIGH (ref 70–99)
GLUCOSE BLDC GLUCOMTR-MCNC: 264 MG/DL — HIGH (ref 70–99)
GLUCOSE SERPL-MCNC: 160 MG/DL — HIGH (ref 70–99)
HCT VFR BLD CALC: 27 % — LOW (ref 39–50)
HGB BLD-MCNC: 8.8 G/DL — LOW (ref 13–17)
MAGNESIUM SERPL-MCNC: 1.9 MG/DL — SIGNIFICANT CHANGE UP (ref 1.6–2.6)
MCHC RBC-ENTMCNC: 29.7 PG — SIGNIFICANT CHANGE UP (ref 27–34)
MCHC RBC-ENTMCNC: 32.6 GM/DL — SIGNIFICANT CHANGE UP (ref 32–36)
MCV RBC AUTO: 91.2 FL — SIGNIFICANT CHANGE UP (ref 80–100)
PLATELET # BLD AUTO: 155 K/UL — SIGNIFICANT CHANGE UP (ref 150–400)
POTASSIUM SERPL-MCNC: 3.9 MMOL/L — SIGNIFICANT CHANGE UP (ref 3.5–5.3)
POTASSIUM SERPL-SCNC: 3.9 MMOL/L — SIGNIFICANT CHANGE UP (ref 3.5–5.3)
RBC # BLD: 2.96 M/UL — LOW (ref 4.2–5.8)
RBC # FLD: 15.8 % — HIGH (ref 10.3–14.5)
SODIUM SERPL-SCNC: 135 MMOL/L — SIGNIFICANT CHANGE UP (ref 135–145)
WBC # BLD: 7.53 K/UL — SIGNIFICANT CHANGE UP (ref 3.8–10.5)
WBC # FLD AUTO: 7.53 K/UL — SIGNIFICANT CHANGE UP (ref 3.8–10.5)

## 2023-07-30 PROCEDURE — 71045 X-RAY EXAM CHEST 1 VIEW: CPT | Mod: 26,76

## 2023-07-30 PROCEDURE — 99232 SBSQ HOSP IP/OBS MODERATE 35: CPT

## 2023-07-30 RX ORDER — LOPERAMIDE HCL 2 MG
2 TABLET ORAL ONCE
Refills: 0 | Status: COMPLETED | OUTPATIENT
Start: 2023-07-30 | End: 2023-07-30

## 2023-07-30 RX ORDER — APIXABAN 2.5 MG/1
2.5 TABLET, FILM COATED ORAL
Refills: 0 | Status: DISCONTINUED | OUTPATIENT
Start: 2023-07-30 | End: 2023-07-31

## 2023-07-30 RX ADMIN — Medication 1 TABLET(S): at 11:37

## 2023-07-30 RX ADMIN — Medication 1: at 17:10

## 2023-07-30 RX ADMIN — TAMSULOSIN HYDROCHLORIDE 0.4 MILLIGRAM(S): 0.4 CAPSULE ORAL at 21:25

## 2023-07-30 RX ADMIN — Medication 667 MILLIGRAM(S): at 08:25

## 2023-07-30 RX ADMIN — SODIUM CHLORIDE 3 MILLILITER(S): 9 INJECTION INTRAMUSCULAR; INTRAVENOUS; SUBCUTANEOUS at 05:15

## 2023-07-30 RX ADMIN — Medication 2 MILLIGRAM(S): at 22:02

## 2023-07-30 RX ADMIN — GABAPENTIN 100 MILLIGRAM(S): 400 CAPSULE ORAL at 13:05

## 2023-07-30 RX ADMIN — Medication 25 MILLIGRAM(S): at 21:25

## 2023-07-30 RX ADMIN — Medication 37.5 MILLIGRAM(S): at 05:08

## 2023-07-30 RX ADMIN — ONDANSETRON 4 MILLIGRAM(S): 8 TABLET, FILM COATED ORAL at 17:58

## 2023-07-30 RX ADMIN — GABAPENTIN 100 MILLIGRAM(S): 400 CAPSULE ORAL at 21:25

## 2023-07-30 RX ADMIN — LIDOCAINE 1 PATCH: 4 CREAM TOPICAL at 12:13

## 2023-07-30 RX ADMIN — Medication 1: at 21:26

## 2023-07-30 RX ADMIN — SODIUM CHLORIDE 3 MILLILITER(S): 9 INJECTION INTRAMUSCULAR; INTRAVENOUS; SUBCUTANEOUS at 21:15

## 2023-07-30 RX ADMIN — METHOCARBAMOL 500 MILLIGRAM(S): 500 TABLET, FILM COATED ORAL at 05:07

## 2023-07-30 RX ADMIN — CLOPIDOGREL BISULFATE 75 MILLIGRAM(S): 75 TABLET, FILM COATED ORAL at 11:37

## 2023-07-30 RX ADMIN — METHOCARBAMOL 500 MILLIGRAM(S): 500 TABLET, FILM COATED ORAL at 17:12

## 2023-07-30 RX ADMIN — GABAPENTIN 100 MILLIGRAM(S): 400 CAPSULE ORAL at 05:08

## 2023-07-30 RX ADMIN — LORATADINE 10 MILLIGRAM(S): 10 TABLET ORAL at 11:36

## 2023-07-30 RX ADMIN — ONDANSETRON 4 MILLIGRAM(S): 8 TABLET, FILM COATED ORAL at 11:57

## 2023-07-30 RX ADMIN — Medication 25 MILLIGRAM(S): at 05:08

## 2023-07-30 RX ADMIN — SODIUM CHLORIDE 3 MILLILITER(S): 9 INJECTION INTRAMUSCULAR; INTRAVENOUS; SUBCUTANEOUS at 11:43

## 2023-07-30 RX ADMIN — LIDOCAINE 1 PATCH: 4 CREAM TOPICAL at 17:17

## 2023-07-30 RX ADMIN — APIXABAN 2.5 MILLIGRAM(S): 2.5 TABLET, FILM COATED ORAL at 17:12

## 2023-07-30 RX ADMIN — INSULIN GLARGINE 7 UNIT(S): 100 INJECTION, SOLUTION SUBCUTANEOUS at 21:28

## 2023-07-30 RX ADMIN — Medication 667 MILLIGRAM(S): at 12:00

## 2023-07-30 RX ADMIN — Medication 1: at 08:27

## 2023-07-30 RX ADMIN — LIDOCAINE 1 PATCH: 4 CREAM TOPICAL at 12:01

## 2023-07-30 RX ADMIN — AMLODIPINE BESYLATE 5 MILLIGRAM(S): 2.5 TABLET ORAL at 05:08

## 2023-07-30 RX ADMIN — Medication 667 MILLIGRAM(S): at 17:12

## 2023-07-30 RX ADMIN — ATORVASTATIN CALCIUM 80 MILLIGRAM(S): 80 TABLET, FILM COATED ORAL at 21:25

## 2023-07-30 RX ADMIN — PANTOPRAZOLE SODIUM 40 MILLIGRAM(S): 20 TABLET, DELAYED RELEASE ORAL at 11:43

## 2023-07-30 RX ADMIN — LIDOCAINE 1 PATCH: 4 CREAM TOPICAL at 17:16

## 2023-07-30 RX ADMIN — Medication 37.5 MILLIGRAM(S): at 21:25

## 2023-07-30 RX ADMIN — Medication 1: at 11:55

## 2023-07-30 RX ADMIN — Medication 37.5 MILLIGRAM(S): at 13:05

## 2023-07-30 NOTE — PROGRESS NOTE ADULT - SUBJECTIVE AND OBJECTIVE BOX
[facility] DIVISION OF KIDNEY DISEASES AND HYPERTENSION -- FOLLOW UP NOTE  --------------------------------------------------------------------------------  Chief Complaint:  ESRD on HD    24 hour events/subjective:  sp HD yesterday  pt seen and examined; feels well        PAST HISTORY  --------------------------------------------------------------------------------  No significant changes to PMH, PSH, FHx, SHx, unless otherwise noted    ALLERGIES & MEDICATIONS  --------------------------------------------------------------------------------  Allergies    No Known Drug Allergies  shellfish (Rash)    Intolerances      Standing Inpatient Medications  amLODIPine   Tablet 5 milliGRAM(s) Oral daily  apixaban 2.5 milliGRAM(s) Oral two times a day  atorvastatin 80 milliGRAM(s) Oral at bedtime  calcium acetate 667 milliGRAM(s) Oral three times a day with meals  clopidogrel Tablet 75 milliGRAM(s) Oral daily  dextrose 50% Injectable 50 milliLiter(s) IV Push every 15 minutes  dextrose 50% Injectable 25 milliLiter(s) IV Push every 15 minutes  epoetin lois-epbx (RETACRIT) Injectable 5000 Unit(s) IV Push <User Schedule>  gabapentin 100 milliGRAM(s) Oral three times a day  glucagon  Injectable 1 milliGRAM(s) IntraMuscular once  hydrALAZINE 25 milliGRAM(s) Oral three times a day  insulin glargine Injectable (LANTUS) 7 Unit(s) SubCutaneous at bedtime  insulin lispro (ADMELOG) corrective regimen sliding scale   SubCutaneous three times a day before meals  insulin lispro (ADMELOG) corrective regimen sliding scale   SubCutaneous at bedtime  iron sucrose IVPB 100 milliGRAM(s) IV Intermittent every 48 hours  lidocaine   4% Patch 1 Patch Transdermal daily  lidocaine   4% Patch 1 Patch Transdermal daily  loratadine 10 milliGRAM(s) Oral daily  methocarbamol 500 milliGRAM(s) Oral two times a day  metoprolol tartrate 37.5 milliGRAM(s) Oral every 8 hours  Nephro-becky 1 Tablet(s) Oral daily  pantoprazole    Tablet 40 milliGRAM(s) Oral daily  sodium chloride 0.9% lock flush 3 milliLiter(s) IV Push every 8 hours  tamsulosin 0.4 milliGRAM(s) Oral at bedtime    PRN Inpatient Medications  dextrose Oral Gel 15 Gram(s) Oral once PRN  ondansetron Injectable 4 milliGRAM(s) IV Push every 6 hours PRN  oxyCODONE    IR 5 milliGRAM(s) Oral every 6 hours PRN      REVIEW OF SYSTEMS  --------------------------------------------------------------------------------  Gen: No weight changes, fatigue, fevers/chills, weakness  Skin: No rashes  Head/Eyes/Ears/Mouth: No headache; Normal hearing; Normal vision w/o blurriness; No sinus pain/discomfort, sore throat  Respiratory: No dyspnea, cough, wheezing, hemoptysis  CV: No chest pain, PND, orthopnea  GI: No abdominal pain, diarrhea, constipation, nausea, vomiting, melena, hematochezia  : No increased frequency, dysuria, hematuria, nocturia  MSK: No joint pain/swelling; no back pain; no edema  Neuro: No dizziness/lightheadedness, weakness, seizures, numbness, tingling  Heme: No easy bruising or bleeding  Endo: No heat/cold intolerance  Psych: No significant nervousness, anxiety, stress, depression    All other systems were reviewed and are negative, except as noted.    VITALS/PHYSICAL EXAM  --------------------------------------------------------------------------------  T(C): 36.8 (07-30-23 @ 16:31), Max: 37.4 (07-30-23 @ 07:52)  HR: 60 (07-30-23 @ 16:31) (59 - 65)  BP: 161/64 (07-30-23 @ 16:31) (112/52 - 161/64)  RR: 18 (07-30-23 @ 16:31) (17 - 18)  SpO2: 93% (07-30-23 @ 16:31) (93% - 98%)  Wt(kg): --        07-29-23 @ 07:01  -  07-30-23 @ 07:00  --------------------------------------------------------  IN: 200 mL / OUT: 1620 mL / NET: -1420 mL    07-30-23 @ 07:01  -  07-30-23 @ 17:52  --------------------------------------------------------  IN: 240 mL / OUT: 0 mL / NET: 240 mL      Physical Exam:  	Gen: NAD  	HEENT: PERRL, supple neck, clear oropharynx  	Pulm: lt chest tube  	CV: RRR, S1S2; no rub  	Back: No spinal or CVA tenderness; no sacral edema  	Abd: +BS, soft, nontender/nondistended  	: No suprapubic tenderness  	UE: Warm,  no edema  	LE: Warm,  no edema  	Neuro: No focal deficit  	Psych: Normal affect and mood  	Skin: Warm  	Vascular access: RUE AVF    LABS/STUDIES  --------------------------------------------------------------------------------              8.8    7.53  >-----------<  155      [07-30-23 @ 05:35]              27.0     135  |  96  |  26.4  ----------------------------<  160      [07-30-23 @ 05:35]  3.9   |  25.0  |  5.76        Ca     7.8     [07-30-23 @ 05:35]      Mg     1.9     [07-30-23 @ 05:35]            Creatinine Trend:  SCr 5.76 [07-30 @ 05:35]  SCr 7.87 [07-29 @ 05:15]  SCr 5.98 [07-28 @ 04:35]  SCr 8.27 [07-27 @ 04:50]  SCr 6.26 [07-26 @ 05:02]    Urinalysis - [07-30-23 @ 05:35]      Color  / Appearance  / SG  / pH       Gluc 160 / Ketone   / Bili  / Urobili        Blood  / Protein  / Leuk Est  / Nitrite       RBC  / WBC  / Hyaline  / Gran  / Sq Epi  / Non Sq Epi  / Bacteria       Iron 45, TIBC 216, %sat 21      [06-13-23 @ 15:10]  Ferritin 1092      [06-13-23 @ 15:10]  PTH -- (Ca 7.8)      [06-13-23 @ 15:10]   175  TSH 4.14      [06-12-23 @ 21:50]    HBsAb 15.0      [07-13-23 @ 21:35]  HBsAg Nonreact      [07-13-23 @ 21:35]

## 2023-07-30 NOTE — PROGRESS NOTE ADULT - PROBLEM SELECTOR PLAN 3
Patient with PAF with long conversion pauses requiring backup pacing.   EP following.  Eliquis initiated 7/25, since held for procedure with EP 7/28.   S/p Medtronic loop recorder and leadless Micra implant 7/28/23.  Plan to restart Eliquis 48hrs post procedure (7/31/23).   Follow up PA/Lat CXR read.   Continue lopressor as tolerated by HR and BP.

## 2023-07-30 NOTE — PROGRESS NOTE ADULT - SUBJECTIVE AND OBJECTIVE BOX
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 In ER EKGs with intermittent diffuse ST changes, + troponins, +NSTEMI, started on a Tridil and Heparin gtt for NSTEMI, admitted to CTICU, IABP placed, now s/p CABG x 4 (LIMA-LAD, SVG-OM 1, OM2, PDA) on 7/14/23, IABP removed after OR without incident. Postop course notable for PAF with long conversion pauses of 2-6.5 seconds (EP following, s/p Medtronic loop recorder and leadless Micra implant 7/28/23), Left pleural effusion (s/p pigtail catheter placement 7/28/23), and persistent hyperkalemia despite HD (concern for recirculation through fistula, vascular surgery following, pt now s/p fistulogram with percutaneous balloon angioplasty 7/25/23, with hyperkalemia resolved).      Significant recent/past 24 hr events: No overnight events reported.    Subjective: Patient sitting in chair at bedside in NAD. +Tolerating diet. +Passing BMs since surgery. +Pain currently controlled. Denies fevers, chills, lightheadedness, dizziness, HA, CP, palpitations, SOB, cough, abdominal pain, N/V, diarrhea, numbness/tingling in extremities, or any other acute complaints. ROS negative x 10 systems except as noted above.    MEDICATIONS  (STANDING):  amLODIPine   Tablet 5 milliGRAM(s) Oral daily  atorvastatin 80 milliGRAM(s) Oral at bedtime  calcium acetate 667 milliGRAM(s) Oral three times a day with meals  clopidogrel Tablet 75 milliGRAM(s) Oral daily  epoetin lois-epbx (RETACRIT) Injectable 5000 Unit(s) IV Push <User Schedule>  gabapentin 100 milliGRAM(s) Oral three times a day  hydrALAZINE 25 milliGRAM(s) Oral three times a day  insulin glargine Injectable (LANTUS) 7 Unit(s) SubCutaneous at bedtime  insulin lispro (ADMELOG) corrective regimen sliding scale   SubCutaneous three times a day before meals  insulin lispro (ADMELOG) corrective regimen sliding scale   SubCutaneous at bedtime  iron sucrose IVPB 100 milliGRAM(s) IV Intermittent every 48 hours  lidocaine   4% Patch 1 Patch Transdermal daily  lidocaine   4% Patch 1 Patch Transdermal daily  loratadine 10 milliGRAM(s) Oral daily  methocarbamol 500 milliGRAM(s) Oral two times a day  metoprolol tartrate 37.5 milliGRAM(s) Oral every 8 hours  Nephro-becky 1 Tablet(s) Oral daily  pantoprazole    Tablet 40 milliGRAM(s) Oral daily  sodium chloride 0.9% lock flush 3 milliLiter(s) IV Push every 8 hours  tamsulosin 0.4 milliGRAM(s) Oral at bedtime    MEDICATIONS  (PRN):  dextrose Oral Gel 15 Gram(s) Oral once PRN Blood Glucose LESS THAN 70 milliGRAM(s)/deciliter  ondansetron Injectable 4 milliGRAM(s) IV Push every 6 hours PRN Nausea and/or Vomiting  oxyCODONE    IR 5 milliGRAM(s) Oral every 6 hours PRN Severe Pain (7 - 10)    Allergies:  No Known Drug Allergies  shellfish (Rash)    Vitals   T(C): 36.6 (29 Jul 2023 23:00), Max: 37.3 (29 Jul 2023 05:00)  T(F): 97.9 (29 Jul 2023 23:00), Max: 99.2 (29 Jul 2023 05:00)  HR: 59 (29 Jul 2023 23:00) (57 - 70)  BP: 144/70 (29 Jul 2023 23:00) (135/63 - 156/74)  RR: 18 (29 Jul 2023 23:00) (16 - 18)  SpO2: 95% (29 Jul 2023 23:00) (92% - 98%)  O2 Parameters below as of 29 Jul 2023 23:00  Patient On (Oxygen Delivery Method): room air    I&O's Detail    28 Jul 2023 07:01  -  29 Jul 2023 07:00  --------------------------------------------------------  IN:    Oral Fluid: 400 mL  Total IN: 400 mL    OUT:    Chest Tube (mL): 2080 mL    Other (mL): 1000 mL  Total OUT: 3080 mL    Total NET: -2680 mL      29 Jul 2023 07:01  -  30 Jul 2023 01:24  --------------------------------------------------------  IN:  Total IN: 0 mL    OUT:    Chest Tube (mL): 95 mL    Other (mL): 1500 mL  Total OUT: 1595 mL    Total NET: -1595 mL    Physical Exam  Constitutional: NAD  Neuro: A+O x 3, non-focal, speech clear and intact  HEENT: NC/AT  Neck:  Supple, trachea midline  Chest: Left pleural pigtail with dressing C/D/I, connected to water seal, no air leak noted, no subcutaneous emphysema noted  CV: regular rate, regular rhythm, +S1S2, no murmurs or rub  Pulm/chest: lung sounds CTA b/l, no accessory muscle use noted  Abd: soft, NT, ND, + BS  Ext: BARRETO x 4, +trace LE pitting edema bilaterally, distal motor/neuro/circ intact, +Right UE AVF   +Groins soft b/l, no hematoma appreciated  Skin: warm, dry, perfused  Psych: calm, appropriate affect   Incision: midsternal incision open to air C/D/I, sternum stable, Right LE EVH site open to air, C/D/I    LABS                        8.7    9.53  )-----------( 167      ( 29 Jul 2023 05:15 )             26.5     07-29    132<L>  |  93<L>  |  38.5<H>  ----------------------------<  140<H>  4.7   |  23.0  |  7.87<H>    Ca    8.0<L>      29 Jul 2023 05:15  Phos  3.1     07-28  Mg     2.0     07-29    Urinalysis Basic - ( 29 Jul 2023 05:15 )  Color: x / Appearance: x / SG: x / pH: x  Gluc: 140 mg/dL / Ketone: x  / Bili: x / Urobili: x   Blood: x / Protein: x / Nitrite: x   Leuk Esterase: x / RBC: x / WBC x   Sq Epi: x / Non Sq Epi: x / Bacteria: x    POCT Blood Glucose.: 220 mg/dL (07-29-23 @ 21:26)  POCT Blood Glucose.: 189 mg/dL (07-29-23 @ 17:14)  POCT Blood Glucose.: 103 mg/dL (07-29-23 @ 12:17)  POCT Blood Glucose.: 119 mg/dL (07-29-23 @ 08:14)      Last CXR:  < from: Xray Chest 1 View- PORTABLE-Routine (Xray Chest 1 View- PORTABLE-Routine in AM.) (07.29.23 @ 05:42) >  Frontal expiratory view of the chest shows the heart to be similar in size. Left pigtail catheter has been placed. Loop recorder and micra pacemaker are also present.  The lungs show mild pulmonary congestion primarily involving the right lung and there is no evidence of pneumothorax nor pleural effusion.  Chest one view 7/29/2023 4:07 AM  Compared to the prior study, there has been progression ofsmall right effusion. No pneumothorax.  IMPRESSION:  No pneumothorax after pigtail catheter placement.  < end of copied text >

## 2023-07-30 NOTE — PROGRESS NOTE ADULT - ASSESSMENT
63M with PMH of DM complicated by neuropathy, HTN, HLD, CAD, PVD, gastroparesis, ESRD on HD TTS and BPH s/p CABG x4 on 07/14/23. Nephrology is managing ESRD on HD.     -Access: R AV fistula   -Outpatient dialysis unit is unclear   -Outpatient dialysis days are Tuesdays Thursdays Saturdays  -Last HD was yesterday- next tx will be on Tuesday  Access working well  previously with Recirculation - s/p fistulogram and angioplasty on 07/25/23 by Vascular Surgery     -BP acceptable   - left large pleural effusion- s/p pigtail catheter placement by CTS  -UF as tolerated during HD  -Anemia - KIMBERLY with HD   -Mineral Bone Disease in setting of CKD -monitor ca++ and phos

## 2023-07-31 ENCOUNTER — TRANSCRIPTION ENCOUNTER (OUTPATIENT)
Age: 63
End: 2023-07-31

## 2023-07-31 VITALS
RESPIRATION RATE: 17 BRPM | HEART RATE: 63 BPM | OXYGEN SATURATION: 91 % | TEMPERATURE: 97 F | SYSTOLIC BLOOD PRESSURE: 129 MMHG | DIASTOLIC BLOOD PRESSURE: 55 MMHG

## 2023-07-31 LAB
ANION GAP SERPL CALC-SCNC: 14 MMOL/L — SIGNIFICANT CHANGE UP (ref 5–17)
BUN SERPL-MCNC: 35.8 MG/DL — HIGH (ref 8–20)
CALCIUM SERPL-MCNC: 7.8 MG/DL — LOW (ref 8.4–10.5)
CHLORIDE SERPL-SCNC: 91 MMOL/L — LOW (ref 96–108)
CO2 SERPL-SCNC: 25 MMOL/L — SIGNIFICANT CHANGE UP (ref 22–29)
CREAT SERPL-MCNC: 7.41 MG/DL — HIGH (ref 0.5–1.3)
EGFR: 8 ML/MIN/1.73M2 — LOW
GLUCOSE BLDC GLUCOMTR-MCNC: 115 MG/DL — HIGH (ref 70–99)
GLUCOSE BLDC GLUCOMTR-MCNC: 182 MG/DL — HIGH (ref 70–99)
GLUCOSE SERPL-MCNC: 164 MG/DL — HIGH (ref 70–99)
HCT VFR BLD CALC: 27.8 % — LOW (ref 39–50)
HGB BLD-MCNC: 8.9 G/DL — LOW (ref 13–17)
MAGNESIUM SERPL-MCNC: 2.1 MG/DL — SIGNIFICANT CHANGE UP (ref 1.8–2.6)
MCHC RBC-ENTMCNC: 29.4 PG — SIGNIFICANT CHANGE UP (ref 27–34)
MCHC RBC-ENTMCNC: 32 GM/DL — SIGNIFICANT CHANGE UP (ref 32–36)
MCV RBC AUTO: 91.7 FL — SIGNIFICANT CHANGE UP (ref 80–100)
PLATELET # BLD AUTO: 182 K/UL — SIGNIFICANT CHANGE UP (ref 150–400)
POTASSIUM SERPL-MCNC: 4 MMOL/L — SIGNIFICANT CHANGE UP (ref 3.5–5.3)
POTASSIUM SERPL-SCNC: 4 MMOL/L — SIGNIFICANT CHANGE UP (ref 3.5–5.3)
RBC # BLD: 3.03 M/UL — LOW (ref 4.2–5.8)
RBC # FLD: 15.5 % — HIGH (ref 10.3–14.5)
SODIUM SERPL-SCNC: 130 MMOL/L — LOW (ref 135–145)
WBC # BLD: 7.72 K/UL — SIGNIFICANT CHANGE UP (ref 3.8–10.5)
WBC # FLD AUTO: 7.72 K/UL — SIGNIFICANT CHANGE UP (ref 3.8–10.5)

## 2023-07-31 PROCEDURE — 83880 ASSAY OF NATRIURETIC PEPTIDE: CPT

## 2023-07-31 PROCEDURE — 84520 ASSAY OF UREA NITROGEN: CPT

## 2023-07-31 PROCEDURE — 33274 TCAT INSJ/RPL PERM LDLS PM: CPT

## 2023-07-31 PROCEDURE — 36902 INTRO CATH DIALYSIS CIRCUIT: CPT

## 2023-07-31 PROCEDURE — P9037: CPT

## 2023-07-31 PROCEDURE — 82553 CREATINE MB FRACTION: CPT

## 2023-07-31 PROCEDURE — 93320 DOPPLER ECHO COMPLETE: CPT

## 2023-07-31 PROCEDURE — 82330 ASSAY OF CALCIUM: CPT

## 2023-07-31 PROCEDURE — 71045 X-RAY EXAM CHEST 1 VIEW: CPT | Mod: 26

## 2023-07-31 PROCEDURE — 86923 COMPATIBILITY TEST ELECTRIC: CPT

## 2023-07-31 PROCEDURE — 80076 HEPATIC FUNCTION PANEL: CPT

## 2023-07-31 PROCEDURE — 93990 DOPPLER FLOW TESTING: CPT

## 2023-07-31 PROCEDURE — 74018 RADEX ABDOMEN 1 VIEW: CPT

## 2023-07-31 PROCEDURE — 36430 TRANSFUSION BLD/BLD COMPNT: CPT

## 2023-07-31 PROCEDURE — 36415 COLL VENOUS BLD VENIPUNCTURE: CPT

## 2023-07-31 PROCEDURE — P9012: CPT

## 2023-07-31 PROCEDURE — 85610 PROTHROMBIN TIME: CPT

## 2023-07-31 PROCEDURE — C1769: CPT

## 2023-07-31 PROCEDURE — C1764: CPT

## 2023-07-31 PROCEDURE — 85384 FIBRINOGEN ACTIVITY: CPT

## 2023-07-31 PROCEDURE — P9016: CPT

## 2023-07-31 PROCEDURE — 97110 THERAPEUTIC EXERCISES: CPT

## 2023-07-31 PROCEDURE — 83690 ASSAY OF LIPASE: CPT

## 2023-07-31 PROCEDURE — 86706 HEP B SURFACE ANTIBODY: CPT

## 2023-07-31 PROCEDURE — 80053 COMPREHEN METABOLIC PANEL: CPT

## 2023-07-31 PROCEDURE — 84295 ASSAY OF SERUM SODIUM: CPT

## 2023-07-31 PROCEDURE — 83605 ASSAY OF LACTIC ACID: CPT

## 2023-07-31 PROCEDURE — 71045 X-RAY EXAM CHEST 1 VIEW: CPT

## 2023-07-31 PROCEDURE — 87340 HEPATITIS B SURFACE AG IA: CPT

## 2023-07-31 PROCEDURE — 99261: CPT

## 2023-07-31 PROCEDURE — 85730 THROMBOPLASTIN TIME PARTIAL: CPT

## 2023-07-31 PROCEDURE — 85025 COMPLETE CBC W/AUTO DIFF WBC: CPT

## 2023-07-31 PROCEDURE — 93325 DOPPLER ECHO COLOR FLOW MAPG: CPT

## 2023-07-31 PROCEDURE — 93005 ELECTROCARDIOGRAM TRACING: CPT

## 2023-07-31 PROCEDURE — C1894: CPT

## 2023-07-31 PROCEDURE — 82962 GLUCOSE BLOOD TEST: CPT

## 2023-07-31 PROCEDURE — 84132 ASSAY OF SERUM POTASSIUM: CPT

## 2023-07-31 PROCEDURE — 82150 ASSAY OF AMYLASE: CPT

## 2023-07-31 PROCEDURE — P9100: CPT

## 2023-07-31 PROCEDURE — 99024 POSTOP FOLLOW-UP VISIT: CPT

## 2023-07-31 PROCEDURE — 85018 HEMOGLOBIN: CPT

## 2023-07-31 PROCEDURE — 86900 BLOOD TYPING SEROLOGIC ABO: CPT

## 2023-07-31 PROCEDURE — C1889: CPT

## 2023-07-31 PROCEDURE — 87641 MR-STAPH DNA AMP PROBE: CPT

## 2023-07-31 PROCEDURE — 86891 AUTOLOGOUS BLOOD OP SALVAGE: CPT

## 2023-07-31 PROCEDURE — 85014 HEMATOCRIT: CPT

## 2023-07-31 PROCEDURE — 87640 STAPH A DNA AMP PROBE: CPT

## 2023-07-31 PROCEDURE — C1887: CPT

## 2023-07-31 PROCEDURE — 94640 AIRWAY INHALATION TREATMENT: CPT

## 2023-07-31 PROCEDURE — C1725: CPT

## 2023-07-31 PROCEDURE — C1729: CPT

## 2023-07-31 PROCEDURE — 33285 INSJ SUBQ CAR RHYTHM MNTR: CPT

## 2023-07-31 PROCEDURE — 84484 ASSAY OF TROPONIN QUANT: CPT

## 2023-07-31 PROCEDURE — 82947 ASSAY GLUCOSE BLOOD QUANT: CPT

## 2023-07-31 PROCEDURE — 86850 RBC ANTIBODY SCREEN: CPT

## 2023-07-31 PROCEDURE — 86965 POOLING BLOOD PLATELETS: CPT

## 2023-07-31 PROCEDURE — 82803 BLOOD GASES ANY COMBINATION: CPT

## 2023-07-31 PROCEDURE — C8929: CPT

## 2023-07-31 PROCEDURE — 82550 ASSAY OF CK (CPK): CPT

## 2023-07-31 PROCEDURE — C1786: CPT

## 2023-07-31 PROCEDURE — 93312 ECHO TRANSESOPHAGEAL: CPT

## 2023-07-31 PROCEDURE — C1751: CPT

## 2023-07-31 PROCEDURE — 80048 BASIC METABOLIC PNL TOTAL CA: CPT

## 2023-07-31 PROCEDURE — 97116 GAIT TRAINING THERAPY: CPT

## 2023-07-31 PROCEDURE — 82435 ASSAY OF BLOOD CHLORIDE: CPT

## 2023-07-31 PROCEDURE — 99291 CRITICAL CARE FIRST HOUR: CPT

## 2023-07-31 PROCEDURE — 86901 BLOOD TYPING SEROLOGIC RH(D): CPT

## 2023-07-31 PROCEDURE — 84100 ASSAY OF PHOSPHORUS: CPT

## 2023-07-31 PROCEDURE — 71046 X-RAY EXAM CHEST 2 VIEWS: CPT

## 2023-07-31 PROCEDURE — 94002 VENT MGMT INPAT INIT DAY: CPT

## 2023-07-31 PROCEDURE — 85027 COMPLETE CBC AUTOMATED: CPT

## 2023-07-31 PROCEDURE — 99233 SBSQ HOSP IP/OBS HIGH 50: CPT

## 2023-07-31 PROCEDURE — 85576 BLOOD PLATELET AGGREGATION: CPT

## 2023-07-31 PROCEDURE — 83735 ASSAY OF MAGNESIUM: CPT

## 2023-07-31 PROCEDURE — 97530 THERAPEUTIC ACTIVITIES: CPT

## 2023-07-31 RX ORDER — CEPHALEXIN 500 MG
500 CAPSULE ORAL EVERY 12 HOURS
Refills: 0 | Status: DISCONTINUED | OUTPATIENT
Start: 2023-07-31 | End: 2023-07-31

## 2023-07-31 RX ORDER — OXYCODONE HYDROCHLORIDE 5 MG/1
1 TABLET ORAL
Qty: 20 | Refills: 0
Start: 2023-07-31 | End: 2023-08-04

## 2023-07-31 RX ORDER — TAMSULOSIN HYDROCHLORIDE 0.4 MG/1
1 CAPSULE ORAL
Qty: 30 | Refills: 0
Start: 2023-07-31 | End: 2023-08-29

## 2023-07-31 RX ORDER — APIXABAN 2.5 MG/1
1 TABLET, FILM COATED ORAL
Qty: 60 | Refills: 1
Start: 2023-07-31 | End: 2023-09-28

## 2023-07-31 RX ORDER — NITROGLYCERIN 6.5 MG
1 CAPSULE, EXTENDED RELEASE ORAL
Refills: 0 | DISCHARGE

## 2023-07-31 RX ORDER — AMLODIPINE BESYLATE 2.5 MG/1
1 TABLET ORAL
Qty: 0 | Refills: 0 | DISCHARGE
Start: 2023-07-31

## 2023-07-31 RX ORDER — OXYCODONE HYDROCHLORIDE 5 MG/1
1 TABLET ORAL
Qty: 0 | Refills: 0 | DISCHARGE
Start: 2023-07-31

## 2023-07-31 RX ORDER — INSULIN GLARGINE 100 [IU]/ML
7 INJECTION, SOLUTION SUBCUTANEOUS
Qty: 1 | Refills: 0
Start: 2023-07-31 | End: 2023-08-29

## 2023-07-31 RX ORDER — INSULIN GLARGINE 100 [IU]/ML
6 INJECTION, SOLUTION SUBCUTANEOUS
Qty: 0 | Refills: 0 | DISCHARGE

## 2023-07-31 RX ORDER — GABAPENTIN 400 MG/1
1 CAPSULE ORAL
Qty: 0 | Refills: 0 | DISCHARGE

## 2023-07-31 RX ORDER — METOPROLOL TARTRATE 50 MG
1 TABLET ORAL
Qty: 30 | Refills: 0
Start: 2023-07-31 | End: 2023-08-29

## 2023-07-31 RX ORDER — AMLODIPINE BESYLATE 2.5 MG/1
1 TABLET ORAL
Qty: 30 | Refills: 0
Start: 2023-07-31 | End: 2023-08-29

## 2023-07-31 RX ORDER — ATORVASTATIN CALCIUM 80 MG/1
1 TABLET, FILM COATED ORAL
Qty: 30 | Refills: 1
Start: 2023-07-31 | End: 2023-09-28

## 2023-07-31 RX ORDER — TAMSULOSIN HYDROCHLORIDE 0.4 MG/1
1 CAPSULE ORAL
Refills: 0 | DISCHARGE

## 2023-07-31 RX ORDER — HYDRALAZINE HCL 50 MG
1 TABLET ORAL
Qty: 0 | Refills: 0 | DISCHARGE
Start: 2023-07-31

## 2023-07-31 RX ORDER — CEPHALEXIN 500 MG
1 CAPSULE ORAL
Qty: 0 | Refills: 0 | DISCHARGE
Start: 2023-07-31

## 2023-07-31 RX ORDER — GABAPENTIN 400 MG/1
1 CAPSULE ORAL
Qty: 60 | Refills: 0
Start: 2023-07-31 | End: 2023-08-29

## 2023-07-31 RX ORDER — ATORVASTATIN CALCIUM 80 MG/1
1 TABLET, FILM COATED ORAL
Qty: 0 | Refills: 0 | DISCHARGE
Start: 2023-07-31

## 2023-07-31 RX ORDER — CLOPIDOGREL BISULFATE 75 MG/1
1 TABLET, FILM COATED ORAL
Qty: 30 | Refills: 0
Start: 2023-07-31 | End: 2023-08-29

## 2023-07-31 RX ORDER — INSULIN GLARGINE 100 [IU]/ML
7 INJECTION, SOLUTION SUBCUTANEOUS
Qty: 0 | Refills: 0 | DISCHARGE
Start: 2023-07-31

## 2023-07-31 RX ORDER — CEPHALEXIN 500 MG
1 CAPSULE ORAL
Qty: 10 | Refills: 0
Start: 2023-07-31 | End: 2023-08-04

## 2023-07-31 RX ADMIN — METHOCARBAMOL 500 MILLIGRAM(S): 500 TABLET, FILM COATED ORAL at 05:03

## 2023-07-31 RX ADMIN — SODIUM CHLORIDE 3 MILLILITER(S): 9 INJECTION INTRAMUSCULAR; INTRAVENOUS; SUBCUTANEOUS at 14:12

## 2023-07-31 RX ADMIN — Medication 37.5 MILLIGRAM(S): at 14:34

## 2023-07-31 RX ADMIN — GABAPENTIN 100 MILLIGRAM(S): 400 CAPSULE ORAL at 14:34

## 2023-07-31 RX ADMIN — Medication 500 MILLIGRAM(S): at 14:34

## 2023-07-31 RX ADMIN — OXYCODONE HYDROCHLORIDE 5 MILLIGRAM(S): 5 TABLET ORAL at 10:12

## 2023-07-31 RX ADMIN — LIDOCAINE 1 PATCH: 4 CREAM TOPICAL at 01:07

## 2023-07-31 RX ADMIN — PANTOPRAZOLE SODIUM 40 MILLIGRAM(S): 20 TABLET, DELAYED RELEASE ORAL at 10:12

## 2023-07-31 RX ADMIN — Medication 1 TABLET(S): at 10:11

## 2023-07-31 RX ADMIN — Medication 25 MILLIGRAM(S): at 05:03

## 2023-07-31 RX ADMIN — Medication 667 MILLIGRAM(S): at 14:35

## 2023-07-31 RX ADMIN — GABAPENTIN 100 MILLIGRAM(S): 400 CAPSULE ORAL at 05:03

## 2023-07-31 RX ADMIN — AMLODIPINE BESYLATE 5 MILLIGRAM(S): 2.5 TABLET ORAL at 05:02

## 2023-07-31 RX ADMIN — APIXABAN 2.5 MILLIGRAM(S): 2.5 TABLET, FILM COATED ORAL at 05:02

## 2023-07-31 RX ADMIN — Medication 37.5 MILLIGRAM(S): at 05:03

## 2023-07-31 RX ADMIN — Medication 25 MILLIGRAM(S): at 14:34

## 2023-07-31 RX ADMIN — Medication 1: at 08:09

## 2023-07-31 RX ADMIN — Medication 667 MILLIGRAM(S): at 10:12

## 2023-07-31 RX ADMIN — CLOPIDOGREL BISULFATE 75 MILLIGRAM(S): 75 TABLET, FILM COATED ORAL at 10:12

## 2023-07-31 RX ADMIN — SODIUM CHLORIDE 3 MILLILITER(S): 9 INJECTION INTRAMUSCULAR; INTRAVENOUS; SUBCUTANEOUS at 05:04

## 2023-07-31 RX ADMIN — LORATADINE 10 MILLIGRAM(S): 10 TABLET ORAL at 10:12

## 2023-07-31 NOTE — CHART NOTE - NSCHARTNOTEFT_GEN_A_CORE
Source: Patient [ X]  Family [ ]   other [ ]  63M PMHx ESRD on HD (T,Th,S), IDDM, COPD, gastroparesis, peripheral blindness and retinopathy, NSTEMI 6/2022 with recent hospital admission for CABG eval, c diff + that admission, now presents from home with complaints of worsening chest pain associated with difficulty breathing. In ER EKGs with intermittent diffuse ST changes, + troponins, +NSTEMI, started on a Tridil and Heparin gtt for NSTEMI, admitted to CTICU, IABP placed, now s/p CABG x 4 (LIMA-LAD, SVG-OM 1, OM2, PDA) on 7/14/23, IABP removed after OR without incident. Postop course notable for PAF with long conversion pauses of 2-6.5 seconds (EP following, s/p Medtronic loop recorder and leadless Micra implant 7/28/23), Left pleural effusion (s/p pigtail catheter placement 7/28/23), and persistent hyperkalemia despite HD (concern for recirculation through fistula, vascular surgery following, pt now s/p fistulogram with percutaneous balloon angioplasty 7/25/23, with hyperkalemia resolved).      Current Diet: Diet, Renal Restrictions:   For patients receiving Renal Replacement - No Protein Restr, No Conc K, No Conc Phos, Low Sodium  1200mL Fluid Restriction ([id number]) (07-22-23 @ 11:20)      Patient reports [ ] nausea  [ ] vomiting [2x ] diarrhea [ ] constipation  [ ]chewing problems [ ] swallowing issues  [ ] other:     PO intake:  < 50% [ ]   50-75%  [ ]   %  [X ]  other :    Source for PO intake [X ] Patient [ ] family [X ] chart [X ] staff [ ] other    Current Weight:   (7/31) 148.5lbs   (7/30) 146.6  (7/29)149.9  (7/28)152.1  (7/27) 155.4  (7/26)152.7  (7/25)152.7  (7/24)149.9  (7/23)152.5  (7/22)149  (7/21)149    % Weight Change +1 L/R leg edema effecting weight status     Pertinent Medications: MEDICATIONS  (STANDING):  amLODIPine   Tablet 5 milliGRAM(s) Oral daily  apixaban 2.5 milliGRAM(s) Oral two times a day  atorvastatin 80 milliGRAM(s) Oral at bedtime  calcium acetate 667 milliGRAM(s) Oral three times a day with meals  clopidogrel Tablet 75 milliGRAM(s) Oral daily  dextrose 50% Injectable 50 milliLiter(s) IV Push every 15 minutes  dextrose 50% Injectable 25 milliLiter(s) IV Push every 15 minutes  epoetin lois-epbx (RETACRIT) Injectable 5000 Unit(s) IV Push <User Schedule>  gabapentin 100 milliGRAM(s) Oral three times a day  glucagon  Injectable 1 milliGRAM(s) IntraMuscular once  hydrALAZINE 25 milliGRAM(s) Oral three times a day  insulin glargine Injectable (LANTUS) 7 Unit(s) SubCutaneous at bedtime  insulin lispro (ADMELOG) corrective regimen sliding scale   SubCutaneous three times a day before meals  insulin lispro (ADMELOG) corrective regimen sliding scale   SubCutaneous at bedtime  iron sucrose IVPB 100 milliGRAM(s) IV Intermittent every 48 hours  lidocaine   4% Patch 1 Patch Transdermal daily  lidocaine   4% Patch 1 Patch Transdermal daily  loratadine 10 milliGRAM(s) Oral daily  methocarbamol 500 milliGRAM(s) Oral two times a day  metoprolol tartrate 37.5 milliGRAM(s) Oral every 8 hours  Nephro-becky 1 Tablet(s) Oral daily  pantoprazole    Tablet 40 milliGRAM(s) Oral daily  sodium chloride 0.9% lock flush 3 milliLiter(s) IV Push every 8 hours  tamsulosin 0.4 milliGRAM(s) Oral at bedtime    MEDICATIONS  (PRN):  dextrose Oral Gel 15 Gram(s) Oral once PRN Blood Glucose LESS THAN 70 milliGRAM(s)/deciliter  ondansetron Injectable 4 milliGRAM(s) IV Push every 6 hours PRN Nausea and/or Vomiting  oxyCODONE    IR 5 milliGRAM(s) Oral every 6 hours PRN Severe Pain (7 - 10)    Pertinent Labs: CBC Full  -  ( 31 Jul 2023 07:37 )  WBC Count : 7.72 K/uL  RBC Count : 3.03 M/uL  Hemoglobin : 8.9 g/dL  Hematocrit : 27.8 %  Platelet Count - Automated : 182 K/uL  Mean Cell Volume : 91.7 fl  Mean Cell Hemoglobin : 29.4 pg  Mean Cell Hemoglobin Concentration : 32.0 gm/dL  07-31 Na130 mmol/L<L> Glu 164 mg/dL<H> K+ 4.0 mmol/L Cr  7.41 mg/dL<H> BUN 35.8 mg/dL<H> Phos n/a   Alb n/a   PAB n/a       Skin: unstageable to buttock     Nutrition focused physical exam-previously conducted - found signs of malnutrition [ ]absent [ x]present    Subcutaneous fat loss: mod [ x] Orbital fat pads region, [x ]Buccal fat region, [x ]Triceps region,  [ x]Ribs region    Muscle wasting: mod [x ]Temples region, [ x]Clavicle region, [x ]Shoulder region, [ ]Scapula region, [ ]Interosseous region,  [ ]thigh region, [ ]Calf region     Estimated Needs:   [X ] no change since previous assessment  [ ] recalculated:     Current Nutrition Diagnosis:  Pt remains at high nutrition risk secondary to Moderate (chronic) protein calorie malnutrition related to inadequate protein energy intake with increased needs in setting of ESRD on HD, NSTEMI, now s/p CABG x 4 as evidenced by physical signs of moderate muscle/fat loss, meeting < 75% estimated nutrition needs > 1 month. Pt states he is eating well, tray visualized and confirmed. Reinforced 1200ml fluid restriction as beverages were noted on tray. Pt sitting in chair unable to obtain bed scale weight. RD to remain available      Recommendations:   1. Continue Nephro-becky, folic acid daily   2. Check weight daily to monitor trends   3. Encourage with meals     Monitoring and Evaluation:   [ ] PO intake [ ] Tolerance to diet prescription [X] Weights  [X] Follow up per protocol [X] Labs:

## 2023-07-31 NOTE — PROGRESS NOTE ADULT - NUTRITIONAL ASSESSMENT
This patient has been assessed with a concern for Malnutrition and has been determined to have a diagnosis/diagnoses of Moderate protein-calorie malnutrition.    This patient is being managed with:   Diet NPO after Midnight-     NPO Start Date: 24-Jul-2023   NPO Start Time: 23:59  Except Medications  Entered: Jul 24 2023  8:24AM    Diet Renal Restrictions-  For patients receiving Renal Replacement - No Protein Restr No Conc K No Conc Phos Low Sodium  1200mL Fluid Restriction ([id number])  Entered: Jul 22 2023 11:20AM  
This patient has been assessed with a concern for Malnutrition and has been determined to have a diagnosis/diagnoses of Moderate protein-calorie malnutrition.    This patient is being managed with:   Diet Regular-  Consistent Carbohydrate {No Snacks} (CSTCHO)  DASH/TLC {Sodium & Cholesterol Restricted} (DASH)  Entered: Jul 15 2023  6:15AM  
This patient has been assessed with a concern for Malnutrition and has been determined to have a diagnosis/diagnoses of Moderate protein-calorie malnutrition.    This patient is being managed with:   Diet Regular-  Consistent Carbohydrate {No Snacks} (CSTCHO)  DASH/TLC {Sodium & Cholesterol Restricted} (DASH)  Entered: Jul 15 2023  6:15AM    This patient has been assessed with a concern for Malnutrition and has been determined to have a diagnosis/diagnoses of Moderate protein-calorie malnutrition.    This patient is being managed with:   Diet Regular-  Consistent Carbohydrate {No Snacks} (CSTCHO)  DASH/TLC {Sodium & Cholesterol Restricted} (DASH)  Entered: Jul 15 2023  6:15AM  
This patient has been assessed with a concern for Malnutrition and has been determined to have a diagnosis/diagnoses of Moderate protein-calorie malnutrition.    This patient is being managed with:   Diet NPO after Midnight-     NPO Start Date: 24-Jul-2023   NPO Start Time: 23:59  Except Medications  Entered: Jul 24 2023  8:24AM    Diet Renal Restrictions-  For patients receiving Renal Replacement - No Protein Restr No Conc K No Conc Phos Low Sodium  1200mL Fluid Restriction ([id number])  Entered: Jul 22 2023 11:20AM  
This patient has been assessed with a concern for Malnutrition and has been determined to have a diagnosis/diagnoses of Moderate protein-calorie malnutrition.    This patient is being managed with:   Diet Regular-  Consistent Carbohydrate {No Snacks} (CSTCHO)  DASH/TLC {Sodium & Cholesterol Restricted} (DASH)  Entered: Jul 15 2023  6:15AM  
This patient has been assessed with a concern for Malnutrition and has been determined to have a diagnosis/diagnoses of Moderate protein-calorie malnutrition.    This patient is being managed with:   Diet Regular-  Consistent Carbohydrate {No Snacks} (CSTCHO)  DASH/TLC {Sodium & Cholesterol Restricted} (DASH)  Entered: Jul 15 2023  6:15AM  
This patient has been assessed with a concern for Malnutrition and has been determined to have a diagnosis/diagnoses of Moderate protein-calorie malnutrition.    This patient is being managed with:   Diet DASH/TLC-  Sodium & Cholesterol Restricted  Consistent Carbohydrate {No Snacks} (CSTCHO)  For patients receiving Renal Replacement - No Protein Restr No Conc K No Conc Phos Low  Sodium (RENAL)  Entered: Jul 21 2023  8:55AM  
This patient has been assessed with a concern for Malnutrition and has been determined to have a diagnosis/diagnoses of Moderate protein-calorie malnutrition.    This patient is being managed with:   Diet Renal Restrictions-  For patients receiving Renal Replacement - No Protein Restr No Conc K No Conc Phos Low Sodium  1200mL Fluid Restriction ([id number])  Entered: Jul 22 2023 11:20AM  
This patient has been assessed with a concern for Malnutrition and has been determined to have a diagnosis/diagnoses of Moderate protein-calorie malnutrition.    This patient is being managed with:   Diet Regular-  Consistent Carbohydrate {No Snacks} (CSTCHO)  DASH/TLC {Sodium & Cholesterol Restricted} (DASH)  Entered: Jul 15 2023  6:15AM  
This patient has been assessed with a concern for Malnutrition and has been determined to have a diagnosis/diagnoses of Moderate protein-calorie malnutrition.    This patient is being managed with:   Diet NPO after Midnight-     NPO Start Date: 24-Jul-2023   NPO Start Time: 23:59  Except Medications  Entered: Jul 24 2023  8:24AM    Diet Renal Restrictions-  For patients receiving Renal Replacement - No Protein Restr No Conc K No Conc Phos Low Sodium  1200mL Fluid Restriction ([id number])  Entered: Jul 22 2023 11:20AM  
This patient has been assessed with a concern for Malnutrition and has been determined to have a diagnosis/diagnoses of Moderate protein-calorie malnutrition.    This patient is being managed with:   Diet NPO after Midnight-     NPO Start Date: 24-Jul-2023   NPO Start Time: 23:59  Except Medications  Entered: Jul 24 2023  8:24AM    Diet Renal Restrictions-  For patients receiving Renal Replacement - No Protein Restr No Conc K No Conc Phos Low Sodium  1200mL Fluid Restriction ([id number])  Entered: Jul 22 2023 11:20AM  
This patient has been assessed with a concern for Malnutrition and has been determined to have a diagnosis/diagnoses of Moderate protein-calorie malnutrition.    This patient is being managed with:   Diet DASH/TLC-  Sodium & Cholesterol Restricted  Consistent Carbohydrate {No Snacks} (CSTCHO)  For patients receiving Renal Replacement - No Protein Restr No Conc K No Conc Phos Low  Sodium (RENAL)  Entered: Jul 21 2023  8:55AM  
This patient has been assessed with a concern for Malnutrition and has been determined to have a diagnosis/diagnoses of Moderate protein-calorie malnutrition.    This patient is being managed with:   Diet NPO after Midnight-     NPO Start Date: 24-Jul-2023   NPO Start Time: 23:59  Except Medications  Entered: Jul 24 2023  8:24AM    Diet Renal Restrictions-  For patients receiving Renal Replacement - No Protein Restr No Conc K No Conc Phos Low Sodium  1200mL Fluid Restriction ([id number])  Entered: Jul 22 2023 11:20AM  
This patient has been assessed with a concern for Malnutrition and has been determined to have a diagnosis/diagnoses of Moderate protein-calorie malnutrition.    This patient is being managed with:   Diet NPO after Midnight-     NPO Start Date: 24-Jul-2023   NPO Start Time: 23:59  Except Medications  Entered: Jul 24 2023  8:24AM    Diet Renal Restrictions-  For patients receiving Renal Replacement - No Protein Restr No Conc K No Conc Phos Low Sodium  1200mL Fluid Restriction ([id number])  Entered: Jul 22 2023 11:20AM  
This patient has been assessed with a concern for Malnutrition and has been determined to have a diagnosis/diagnoses of Moderate protein-calorie malnutrition.    This patient is being managed with:   Diet NPO after Midnight-     NPO Start Date: 24-Jul-2023   NPO Start Time: 23:59  Except Medications  Entered: Jul 24 2023  8:24AM    Diet Renal Restrictions-  For patients receiving Renal Replacement - No Protein Restr No Conc K No Conc Phos Low Sodium  1200mL Fluid Restriction ([id number])  Entered: Jul 22 2023 11:20AM  
This patient has been assessed with a concern for Malnutrition and has been determined to have a diagnosis/diagnoses of Moderate protein-calorie malnutrition.    This patient is being managed with:   Diet Renal Restrictions-  For patients receiving Renal Replacement - No Protein Restr No Conc K No Conc Phos Low Sodium  1200mL Fluid Restriction ([id number])  Entered: Jul 22 2023 11:20AM

## 2023-07-31 NOTE — PROGRESS NOTE ADULT - PROBLEM SELECTOR PLAN 4
HA1C 9.4 on insulin preop.  Continue Lantus, premeal insulin, and sliding scale insulin for BG coverage.   Labile blood glucose. Fingersticks AC/HS or if symptomatic.   Endocrine following, pending discharge recs.
HA1C 9.4 on insulin preop.  Continue Lantus, premeal insulin, and sliding scale insulin for BG coverage.   Labile blood glucose. Fingersticks AC/HS or if symptomatic.   Endocrine following, pending discharge recs.
HA1C 9.4 on insulin preop.  Continue Lantus, premeal insulin, and sliding scale insulin for BG coverage.   Labile blood glucose. Fingersticks AC/HS or if symptomatic.   Remains NPO for procedure later today. Fingersticks q6hrs while NPO.   Endocrine following, pending discharge recs.
SCDs for DVT prophylaxis  Protonix for stress ulcer prophylaxis
SCDs heparin  for DVT prophylaxis  Protonix for stress ulcer prophylaxis
HA1C 9.4 on insulin preop.  Continue Lantus, and sliding scale insulin for BG coverage.   Labile blood glucose. Fingersticks AC/HS or if symptomatic.   Endocrine following, pending discharge recs.
HD T/Th/S  renal following  Renal diet
HA1C 9.4 on insulin preop.  Continue Lantus, premeal insulin, and sliding scale insulin for BG coverage.   Labile blood glucose. Fingersticks AC/HS or if symptomatic.   Endocrine following, pending discharge recs.
SCDs heparin for DVT prophylaxis  Protonix for stress ulcer prophylaxis
HA1C 9.4 on insulin preop.  Continue Lantus, premeal insulin, and sliding scale insulin for BG coverage.   Labile blood glucose. Fingersticks AC/HS or if symptomatic.   Endocrine following, pending discharge recs.
HA1C 9.4 on insulin preop.  Continue Lantus, premeal insulin, and sliding scale insulin for BG coverage.   Labile blood glucose. Fingersticks AC/HS or if symptomatic.   Endocrine following, pending discharge recs.
A1C 9.4 on insulin preop  cont lantus/shaheen/premeal  labile blood glucose  Endocrine following, pending discharge recs
HA1C 9.4 on insulin preop.  Continue Lantus, premeal insulin, and sliding scale insulin for BG coverage.   Labile blood glucose. Fingersticks AC/HS or if symptomatic.   Endocrine following, pending discharge recs.
HA1C 9.4 on insulin preop.  Continue Lantus, and sliding scale insulin for BG coverage.   Labile blood glucose. Fingersticks AC/HS or if symptomatic.   Endocrine following, pending discharge recs.
HA1C 9.4 on insulin preop.  Continue Lantus, and sliding scale insulin for BG coverage.   Labile blood glucose. Fingersticks AC/HS or if symptomatic.   Endocrine following, pending discharge recs.

## 2023-07-31 NOTE — PROGRESS NOTE ADULT - PROBLEM SELECTOR PROBLEM 6
Prophylactic measure
Pleural effusion
Prophylactic measure
Pleural effusion
Prophylactic measure
Prophylactic measure
Pleural effusion

## 2023-07-31 NOTE — PROGRESS NOTE ADULT - PROBLEM SELECTOR PLAN 1
S/p CABG X 4 (LIMA-LAD, SVG-OM 1, OM2, PDA) on 7/14/23.   Neurologically intact. Hemodynamically stable.  Continue lopressor as tolerated by HR and BP.   Continue Plavix for acute graft closure prophylaxis.  Continue statin for chronic graft patency prophylaxis.  Oxygenating well, coughing and deep breathing exercises/incentive spirometry encouraged.  S/p Left pigtail catheter placed 7/28 for pleural effusion.   Monitor strict I/Os, optimize electrolytes as appropriate. HD schedule T/TH/S, planned for later today.   S/p fistulogram with percutaneous balloon angioplasty 7/25. AVF with good thrill.  Continue with PT, increase activity as tolerated.  FS AC+HS with coverage for glycemic control.  Robaxin, Gabapentin  for analgesia.  Continue bowel regimen PRN constipation.   S/p Medtronic loop recorder and leadless Micra implant 7/28/23.  Follow up PA/Lat CXR later today. EP following.     Dispo: Home once cleared by EP and left pigtail catheter removed.   Case and plan to be reviewed / discussed with CT Surgery attending/ team during AM rounds.
S/p CABG X 4 (LIMA-LAD, SVG-OM 1, OM2, PDA) on 7/14/23.   Neurologically intact. Hemodynamically stable.  Continue lopressor as tolerated by HR and BP.   Continue aspirin and Plavix for acute graft closure prophylaxis.  Continue statin for chronic graft patency prophylaxis.  Oxygenating well, coughing and deep breathing exercises/incentive spirometry encouraged.  Monitor strict I/Os, optimize electrolytes as appropriate. HD T/TH/S.   Continue with PT, increase activity as tolerated.  FS AC+HS with coverage for glycemic control.  Robaxin, Gabapentin, Oxy PRN for analgesia.  Continue bowel regimen PRN constipation.   Dispo: Home after Fistulogram with vascular surgery and optimization by nephrology.  Case and plan to be reviewed / discussed with CT Surgery Dr. Newsome/ team during AM rounds.
S/p CABG X 4 (LIMA-LAD, SVG-OM 1, OM2, PDA) on 7/14/23.   Neurologically intact. Hemodynamically stable.  Continue lopressor as tolerated by HR and BP.   Continue aspirin and Plavix for acute graft closure prophylaxis.  Continue statin for chronic graft patency prophylaxis.  Oxygenating well, coughing and deep breathing exercises/incentive spirometry encouraged.  Monitor strict I/Os, optimize electrolytes as appropriate. HD schedule T/TH/S,   HD today, fistulogram after  Continue with PT, increase activity as tolerated.  FS AC+HS with coverage for glycemic control.  Robaxin, Gabapentin  for analgesia.  Continue bowel regimen PRN constipation.   Dispo: home pending fistulogram and resolution of rhythm issues, likely next 1-2 days  Case and plan to be reviewed / discussed with CT Surgery attending/ team during AM rounds.
s/p C4L (LIMA-LAD, SVG-OM 1, OM2, PDA)  OOB, ambulate PT as tolerated,   oxy PRN pain  spo2 stable on RA, encourage Incentive Spirometry, chest PT deep breathing and coughing  hemodynamically stable, cont asa/plavix and lipitor for graft closure ppx  Lopressor as tolerated for HR/BP   isolate epicardial wires in AM if no further episodes of afib   tolerating diet  HD T/TH/S  dispo: home next 1-2 days pending progress   plan to be d/w Dr. Newsome in AM rounds
s/p C4L (LIMA-LAD, SVG-OM 1, OM2, PDA)  OOB, ambulate PT as tolerated,   oxy PRN pain  spo2 stable on RA, encourage Incentive Spirometry, chest PT deep breathing and coughing  hemodynamically stable, cont asa/plavix and lipitor for graft closure ppx  lopressor as tolerated by HR/BP  isolate epicardial wires  tolerating diet  HD today  dispo: home next 1-2 days  plan to be d/w Dr. Newsome in AM rounds
Now s/p CABG  Wean pressor as tolerated  Beta blocker as tolerated by HR and SBP   Lipitor for chronic graft patency prophylaxis  Aspirin for acute graft closure prophylaxis  Encourage PO intake  Encourage OOB to chair and ambulation with PT  Encourage deep breathing exercised and coughing  Chest PT and IS use with bedside nurse
S/p CABG X 4 (LIMA-LAD, SVG-OM 1, OM2, PDA) on 7/14/23.   Neurologically intact. Hemodynamically stable.  Continue lopressor as tolerated by HR and BP.   Continue Plavix for acute graft closure prophylaxis.  Continue statin for chronic graft patency prophylaxis.  Oxygenating well, coughing and deep breathing exercises/incentive spirometry encouraged.  Monitor strict I/Os, optimize electrolytes as appropriate. HD schedule T/TH/S,   HD 7/27 with -1.5L removed.   S/p fistulogram with percutaneous balloon angioplasty 7/25.  AVF with good thrill.  Continue with PT, increase activity as tolerated.  FS AC+HS with coverage for glycemic control.  Robaxin, Gabapentin  for analgesia.  Continue bowel regimen PRN constipation.   Plan for Micra PPM placement later today with EP 7/28.    Dispo: Home pending resolution of rhythm issues  Case and plan to be reviewed / discussed with CT Surgery attending/ team during AM rounds.
S/p CABG X 4 (LIMA-LAD, SVG-OM 1, OM2, PDA) on 7/14/23.   Neurologically intact. Hemodynamically stable.  Continue lopressor as tolerated by HR and BP.   Continue aspirin and Plavix for acute graft closure prophylaxis.  Continue statin for chronic graft patency prophylaxis.  Oxygenating well, coughing and deep breathing exercises/incentive spirometry encouraged.  Monitor strict I/Os, optimize electrolytes as appropriate. HD schedule T/TH/S,   HD 7/25 -1.5 off  s/p fistulogram with percutaneous balloon angioplasty 7/25  AVF with good thrill, less pulsatile, at the end of the case as per Brief op note   Continue with PT, increase activity as tolerated.  FS AC+HS with coverage for glycemic control.  Robaxin, Gabapentin  for analgesia.  Continue bowel regimen PRN constipation.   Plan for ablation by EP 7/28     Dispo: home pending resolution of rhythm issues  Case and plan to be reviewed / discussed with CT Surgery attending/ team during AM rounds.
S/p CABG X 4 (LIMA-LAD, SVG-OM 1, OM2, PDA) on 7/14/23.   Neurologically intact. Hemodynamically stable.  Continue lopressor as tolerated by HR and BP.   Continue Plavix for acute graft closure prophylaxis.  Continue statin for chronic graft patency prophylaxis.  Oxygenating well, coughing and deep breathing exercises/incentive spirometry encouraged.  S/p Left pigtail catheter placed 7/28 for pleural effusion.   Monitor strict I/Os, optimize electrolytes as appropriate.   HD schedule T/TH/S. Last HD yesterday with 1.5L removed.   S/p fistulogram with percutaneous balloon angioplasty 7/25. AVF with good thrill.  Continue with PT, increase activity as tolerated.  FS AC+HS with coverage for glycemic control.  Robaxin, Gabapentin, and Oxy PRN for analgesia.  Continue bowel regimen PRN constipation.   S/p Medtronic loop recorder and leadless Micra implant 7/28/23.  Follow up PA/Lat CXR reading later today. EP following.     Dispo: Home once cleared by EP and left pigtail catheter removed.   Case and plan to be reviewed / discussed with CT Surgery attending/ team during AM rounds.
S/p CABG X 4 (LIMA-LAD, SVG-OM 1, OM2, PDA) on 7/14/23.   Neurologically intact. Hemodynamically stable.  Continue lopressor as tolerated by HR and BP.   Continue aspirin and Plavix for acute graft closure prophylaxis.  Continue statin for chronic graft patency prophylaxis.  Oxygenating well, coughing and deep breathing exercises/incentive spirometry encouraged.  Monitor strict I/Os, optimize electrolytes as appropriate. HD T/TH/S.   Continue with PT, increase activity as tolerated.  FS AC+HS with coverage for glycemic control.  Robaxin, Gabapentin, Oxy PRN for analgesia.  Continue bowel regimen PRN constipation.   Dispo: Home next 1-2 days pending plan per EP.   Case and plan to be reviewed / discussed with CT Surgery Dr. Newsome/ team during AM rounds.
Now s/p CABG  Beta blocker as tolerated by HR and SBP   Lipitor for chronic graft patency prophylaxis  Aspirin and plavix for acute graft closure prophylaxis  Encourage PO intake  Encourage OOB to chair and ambulation with PT  Encourage deep breathing exercised and coughing  Chest PT and IS use with bedside nurse  D/C cordis later this AM D/C merida, bladder scan every 12 hours after removal.
S/p CABG X 4 (LIMA-LAD, SVG-OM 1, OM2, PDA) on 7/14/23.   Neurologically intact. Hemodynamically stable.  Continue lopressor as tolerated by HR and BP.   Continue aspirin and Plavix for acute graft closure prophylaxis.  Continue statin for chronic graft patency prophylaxis.  Oxygenating well, coughing and deep breathing exercises/incentive spirometry encouraged.  Monitor strict I/Os, optimize electrolytes as appropriate. HD schedule T/TH/S,   HD 7/25 -1.5 off  s/p fistulogram with percutaneous balloon angioplasty 7/25  AVF with good thrill, less pulsatile, at the end of the case as per Brief op note   Continue with PT, increase activity as tolerated.  FS AC+HS with coverage for glycemic control.  Robaxin, Gabapentin  for analgesia.  Continue bowel regimen PRN constipation.   Dispo: home pending fistulogram and resolution of rhythm issues, likely next 1-2 days  Case and plan to be reviewed / discussed with CT Surgery attending/ team during AM rounds.
Now s/p CABG  Beta blocker as tolerated by HR and SBP   Lipitor for chronic graft patency prophylaxis  Aspirin and plavix for acute graft closure prophylaxis  Encourage PO intake  Encourage OOB to chair and ambulation with PT  Encourage deep breathing exercised and coughing  Chest PT and IS use with bedside nurse  Case to be discussed at AM rounds.
S/p CABG X 4 (LIMA-LAD, SVG-OM 1, OM2, PDA) on 7/14/23.   Neurologically intact. Hemodynamically stable.  Continue lopressor as tolerated by HR and BP.   Continue aspirin and Plavix for acute graft closure prophylaxis.  Continue statin for chronic graft patency prophylaxis.  Oxygenating well, coughing and deep breathing exercises/incentive spirometry encouraged.  Monitor strict I/Os, optimize electrolytes as appropriate. HD T/TH/S.   Continue with PT, increase activity as tolerated.  FS AC+HS with coverage for glycemic control.  Robaxin, Gabapentin, Oxy PRN for analgesia.  Continue bowel regimen PRN constipation.   Dispo: Home after Fistulogram with vascular surgery and optimization by nephrology.  Case and plan to be reviewed / discussed with CT Surgery Dr. Newsome/ team during AM rounds.
S/p CABG X 4 (LIMA-LAD, SVG-OM 1, OM2, PDA) on 7/14/23.   Neurologically intact. Hemodynamically stable.  Continue lopressor as tolerated by HR and BP.   Continue aspirin and Plavix for acute graft closure prophylaxis.  Continue statin for chronic graft patency prophylaxis.  Oxygenating well, coughing and deep breathing exercises/incentive spirometry encouraged.  Monitor strict I/Os, optimize electrolytes as appropriate. HD schedule T/TH/S. however now receiving daily HD due to hyperkalemia, cont lokelma   Continue with PT, increase activity as tolerated.  FS AC+HS with coverage for glycemic control.  Robaxin, Gabapentin  for analgesia.  Continue bowel regimen PRN constipation.   Dispo: Home after Fistulogram with vascular surgery and optimization by nephrology.  Case and plan to be reviewed / discussed with CT Surgery attending/ team during AM rounds.
S/p CABG X 4 (LIMA-LAD, SVG-OM 1, OM2, PDA) on 7/14/23.   Neurologically intact. Hemodynamically stable.  Continue lopressor as tolerated by HR and BP.   Continue aspirin and Plavix for acute graft closure prophylaxis.  Continue statin for chronic graft patency prophylaxis.  Oxygenating well, coughing and deep breathing exercises/incentive spirometry encouraged.  Monitor strict I/Os, optimize electrolytes as appropriate. HD T/TH/S.   Continue with PT, increase activity as tolerated.  FS AC+HS with coverage for glycemic control.  Robaxin, Gabapentin, Oxy PRN for analgesia.  Continue bowel regimen PRN constipation.   Dispo: Home next 1-2 days pending plan per EP and optimization by nephrology.  Case and plan to be reviewed / discussed with CT Surgery Dr. Newsome/ team during AM rounds.
S/p CABG X 4 (LIMA-LAD, SVG-OM 1, OM2, PDA) on 7/14/23.   Neurologically intact. Hemodynamically stable.  Continue lopressor as tolerated by HR and BP.   Continue Plavix for acute graft closure prophylaxis.  Continue statin for chronic graft patency prophylaxis.  Oxygenating well, coughing and deep breathing exercises/incentive spirometry encouraged.  S/p Left pigtail catheter placed 7/28 for pleural effusion.   Monitor strict I/Os, optimize electrolytes as appropriate.   HD schedule T/TH/S. Last HD Saturday with 1.5L removed.   S/p fistulogram with percutaneous balloon angioplasty 7/25. AVF with good thrill.  Continue with PT, increase activity as tolerated.  FS AC+HS with coverage for glycemic control.  Robaxin, Gabapentin, and Oxy PRN for analgesia.  Bowel regimen held for diarrhea- continue to monitor for post operative constipation  S/p Medtronic loop recorder and leadless Micra implant 7/28/23.  PA/Lat CXR cleared by EP team    Dispo: Home later today pending confirmation of HD seat in community   Case and plan to be reviewed / discussed with CT Surgery attending/ team during AM rounds.

## 2023-07-31 NOTE — PROGRESS NOTE ADULT - PROBLEM SELECTOR PLAN 5
HD T/Th/S.  Renal following.  Renal diet.  Persistent hyperkalemia despite HD, concern for recirculation through fistula.   Required HD 7/18, 7/20, 7/21, and plan was for HD 7/22 due to persistent hyperkalemia but cancelled and IV Lasix and Lokelma given by Nephrology.   Plan for fistulogram Tuesday 7/25  Vascular surgery following.
HD T/Th/S.  Renal following.  Renal diet.  Persistent hyperkalemia despite HD, concern for recirculation through fistula.   Vascular surgery following.  Required HD 7/18, 7/20, 7/21, 7/23 and Lokelma.  S/p fistulogram with percutaneous balloon angioplasty 7/25 AVF with good thrill.  K now improved.  Plan for HD later today.
HD T/Th/S.  Renal following.  Renal diet.  Persistent hyperkalemia despite HD, concern for recirculation through fistula.   Vascular surgery following.  Required HD 7/18, 7/20, 7/21, 7/23 and Lokelma.  S/p fistulogram with percutaneous balloon angioplasty 7/25 AVF with good thrill.  K now improved.  Plan for HD later today.
HD T/Th/S.  Renal following.  Renal diet.  Persistent hyperkalemia despite HD, concern for recirculation through fistula.   Vascular surgery following. Follow up duplex of assess fistula.
SCDs heparin for DVT prophylaxis  Protonix for stress ulcer prophylaxis
HD T/Th/S.  Renal following.  Renal diet.  Persistent hyperkalemia despite HD, concern for recirculation through fistula.   Plan for fistulogram Tuesday 7/25  Vascular surgery following.
HD T/Th/S.  Renal following.  Renal diet.  Persistent hyperkalemia despite HD, concern for recirculation through fistula.   Vascular surgery following.  Required HD 7/18, 7/20, 7/21, 7/23 and Lokelma.  S/p fistulogram with percutaneous balloon angioplasty 7/25 AVF with good thrill.  K now improved.
HD T/Th/S  renal following  Renal diet
HD T/Th/S.  Renal following.  Renal diet.  Persistent hyperkalemia despite HD, concern for recirculation through fistula.   Required HD 7/18, 7/20, 7/21, 7/23 and lokelma   K now improved  Plan for fistulogram today  Vascular surgery following.
HD T/Th/S.  Renal following.  Renal diet.  Persistent hyperkalemia despite HD, concern for recirculation through fistula.   Required HD 7/18, 7/20, 7/21, 7/23 and lokelma   K now improved  Vascular surgery following.  s/p fistulogram with percutaneous balloon angioplasty 7/25 AVF with good thrill, less pulsatile, at the end of the case
HD T/Th/S.  Renal following.  Renal diet.  Persistent hyperkalemia despite HD, concern for recirculation through fistula.   Vascular surgery following.  Required HD 7/18, 7/20, 7/21, 7/23 and Lokelma.  S/p fistulogram with percutaneous balloon angioplasty 7/25 AVF with good thrill.  K now improved.  Will need to confirm start date for community HD upon discharge
HD T/Th/S.  Renal following.  Renal diet.  Persistent hyperkalemia despite HD, concern for recirculation through fistula.   Required HD 7/18, 7/20, 7/21, 7/23 and lokelma   K now improved  Vascular surgery following.  s/p fistulogram with percutaneous balloon angioplasty 7/25 AVF with good thrill, less pulsatile, at the end of the case
HD T/Th/S.  Renal following.  Renal diet.  Persistent hyperkalemia despite HD, concern for recirculation through fistula.   Required HD 7/18, 7/20, 7/21, 7/23  Plan for fistulogram Tuesday 7/25  Vascular surgery following.
HD T/Th/S.  Renal following.  Renal diet.

## 2023-07-31 NOTE — DISCHARGE NOTE NURSING/CASE MANAGEMENT/SOCIAL WORK - NSSCTYPOFSERV_GEN_ALL_CORE
NURSING, PHYSCAL THERAPY EVALUATE FOR AN AIDE, PROJECTED START OF CARE 8/1/23 NURSING, PHYSICAL THERAPY EVALUATE FOR AN AIDE, PROJECTED START OF CARE 8/1/23

## 2023-07-31 NOTE — PROGRESS NOTE ADULT - ASSESSMENT
63M PMHx ESRD on HD (T,Th, S), IDDM, COPD, gastroparesis, peripheral blindness and retinopathy, NSTEMI 6/2022 with recent hospital admission for CABG eval, now presents from home with complaints of worsening chest pain associated with difficulty breathing. Admitted to CT[facility], IABP placed, now s/p CABG on 7/14.    1. DM2, a1c 9.4% - glucoses controlled  - Continue lantus 7 units qhs  - Sliding scale coverage    2. CAD  - S/p CABG, care per CT surgery team    3. ESRD  - HD as per nephrology    4. Afib/aflutter  - S/p Medtronic loop recorder and leadless Micra implant

## 2023-07-31 NOTE — PROGRESS NOTE ADULT - NS ATTEND AMEND GEN_ALL_CORE FT
Seen and examined, agree with above unless specified below. Patient with TBS post CABG. He had mostly AFL but also had some AF with RVR, and conversion pauses requiring intermittent pacing and limiting titration of BB. His AFl was NOT captured on EKG and cannot be certain if its CTI dependant based on tele alone. Pacing support (most likely with leadless as expected to have low pacing burden and high risk of infection) as well as AFL ablation were discussed and considered. We considered all BRA. Given that he had some AF on monitor and the possibility that his AFL may not be typical especially in this immediate post op state, we made a decision to proceed with leadless PPM tomorrow, with plans to consider AF/AFL ablation as o/p in 3 months if needed. Will consider ILR at the same time or as o/p to closely monitor his AF/AFL burden as leadless PMs cannot do that. Will consider adding amiodarone for short period of this if needed after PPM.     NPO MN  HOLD eliquis  c/w current dose BB, can up-titrate after PPM  continue to try to capture the AFL on EKG if sustained long enough    Will follow up. Above d/w pt and Dr. Enriquez
Patient continues to have paroxysmal atrial flutter with conversion pauses. After decreasing dose of metoprolol the pauses are shorter but rates during flutter are approx 150 bpm. Recommend catheter ablation for atrial flutter (typical) for rhythm control and to allow discontinuation of metoprolol. Will plan for Friday this week. Patient should be started on eliquis after the fistulogram to be performed today.
Seen and examined, agree with above. Can uptitrate BB as needed. One view CXR ok. Pls notify EP to review 2 views X ray when done. Will follow up peripherally while admitted.
basal insulin increased slightly
continue to monitor glycemic control.  DUe to renal status, if emergency treatment needed for hyperkalemia would not give more than 5 units of IV insulin.
one post conversion pause approximately 4 seconds long. recommend decreasing dose of metoprolol to 50 mg bid.
stable glycemic control
pt BS are variable - still    pt po intake is poor as he often is given food with gravy/sauce and he does not like it  Dw pt-  will recc that dieticiian sees pt to find preferences     in additon    adjustmetn made to coverage scale    maintian premeal Admelog and Lantus same doses
pt. currently NPO. COntinue to monitor glycemic status
Cautious insulin use due to risk of hypoglycemia
pt seen and examined     DM can cont RX   encouraged po intake   pt reports to have better appetitie    low BS today after give 10 unit R as part of hyperkalemia RX
Above as by Dr. Cerrato prior plans. Ideally should be on full dose eliquis at time of ablation.
Agree with above. See EP reports for details. Bedside POCUS pre Micra showed small pericardial effusion that did not change after Micra implant.
I have seen and examined patient with NP, agree with above assessment and plan
Plan as outlined above     pt was NPO today for possible PPM  andno LAntus given last night    but was decided against     DM cont current RX

## 2023-07-31 NOTE — PROGRESS NOTE ADULT - PROBLEM SELECTOR PLAN 7
SCDs for DVT prophylaxis.  Protonix for stress ulcer prophylaxis.
Eliquis and SCDs for DVT prophylaxis  Protonix for stress ulcer prophylaxis.
SCDs for DVT prophylaxis. Eliquis to be restarted tomorrow.   Protonix for stress ulcer prophylaxis.

## 2023-07-31 NOTE — PROGRESS NOTE ADULT - NS ATTEND OPT1 GEN_ALL_CORE
I independently performed the documented:
I attest my time as attending is greater than 50% of the total combined time spent on qualifying patient care activities by the PA/NP and attending.
I independently performed the documented:
I independently performed the documented:
I attest my time as attending is greater than 50% of the total combined time spent on qualifying patient care activities by the PA/NP and attending.
I attest my time as attending is greater than 50% of the total combined time spent on qualifying patient care activities by the PA/NP and attending.
I independently performed the documented:
I independently performed the documented:
I attest my time as attending is greater than 50% of the total combined time spent on qualifying patient care activities by the PA/NP and attending.
I independently performed the documented:

## 2023-07-31 NOTE — DISCHARGE NOTE NURSING/CASE MANAGEMENT/SOCIAL WORK - NSDCFUADDAPPT_GEN_ALL_CORE_FT
Please follow up with Dr. Newsome by calling the CT Surgery office at [phone] on the next open business day to make an appointment.    The cardiac surgery office is located at [facility], first floor. Take a left at the end of the lobby until the end of that brown (past the elevator bank). Make a left and the office is on your right across from the elevators.     Your Care Navigator Nurse Practitioner will be in touch to see you in your home within a few days from discharge. The Follow Your Heart program can help ensure you understand your medications, discharge instructions and answer any questions you may have at that time. They are also a great source to address concerns during the day and may be reached at [phone].    Follow up with your EP Cardiologist Dr. Dietrich within 1-2 weeks.   Follow up with your cardiologist, primary care provider, and nephrologist in 2-4 weeks. Please follow up with Dr. Newsome for an appointment on Wednesday, August 9 at 2PM.     The cardiac surgery office is located at [facility], first floor. Take a left at the end of the lobby until the end of that brown (past the elevator bank). Make a left and the office is on your right across from the elevators.     Your Care Navigator Nurse Practitioner will be in touch to see you in your home within a few days from discharge. The Follow Your Heart program can help ensure you understand your medications, discharge instructions and answer any questions you may have at that time. They are also a great source to address concerns during the day and may be reached at [phone].    Follow up with your EP Cardiologist Dr. Dietrich within 1-2 weeks.   Follow up with your cardiologist, primary care provider, and nephrologist in 2-4 weeks. Please follow up with Dr. Newsome for an appointment on Wednesday, August 9 at 2PM.     The cardiac surgery office is located at [facility], first floor. Take a left at the end of the lobby until the end of that brown (past the elevator bank). Make a left and the office is on your right across from the elevators.     Your Care Navigator Nurse Practitioner will be in touch to see you in your home within a few days from discharge. The Follow Your Heart program can help ensure you understand your medications, discharge instructions and answer any questions you may have at that time. They are also a great source to address concerns during the day and may be reached at [phone].    Follow up with your EP Cardiologist Dr. Dietrich within 1-2 weeks.   Follow up with your cardiologist, primary care provider, and nephrologist in 2-4 weeks.  [employer] trip Auth#[id number] for tomorrow as follows;  Pickup time: 4:00am…from [address] [place] [address] to [facility]  Chair time:  5:00am  Return time: 9:15 am to home.

## 2023-07-31 NOTE — PROGRESS NOTE ADULT - SUBJECTIVE AND OBJECTIVE BOX
INTERVAL EVENTS:  Follow up diabetes management    ROS: Denies chest pain, sob, abd pain.     MEDICATIONS  (STANDING):  amLODIPine   Tablet 5 milliGRAM(s) Oral daily  apixaban 2.5 milliGRAM(s) Oral two times a day  atorvastatin 80 milliGRAM(s) Oral at bedtime  calcium acetate 667 milliGRAM(s) Oral three times a day with meals  cephalexin 500 milliGRAM(s) Oral every 12 hours  clopidogrel Tablet 75 milliGRAM(s) Oral daily  dextrose 50% Injectable 50 milliLiter(s) IV Push every 15 minutes  dextrose 50% Injectable 25 milliLiter(s) IV Push every 15 minutes  epoetin lois-epbx (RETACRIT) Injectable 5000 Unit(s) IV Push <User Schedule>  gabapentin 100 milliGRAM(s) Oral three times a day  glucagon  Injectable 1 milliGRAM(s) IntraMuscular once  hydrALAZINE 25 milliGRAM(s) Oral three times a day  insulin glargine Injectable (LANTUS) 7 Unit(s) SubCutaneous at bedtime  insulin lispro (ADMELOG) corrective regimen sliding scale   SubCutaneous three times a day before meals  insulin lispro (ADMELOG) corrective regimen sliding scale   SubCutaneous at bedtime  iron sucrose IVPB 100 milliGRAM(s) IV Intermittent every 48 hours  lidocaine   4% Patch 1 Patch Transdermal daily  lidocaine   4% Patch 1 Patch Transdermal daily  loratadine 10 milliGRAM(s) Oral daily  methocarbamol 500 milliGRAM(s) Oral two times a day  metoprolol tartrate 37.5 milliGRAM(s) Oral every 8 hours  Nephro-becky 1 Tablet(s) Oral daily  pantoprazole    Tablet 40 milliGRAM(s) Oral daily  sodium chloride 0.9% lock flush 3 milliLiter(s) IV Push every 8 hours  tamsulosin 0.4 milliGRAM(s) Oral at bedtime    MEDICATIONS  (PRN):  dextrose Oral Gel 15 Gram(s) Oral once PRN Blood Glucose LESS THAN 70 milliGRAM(s)/deciliter  ondansetron Injectable 4 milliGRAM(s) IV Push every 6 hours PRN Nausea and/or Vomiting  oxyCODONE    IR 5 milliGRAM(s) Oral every 6 hours PRN Severe Pain (7 - 10)    Allergies  No Known Drug Allergies  shellfish (Rash)    Vital Signs Last 24 Hrs  T(C): 37.4 (31 Jul 2023 10:06), Max: 37.6 (31 Jul 2023 07:34)  T(F): 99.4 (31 Jul 2023 10:06), Max: 99.6 (31 Jul 2023 07:34)  HR: 61 (31 Jul 2023 10:06) (60 - 68)  BP: 110/62 (31 Jul 2023 10:06) (110/62 - 161/64)  BP(mean): --  RR: 19 (31 Jul 2023 10:06) (18 - 19)  SpO2: 93% (31 Jul 2023 10:06) (93% - 98%)    Parameters below as of 31 Jul 2023 10:06  Patient On (Oxygen Delivery Method): room air    PHYSICAL EXAM:  General: No apparent distress  Neck: Supple, trachea midline, no thyromegaly  Respiratory: Lungs clear bilaterally, normal rate, effort  Cardiac: +S1, S2, no m/r/g  GI: +BS, soft, non tender, non distended  Extremities: No peripheral edema, no pedal lesions  Neuro: A+O X3, no tremor    LABS:                        8.9    7.72  )-----------( 182      ( 31 Jul 2023 07:37 )             27.8     07-31    130<L>  |  91<L>  |  35.8<H>  ----------------------------<  164<H>  4.0   |  25.0  |  7.41<H>    Ca    7.8<L>      31 Jul 2023 07:37  Mg     2.1     07-31      Urinalysis Basic - ( 31 Jul 2023 07:37 )    Color: x / Appearance: x / SG: x / pH: x  Gluc: 164 mg/dL / Ketone: x  / Bili: x / Urobili: x   Blood: x / Protein: x / Nitrite: x   Leuk Esterase: x / RBC: x / WBC x   Sq Epi: x / Non Sq Epi: x / Bacteria: x    POCT Blood Glucose.: 115 mg/dL (07-31-23 @ 12:02)  POCT Blood Glucose.: 182 mg/dL (07-31-23 @ 08:05)  POCT Blood Glucose.: 264 mg/dL (07-30-23 @ 21:20)  POCT Blood Glucose.: 189 mg/dL (07-30-23 @ 17:07)

## 2023-07-31 NOTE — PROGRESS NOTE ADULT - PROBLEM SELECTOR PROBLEM 1
CAD (coronary artery disease)
ESRD on dialysis
NSTEMI (non-ST elevation myocardial infarction)
CAD (coronary artery disease)
NSTEMI (non-ST elevation myocardial infarction)
CAD (coronary artery disease)
NSTEMI (non-ST elevation myocardial infarction)
CAD (coronary artery disease)

## 2023-07-31 NOTE — DISCHARGE NOTE NURSING/CASE MANAGEMENT/SOCIAL WORK - PATIENT PORTAL LINK FT
You can access the FollowMyHealth Patient Portal offered by [facility] by registering at the following website: [email or web address]. By joining [employer]’s FollowMyHealth portal, you will also be able to view your health information using other applications (apps) compatible with our system.

## 2023-07-31 NOTE — PROGRESS NOTE ADULT - PROVIDER SPECIALTY LIST ADULT
CT Surgery
Critical Care
Critical Care
Electrophysiology
Intervent Cardiology
Nephrology
CT Surgery
Electrophysiology
Endocrinology
Intervent Cardiology
Nephrology
Vascular Surgery
Critical Care
Electrophysiology
Endocrinology
Nephrology
Vascular Surgery
Vascular Surgery
CT Surgery
Critical Care
Thoracic Surgery
CT Surgery

## 2023-07-31 NOTE — PROGRESS NOTE ADULT - PROBLEM SELECTOR PLAN 3
Patient with PAF with long conversion pauses requiring backup pacing.   EP following.  Eliquis initiated 7/25, since held for procedure with EP 7/28.   S/p Medtronic loop recorder and leadless Micra implant 7/28/23.  Eliquis restarted   PA/Lat CXR cleared by EP team  Continue lopressor as tolerated by HR and BP.

## 2023-07-31 NOTE — PROGRESS NOTE ADULT - PROBLEM SELECTOR PLAN 6
Left pleural effusion s/p pigtail catheter placement 7/28/23.  Monitor drainage from tube.   Follow up AM CXR.  Possible d/c CT later today.
SCDs heparin for DVT prophylaxis  Protonix for stress ulcer prophylaxis
SCDs, Heparin gtt for DVT prophylaxis.  Protonix for stress ulcer prophylaxis.
SCDs and Heparin gtt for DVT prophylaxis.  Protonix for stress ulcer prophylaxis.
SCDs for DVT prophylaxis.  Protonix for stress ulcer prophylaxis.
SCDs and Heparin gtt for DVT prophylaxis.  Protonix for stress ulcer prophylaxis.
SCDs and Heparin gtt for DVT prophylaxis.  Protonix for stress ulcer prophylaxis.
SCDs and subcutaneous Heparin for DVT prophylaxis.  Protonix for stress ulcer prophylaxis.
Left pleural effusion s/p pigtail catheter placement 7/28/23 with 1.4L output so far.  Monitor drainage from tube.   Follow up AM CXR.
SCDs and Heparin gtt for DVT prophylaxis.  Protonix for stress ulcer prophylaxis.
Left pleural effusion s/p pigtail catheter placement 7/28/23- removed 7/30/23  Follow up AM CXR
SCDs and Heparin gtt for DVT prophylaxis.  Protonix for stress ulcer prophylaxis.
SCDs and Heparin gtt for DVT prophylaxis.  Protonix for stress ulcer prophylaxis.

## 2023-07-31 NOTE — PROGRESS NOTE ADULT - SUBJECTIVE AND OBJECTIVE BOX
Subjective: Patient seen and assessed s/p CABG & Micra PPM implant. Patient states "I had a stomach ache, but it's better now." At time of exam, Pt reports 2 instances of diarrhea otherwise denies current chest pain, palpitations, dizziness, headache, shortness of breath, abdominal pain or N/V.     Pertinent events of the past 24 hours: PW removed, Eliquis restarted, Left pigtail catheter removed. Pending TTE.     Tele: NSR 70s    VITAL SIGNS  Vital Signs Last 24 Hrs  T(C): 37.2 (23 @ 20:39), Max: 37.4 (23 @ 07:52)  T(F): 98.9 (23 @ 20:39), Max: 99.4 (23 @ 07:52)  HR: 68 (-23 @ 20:39) (59 - 68)  BP: 146/71 (-23 @ 20:39) (112/52 - 161/64)  RR: 18 (23 @ 20:39) (17 - 18)  SpO2: 98% (23 @ 20:39) (93% - 98%)  on (O2)              MEDICATIONS  amLODIPine   Tablet 5 milliGRAM(s) Oral daily  apixaban 2.5 milliGRAM(s) Oral two times a day  atorvastatin 80 milliGRAM(s) Oral at bedtime  calcium acetate 667 milliGRAM(s) Oral three times a day with meals  clopidogrel Tablet 75 milliGRAM(s) Oral daily  dextrose 50% Injectable 50 milliLiter(s) IV Push every 15 minutes  dextrose 50% Injectable 25 milliLiter(s) IV Push every 15 minutes  dextrose Oral Gel 15 Gram(s) Oral once PRN  epoetin lois-epbx (RETACRIT) Injectable 5000 Unit(s) IV Push <User Schedule>  gabapentin 100 milliGRAM(s) Oral three times a day  glucagon  Injectable 1 milliGRAM(s) IntraMuscular once  hydrALAZINE 25 milliGRAM(s) Oral three times a day  insulin glargine Injectable (LANTUS) 7 Unit(s) SubCutaneous at bedtime  insulin lispro (ADMELOG) corrective regimen sliding scale   SubCutaneous three times a day before meals  insulin lispro (ADMELOG) corrective regimen sliding scale   SubCutaneous at bedtime  iron sucrose IVPB 100 milliGRAM(s) IV Intermittent every 48 hours  lidocaine   4% Patch 1 Patch Transdermal daily  lidocaine   4% Patch 1 Patch Transdermal daily  loratadine 10 milliGRAM(s) Oral daily  methocarbamol 500 milliGRAM(s) Oral two times a day  metoprolol tartrate 37.5 milliGRAM(s) Oral every 8 hours  Nephro-becky 1 Tablet(s) Oral daily  ondansetron Injectable 4 milliGRAM(s) IV Push every 6 hours PRN  oxyCODONE    IR 5 milliGRAM(s) Oral every 6 hours PRN  pantoprazole    Tablet 40 milliGRAM(s) Oral daily  sodium chloride 0.9% lock flush 3 milliLiter(s) IV Push every 8 hours  tamsulosin 0.4 milliGRAM(s) Oral at bedtime    PHYSICAL EXAM  Constitutional: NAD  Neuro: A+O x 3, non-focal, speech clear and intact  CV: regular rate, regular rhythm, +S1S2, no murmurs or rub  Pulm/chest: lung sounds CTA and equal bilaterally, no accessory muscle use noted  Abd: +BS, +mild tenderness, soft, ND  Ext: BARRETO x 4, nb/l LE +1/2 edema   Skin: warm, well perfused  Psych: calm, appropriate affect  Incision: midline sternal incision open to air, well approxiamted, sterum stable, no audible sternal click, Right groin soft, nontender, no hematoma     Intake and Output   @ :  -   @ 07:00  --------------------------------------------------------  IN: 200 mL / OUT: 1620 mL / NET: -1420 mL     @ 07:  -   @ 00:45  --------------------------------------------------------  IN: 580 mL / OUT: 0 mL / NET: 580 mL    Weights:  Daily     Daily Weight in k.5 (2023 04:00)  Admit Wt: Drug Dosing Weight  Height (cm): 160 (2023 23:27)  Weight (kg): 68.4 (2023 04:34)  BMI (kg/m2): 26.7 (2023 04:34)  BSA (m2): 1.71 (2023 04:34)    All laboratory results, radiology and medications reviewed.    LABS      135  |  96  |  26.4<H>  ----------------------------<  160<H>  3.9   |  25.0  |  5.76<H>    Ca    7.8<L>      2023 05:35  Mg     1.9                                        8.8    7.53  )-----------( 155      ( 2023 05:35 )             27.0              CAPILLARY BLOOD GLUCOSE  POCT Blood Glucose.: 264 mg/dL (2023 21:20)  POCT Blood Glucose.: 189 mg/dL (2023 17:07)  POCT Blood Glucose.: 158 mg/dL (2023 11:50)  POCT Blood Glucose.: 160 mg/dL (2023 08:02)         < from: Xray Chest 1 View- PORTABLE-Routine (Xray Chest 1 View- PORTABLE-Routine in AM.) (23 @ 05:42) >    INTERPRETATION:  Chest one view 2023 5:40 PM    HISTORY: Pleural effusion    COMPARISON STUDY: Earlier the same day    Frontal expiratory view of the chest shows the heart to be similar in   size. Left pigtail catheter has been placed. Loop recorder and micra   pacemaker are also present.    The lungs show mild pulmonary congestion primarily involving the right   lung and there is no evidence of pneumothorax nor pleural effusion.    Chest one view 2023 4:07 AM  Compared to the prior study, there has been progression ofsmall right   effusion. No pneumothorax.    IMPRESSION:  No pneumothorax after pigtail catheter placement.    < end of copied text >      < from: 12 Lead ECG (23 @ 07:16) >    Ventricular Rate 62 BPM    Atrial Rate 62 BPM    P-R Interval 186 ms    QRS Duration 84 ms    Q-T Interval 426 ms    QTC Calculation(Bazett) 432 ms    P Axis 58 degrees    R Axis 0 degrees    T Axis -68 degrees    Diagnosis Line Normal sinus rhythm  Septal infarct , age undetermined  ST & T wave abnormality, consider lateral ischemia  Abnormal ECG    < end of copied text >      < from: TTE Echo Complete w/ Contrast w/ Doppler (23 @ 09:41) >    PHYSICIAN INTERPRETATION:  Left Ventricle: The left ventricular internal cavity size is normal.  Global LV systolic function was normal. Left ventricular ejection   fraction, by visual estimation, is 50 to 55%. Abnormal (paradoxical)   septal motion consistent with post-operative status. Spectral Doppler   shows pseudonormal pattern of left ventricular myocardial filling (Grade   II diastolic dysfunction).  Right Ventricle: The right ventricular size is normal. RV systolic   function is mildly reduced.  Left Atrium:The left atrium is normal in size.  Right Atrium: The right atrium is normal in size.  Pericardium: There is no evidence of pericardial effusion.  Mitral Valve: Mild thickening and calcification of the anterior and   posterior mitral valve leaflets. There is mild mitral annular   calcification. Mild mitral valve regurgitation is seen.  Tricuspid Valve: The tricuspid valve is normal in structure. Mild   tricuspid regurgitation is visualized. Estimated pulmonary artery   systolic pressure is 35.8 mmHg assuming a right atrial pressure of 15   mmHg, which is consistent with borderline pulmonary hypertension.  Aortic Valve: The aortic valve is trileaflet. Sclerotic aortic valve with   normal opening. No evidence of aortic valve regurgitation is seen.  Pulmonic Valve: The pulmonic valve is normal. No indication of pulmonic   valve regurgitation.  Aorta: The aortic root and ascending aorta are structurally normal, with   no evidence of dilitation.  Pulmonary Artery: The main pulmonary artery is normal in size. Normal   pulmonary artery pressure.  Venous: A normal flow pattern is recorded from the right upper pulmonary   vein. The inferior vena cava was dilated, with respiratory size variation   less than 50%.      Summary:   1. Normal left ventricular internal cavity size.   2. Normal global left ventricular systolic function.   3. Left ventricular ejection fraction, by visual estimation, is 50 to   55%.   4. Abnormal septal motion consistent with post-operative status.   5. Spectral Doppler shows pseudonormal pattern of left ventricular   myocardial filling (Grade II diastolic dysfunction).   6. Mildly reduced RV systolic function.   7. The left atrium is normal in size.   8. The right atrium is normal in size.   9. Sclerotic aorticvalve with normal opening.  10. Mild thickening and calcification of the anterior and posterior   mitral valve leaflets.  11. Mild mitral annular calcification.  12. Mild mitral valve regurgitation.  13. Mild tricuspid regurgitation.  14. Estimated pulmonary artery systolic pressure is 35.8 mmHg assuming a   right atrial pressure of 15 mmHg, which is consistent with borderline   pulmonary hypertension.  15. There is no evidence of pericardial effusion.    < end of copied text >

## 2023-07-31 NOTE — PROGRESS NOTE ADULT - PROBLEM SELECTOR PROBLEM 4
Diabetes mellitus
ESRD on dialysis
Prophylactic measure
Diabetes mellitus
Diabetes mellitus
Prophylactic measure
Diabetes mellitus
Prophylactic measure
Diabetes mellitus

## 2023-07-31 NOTE — PROGRESS NOTE ADULT - PROBLEM SELECTOR PROBLEM 3
Atrial fibrillation
ESRD on dialysis
Atrial fibrillation
Atrial fibrillation
ESRD on dialysis
Atrial fibrillation
ESRD on dialysis
Atrial fibrillation
Diabetes mellitus
Atrial fibrillation

## 2023-08-01 ENCOUNTER — TRANSCRIPTION ENCOUNTER (OUTPATIENT)
Age: 63
End: 2023-08-01

## 2023-08-01 RX ORDER — OMEPRAZOLE 20 MG/1
20 CAPSULE, DELAYED RELEASE ORAL
Qty: 30 | Refills: 0 | Status: ACTIVE | COMMUNITY
Start: 1900-01-01 | End: 1900-01-01

## 2023-08-02 ENCOUNTER — TRANSCRIPTION ENCOUNTER (OUTPATIENT)
Age: 63
End: 2023-08-02

## 2023-08-02 ENCOUNTER — APPOINTMENT (OUTPATIENT)
Dept: CARE COORDINATION | Facility: HOME HEALTH | Age: 63
End: 2023-08-02
Payer: SELF-PAY

## 2023-08-02 PROCEDURE — 99024 POSTOP FOLLOW-UP VISIT: CPT

## 2023-08-02 RX ORDER — SODIUM BICARBONATE 1 MEQ/ML
2 SYRINGE (ML) INTRAVENOUS
Qty: 84 | Refills: 0
Start: 2023-08-02 | End: 2023-08-15

## 2023-08-02 RX ORDER — SODIUM BICARBONATE 1 MEQ/ML
2 SYRINGE (ML) INTRAVENOUS
Qty: 180 | Refills: 0
Start: 2023-08-02 | End: 2023-08-31

## 2023-08-03 VITALS
HEART RATE: 62 BPM | DIASTOLIC BLOOD PRESSURE: 60 MMHG | SYSTOLIC BLOOD PRESSURE: 138 MMHG | RESPIRATION RATE: 16 BRPM | OXYGEN SATURATION: 97 %

## 2023-08-03 NOTE — PLAN
[TextEntry] : 1. daily weights and showers 2. cont current meds, start levaquin, renal dose 500mg first dose then 250 qod, add vanco for cdiff prophylaxis 3. keep legs elevated 4. incentive spirometry 5. homecare- [facility] 6. diet- low salt, low fat 7. f/u appts - CTS 8/9, cards tbd 8. [facility] team will continue to f/u with pt status, pt agrees to call with any questions, issues or concerns.

## 2023-08-03 NOTE — PHYSICAL EXAM
[] : no respiratory distress [Respiration, Rhythm And Depth] : normal respiratory rhythm and effort [Auscultation Breath Sounds / Voice Sounds] : lungs were clear to auscultation bilaterally [Heart Rate And Rhythm] : heart rate was normal and rhythm regular [Heart Sounds] : normal S1 and S2 [FreeTextEntry1] : MSI with nonfluctuant collection, erythema, no warmth, no drainage, CT sites and SVG sites LLE without erythema, drainage or warmth, with edges well approximated.  Sternum stable. BLE minimal edema [Bowel Sounds] : normal bowel sounds [Abdomen Soft] : soft [Abdomen Tenderness] : non-tender

## 2023-08-03 NOTE — HISTORY OF PRESENT ILLNESS
[FreeTextEntry1] : 63M PMHx ESRD on HD (T,Th,S), IDDM, COPD, gastroparesis, peripheral blindness and retinopathy, NSTEMI 6/2022 with recent hospital admission for CABG eval, c diff + that admission, now presents from home with complaints of worsening chest pain associated with difficulty breathing. In ER EKGs with intermittent diffuse ST changes, + troponins, +NSTEMI, started on a Tridil and Heparin gtt for NSTEMI, admitted to CTICU, IABP placed, now s/p CABG x 4 (LIMA-LAD, SVG-OM 1, OM2, PDA) on 7/14/23, IABP removed after OR without incident. Postop course notable for PAF with long conversion pauses of 2-6.5 seconds (EP following, s/p Medtronic loop recorder and leadless Micra implant 7/28/23), Left pleural effusion (s/p pigtail catheter placement 7/28/23, since removed), and persistent hyperkalemia despite HD (s/p fistulogram for recirculation with percutaneous balloon angioplasty 7/25/23, hyperkalemia since resolved). Patient remains hemodynamically stable. Plan for discharge home later today as per Dr. Newsome, discussed in AM rounds.   HD T/TH/S: HD set up for outpatient treatments.  education and emotional support provided all questions answered meds reviewed

## 2023-08-04 ENCOUNTER — TRANSCRIPTION ENCOUNTER (OUTPATIENT)
Age: 63
End: 2023-08-04

## 2023-08-09 ENCOUNTER — APPOINTMENT (OUTPATIENT)
Dept: CARDIOTHORACIC SURGERY | Facility: CLINIC | Age: 63
End: 2023-08-09
Payer: SELF-PAY

## 2023-08-09 VITALS
SYSTOLIC BLOOD PRESSURE: 152 MMHG | OXYGEN SATURATION: 96 % | WEIGHT: 138 LBS | RESPIRATION RATE: 16 BRPM | HEART RATE: 64 BPM | HEIGHT: 64 IN | DIASTOLIC BLOOD PRESSURE: 69 MMHG | BODY MASS INDEX: 23.56 KG/M2 | TEMPERATURE: 98.8 F

## 2023-08-09 DIAGNOSIS — N18.6 END STAGE RENAL DISEASE: ICD-10-CM

## 2023-08-09 DIAGNOSIS — N18.1 CHRONIC KIDNEY DISEASE, STAGE 1: ICD-10-CM

## 2023-08-09 PROCEDURE — 99024 POSTOP FOLLOW-UP VISIT: CPT

## 2023-08-09 RX ORDER — LEVOFLOXACIN 750 MG/1
750 TABLET, FILM COATED ORAL DAILY
Qty: 6 | Refills: 0 | Status: ACTIVE | COMMUNITY
Start: 2023-08-02 | End: 1900-01-01

## 2023-08-10 RX ORDER — VANCOMYCIN HYDROCHLORIDE 125 MG/1
125 CAPSULE ORAL
Qty: 15 | Refills: 0 | Status: ACTIVE | COMMUNITY
Start: 2023-08-02 | End: 1900-01-01

## 2023-08-10 NOTE — REASON FOR VISIT
[Spouse] : spouse [de-identified] : Coronary bypass grafting x4 with the left internal mammary artery to the left anterior descending, saphenous vein graft in a sequential fashion to the first marginal  and to the second marginal and to the right posterior descending artery.   [de-identified] : 7/14/23 [de-identified] : He reports feeling improvement in his shortness of breath.  He has now completed a week course of Levaquin and he is finishing Vancomycin in 2 days

## 2023-08-10 NOTE — ASSESSMENT
[FreeTextEntry1] : Today on exam patient's lungs clear bilaterally, sternum stable, incision clean, dry and intact. Sternal incision with area of raised erythema that is improving since antibiotic therapy began 1 week ago. SVG site is clean, dry and intact. No peripheral edema noted. Instructed patient on importance of optimal glycemic control, daily showering, daily weights, incentive spirometer use, and increase ambulation as tolerated. Instructed to call office with any signs or symptoms of infection or weight gain of 2 or more pounds in 1 day or 3 or more pounds in 1 week.  PLAN: - Continue Levaquin x 2 weeks renal adjustment noted - Continue Vancomycin (recent hx of cdiff now on long term abx) - Return to care in 2 weeks for sternal incision evaluation       I, Dr. Newsome personally performed the evaluation and management (E/M) services for this patient. That E/M includes conducting the initial examination, assessing all conditions, and establishing the plan of care. Today, Mirza Castro NP was here to observe my evaluation and management services for this patient.

## 2023-08-11 ENCOUNTER — APPOINTMENT (OUTPATIENT)
Dept: ELECTROPHYSIOLOGY | Facility: CLINIC | Age: 63
End: 2023-08-11
Payer: SELF-PAY

## 2023-08-11 VITALS
OXYGEN SATURATION: 95 % | DIASTOLIC BLOOD PRESSURE: 44 MMHG | HEART RATE: 61 BPM | WEIGHT: 136 LBS | SYSTOLIC BLOOD PRESSURE: 126 MMHG | TEMPERATURE: 99 F | BODY MASS INDEX: 24.1 KG/M2 | HEIGHT: 63 IN

## 2023-08-11 DIAGNOSIS — R00.1 BRADYCARDIA, UNSPECIFIED: ICD-10-CM

## 2023-08-11 PROCEDURE — 93279 PRGRMG DEV EVAL PM/LDLS PM: CPT

## 2023-08-11 PROCEDURE — 93000 ELECTROCARDIOGRAM COMPLETE: CPT | Mod: 59

## 2023-08-11 PROCEDURE — 99024 POSTOP FOLLOW-UP VISIT: CPT

## 2023-08-11 RX ORDER — SODIUM BICARBONATE 650 MG/1
650 TABLET ORAL EVERY 8 HOURS
Qty: 180 | Refills: 0 | Status: DISCONTINUED | COMMUNITY
End: 2023-08-11

## 2023-08-11 RX ORDER — METHOCARBAMOL 500 MG/1
500 TABLET, FILM COATED ORAL TWICE DAILY
Refills: 0 | Status: ACTIVE | COMMUNITY

## 2023-08-11 RX ORDER — OXYCODONE 5 MG/1
5 TABLET ORAL
Refills: 0 | Status: ACTIVE | COMMUNITY

## 2023-08-11 RX ORDER — ACETAMINOPHEN 325 MG/1
325 TABLET ORAL
Refills: 0 | Status: ACTIVE | COMMUNITY

## 2023-08-11 RX ORDER — SODIUM BICARBONATE 650 MG/1
650 TABLET ORAL 3 TIMES DAILY
Refills: 0 | Status: ACTIVE | COMMUNITY

## 2023-08-11 RX ORDER — GABAPENTIN 300 MG/1
300 CAPSULE ORAL DAILY
Refills: 0 | Status: ACTIVE | COMMUNITY

## 2023-08-11 RX ORDER — ALBUTEROL 90 MCG
90 AEROSOL (GRAM) INHALATION
Refills: 0 | Status: ACTIVE | COMMUNITY

## 2023-08-11 RX ORDER — SACCHAROMYCES BOULARDII 50 MG
250 CAPSULE ORAL TWICE DAILY
Refills: 0 | Status: DISCONTINUED | COMMUNITY
End: 2023-08-11

## 2023-08-11 RX ORDER — ISOSORBIDE MONONITRATE 30 MG/1
30 TABLET, EXTENDED RELEASE ORAL DAILY
Qty: 30 | Refills: 3 | Status: DISCONTINUED | COMMUNITY
Start: 2023-07-06 | End: 2023-08-11

## 2023-08-11 RX ORDER — CALCIUM ACETATE 667 MG/1
667 TABLET ORAL 3 TIMES DAILY
Refills: 0 | Status: ACTIVE | COMMUNITY

## 2023-08-11 RX ORDER — LORATADINE 10 MG/1
10 TABLET ORAL DAILY
Refills: 0 | Status: DISCONTINUED | COMMUNITY
End: 2023-08-11

## 2023-08-11 RX ORDER — ELECTROLYTES/DEXTROSE
SOLUTION, ORAL ORAL DAILY
Refills: 0 | Status: ACTIVE | COMMUNITY

## 2023-08-11 RX ORDER — APIXABAN 2.5 MG/1
2.5 TABLET, FILM COATED ORAL TWICE DAILY
Refills: 0 | Status: ACTIVE | COMMUNITY

## 2023-08-11 RX ORDER — CARVEDILOL 25 MG/1
25 TABLET, FILM COATED ORAL TWICE DAILY
Refills: 0 | Status: DISCONTINUED | COMMUNITY
End: 2023-08-11

## 2023-08-15 ENCOUNTER — TRANSCRIPTION ENCOUNTER (OUTPATIENT)
Age: 63
End: 2023-08-15

## 2023-08-18 ENCOUNTER — APPOINTMENT (OUTPATIENT)
Dept: CARDIOLOGY | Facility: CLINIC | Age: 63
End: 2023-08-18
Payer: SELF-PAY

## 2023-08-18 ENCOUNTER — NON-APPOINTMENT (OUTPATIENT)
Age: 63
End: 2023-08-18

## 2023-08-18 VITALS
BODY MASS INDEX: 24.09 KG/M2 | SYSTOLIC BLOOD PRESSURE: 140 MMHG | OXYGEN SATURATION: 93 % | HEART RATE: 71 BPM | WEIGHT: 136 LBS | DIASTOLIC BLOOD PRESSURE: 50 MMHG

## 2023-08-18 DIAGNOSIS — Z95.0 PRESENCE OF CARDIAC PACEMAKER: ICD-10-CM

## 2023-08-18 DIAGNOSIS — I10 ESSENTIAL (PRIMARY) HYPERTENSION: ICD-10-CM

## 2023-08-18 DIAGNOSIS — E78.5 HYPERLIPIDEMIA, UNSPECIFIED: ICD-10-CM

## 2023-08-18 DIAGNOSIS — I48.0 PAROXYSMAL ATRIAL FIBRILLATION: ICD-10-CM

## 2023-08-18 PROCEDURE — 93000 ELECTROCARDIOGRAM COMPLETE: CPT | Mod: NC

## 2023-08-18 PROCEDURE — 99214 OFFICE O/P EST MOD 30 MIN: CPT

## 2023-08-18 NOTE — DISCUSSION/SUMMARY
[FreeTextEntry1] : 63 year old male with a history of HTN, hyperlipidemia, ESRD on HD (T, Th, Sat), COPD, DM type 2 uncontrolled, gastroparesis s/p gastric PM 2/2013, peripheral blindness and retinopathy, BPH, recent CDiff infection 6/1/23 s/p 10 day course of oral vancomycin, CAD s/p prior PCI, and recent hospitalization 6/2023 for NSTEMI, ICM LVEF 35-40%, and C which demonstrated multi-vessel disease who underwent 4vCABG (LIMA-LAD,VAV-VH4-EL8-PDA) on 7/14/23. EP consulted regarding AFib/AFlutter with conversion pauses. Now status post uncomplicated MDT leadless Micra implant via RFV access and loop recorder implant for continued monitoring. He presents for follow up after hospitalization.  Assessment/ Plan:  1. CAD s/p CABG: recovering well. Surgery f/u in 2 weeks as planned. Advised to closely monitor wounds for any increased redness or development of edema or drainage- and to seek care immediately if this occurs. Would like information on cardiac rehab- will refer, however, pt may not go forward with it due to lack of insurance and transportation issues. Continue statin and Plavix. LLE edema improving post op- advised to elevate legs as much as possible.  2. PAF, s/p PPM and ILR: Tolerating Eliquis. SR today. On Metoprolol.  3. HTN: BP stable continue current meds.  4. Follow up with Dr. Becerra in 6 months or sooner if needed.  Patient was advised to contact the office or seek emergency medical care for any new, worsening or concerning symptoms. Patient verbalized understanding and is in agreement with the above plan.  Maggie Hogue was present in office at the time of visit. [EKG obtained to assist in diagnosis and management of assessed problem(s)] : EKG obtained to assist in diagnosis and management of assessed problem(s)

## 2023-08-18 NOTE — HISTORY OF PRESENT ILLNESS
[FreeTextEntry1] : 7'/6/2023: Patient with history of CAD, ESRD, recent c diff presenting for evaluation for pCI for significant multivessel disease.  Patient was trasnferred from [place] one month prior to [facility] for CABG but due to conditions at the time, was not considered to be a CABG candidate.  Now doing well.  Has had chest pain episodes.   8/18/2023: 63 year old male with a history of HTN, hyperlipidemia, ESRD on HD (T, Th, Sat), COPD, DM type 2 uncontrolled, gastroparesis s/p gastric PM 2/2013, peripheral blindness and retinopathy, BPH, recent CDiff infection 6/1/23 s/p 10 day course of oral vancomycin, CAD s/p prior PCI, and recent hospitalization 6/2023 for NSTEMI, ICM LVEF 35-40%, and [facility] which demonstrated multi-vessel disease who underwent 4vCABG (LIMA-LAD,[id number]) on 7/14/23. EP consulted regarding AFib/AFlutter with conversion pauses. Now status post uncomplicated MDT leadless Micra implant via RFV access and loop recorder implant for continued monitoring. He presents for follow up after hospitalization. Accompanied by his wife, Belem. Reports feeling good today. He has been walking more and states he is not SOB as he was prior to surgery. Reports continued soreness in his chest after surgery. Reports quick fluttering palpitations, momentary, rarely. Denies SOB at rest, CABAN, lightheadedness, dizziness, syncope, near syncope. Reports LLE edema has been gradually improving. Denies smoking, excessive alcohol and illicit drug use.

## 2023-08-18 NOTE — REVIEW OF SYSTEMS
[Feeling Fatigued] : feeling fatigued [SOB] : no shortness of breath [Dyspnea on exertion] : not dyspnea during exertion [Chest Discomfort] : chest discomfort [Lower Ext Edema] : lower extremity edema [Palpitations] : no palpitations [Orthopnea] : no orthopnea [Syncope] : no syncope [Dizziness] : no dizziness

## 2023-09-04 PROBLEM — Z95.1 S/P CABG X 4: Status: ACTIVE | Noted: 2023-08-02

## 2023-09-04 NOTE — COUNSELING
[Hygeine (Including Daily Shower)] : hygeine (including daily shower) [Importance of Regular Medical Follow-Up] : the importance of regular medical follow-up [No Heavy Lifting] : no heavy lifting (>15-20 lb. for 1 month or 25 lb. for 3 months from date of surgery) [Blood Pressure Control] : blood pressure control [Weight Management] : weight management [S/S of infection] : signs and symptoms of infection (and to whom it should be reported) [Progressive Ambulation/Activity] : progressive ambulation/activity [Medication/Vitamin/Herb/Food Interaction] : medication/vitamin/herb/food interaction [Low Fat/Low Cholesterol Diet] : low fat/low cholesterol diet [Blood Sugar Control] : blood sugar control

## 2023-09-06 ENCOUNTER — APPOINTMENT (OUTPATIENT)
Dept: CARDIOTHORACIC SURGERY | Facility: CLINIC | Age: 63
End: 2023-09-06
Payer: MEDICAID

## 2023-09-06 VITALS
HEART RATE: 77 BPM | BODY MASS INDEX: 24.54 KG/M2 | OXYGEN SATURATION: 95 % | RESPIRATION RATE: 16 BRPM | WEIGHT: 138.5 LBS | HEIGHT: 63 IN | DIASTOLIC BLOOD PRESSURE: 64 MMHG | SYSTOLIC BLOOD PRESSURE: 170 MMHG | TEMPERATURE: 98.1 F

## 2023-09-06 DIAGNOSIS — Z95.1 PRESENCE OF AORTOCORONARY BYPASS GRAFT: ICD-10-CM

## 2023-09-06 DIAGNOSIS — I25.10 ATHEROSCLEROTIC HEART DISEASE OF NATIVE CORONARY ARTERY W/OUT ANGINA PECTORIS: ICD-10-CM

## 2023-09-06 DIAGNOSIS — E11.9 TYPE 2 DIABETES MELLITUS W/OUT COMPLICATIONS: ICD-10-CM

## 2023-09-06 DIAGNOSIS — E11.21 TYPE 2 DIABETES MELLITUS WITH DIABETIC NEPHROPATHY: ICD-10-CM

## 2023-09-06 PROCEDURE — 99024 POSTOP FOLLOW-UP VISIT: CPT

## 2023-09-06 NOTE — PHYSICAL EXAM
[] : no respiratory distress [Respiration, Rhythm And Depth] : normal respiratory rhythm and effort [Auscultation Breath Sounds / Voice Sounds] : lungs were clear to auscultation bilaterally [Heart Rate And Rhythm] : heart rate was normal and rhythm regular [Heart Sounds] : normal S1 and S2 [Clean] : clean [Dry] : dry [Healing Well] : healing well [No Edema] : no edema

## 2023-09-06 NOTE — ASSESSMENT
[FreeTextEntry1] : Today on exam patient's lungs clear bilaterally, sternum stable, incision clean, dry and intact, no signs/symptoms of infection noted. SVG site is clean, dry and intact. No peripheral edema noted. Instructed patient on importance of optimal glycemic control, daily showering, daily weights, incentive spirometer use, and increase ambulation as tolerated. Instructed to call office with any signs or symptoms of infection or weight gain of 2 or more pounds in 1 day or 3 or more pounds in 1 week.   Continue Cephalexin as prescribed by [facility].  PLAN: - Continue follow up care with Cardiology. - Continue follow up care with Electrophysiology - Continue follow up care with Endocrinology - Return to care in the office as needed.   I, Dr. Newsome, personally performed the evaluation and management (E/M) services for this established patient who presents today with an existing condition(s).  That E/M includes conducting the examination, assessing all new/exacerbated conditions, and establishing a new plan of care.  Today, my ACP, Afshan Medina NP, was here to observe my evaluation and management services for this new problem/exacerbated condition to be followed going forward.

## 2023-09-06 NOTE — REASON FOR VISIT
[Spouse] : spouse [de-identified] : Coronary bypass grafting x4 with the left internal mammary artery to the left anterior descending, saphenous vein graft in a sequential fashion to the first marginal and to the second marginal and to the right posterior descending artery. [de-identified] : 7/14/23 [de-identified] : Postop course notable for PAF with long conversion pauses of 2-6.5 seconds (EP following, s/p [employer] loop recorder and leadless Micra implant 7/28/23), Left pleural effusion (s/p pigtail catheter placement 7/28/23, since removed), and persistent hyperkalemia despite HD (s/p fistulogram for recirculation with percutaneous balloon angioplasty 7/25/23, hyperkalemia since resolved).  At last office visit on 8/9/23, the sternal incision was noted to have an area of raised erythema, which he was on antibiotic therapy for. We discussed that he continue Levaquin for two weeks and presents today for sternal incision evaluation.  On 9/1/23, he presented to [facility] with complaints of redness surrounding the sternal incision. He was diagnosed with cellulitis and was prescribed Cephalexin 500mg four times a day (wife unable to recall how long he is to take the medication for).  Today the patient reports some mild discomfort around the sternal incision. He is otherwise feeling well, he denies chest pain, palpitations, dizziness, syncope, lower extremity edema, shortness of breath, weight loss/weight gain.

## 2023-09-07 RX ORDER — ATORVASTATIN CALCIUM 80 MG/1
80 TABLET, FILM COATED ORAL
Qty: 90 | Refills: 1 | Status: ACTIVE | COMMUNITY
Start: 2023-08-02 | End: 1900-01-01

## 2023-09-11 RX ORDER — CLOPIDOGREL BISULFATE 75 MG/1
75 TABLET, FILM COATED ORAL DAILY
Qty: 90 | Refills: 1 | Status: ACTIVE | COMMUNITY
Start: 1900-01-01 | End: 1900-01-01

## 2023-09-11 RX ORDER — METOPROLOL SUCCINATE 100 MG/1
100 TABLET, EXTENDED RELEASE ORAL DAILY
Qty: 90 | Refills: 1 | Status: ACTIVE | COMMUNITY
Start: 1900-01-01 | End: 1900-01-01

## 2023-09-12 RX ORDER — HYDRALAZINE HYDROCHLORIDE 25 MG/1
25 TABLET ORAL 3 TIMES DAILY
Qty: 270 | Refills: 1 | Status: ACTIVE | COMMUNITY
Start: 2023-08-02 | End: 1900-01-01

## 2023-09-25 NOTE — CONSULT NOTE ADULT - CONSULT REQUESTED DATE/TIME
13-Jun-2023 12:58
TRANSFER - OUT REPORT:    Verbal report given to Chelsey Andrade RN on [employer]  being transferred to  for routine progression of patient care       Report consisted of patient's Situation, Background, Assessment and   Recommendations(SBAR). Information from the following report(s) Nurse Handoff Report was reviewed with the receiving nurse. Thornton Fall Assessment:                           Lines:   Peripheral IV Left;Proximal Forearm (Active)        Opportunity for questions and clarification was provided. Patient transported with:  Transport.            Thi Jaime RN  09/25/23 [id number]
17-Jun-2023
13-Jun-2023 12:38
16-Jun-2023 11:21
13-Jun-2023 12:55

## 2024-01-18 NOTE — ADDENDUM
[FreeTextEntry1] : 1/18/24 Pt reports Eliquis is cost prohibitive. Recommend f/u with PCP at [facility] for transition to warfarin with INR Monitoring. Juliana UNGERC

## 2024-01-18 NOTE — DISCUSSION/SUMMARY
[FreeTextEntry1] : 63 year old male with a history of HTN, hyperlipidemia, ESRD on HD (T, Th, Sat), COPD, DM type 2 uncontrolled, gastroparesis s/p gastric PM 2/2013, peripheral blindness and retinopathy, BPH, recent CDiff infection 6/1/23 s/p 10 day course of oral vancomycin, CAD s/p prior PCI, and recent hospitalization 6/2023 for NSTEMI, ICM LVEF 35-40%, and [facility] which demonstrated multi-vessel disease who underwent 4vCABG (LIMA-LAD,[id number]-PDA) on 7/14/23. EP consulted regarding AFib/AFlutter with conversion pauses. Now status post uncomplicated MDT leadless Micra implant via RFV access and loop recorder implant for continued monitoring.   Presents for post implant device and wound check. Denies CP, SOB, CABAN, dizziness, palpitations, syncope or presyncope. On Eliquis and tolerating without bleeding events.   -  Normal device function in VVI 40bpm. Sensing, capture thresholds and impedance are all stable and WNL. Total  < 1% - In SR today. On Eliquis for h//o PAF and tolerating - Consider future EP f/up for rhythm control if recurrent AF noted.   Ofelia Sewell PAC

## 2024-01-18 NOTE — PROCEDURE
[No] : not [NSR] : normal sinus rhythm [See Device Printout] : See device printout [Normal] : The battery status is normal. [None] : none [de-identified] :  < 1% in VVI @ 40bpm

## 2024-01-18 NOTE — HISTORY OF PRESENT ILLNESS
[de-identified] : 63 year old male with a history of HTN, hyperlipidemia, ESRD on HD (T, Th, Sat), COPD, DM type 2 uncontrolled, gastroparesis s/p gastric PM 2/2013, peripheral blindness and retinopathy, BPH, recent CDiff infection 6/1/23 s/p 10 day course of oral vancomycin, CAD s/p prior PCI, and recent hospitalization 6/2023 for NSTEMI, ICM LVEF 35-40%, and [facility] which demonstrated multi-vessel disease who underwent 4vCABG (LIMA-LAD,[id number]-PDA) on 7/14/23. EP consulted regarding AFib/AFlutter with conversion pauses. Now status post uncomplicated MDT leadless Micra implant via RFV access and loop recorder implant for continued monitoring.   Presents for post implant device and wound check. Denies CP, SOB, CABAN, dizziness, palpitations, syncope or presyncope. On Eliquis and tolerating without bleeding events.

## 2024-03-28 ENCOUNTER — APPOINTMENT (OUTPATIENT)
Dept: CARDIOLOGY | Facility: CLINIC | Age: 64
End: 2024-03-28

## 2024-04-09 ENCOUNTER — APPOINTMENT (OUTPATIENT)
Dept: CARDIOLOGY | Facility: CLINIC | Age: 64
End: 2024-04-09

## 2024-04-16 ENCOUNTER — APPOINTMENT (OUTPATIENT)
Dept: CARDIOLOGY | Facility: CLINIC | Age: 64
End: 2024-04-16

## 2024-05-04 NOTE — CONSULT NOTE ADULT - NS ATTEND BILL GEN_ALL_CORE
Pre-application: Motor, sensory, and vascular responses intact in the injured extremity.
Attending to bill
Attending to bill

## 2024-08-16 NOTE — PROGRESS NOTE ADULT - ASSESSMENT
63M with h/o ESRD on HD (T,Th,S), IDDM, COPD, gastroparesis, peripheral blindness and retinopathy, and CAD, who initially presented to [facility] on 5/31 with n/v/d, missing his HD as a result, and found to be hyperkalemic with acute respiratory failure secondary to fluid overload vs pneumonia and NSTEMI in the context of C. diff colitis (positive on 6/1). Completed 10 day course of oral vancomycin on 6/12 per chart.  Patient became fluid overloaded during hospitalization at [place] requiring HFNC, improved with additional HD. Blood culture negative to date, patient received 1 dose of IV cefepime. Patient found to have NSTEMI s/p heparin drip 6/10, with cardiac cath 6/12 demonstrating MVD requiring transfer to [facility] for CT Surgery evaluation for possible CABG.   (2) Male

## 2024-10-17 ENCOUNTER — APPOINTMENT (OUTPATIENT)
Dept: CARDIOLOGY | Facility: CLINIC | Age: 64
End: 2024-10-17

## 2024-11-01 NOTE — CONSULT NOTE ADULT - PROBLEM SELECTOR RECOMMENDATION 9
Pulse oximetry assessment   94% at rest on room air (if 88% or less, skip next steps)  94% while ambulating on room air    Patient was not placed on oxygen for this assessment. She remain stable and denied shortness of breath during ambulation. No desaturation noted by this RN.     -MVD / NSTEMI at [place]  -CT SX following   -Still complaining of persistent CP for over a month, unchanged thus far, s/p NTG without resolution. Follow up EKG ordered as of now. Consider trialing imdur (ranexa contraindicated for eGFR < 30)  -GDMT: meto (able to up-titrate if HR / BP stable), ASA, statin  -CABG planning as per CT SX -MVD / NSTEMI at [place]  -CT SX following   -Still complaining of persistent CP for over a month, unchanged thus far, s/p NTG without resolution. Follow up EKG ordered as of now. Consider trialing imdur (ranexa contraindicated for eGFR < 30)  -GDMT: meto (able to up-titrate if HR / BP stable), ASA, statin  -CABG planning as per CT SX  -Keep HGB > 8

## 2025-04-13 NOTE — PROGRESS NOTE ADULT - PROBLEM SELECTOR PROBLEM 2
Patient did not want a gown   
RN to bedside, 02 sat RA was 88. This RN placed patient on 2 L NC and PA aware.   
RT called for treatment.   
NSTEMI (non-ST elevation myocardial infarction)
Administration: no  If Yes, please provide details:   Process Protocols/Bundles: N/A    Recommendation  Incomplete STAT orders:   Overdue Medications:   Patient Belongings:    Additional Comments:   If any further questions, please call Sending RN at [phone]      Admitting Unit Notification  Name of person notified and time:       Electronically signed by: Electronically signed by Susanna Rod RN on 4/13/2025 at 12:47 AM     
NSTEMI (non-ST elevation myocardial infarction)
NSTEMI (non-ST elevation myocardial infarction)
CAD (coronary artery disease)
CAD (coronary artery disease)
NSTEMI (non-ST elevation myocardial infarction)
CAD (coronary artery disease)
NSTEMI (non-ST elevation myocardial infarction)

## (undated) DEVICE — SUT PROLENE 4-0 36" RB-1

## (undated) DEVICE — ELCTR BOVIE PENCIL HANDPIECE ROCKER SWITCH 15FT

## (undated) DEVICE — GLV 8.5 PROTEXIS (WHITE)

## (undated) DEVICE — DRSG MEPILEX 10 X 30CM (4 X 12") AG

## (undated) DEVICE — ATR40 AUTOTRANSFUSION COLLECTION RESERVOIR

## (undated) DEVICE — TUBING SUCTION 20FT

## (undated) DEVICE — DRAPE TOWEL BLUE 17" X 24"

## (undated) DEVICE — TUBING ATS SUCTION LINE

## (undated) DEVICE — PRESSURE INFUSOR BAG 1000ML

## (undated) DEVICE — SUT PROLENE 6-0 24" C-1

## (undated) DEVICE — SUT SILK 0 30" SH

## (undated) DEVICE — TUBING IV SET SECOND 34" W/O LOK-BLUNT

## (undated) DEVICE — POSITIONER CARDIAC BUMP

## (undated) DEVICE — SUT DOUBLE 6 WIRE STERNAL

## (undated) DEVICE — SUT PROLENE 5-0 30" RB-2

## (undated) DEVICE — STEALTH CLAMP INSERT FIBRA/FIBRA 90MM

## (undated) DEVICE — SUT ETHIBOND 3-0 36" RB-1

## (undated) DEVICE — BLADE SURGICAL #20 CARBON

## (undated) DEVICE — ELCTR MULTIFUNCTION DEFIBRILLATION ELECTRODE EDGE SYSTEM ADULT

## (undated) DEVICE — DRSG TAPE UMBILICAL COTTON 2" X 30 X 1/8"

## (undated) DEVICE — SUT VICRYL 1 36" CTX UNDYED

## (undated) DEVICE — DRAPE TOWEL WHITE 17" X 24"

## (undated) DEVICE — MARKING PEN W RULER

## (undated) DEVICE — SAW BLADE STRYKER FAN OFFSET 20 X 41 X 0.38MM

## (undated) DEVICE — SUT PERMAHAND SILK 2 60" TIES

## (undated) DEVICE — SUT ETHIBOND 4-0 36" RB-1

## (undated) DEVICE — STOPCOCK 3 WAY W TUBE 35"

## (undated) DEVICE — PREP CHLORAPREP HI-LITE ORANGE 26ML

## (undated) DEVICE — BLOWER MISTER VIPER II

## (undated) DEVICE — VESSEL LOOP MAXI-RED  0.120" X 16"

## (undated) DEVICE — SUT ETHIBOND 2-0 30" V5 WHITE

## (undated) DEVICE — TUBING ALARIS PUMP MODULE NON-DEHP

## (undated) DEVICE — NDL PERC BASEPLT 18GX7CM

## (undated) DEVICE — SYNOVIS VASCULAR PROBE 1.5MM 15CM

## (undated) DEVICE — SUT PDS II 2-0 27" CT-1

## (undated) DEVICE — CHEST DRAIN PLEUR-EVAC DRY/WET ADULT-PEDS SINGLE (QUICK)

## (undated) DEVICE — Device

## (undated) DEVICE — SUT VICRYL 2 27" TP-1 UNDYED

## (undated) DEVICE — DRAPE SLUSH / WARMER 44 X 66"

## (undated) DEVICE — CLEANER FOR ELCTR TIP

## (undated) DEVICE — SUT PROLENE 7-0 24" BV175-6

## (undated) DEVICE — DRSG ALLEVYN LIFE SACRUM 6.75 X 6.75 (PINK)

## (undated) DEVICE — SOL ANTI FOG

## (undated) DEVICE — PACING CABLE A/V TEMP SCREW DOWN 6FT

## (undated) DEVICE — SUT VICRYL 0 36" CTX UNDYED

## (undated) DEVICE — CONNECTOR STRAIGHT 1/4 X 3/8"

## (undated) DEVICE — DRSG 4 X 8

## (undated) DEVICE — AORTIC PUNCH 5MM STANDARD HANDLE

## (undated) DEVICE — SUT PROLENE 7-0 30" BV-1

## (undated) DEVICE — LAP PAD 18 X 18"

## (undated) DEVICE — PREP SCRUB BRUSH W CHG 4%

## (undated) DEVICE — VISITEC 4X4

## (undated) DEVICE — RANGER BLOOD/FLUID WARMING SET DISP

## (undated) DEVICE — GLV 6.5 PROTEXIS (WHITE)

## (undated) DEVICE — SWITCH ARISS TABLE MOUNT UNEQUAL LEGS 13"

## (undated) DEVICE — SUT SILK 2 30" MH

## (undated) DEVICE — SUT PROLENE 3-0 36" SH

## (undated) DEVICE — STEALTH CLAMP INSERT FIBRA/FIBRA 60MM

## (undated) DEVICE — DRAPE INSTRUMENT POUCH 6.75" X 11"

## (undated) DEVICE — MAXI-FLO SUCTION CATHETER KIT 14FR STRAIGHT

## (undated) DEVICE — GLV 8 PROTEXIS (WHITE)

## (undated) DEVICE — DRSG TAPE TRANSPORE 3"

## (undated) DEVICE — GETINGE VASOVIEW 7 ENDOSCOPIC VESSEL HARVESTING SYSTEM

## (undated) DEVICE — STOPCOCK 3 WAY W SWIVEL MALE LUER LOCK

## (undated) DEVICE — SUT SILK 0 30" TIES

## (undated) DEVICE — BLADE SURGICAL #15 CARBON

## (undated) DEVICE — GLV 7.5 PROTEXIS (WHITE)

## (undated) DEVICE — DRAPE IOBAN 33" X 23"

## (undated) DEVICE — SUT MONOCRYL 4-0 27" PS-2 UNDYED

## (undated) DEVICE — NDL COUNTER FOAM AND MAGNET 40-70

## (undated) DEVICE — CATH IV SAFE BC 24G X 0.75" (YELLOW)

## (undated) DEVICE — BLADE SURGICAL #11 CARBON

## (undated) DEVICE — CONNECTOR STRAIGHT 3/8 X 1/2"

## (undated) DEVICE — MULTIPLE PERFUSION SET FEMALE 1 INLET LEG W 4 LEGS 15" (BLUE/RED)

## (undated) DEVICE — SUCTION YANKAUER NO CONTROL VENT

## (undated) DEVICE — SUT PROLENE 4-0 36" SH

## (undated) DEVICE — SUT ETHIBOND 5 4-30" CCS

## (undated) DEVICE — SUT VICRYL 2-0 27" CT-1 UNDYED

## (undated) DEVICE — SUT SILK 4-0 30" RB-1

## (undated) DEVICE — STAPLER SKIN PROXIMATE

## (undated) DEVICE — PACK VALVE

## (undated) DEVICE — BULLDOG SPRING CLIP 6MM SOFT/SOFT

## (undated) DEVICE — GLV 6.5 PROTEXIS (BLUE)

## (undated) DEVICE — DRSG TEGADERM 4X4.75"

## (undated) DEVICE — DRAPE CV 106" X 135"

## (undated) DEVICE — SUT PROLENE 4-0 30" SH-1

## (undated) DEVICE — SUCTION TUBE CARDIAC SOFT TIP 6FR SHAFT 10FR TIP 6"

## (undated) DEVICE — BEAVER BLADE MINI SHARP ALL ROUND (BLUE)

## (undated) DEVICE — SUT PLEDGET 9MM X 4MM X 1.5MM

## (undated) DEVICE — GOWN TRIMAX LG

## (undated) DEVICE — PRESSURE INFUSOR BAG 500ML

## (undated) DEVICE — SUT SILK 2-0 18" SH (POP-OFF)

## (undated) DEVICE — TUBING TRUWAVE PRESSURE MALE/FEMALE 72"

## (undated) DEVICE — SUT PROLENE 6-0 30" C-1

## (undated) DEVICE — PACK W INSPIRE OXYGENATOR W HEMOCONCENTRATOR

## (undated) DEVICE — SUT MONOCRYL 3-0 27" PS-2 UNDYED

## (undated) DEVICE — WARMING BLANKET DUO-THERM HYPER/HYPOTHERM ADULT

## (undated) DEVICE — SYR LUER LOK 50CC

## (undated) DEVICE — NDL TAPR FR EYE 1/2 CIR 3

## (undated) DEVICE — SUT VICRYL 0 54" REEL UNDYED

## (undated) DEVICE — SPONGE PEANUT AUTO COUNT

## (undated) DEVICE — SAW BLADE STRYKER STERNUM 31MM X 6.27 X .79

## (undated) DEVICE — ELCTR BOVIE BLADE 3/4" EXTENDED LENGTH 6"

## (undated) DEVICE — DRSG MEPILEX 10 X 10CM (4 X 4") AG

## (undated) DEVICE — SUT BOOT STANDARD (ASSORTED) 5 PAIR

## (undated) DEVICE — GLV 7 PROTEXIS (WHITE)

## (undated) DEVICE — DRSG OPSITE 13.75 X 4"

## (undated) DEVICE — STABILIZER HAND ASSISTANT ATTACHMENT W STABLESOFT 2S

## (undated) DEVICE — SOL INJ NS 0.9% 500ML 1-PORT

## (undated) DEVICE — SUT ETHIBOND 2-0 36" SH

## (undated) DEVICE — DRSG OPSITE 2.5 X 2"

## (undated) DEVICE — SUT PROLENE 7-0 24" BV-1

## (undated) DEVICE — DRAPE 3/4 SHEET 52X76"

## (undated) DEVICE — SUT ETHIBOND 2-0 4-30" RB-1 WHITE

## (undated) DEVICE — SUT VICRYL 3-0 27" CT-1

## (undated) DEVICE — AORTIC PUNCH 4.0MM STANDARD HANDLE

## (undated) DEVICE — BULLDOG SPRING CLIP LATIS/LATIS 6MM 1/2 FORCE (BLUE)

## (undated) DEVICE — TUBING MEDI-VAC W MAXIGRIP CONNECTORS 1/4"X6'

## (undated) DEVICE — SET BLOOD Y-TYPE

## (undated) DEVICE — GOWN XL W TOWEL

## (undated) DEVICE — FOLEY TRAY 16FR 5CC LF LUBRISIL ADVANCE TEMP CLOSED

## (undated) DEVICE — TUBING INSUFFLATION LAP FILTER 10FT

## (undated) DEVICE — SUT GORETEX CV-4 (3-0) 36" TH-18

## (undated) DEVICE — GLV 7 PROTEXIS (BLUE)

## (undated) DEVICE — SOL INJ NS 0.9% 1000ML

## (undated) DEVICE — MEDICATION LABELS W MARKER

## (undated) DEVICE — GLV 7.5 SENSICARE W ALOE

## (undated) DEVICE — SYR LUER LOK 20CC

## (undated) DEVICE — SUT ETHIBOND 2-0 30" SH-1 GREEN/WHITE

## (undated) DEVICE — GLV 6 PROTEXIS (WHITE)

## (undated) DEVICE — SYS VEIN HARVESTING VIRTUOSAPH PLUS W/ RADIAL

## (undated) DEVICE — SUT PROLENE 3-0 36" MH

## (undated) DEVICE — STABILIZER HAND ASSISTANT ATTACHMENT W STABLESOFT 2L

## (undated) DEVICE — FRAZIER SUCTION TIP 10FR

## (undated) DEVICE — SUT ETHIBOND 1 30" OS6

## (undated) DEVICE — WOUND IRR IRRISEPT W 0.5 CHG

## (undated) DEVICE — VENTING ADAPTER "Y" (RED/BLUE) 7.5"

## (undated) DEVICE — TRAY IRRIGATION SYR BULB 60CC

## (undated) DEVICE — PACK OPEN HEART VAMP PLUS